# Patient Record
Sex: FEMALE | Race: WHITE | NOT HISPANIC OR LATINO | Employment: UNEMPLOYED | URBAN - METROPOLITAN AREA
[De-identification: names, ages, dates, MRNs, and addresses within clinical notes are randomized per-mention and may not be internally consistent; named-entity substitution may affect disease eponyms.]

---

## 2017-01-03 ENCOUNTER — PATIENT MESSAGE (OUTPATIENT)
Dept: INFECTIOUS DISEASES | Facility: CLINIC | Age: 47
End: 2017-01-03

## 2017-01-03 DIAGNOSIS — Z80.0 FAMILY HISTORY OF COLON CANCER: Primary | ICD-10-CM

## 2017-01-05 ENCOUNTER — TELEPHONE (OUTPATIENT)
Dept: ENDOSCOPY | Facility: HOSPITAL | Age: 47
End: 2017-01-05

## 2017-01-05 DIAGNOSIS — Z80.0 FAMILY HISTORY OF COLON CANCER REQUIRING SCREENING COLONOSCOPY: Primary | ICD-10-CM

## 2017-01-05 RX ORDER — SODIUM, POTASSIUM,MAG SULFATES 17.5-3.13G
SOLUTION, RECONSTITUTED, ORAL ORAL
Qty: 1 BOTTLE | Refills: 0 | Status: ON HOLD | OUTPATIENT
Start: 2017-01-05 | End: 2017-01-26

## 2017-01-25 ENCOUNTER — ANESTHESIA EVENT (OUTPATIENT)
Dept: ENDOSCOPY | Facility: HOSPITAL | Age: 47
End: 2017-01-25
Payer: COMMERCIAL

## 2017-01-26 ENCOUNTER — ANESTHESIA (OUTPATIENT)
Dept: ENDOSCOPY | Facility: HOSPITAL | Age: 47
End: 2017-01-26
Payer: COMMERCIAL

## 2017-01-26 ENCOUNTER — SURGERY (OUTPATIENT)
Age: 47
End: 2017-01-26

## 2017-01-26 ENCOUNTER — HOSPITAL ENCOUNTER (OUTPATIENT)
Facility: HOSPITAL | Age: 47
Discharge: HOME | End: 2017-01-26
Attending: COLON & RECTAL SURGERY | Admitting: COLON & RECTAL SURGERY
Payer: COMMERCIAL

## 2017-01-26 VITALS
SYSTOLIC BLOOD PRESSURE: 115 MMHG | HEIGHT: 65 IN | DIASTOLIC BLOOD PRESSURE: 70 MMHG | OXYGEN SATURATION: 100 % | TEMPERATURE: 98 F | RESPIRATION RATE: 16 BRPM | HEART RATE: 69 BPM | WEIGHT: 118 LBS | BODY MASS INDEX: 19.66 KG/M2

## 2017-01-26 VITALS — RESPIRATION RATE: 73 BRPM

## 2017-01-26 DIAGNOSIS — Z13.9 SCREENING: ICD-10-CM

## 2017-01-26 LAB
B-HCG UR QL: NEGATIVE
CTP QC/QA: YES

## 2017-01-26 PROCEDURE — 37000009 HC ANESTHESIA EA ADD 15 MINS: Performed by: COLON & RECTAL SURGERY

## 2017-01-26 PROCEDURE — 45380 COLONOSCOPY AND BIOPSY: CPT | Mod: 33,,, | Performed by: COLON & RECTAL SURGERY

## 2017-01-26 PROCEDURE — 45380 COLONOSCOPY AND BIOPSY: CPT | Performed by: COLON & RECTAL SURGERY

## 2017-01-26 PROCEDURE — 37000008 HC ANESTHESIA 1ST 15 MINUTES: Performed by: COLON & RECTAL SURGERY

## 2017-01-26 PROCEDURE — D9220A PRA ANESTHESIA: Mod: 33,CRNA,, | Performed by: NURSE ANESTHETIST, CERTIFIED REGISTERED

## 2017-01-26 PROCEDURE — D9220A PRA ANESTHESIA: Mod: 33,ANES,, | Performed by: ANESTHESIOLOGY

## 2017-01-26 PROCEDURE — 81025 URINE PREGNANCY TEST: CPT | Performed by: NURSE PRACTITIONER

## 2017-01-26 PROCEDURE — 27201012 HC FORCEPS, HOT/COLD, DISP: Performed by: COLON & RECTAL SURGERY

## 2017-01-26 PROCEDURE — 25000003 PHARM REV CODE 250: Performed by: NURSE ANESTHETIST, CERTIFIED REGISTERED

## 2017-01-26 PROCEDURE — 25000003 PHARM REV CODE 250: Performed by: NURSE PRACTITIONER

## 2017-01-26 PROCEDURE — 63600175 PHARM REV CODE 636 W HCPCS: Performed by: NURSE ANESTHETIST, CERTIFIED REGISTERED

## 2017-01-26 PROCEDURE — 88305 TISSUE EXAM BY PATHOLOGIST: CPT | Performed by: PATHOLOGY

## 2017-01-26 RX ORDER — LIDOCAINE HCL/PF 100 MG/5ML
SYRINGE (ML) INTRAVENOUS
Status: DISCONTINUED | OUTPATIENT
Start: 2017-01-26 | End: 2017-01-26

## 2017-01-26 RX ORDER — PROPOFOL 10 MG/ML
VIAL (ML) INTRAVENOUS
Status: DISCONTINUED | OUTPATIENT
Start: 2017-01-26 | End: 2017-01-26

## 2017-01-26 RX ORDER — SODIUM CHLORIDE 9 MG/ML
INJECTION, SOLUTION INTRAVENOUS CONTINUOUS
Status: DISCONTINUED | OUTPATIENT
Start: 2017-01-26 | End: 2017-01-26 | Stop reason: HOSPADM

## 2017-01-26 RX ADMIN — LIDOCAINE HYDROCHLORIDE 100 MG: 20 INJECTION, SOLUTION INTRAVENOUS at 10:01

## 2017-01-26 RX ADMIN — PROPOFOL 80 MG: 10 INJECTION, EMULSION INTRAVENOUS at 10:01

## 2017-01-26 RX ADMIN — PROPOFOL 40 MG: 10 INJECTION, EMULSION INTRAVENOUS at 10:01

## 2017-01-26 RX ADMIN — SODIUM CHLORIDE: 0.9 INJECTION, SOLUTION INTRAVENOUS at 09:01

## 2017-01-26 NOTE — DISCHARGE INSTRUCTIONS
Colonoscopy     A camera attached to a flexible tube with a viewing lens is used to take video pictures.     Colonoscopy is used to view the inside of your lower digestive tract (colon and rectum). It can help screen for colon cancer and can help find the source of abdominal pain, bleeding, and changes in bowel habits. The test is usually done in the hospital on an outpatient basis. During the exam, the doctor can remove a small tissue sample ( a biopsy) for testing. Small growths, such as polyps, may also be removed during colonoscopy.  Getting ready   · Be sure to tell your doctor about any medications you take. Also tell your doctor about any health conditions you may have.  · Discuss the risks of the test with your doctor. These include bleeding and bowel puncture.  · Your rectum and colon must be empty for the test. So be sure to follow the diet and bowel prep instructions exactly. If you dont, the test may need to be rescheduled.  · Ask your doctor whether you need to have a friend or family member prepared to drive you home after the test.  During the test   · You are given sedating (relaxing) medication through an IV line. You may be drowsy or completely asleep.  · The procedure takes 30 minutes or longer.  · The doctor performs a digital rectal exam to check for anal and rectal problems. The rectum is lubricated and the scope inserted.  · If you are awake, you may have a feeling similar to needing to have a bowel movement. You may also feel pressure as air is pumped into the colon. Its OK to pass gas during the procedure.  After the test   ·      Colonoscopy provides an inside view of the entire colon.     You may discuss the results with your doctor right away or at a future visit.  · Try to pass all the gas right after the test to help prevent bloating and cramping.  · After the test, you can go back to your normal eating and other activities.  Risks and possible complications include:  · Bleeding · A  puncture or tear in the colon · Risks of anesthesia       © 7732-8500 [employer]. [address] [place] [address], [place] [address]. All rights reserved. This information is not intended as a substitute for professional medical care. Always follow your healthcare professional's instructions.

## 2017-01-26 NOTE — ANESTHESIA PREPROCEDURE EVALUATION
01/26/2017  Louise Cueto is a 46 y.o., female.    [facility] Anesthesia Evaluation         Review of Systems  Anesthesia Hx:  No problems with previous Anesthesia   Social:  Non-Smoker    Cardiovascular:   Exercise tolerance: good Denies Hypertension.  Denies CAD.     Denies Angina.  Functional Capacity Can you climb two flights of stairs? ==> Yes    Pulmonary:   Denies Asthma.  Denies Recent URI.  Denies Sleep Apnea.    Renal/:  Renal/ Normal     Hepatic/GI:   Denies PUD. Denies Hiatal Hernia.  Denies GERD. Denies Liver Disease.  Denies Hepatitis.    Neurological:   Denies CVA. Denies Seizures.    Endocrine:   Denies Diabetes. Denies Hypothyroidism.        Physical Exam  General:  Well nourished    Airway/Jaw/Neck:  Airway Findings: Mouth Opening: Normal Tongue: Normal  General Airway Assessment: Adult  Mallampati: I  TM Distance: Normal, at least 6 cm  Jaw/Neck Findings:  Neck ROM: Normal ROM  Neck Findings:     Eyes/Ears/Nose:  EYES/EARS/NOSE FINDINGS: Normal   Dental:  Dental Findings: In tact   Chest/Lungs:  Chest/Lungs Findings: Clear to auscultation     Heart/Vascular:  Heart Findings: Rate: Normal  Rhythm: Regular Rhythm  Sounds: Normal        Mental Status:  Mental Status Findings:  Alert and Oriented         Anesthesia Plan  Type of Anesthesia, risks & benefits discussed:  Anesthesia Type:  general  Patient's Preference: Proceed with anesthesia understanding that the risks are very small but could be serious or life threatening.  Intra-op Monitoring Plan: standard ASA monitors  Intra-op Monitoring Plan Comments:   Post Op Pain Control Plan:   Post Op Pain Control Plan Comments:   Induction:   IV  Beta Blocker:  Patient is not currently on a Beta-Blocker (No further documentation required).       Informed Consent: Patient understands risks and agrees with Anesthesia plan.  Questions answered.  Anesthesia consent signed with patient.  ASA Score: 1     Day of Surgery Review of History & Physical: I have interviewed and examined the patient. I have reviewed the patient's H&P dated:            Ready For Surgery From Anesthesia Perspective.

## 2017-01-26 NOTE — TRANSFER OF CARE
"Anesthesia Transfer of Care Note    Patient: Louise Cueto    Procedure(s) Performed: Procedure(s) (LRB):  COLONOSCOPY (N/A)    Patient location: PACU    Anesthesia Type: general    Transport from OR: Transported from OR on room air with adequate spontaneous ventilation    Post pain: adequate analgesia    Post assessment: no apparent anesthetic complications    Post vital signs: stable    Level of consciousness: awake    Nausea/Vomiting: no nausea/vomiting          Last vitals:   Visit Vitals    /65    Pulse 65    Temp 36.6 °C (97.9 °F)    Resp 20    Ht 5' 5" (1.651 m)    Wt 53.5 kg (118 lb)    LMP 01/20/2017    SpO2 100%    Breastfeeding No    BMI 19.64 kg/m2     "

## 2017-01-26 NOTE — ANESTHESIA POSTPROCEDURE EVALUATION
"Anesthesia Post Evaluation    Patient: Louise Cueto    Procedure(s) Performed: Procedure(s) (LRB):  COLONOSCOPY (N/A)    Final Anesthesia Type: general  Patient location during evaluation: GI PACU  Patient participation: Yes- Able to Participate  Level of consciousness: awake and alert  Post-procedure vital signs: reviewed and stable  Pain management: adequate  Airway patency: patent  PONV status at discharge: No PONV  Anesthetic complications: no      Cardiovascular status: blood pressure returned to baseline  Respiratory status: unassisted  Hydration status: euvolemic  Follow-up not needed.        Visit Vitals    /70 (BP Location: Left arm, Patient Position: Sitting, BP Method: Automatic)    Pulse 69    Temp 36.5 °C (97.7 °F) (Axillary)    Resp 16    Ht 5' 5" (1.651 m)    Wt 53.5 kg (118 lb)    LMP 01/20/2017    SpO2 100%    Breastfeeding No    BMI 19.64 kg/m2       Pain/Ozzy Score: Pain Assessment Performed: Yes (1/26/2017 11:04 AM)  Presence of Pain: denies (1/26/2017 11:04 AM)  Ozzy Score: 10 (1/26/2017 11:04 AM)      "

## 2017-01-26 NOTE — IP AVS SNAPSHOT
[facility]  [address]  [facility] [address]  Phone: [phone]           Patient Discharge Instructions     Our goal is to set you up for success. This packet includes information on your condition, medications, and your home care. It will help you to care for yourself so you don't get sicker and need to go back to the hospital.     Please ask your nurse if you have any questions.        There are many details to remember when preparing to leave the hospital. Here is what you will need to do:    1. Take your medicine. If you are prescribed medications, review your Medication List in the following pages. You may have new medications to  at the pharmacy and others that you'll need to stop taking. Review the instructions for how and when to take your medications. Talk with your doctor or nurses if you are unsure of what to do.     2. Go to your follow-up appointments. Specific follow-up information is listed in the following pages. Your may be contacted by a transition nurse or clinical provider about future appointments. Be sure we have all of the phone numbers to reach you, if needed. Please contact your provider's office if you are unable to make an appointment.     3. Watch for warning signs. Your doctor or nurse will give you detailed warning signs to watch for and when to call for assistance. These instructions may also include educational information about your condition. If you experience any of warning signs to your health, call your doctor.               [facility]  Unless otherwise directed by your provider, please contact [facility], our nurse care line that is available for 24/7 assistance.     [phone] (toll-free)    Registered nurses in the [facility] provide clinical advisement, health education, appointment booking, and other advisory services.                    ** Verify the list of medication(s) below is accurate and up  to date. Carry this with you in case of emergency. If your medications have changed, please notify your healthcare provider.             Medication List      Notice     You have not been prescribed any medications.               Please bring to all follow up appointments:    1. A copy of your discharge instructions.  2. All medicines you are currently taking in their original bottles.  3. Identification and insurance card.    Please arrive 15 minutes ahead of scheduled appointment time.    Please call 24 hours in advance if you must reschedule your appointment and/or time.            Discharge Instructions         Colonoscopy     A camera attached to a flexible tube with a viewing lens is used to take video pictures.     Colonoscopy is used to view the inside of your lower digestive tract (colon and rectum). It can help screen for colon cancer and can help find the source of abdominal pain, bleeding, and changes in bowel habits. The test is usually done in the hospital on an outpatient basis. During the exam, the doctor can remove a small tissue sample ( a biopsy) for testing. Small growths, such as polyps, may also be removed during colonoscopy.  Getting ready   · Be sure to tell your doctor about any medications you take. Also tell your doctor about any health conditions you may have.  · Discuss the risks of the test with your doctor. These include bleeding and bowel puncture.  · Your rectum and colon must be empty for the test. So be sure to follow the diet and bowel prep instructions exactly. If you dont, the test may need to be rescheduled.  · Ask your doctor whether you need to have a friend or family member prepared to drive you home after the test.  During the test   · You are given sedating (relaxing) medication through an IV line. You may be drowsy or completely asleep.  · The procedure takes 30 minutes or longer.  · The doctor performs a digital rectal exam to check for anal and rectal problems. The  "rectum is lubricated and the scope inserted.  · If you are awake, you may have a feeling similar to needing to have a bowel movement. You may also feel pressure as air is pumped into the colon. Its OK to pass gas during the procedure.  After the test   ·      Colonoscopy provides an inside view of the entire colon.     You may discuss the results with your doctor right away or at a future visit.  · Try to pass all the gas right after the test to help prevent bloating and cramping.  · After the test, you can go back to your normal eating and other activities.  Risks and possible complications include:  · Bleeding · A puncture or tear in the colon · Risks of anesthesia       © 9561-6583 [employer]. [address] [place] [address], [place] [address]. All rights reserved. This information is not intended as a substitute for professional medical care. Always follow your healthcare professional's instructions.            Admission Information     Date & Time Provider Department CSN    1/26/2017  8:55 AM Andrews Sears MD [facility] [id number]      Care Providers     Provider Role Specialty Primary office phone    Andrews Sears MD Attending Provider Colon and Rectal Surgery [phone]    Andrews Sears MD Surgeon  Colon and Rectal Surgery [phone]      Your Vitals Were     BP Pulse Temp Resp Height Weight    111/58 (BP Location: Left arm, Patient Position: Lying, BP Method: Automatic) 73 97.7 °F (36.5 °C) (Axillary) 16 5' 5" (1.651 m) 53.5 kg (118 lb)    Last Period SpO2 BMI          01/20/2017 99% 19.64 kg/m2        Recent Lab Values        5/2/2016                           9:10 AM           A1C 5.3                       Pending Labs     Order Current Status    Specimen to Pathology - Surgery Collected (01/26/17 1041)      Allergies as of 1/26/2017     No Known Allergies      Advance Directives     An advance directive is a document which, in the event you are no longer able " to make decisions for yourself, tells your healthcare team what kind of treatment you do or do not want to receive, or who you would like to make those decisions for you.  If you do not currently have an advance directive, [facility] encourages you to create one.  For more information call:  [phone]), [phone] ([phone]),  or log on to [email or web address].        Language Assistance Services     ATTENTION: Language assistance services are available, free of charge. Please call [phone].      ATENCIÓN: Si habla español, tiene a patterson disposición servicios gratuitos de asistencia lingüística. Llame al [phone].     CHÚ Ý: N?u b?n nói Ti?ng Vi?t, có các d?ch v? h? tr? ngôn ng? mi?n phí dành cho b?n. G?i s? [phone].         [facility] complies with applicable Federal civil rights laws and does not discriminate on the basis of race, color, national origin, age, disability, or sex.

## 2017-01-26 NOTE — ANESTHESIA RELEASE NOTE
Anesthesia Release from PACU Note    Patient: Louise Cueto    Procedure(s) Performed: Procedure(s) (LRB):  COLONOSCOPY (N/A)    Anesthesia type: General    Post pain: Adequate analgesia    Post assessment: no apparent anesthetic complications    Last Vitals:   Vitals:    01/26/17 1104   BP: 115/70   Pulse: 69   Resp: 16   Temp:    SpO2: 100%       Post vital signs: stable    Level of consciousness: awake    Complications: none    Airway Patency: patent    Respiratory: spontaneous    Cardiovascular: stable    Hydration: euvolemic

## 2017-01-26 NOTE — H&P
Endoscopy H&P    Procedure : Colonoscopy      asymptomatic screening exam and family history of colon cancer (mother 70's)      Past Medical History   Diagnosis Date    GERD (gastroesophageal reflux disease)              Review of Systems -ROS:  GENERAL: No fever, chills, fatigability or weight loss.  CHEST: Denies GERMAN, cyanosis, wheezing, cough and sputum production.  CARDIOVASCULAR: Denies chest pain, PND, orthopnea or reduced exercise tolerance.   Musculoskeletal ROS: negative for - gait disturbance or joint pain  Neurological ROS: negative for - confusion or memory loss        Physical Exam:  General: well developed, well nourished, no distress  Head: normocephalic  Neck: supple, symmetrical, trachea midline  Lungs:  clear to auscultation bilaterally and normal respiratory effort  Heart: regular rate and rhythm, S1, S2 normal, no murmur, rub or gallop and regular rate and rhythm  Abdomen: soft, non-tender non-distented; bowel sounds normal; no masses,  no organomegaly  Extremities: no cyanosis or edema, or clubbing       Moderate Sedation (choice): Mallampati Score 1    ASA : II    IMP: asymptomatic screening exam and family history of colon cancer (mother 70's)    Plan: Colonoscopy with Moderate sedation.  I have explained the procedure including indications, alternatives, expected outcomes and potential complications. The patient appears to understand and gives informed consent. The patient is medically ready for surgery.

## 2017-02-02 ENCOUNTER — TELEPHONE (OUTPATIENT)
Dept: ENDOSCOPY | Facility: HOSPITAL | Age: 47
End: 2017-02-02

## 2017-03-02 ENCOUNTER — TELEPHONE (OUTPATIENT)
Dept: INFECTIOUS DISEASES | Facility: CLINIC | Age: 47
End: 2017-03-02

## 2017-03-02 RX ORDER — AMOXICILLIN AND CLAVULANATE POTASSIUM 875; 125 MG/1; MG/1
1 TABLET, FILM COATED ORAL 2 TIMES DAILY
Qty: 20 TABLET | Refills: 0 | Status: SHIPPED | OUTPATIENT
Start: 2017-03-02 | End: 2017-03-12

## 2017-08-11 ENCOUNTER — OFFICE VISIT (OUTPATIENT)
Dept: URGENT CARE | Facility: CLINIC | Age: 47
End: 2017-08-11
Payer: COMMERCIAL

## 2017-08-11 VITALS
RESPIRATION RATE: 18 BRPM | BODY MASS INDEX: 19.66 KG/M2 | DIASTOLIC BLOOD PRESSURE: 67 MMHG | WEIGHT: 118 LBS | OXYGEN SATURATION: 100 % | HEIGHT: 65 IN | HEART RATE: 92 BPM | SYSTOLIC BLOOD PRESSURE: 103 MMHG | TEMPERATURE: 98 F

## 2017-08-11 DIAGNOSIS — S60.562A: Primary | ICD-10-CM

## 2017-08-11 DIAGNOSIS — L08.9: Primary | ICD-10-CM
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
## 2017-08-11 PROCEDURE — 99214 OFFICE O/P EST MOD 30 MIN: CPT | Mod: 25,S$GLB,, | Performed by: FAMILY MEDICINE

## 2017-08-11 PROCEDURE — 96372 THER/PROPH/DIAG INJ SC/IM: CPT | Mod: S$GLB,,, | Performed by: FAMILY MEDICINE

## 2017-08-11 PROCEDURE — 3008F BODY MASS INDEX DOCD: CPT | Mod: S$GLB,,, | Performed by: FAMILY MEDICINE

## 2017-08-11 RX ORDER — SULFAMETHOXAZOLE AND TRIMETHOPRIM 400; 80 MG/1; MG/1
1 TABLET ORAL 2 TIMES DAILY
Qty: 20 TABLET | Refills: 0 | Status: SHIPPED | OUTPATIENT
Start: 2017-08-11 | End: 2017-08-21

## 2017-08-11 RX ORDER — BETAMETHASONE SODIUM PHOSPHATE AND BETAMETHASONE ACETATE 3; 3 MG/ML; MG/ML
6 INJECTION, SUSPENSION INTRA-ARTICULAR; INTRALESIONAL; INTRAMUSCULAR; SOFT TISSUE
Status: COMPLETED | OUTPATIENT
Start: 2017-08-11 | End: 2017-08-11

## 2017-08-11 RX ADMIN — BETAMETHASONE SODIUM PHOSPHATE AND BETAMETHASONE ACETATE 6 MG: 3; 3 INJECTION, SUSPENSION INTRA-ARTICULAR; INTRALESIONAL; INTRAMUSCULAR; SOFT TISSUE at 11:08

## 2017-08-11 NOTE — PATIENT INSTRUCTIONS
Infected Insect Bite or Sting    When an insect stings you, it injects venom. When an insect bites you, it does not. Stings and bites may cause a local reaction. Or they may cause a reaction that affects your whole body. Bites and stings may become infected. Signs of infection include redness, warmth, pain, drainage of pus, and swelling. Infections will need treatment with antibiotics and should get better over the next 10 days.  Home care  The following will help you care for your bite or sting at home:  · If a stinger is still in your skin, it will need to be removed. Don't use tweezers. Gently scrape the stinger from the side with a firm object such as the side of a credit card. This will loosen it and remove it from your skin.  · If itching is a problem, applying ice packs to the sting area will help.  · Wash the area with soap and water at least 3 times a day. Apply a topical antibiotic cream or ointment.  · You can use an over-the counter antihistamine unless your doctor has given you a prescription antihistamine. You may use antihistamines to reduce itching if large areas of the skin are involved. Use lower doses during the daytime and higher doses at bedtime since the drug may make you sleepy. Don't use an antihistamine if you have glaucoma or if you are a man with trouble urinating due to an enlarged prostate. Some antihistamines cause less drowsiness and are a good choice for daytime use.  · If oral antibiotics have been prescribed, be sure to take them as directed until they are all finished.  · You may use over-the-counter pain medicine to control pain, unless another pain medicine was prescribed. Talk with your doctor before using acetaminophen or ibuprofen if you have chronic liver or kidney disease. Also talk with your doctor if you have ever had a stomach ulcer or gastrointestinal bleeding.  Follow-up care  Follow up with your healthcare provider, or as advised if you don't get better over the next  2 days or if your symptoms get worse.  Call 911  Call 911 if any of these occur:  · Swelling of the face, eyelids, mouth, throat, or tongue  · Difficulty swallowing or breathing  When to seek medical advice  Call your healthcare provider right away if any of these occur:  · Spreading areas of redness or swelling  · Fever of 100.4°F (38°C) or higher, or as directed by your healthcare provider  · Increased local pain  · Headache, fever, chills, muscle or joint aching, or vomiting,  · New rash  Date Last Reviewed: 10/1/2016  © 5808-7811 [employer]. [address] [place] [address], [place] [address]. All rights reserved. This information is not intended as a substitute for professional medical care. Always follow your healthcare professional's instructions.

## 2017-08-11 NOTE — PROGRESS NOTES
"Subjective:       Patient ID: Louise Cueto is a 46 y.o. female.    Vitals:  height is 5' 5" (1.651 m) and weight is 53.5 kg (118 lb). Her oral temperature is 98.4 °F (36.9 °C). Her blood pressure is 103/67 and her pulse is 92. Her respiration is 18 and oxygen saturation is 100%.     Chief Complaint: Insect Bite    Patient states she was sting by a wasp on yesterday on her left hand.      Insect Bite   This is a new problem. The current episode started yesterday. The problem occurs constantly. The problem has been gradually worsening. Associated symptoms include joint swelling and numbness. Pertinent negatives include no abdominal pain, chest pain, chills, fever, headaches, nausea, rash, sore throat or vomiting. Nothing aggravates the symptoms. Treatments tried: bendrayl and advil. The treatment provided mild relief.     Review of Systems   Constitution: Negative for chills and fever.   HENT: Negative for headaches and sore throat.    Eyes: Negative for blurred vision.   Cardiovascular: Negative for chest pain.   Respiratory: Negative for shortness of breath.    Skin: Positive for color change, dry skin and itching. Negative for rash.   Musculoskeletal: Positive for joint swelling. Negative for back pain and joint pain.   Gastrointestinal: Negative for abdominal pain, diarrhea, nausea and vomiting.   Neurological: Positive for numbness.   Psychiatric/Behavioral: The patient is not nervous/anxious.        Objective:      Physical Exam   Constitutional: She appears well-developed and well-nourished.   Cardiovascular: Normal rate, regular rhythm and normal heart sounds.    Pulmonary/Chest: Effort normal and breath sounds normal.   Skin: Skin is warm. There is erythema (left hand dorsum with significant swelling to wrist).   Nursing note and vitals reviewed.      Assessment:       1. Insect bite of hand, left, infected, initial encounter        Plan:         Insect bite of hand, left, infected, initial encounter  -     " sulfamethoxazole-trimethoprim 400-80mg (BACTRIM) 400-80 mg per tablet; Take 1 tablet by mouth 2 (two) times daily.  Dispense: 20 tablet; Refill: 0  -     DIPH,PERTUSS(ACEL),TET VAC(PF)(ADULT)(ADACEL)(TDaP); Inject 0.5 mLs into the muscle one time.  -     betamethasone acetate-betamethasone sodium phosphate injection 6 mg; Inject 1 mL (6 mg total) into the muscle one time.      Discussed warning symptoms with patient

## 2017-08-25 ENCOUNTER — TELEPHONE (OUTPATIENT)
Dept: OBSTETRICS AND GYNECOLOGY | Facility: CLINIC | Age: 47
End: 2017-08-25

## 2017-08-25 DIAGNOSIS — Z12.31 VISIT FOR SCREENING MAMMOGRAM: Primary | ICD-10-CM

## 2017-08-25 NOTE — TELEPHONE ENCOUNTER
----- Message from Amara Dumont sent at 8/25/2017 12:49 PM CDT -----  Contact: pt  x 1st Request  _ 2nd Request  _ 3rd Request    Who: pt    Why: is requesting mmg order and is needing to schedule her annual if its her time to do so    What Number to Call Back: [phone]    When to Expect a call back: (Before the end of the day)  -- if call after 3:00 call back will be tomorrow.

## 2017-09-14 ENCOUNTER — HOSPITAL ENCOUNTER (OUTPATIENT)
Dept: RADIOLOGY | Facility: HOSPITAL | Age: 47
Discharge: HOME | End: 2017-09-14
Attending: OBSTETRICS & GYNECOLOGY
Payer: COMMERCIAL

## 2017-09-14 DIAGNOSIS — Z12.31 VISIT FOR SCREENING MAMMOGRAM: ICD-10-CM

## 2017-09-14 PROCEDURE — 77063 BREAST TOMOSYNTHESIS BI: CPT | Mod: 26,,, | Performed by: RADIOLOGY

## 2017-09-14 PROCEDURE — 77067 SCR MAMMO BI INCL CAD: CPT | Mod: 26,,, | Performed by: RADIOLOGY

## 2017-09-14 PROCEDURE — 77067 SCR MAMMO BI INCL CAD: CPT | Mod: TC

## 2017-10-09 ENCOUNTER — HOSPITAL ENCOUNTER (OUTPATIENT)
Dept: RADIOLOGY | Facility: HOSPITAL | Age: 47
Discharge: HOME | End: 2017-10-09
Attending: ORTHOPAEDIC SURGERY
Payer: COMMERCIAL

## 2017-10-09 ENCOUNTER — OFFICE VISIT (OUTPATIENT)
Dept: SPORTS MEDICINE | Facility: CLINIC | Age: 47
End: 2017-10-09
Payer: COMMERCIAL

## 2017-10-09 VITALS
HEIGHT: 65 IN | BODY MASS INDEX: 19.99 KG/M2 | HEART RATE: 59 BPM | WEIGHT: 120 LBS | DIASTOLIC BLOOD PRESSURE: 69 MMHG | SYSTOLIC BLOOD PRESSURE: 108 MMHG

## 2017-10-09 DIAGNOSIS — M54.2 NECK PAIN: ICD-10-CM

## 2017-10-09 DIAGNOSIS — M25.512 LEFT SHOULDER PAIN, UNSPECIFIED CHRONICITY: ICD-10-CM

## 2017-10-09 DIAGNOSIS — M54.2 NECK PAIN: Primary | ICD-10-CM

## 2017-10-09 PROCEDURE — 73030 X-RAY EXAM OF SHOULDER: CPT | Mod: 26,LT,, | Performed by: RADIOLOGY

## 2017-10-09 PROCEDURE — 73030 X-RAY EXAM OF SHOULDER: CPT | Mod: TC,PO,LT

## 2017-10-09 PROCEDURE — 99203 OFFICE O/P NEW LOW 30 MIN: CPT | Mod: S$GLB,,, | Performed by: ORTHOPAEDIC SURGERY

## 2017-10-09 PROCEDURE — 72040 X-RAY EXAM NECK SPINE 2-3 VW: CPT | Mod: 26,,, | Performed by: RADIOLOGY

## 2017-10-09 PROCEDURE — 72040 X-RAY EXAM NECK SPINE 2-3 VW: CPT | Mod: TC,PO

## 2017-10-09 PROCEDURE — 99999 PR PBB SHADOW E&M-EST. PATIENT-LVL IV: CPT | Mod: PBBFAC,,, | Performed by: ORTHOPAEDIC SURGERY

## 2017-10-09 NOTE — PROGRESS NOTES
"CC: left Shoulder pain/neck pain    46 y.o. Female reports active in running and yoga who presents with left sided neck and shoulder pain for 3-4 mos.  Denies any knowledge of injury.  Reports that pain is mild up to 3-4/10.  Intermittent pain with activities.  Improved with yoga.  Pain does not radiate down arm.  Described as dull pain.          PAST MEDICAL HISTORY:   Past Medical History:   Diagnosis Date    GERD (gastroesophageal reflux disease)      PAST SURGICAL HISTORY:   Past Surgical History:   Procedure Laterality Date    BREAST CYST EXCISION Left     20 y/o f     SECTION      CHOLECYSTECTOMY      COLONOSCOPY N/A 2017    Procedure: COLONOSCOPY;  Surgeon: Andrews Sears MD;  Location: [address]);  Service: Endoscopy;  Laterality: N/A;     FAMILY HISTORY:   Family History   Problem Relation Age of Onset    Heart disease Father     Hyperlipidemia Father     Cancer Mother 73     colon ca    Breast cancer Sister 48    Colon cancer Neg Hx      SOCIAL HISTORY:   Social History     Social History    Marital status:      Spouse name: N/A    Number of children: N/A    Years of education: N/A     Occupational History    Not on file.     Social History Main Topics    Smoking status: Never Smoker    Smokeless tobacco: Not on file    Alcohol use Not on file    Drug use: Unknown    Sexual activity: Not on file     Other Topics Concern    Not on file     Social History Narrative    No narrative on file       MEDICATIONS: No current outpatient prescriptions on file.  ALLERGIES: Review of patient's allergies indicates:  No Known Allergies    VITAL SIGNS: /69   Pulse (!) 59   Ht 5' 5" (1.651 m)   Wt 54.4 kg (120 lb)   LMP 2017   BMI 19.97 kg/m²      Review of Systems   Constitution: Negative. Negative for chills, fever and night sweats.   HENT: Negative for congestion and headaches.    Eyes: Negative for blurred vision, left vision loss and right vision " loss.   Cardiovascular: Negative for chest pain and syncope.   Respiratory: Negative for cough and shortness of breath.    Endocrine: Negative for polydipsia, polyphagia and polyuria.   Hematologic/Lymphatic: Negative for bleeding problem. Does not bruise/bleed easily.   Skin: Negative for dry skin, itching and rash.   Musculoskeletal: Negative for falls and muscle weakness.   Gastrointestinal: Negative for abdominal pain and bowel incontinence.   Genitourinary: Negative for bladder incontinence and nocturia.   Neurological: Negative for disturbances in coordination, loss of balance and seizures.   Psychiatric/Behavioral: Negative for depression. The patient does not have insomnia.    Allergic/Immunologic: Negative for hives and persistent infections.       PHYSICAL EXAMINATION:  General:  The patient is alert and oriented x 3.  Mood is pleasant.  Observation of ears, eyes and nose reveal no gross abnormalities.  HEENT: NCAT, sclera nonicteric  Lungs: Respirations are equal and unlabored.  Gait is coordinated. Patient can toe walk and heel walk without difficulty.  Cardiovascular: There are no swelling or varicosities present.   Lymphatic: Negative for adenopathy       left Shoulder / Upper Extremity Exam    OBSERVATION:     Swelling  none  Deformity  none   Discoloration  none   Scapular winging none   Scars   none  Atrophy  none    TENDERNESS / CREPITUS (T/C):          T/C      T/C   Clavicle   -/-  SUPRAspinatus    -/-   AC Jt.    -/-  INFRAspinatus  -/-   SC Jt.    -/-  Deltoid    -/-   G. Tuberosity  -/-  LH BICEP groove  -/-   Acromion:  -/-  Midline Neck   -/-   Scapular Spine -/-  Trapezium   +/-   SMA Scapula  -/-  GH jt. line - post  -/-   Scapulothoracic  -/-         ROM: (* = with pain)  Right shoulder   Left shoulder        AROM (PROM)   AROM (PROM)   FE    170° (175°)     170° (175°)     ER at 0°    60°  (65°)    60°  (65°)   ER at 90° ABD  90°  (90°)    90°  (90°)   IR at 90°  ABD   NA  (40°)     NA   (40°)      IR (spine level)   T10     T10    STRENGTH: (* = with pain) RIGHT SHOULDER  LEFT SHOULDER   SCAPTION at 0   5/5    5/5    SCAPTION at 30   5/5    5/5    IR    5/5    5/5   ER    5/5    5/5   BICEPS   5/5    5/5   Deltoid    5/5    5/5     SIGNS:  Painful side       NEER   neg    OJAYLENS  Neg   EUCEDA   neg    SPEEDS  Neg   DROP ARM   neg   BELLY PRESS Neg   Superior escape none    LIFT-OFF  Neg   X-Body ADD    neg    MOVING VALGUS Neg            EXTREMITY NEURO-VASCULAR EXAM    Sensation grossly intact to light touch all dermatomal regions.    DTR 2+ Biceps, Triceps, BR and Negative Rhondas sign   Grossly intact motor function at Elbow, Wrist and Hand   Distal pulses radial and ulnar 2+, brisk cap refill, symmetric.      NECK:  Painless FROM and spinous processes non-tender. Negative Spurlings sign.      OTHER FINDINGS:  + scapular dyskinesia    XRAYS:  Shoulder trauma series left cervical ap and lateral,  were ordered and reviewed by me. No convincing fracture or dislocation is noted. The osseous structures appear well mineralized and well aligned    1.  2. Shoulder pain, left  Scapular dyskinesia     Plan:       ASSESSMENT:  shoulder pain, scapular dyskinesia  1. Will begin PT for scapular dyskinesia  2. Return to clinic in 1 month

## 2017-10-19 ENCOUNTER — CLINICAL SUPPORT (OUTPATIENT)
Dept: REHABILITATION | Facility: HOSPITAL | Age: 47
End: 2017-10-19
Attending: STUDENT IN AN ORGANIZED HEALTH CARE EDUCATION/TRAINING PROGRAM
Payer: COMMERCIAL

## 2017-10-19 DIAGNOSIS — M25.512 ACUTE PAIN OF LEFT SHOULDER: ICD-10-CM

## 2017-10-19 DIAGNOSIS — M54.2 CERVICAL PAIN: ICD-10-CM

## 2017-10-19 PROCEDURE — 97161 PT EVAL LOW COMPLEX 20 MIN: CPT

## 2017-10-19 PROCEDURE — 97140 MANUAL THERAPY 1/> REGIONS: CPT

## 2017-10-19 NOTE — PROGRESS NOTES
"Pt signed written consent to dry needling Rx.  Pt gave verbal consent for DN.    DN Time: 11:45  Pt rec'd dry needling to cervical spine with 1-2 in needles for range of motion, decrease pain with no adverse effects.    Homeostatic points  1. Deep Radial  2. Greater Auricular  3. Spinal Accessory x 1 1"  4. Saphenous  5. Deep Fibular  6. Tibial  7. Greater Occipital  8. Suprascapular ( infraspinatus)  9. Lateral Antebrachial Cutaneous  10. Sural  11 Lateral Popliteal  12. Superficial Radial  13. Dorsal Scapular  14. Superior Cluneal  15. Posterior Cutaneous L 2  16. Inferior Gluteal  17. Lateral Pectoral  18. Ilitotibal  19. Infraorbital  20. Spinous process T7  21. Posterior cutaneous  T6  22. Posterior cutaneous L 5  23. Supraorbital  24. Common fibular    cspine 4,5,6 paraspinals B x 6    Elisha Benitez, PT, DPT  10/19/2017      "

## 2017-10-19 NOTE — PROGRESS NOTES
Physical Therapy Evaluation    Name: Louise Cueto  Clinic Number: [id number]      Diagnosis:   Encounter Diagnoses   Name Primary?    Cervical pain     Acute pain of left shoulder      Physician: Chip Somers MD  Treatment Orders: PT Eval and Treat    Past Medical History:   Diagnosis Date    GERD (gastroesophageal reflux disease)      No current outpatient prescriptions on file.     No current facility-administered medications for this visit.      Review of patient's allergies indicates:  No Known Allergies  Precautions: N/A    Evaluation Date: 10/19/47901  Visit # authorized: 1/20  Authorization period: 12/31/2017    Subjective     Onset/ARIANA: insidious    Primary concern/ Chief complaints:    Louise is a 46 y.o. female that presents to [facility] secondary to neck pain that radiates to left shoulder down to about deltoid region.  Pt states pain began a few months ago without any specific injury or incident.  She states she runs and does yoga but has been having difficulty with both.  X-ray was taken and revealed no bony abnormality.  Pt uses nothing to control symptoms. Pt has a decreased ability to perform ADLs, running, yoga and sleeping has been difficult.  Patient states MD referred her to a chiropractor as well and that has not made a difference yet; however, the doctor at the chiropractor wants to do trigger point injections.  Patient denies numbness and tingling in L UE. No cultural, environmental, or spiritual barriers identified to treatment or learning.    Pain Scale: Louise rates pain on a scale of 0-10 to be 5 at worst; 4 currently; 2 at best .    Patient Goals: Return to full functional mobility, fitness and daily activities without pain or difficulty    Objective     Posture: Patient demonstrates forward head, decreased cervical lordosis, bilaterally abducted scapulae, with forward head/rounded shoulders posture.    Passive Range of Motion: WNL.     Active Range of  "Motion: WNL.    Cervical ROM:  Moderately limited side bend and rotation w/ pain reproduction to L.    Strength:  4+/5 in all directions; scapular collapse with ER, difficulty maintaining with cues.    Special Tests:  AC Joint Right Left   Cervical Distraction (+) resolution/ stretch felt    Spurling's (-) (-)   Empty Can (-) (-)   Subscaputlaris Lift Off (-) (-)   Hawkin's Kenndy (-) (-)   Neer's Test (-) (-)   Speed's test (-) (-)   1st Rib Spring Test:  (+) reproduction of symptoms  Tay's Test:  (+) for "cold" sensation L UE   Scapular Dyskinesis (+)     Joint Mobility: Moderately restricted cervical vertebral mobility, especially C5-6 with rotation noted.  No radicular symptoms reported.    Palpation: Severe point tenderness over C5-6 transverse processes on L; moderate point tenderness over UT, scalenes and distal levator as well as cervical paraspinals on L.    Sensation: Intact.    Flexibility: Moderately restricted pec minor B; Severely restricted B UT, scalenes, moderately restricted levator.    Functional Limitations Reports   Category: Lifting/Carrying  Tool: FOTO Shoulder/Cervical    PT Evaluation Completed? Yes  Discussed Plan of Care with patient: Yes    TREATMENT:  Louise received therapeutic exercises to develop strength and endurance, flexibility for 0 minutes including:    Louise  received the following manual therapy techniques x 30 min. To include Joint mobilizations and Soft tissue Mobilization were applied to the: cervical region To include:   Dry needling performed by Elisha Benitez, PT, DPT per attached note (not billed)  Gentle STM as tolerated cervical musculature  Gentle rotational mobs especially L side of cervical region     Instructed pt. regarding: Proper technique with all exercises. Pt demonstrated good understanding of the education provided. Louise demonstrated good return demonstration of activities.    Assessment     This is a 46 y.o. female referred to outpatient physical " therapy and presents with a medical diagnosis of cervical pain and scapular dyskinesis.  Patient presents with signs and symptoms consistent with medical diagnosis exacerbated by poor scapular and cervical strength, and postural control.  Patient's chief complaint is pain.  Patient will benefit from skilled physical therapy services, specifically normalized posture and strengthening, normalized joint mobility and flexibility, progressing to functional mobility and fitness activities as tolerated. The following goals were discussed with the patient and patient is in agreement with them as to be addressed in the treatment plan. Patient was given a HEP consisting of exercises listed above. Patient verbally understood the instructions as they were given and demonstrated proper form and technique during therapy. Pt was advised to perform these exercises free of pain, and to stop performing them if pain occurs.     Medical necessity is demonstrated by the following IMPAIRMENTS/PROBLEM LIST:   1)Increase in pain level limiting function   2)Decreased strength   3)Decreased posture   4)Decreased functional mobility   5)Lack of HEP    GOALS: Short Term Goals:  6-8 weeks  1.  Report decreased cervical pain < / =  2/10  to increase tolerance for daily and fitness activities as desired.  2.  Increase cervical AROM to painfree and normal limits in all directions in order to return to daily and fitness activities as desired.   3.  Patient will demonstrate normalized scapular positioning and motion in order to perform daily and fitness activities as desired with minimal pain or difficulty.  4.  Pt to tolerate HEP to improve ROM and independence with ADL's.  5.  Patient will report ability to perform daily and fitness activities as desired without pain or difficulty.    Plan     Pt will be treated by physical therapy 1-2 times a week for Pt Education, HEP, therapeutic exercises, neuromuscular re-education, manual therapy, joint  mobilizations, modalities prn to achieve established goals. Louise may at times be seen by a PTA as part of the Rehab Team.     Cont PT for 6-8 weeks.     Zaina Denton, PT, DPT, SCS        I certify the need for these services furnished under this plan of treatment and while under my care.______________________________ Physician/Referring Practitioner  Date of Signature

## 2017-10-24 ENCOUNTER — CLINICAL SUPPORT (OUTPATIENT)
Dept: REHABILITATION | Facility: HOSPITAL | Age: 47
End: 2017-10-24
Attending: STUDENT IN AN ORGANIZED HEALTH CARE EDUCATION/TRAINING PROGRAM
Payer: COMMERCIAL

## 2017-10-24 DIAGNOSIS — M25.512 ACUTE PAIN OF LEFT SHOULDER: ICD-10-CM

## 2017-10-24 DIAGNOSIS — M54.2 CERVICAL PAIN: ICD-10-CM

## 2017-10-24 PROCEDURE — 97140 MANUAL THERAPY 1/> REGIONS: CPT

## 2017-10-27 ENCOUNTER — CLINICAL SUPPORT (OUTPATIENT)
Dept: REHABILITATION | Facility: HOSPITAL | Age: 47
End: 2017-10-27
Attending: STUDENT IN AN ORGANIZED HEALTH CARE EDUCATION/TRAINING PROGRAM
Payer: COMMERCIAL

## 2017-10-27 DIAGNOSIS — M25.512 ACUTE PAIN OF LEFT SHOULDER: ICD-10-CM

## 2017-10-27 DIAGNOSIS — M54.2 CERVICAL PAIN: ICD-10-CM

## 2017-10-27 PROCEDURE — 97110 THERAPEUTIC EXERCISES: CPT

## 2017-10-27 PROCEDURE — 97140 MANUAL THERAPY 1/> REGIONS: CPT

## 2017-10-31 ENCOUNTER — CLINICAL SUPPORT (OUTPATIENT)
Dept: REHABILITATION | Facility: HOSPITAL | Age: 47
End: 2017-10-31
Attending: STUDENT IN AN ORGANIZED HEALTH CARE EDUCATION/TRAINING PROGRAM
Payer: COMMERCIAL

## 2017-10-31 DIAGNOSIS — M54.2 CERVICAL PAIN: ICD-10-CM

## 2017-10-31 DIAGNOSIS — M25.512 ACUTE PAIN OF LEFT SHOULDER: ICD-10-CM

## 2017-10-31 PROCEDURE — 97140 MANUAL THERAPY 1/> REGIONS: CPT

## 2017-10-31 PROCEDURE — 97110 THERAPEUTIC EXERCISES: CPT

## 2017-10-31 NOTE — PROGRESS NOTES
"Physical Therapy Visit Note    Name: Louise Cueto  Clinic Number: [id number]      Diagnosis:   Encounter Diagnoses   Name Primary?    Cervical pain     Acute pain of left shoulder      Physician: Chip Somers MD  Treatment Orders: PT Eval and Treat    Past Medical History:   Diagnosis Date    GERD (gastroesophageal reflux disease)      No current outpatient prescriptions on file.     No current facility-administered medications for this visit.      Review of patient's allergies indicates:  No Known Allergies  Precautions: N/A    Today's Date:  10/31/2017  Evaluation Date: 10/19/00976  Visit # authorized: 4/20  Authorization period: 12/31/2017  Time In:  1310  Time Out:1405    Subjective     Patient states she feels better with her stretches and immediately after therapy but has not noticed a long-term difference yet.     Patient Goals: Return to full functional mobility, fitness and daily activities without pain or difficulty    Objective     Posture: Patient demonstrates forward head, decreased cervical lordosis, bilaterally abducted scapulae, with forward head/rounded shoulders posture.    Passive Range of Motion: WNL.     Active Range of Motion: WNL.    Cervical ROM:  Moderately limited side bend and rotation w/ pain reproduction to L.    Strength:  4+/5 in all directions; scapular collapse with ER, difficulty maintaining with cues.    Special Tests:  AC Joint Right Left   Cervical Distraction (+) resolution/ stretch felt    Spurling's (-) (-)   Empty Can (-) (-)   Subscaputlaris Lift Off (-) (-)   Hawkin's Kenndy (-) (-)   Neer's Test (-) (-)   Speed's test (-) (-)   1st Rib Spring Test:  (+) reproduction of symptoms  Tay's Test:  (+) for "cold" sensation L UE   Scapular Dyskinesis (+)     Joint Mobility: Moderately restricted cervical vertebral mobility, especially C5-6 with rotation noted.  No radicular symptoms reported.    Palpation: Severe point tenderness over C5-6 transverse processes on L; " moderate point tenderness over UT, scalenes and distal levator as well as cervical paraspinals on L.    Sensation: Intact.    Flexibility: Moderately restricted pec minor B; Severely restricted B UT, scalenes, moderately restricted levator.    Functional Limitations Reports   Category: Lifting/Carrying  Tool: FOTO Shoulder/Cervical    TREATMENT:  Louise received therapeutic exercises to develop strength and endurance, flexibility for 20 minutes including:  MHP x 10' cervical region  Prone Scap Retractions 5sh x 20  Supine Cervical Retraction w/ Rotation 20xB  HEP discussion and Education    Louise  received the following manual therapy techniques x 30 min. To include Joint mobilizations and Soft tissue Mobilization were applied to the: cervical region To include:   Dry needling performed by Live Locke, PT, DPT, OCS per attached note  Gentle STM as tolerated cervical musculature  Gentle rotational mobs especially L side of cervical region     Instructed pt. regarding: Proper technique with all exercises. Pt demonstrated good understanding of the education provided. Louise demonstrated good return demonstration of activities.    Assessment     Today's Assessment:  Patient felt much more loose after today's treatment and had decreased pain afterwards.  Begin progressing postural exercises as tolerated.    This is a 46 y.o. female referred to outpatient physical therapy and presents with a medical diagnosis of cervical pain and scapular dyskinesis.  Patient presents with signs and symptoms consistent with medical diagnosis exacerbated by poor scapular and cervical strength, and postural control.  Patient's chief complaint is pain.  Patient will benefit from skilled physical therapy services, specifically normalized posture and strengthening, normalized joint mobility and flexibility, progressing to functional mobility and fitness activities as tolerated.     Medical necessity is demonstrated by the following  IMPAIRMENTS/PROBLEM LIST:   1)Increase in pain level limiting function   2)Decreased strength   3)Decreased posture   4)Decreased functional mobility   5)Lack of HEP    GOALS: Short Term Goals:  6-8 weeks  1.  Report decreased cervical pain < / =  2/10  to increase tolerance for daily and fitness activities as desired.  2.  Increase cervical AROM to painfree and normal limits in all directions in order to return to daily and fitness activities as desired.   3.  Patient will demonstrate normalized scapular positioning and motion in order to perform daily and fitness activities as desired with minimal pain or difficulty.  4.  Pt to tolerate HEP to improve ROM and independence with ADL's.  5.  Patient will report ability to perform daily and fitness activities as desired without pain or difficulty.    Plan     Pt will be treated by physical therapy 1-2 times a week for Pt Education, HEP, therapeutic exercises, neuromuscular re-education, manual therapy, joint mobilizations, modalities prn to achieve established goals. Louise may at times be seen by a PTA as part of the Rehab Team.     Cont PT for 6-8 weeks.     Zaina Denton, PT, DPT, SCS        I certify the need for these services furnished under this plan of treatment and while under my care.______________________________ Physician/Referring Practitioner  Date of Signature

## 2017-11-02 ENCOUNTER — CLINICAL SUPPORT (OUTPATIENT)
Dept: REHABILITATION | Facility: HOSPITAL | Age: 47
End: 2017-11-02
Attending: STUDENT IN AN ORGANIZED HEALTH CARE EDUCATION/TRAINING PROGRAM
Payer: COMMERCIAL

## 2017-11-02 DIAGNOSIS — M54.2 CERVICAL PAIN: ICD-10-CM

## 2017-11-02 DIAGNOSIS — M25.512 ACUTE PAIN OF LEFT SHOULDER: ICD-10-CM

## 2017-11-02 PROCEDURE — 97110 THERAPEUTIC EXERCISES: CPT

## 2017-11-02 PROCEDURE — 97140 MANUAL THERAPY 1/> REGIONS: CPT

## 2017-11-02 PROCEDURE — 97014 ELECTRIC STIMULATION THERAPY: CPT

## 2017-11-06 ENCOUNTER — CLINICAL SUPPORT (OUTPATIENT)
Dept: REHABILITATION | Facility: HOSPITAL | Age: 47
End: 2017-11-06
Attending: STUDENT IN AN ORGANIZED HEALTH CARE EDUCATION/TRAINING PROGRAM
Payer: COMMERCIAL

## 2017-11-06 DIAGNOSIS — M54.2 CERVICAL PAIN: ICD-10-CM

## 2017-11-06 DIAGNOSIS — M25.512 ACUTE PAIN OF LEFT SHOULDER: ICD-10-CM

## 2017-11-06 PROCEDURE — 97110 THERAPEUTIC EXERCISES: CPT

## 2017-11-06 PROCEDURE — 97140 MANUAL THERAPY 1/> REGIONS: CPT

## 2017-11-06 PROCEDURE — 97012 MECHANICAL TRACTION THERAPY: CPT

## 2017-11-06 NOTE — PROGRESS NOTES
"Physical Therapy Visit Note    Name: Louise Cueto  Clinic Number: [id number]      Diagnosis:   Encounter Diagnoses   Name Primary?    Cervical pain     Acute pain of left shoulder      Physician: Chip Somers MD  Treatment Orders: PT Eval and Treat    Past Medical History:   Diagnosis Date    GERD (gastroesophageal reflux disease)      No current outpatient prescriptions on file.     No current facility-administered medications for this visit.      Review of patient's allergies indicates:  No Known Allergies  Precautions: N/A    Today's Date:  11/6/2017  Evaluation Date: 10/19/47768  Visit # authorized: 6/20  Authorization period: 12/31/2017  Time In:  1515  Time Out:  1600    Subjective     Patient states she went to yoga this morning and it felt good.  She has felt about the same other than that.    Patient Goals: Return to full functional mobility, fitness and daily activities without pain or difficulty    Objective     Posture: Patient demonstrates forward head, decreased cervical lordosis, bilaterally abducted scapulae, with forward head/rounded shoulders posture.    Passive Range of Motion: WNL.     Active Range of Motion: WNL.    Cervical ROM:  Moderately limited side bend and rotation w/ pain reproduction to L.    Strength:  4+/5 in all directions; scapular collapse with ER, difficulty maintaining with cues.    Special Tests:  AC Joint Right Left   Cervical Distraction (+) resolution/ stretch felt    Spurling's (-) (-)   Empty Can (-) (-)   Subscaputlaris Lift Off (-) (-)   Hawkin's Kenndy (-) (-)   Neer's Test (-) (-)   Speed's test (-) (-)   1st Rib Spring Test:  (+) reproduction of symptoms  Tay's Test:  (+) for "cold" sensation L UE   Scapular Dyskinesis (+)     Joint Mobility: Moderately restricted cervical vertebral mobility, especially C5-6 with rotation noted.  No radicular symptoms reported.    Palpation: Severe point tenderness over C5-6 transverse processes on L; moderate point tenderness " over UT, scalenes and distal levator as well as cervical paraspinals on L.    Sensation: Intact.    Flexibility: Moderately restricted pec minor B; Severely restricted B UT, scalenes, moderately restricted levator.    Functional Limitations Reports   Category: Lifting/Carrying  Tool: FOTO Shoulder/Cervical    TREATMENT:  Louise received therapeutic exercises to develop strength and endurance, flexibility for 20 minutes including:  MHP x 10' cervical region  Cervical Retraction 5sh x 20  Supine Cervical Retraction w/ Rotation 20xB  OTB B ER 10x3    Cervical Traction 20#/10#, 30s/20s, 20 deg; 12'.    Louise  received the following manual therapy techniques x 20 min. To include Joint mobilizations and Soft tissue Mobilization were applied to the: cervical region To include:   Gentle STM as tolerated thoracic musculature, trigger point release  Rotational mobs entire thoracic spine as tolerated  Rib mobs especially left thoracic region  Supine Grade V Manip to upper thoracic spine     Supine pec stretch over towel roll with IFC x 10' at end of treatment 2/2 soreness from manual therapy.    Instructed pt. regarding: Proper technique with all exercises. Pt demonstrated good understanding of the education provided. Louise demonstrated good return demonstration of activities.    Assessment     Today's Assessment:  Patient reported feeling great after today's treatment.  Discussed strategies for long-term resolution of symptoms.  Progress exercises as tolerated.    This is a 46 y.o. female referred to outpatient physical therapy and presents with a medical diagnosis of cervical pain and scapular dyskinesis.  Patient presents with signs and symptoms consistent with medical diagnosis exacerbated by poor scapular and cervical strength, and postural control.  Patient's chief complaint is pain.  Patient will benefit from skilled physical therapy services, specifically normalized posture and strengthening, normalized joint mobility  and flexibility, progressing to functional mobility and fitness activities as tolerated.     Medical necessity is demonstrated by the following IMPAIRMENTS/PROBLEM LIST:   1)Increase in pain level limiting function   2)Decreased strength   3)Decreased posture   4)Decreased functional mobility   5)Lack of HEP    GOALS: Short Term Goals:  6-8 weeks  1.  Report decreased cervical pain < / =  2/10  to increase tolerance for daily and fitness activities as desired.  2.  Increase cervical AROM to painfree and normal limits in all directions in order to return to daily and fitness activities as desired.   3.  Patient will demonstrate normalized scapular positioning and motion in order to perform daily and fitness activities as desired with minimal pain or difficulty.  4.  Pt to tolerate HEP to improve ROM and independence with ADL's.  5.  Patient will report ability to perform daily and fitness activities as desired without pain or difficulty.    Plan     Pt will be treated by physical therapy 1-2 times a week for Pt Education, HEP, therapeutic exercises, neuromuscular re-education, manual therapy, joint mobilizations, modalities prn to achieve established goals. Louise may at times be seen by a PTA as part of the Rehab Team.     Cont PT for 6-8 weeks.     Zaina Denton, PT, DPT, SCS        I certify the need for these services furnished under this plan of treatment and while under my care.______________________________ Physician/Referring Practitioner  Date of Signature

## 2017-11-06 NOTE — PROGRESS NOTES
"Physical Therapy Visit Note    Name: Louise Cueto  Clinic Number: [id number]      Diagnosis:   Encounter Diagnoses   Name Primary?    Cervical pain     Acute pain of left shoulder      Physician: Chip Somers MD  Treatment Orders: PT Eval and Treat    Past Medical History:   Diagnosis Date    GERD (gastroesophageal reflux disease)      No current outpatient prescriptions on file.     No current facility-administered medications for this visit.      Review of patient's allergies indicates:  No Known Allergies  Precautions: N/A    Today's Date:  10/24/2017  Evaluation Date: 10/19/32865  Visit # authorized: 2/20  Authorization period: 12/31/2017  Time In:  810  Time Out:  840    Subjective     Patient states she has to leave by 840 for another appointment.  She felt good initially when she left after first visit but no change since then.    Objective     Posture: Patient demonstrates forward head, decreased cervical lordosis, bilaterally abducted scapulae, with forward head/rounded shoulders posture.    Passive Range of Motion: WNL.     Active Range of Motion: WNL.    Cervical ROM:  Moderately limited side bend and rotation w/ pain reproduction to L.    Strength:  4+/5 in all directions; scapular collapse with ER, difficulty maintaining with cues.    Special Tests:  AC Joint Right Left   Cervical Distraction (+) resolution/ stretch felt    Spurling's (-) (-)   Empty Can (-) (-)   Subscaputlaris Lift Off (-) (-)   Hawkin's Kenndy (-) (-)   Neer's Test (-) (-)   Speed's test (-) (-)   1st Rib Spring Test:  (+) reproduction of symptoms  Tay's Test:  (+) for "cold" sensation L UE   Scapular Dyskinesis (+)     Joint Mobility: Moderately restricted cervical vertebral mobility, especially C5-6 with rotation noted.  No radicular symptoms reported.    Palpation: Severe point tenderness over C5-6 transverse processes on L; moderate point tenderness over UT, scalenes and distal levator as well as cervical paraspinals on " L.    Sensation: Intact.    Flexibility: Moderately restricted pec minor B; Severely restricted B UT, scalenes, moderately restricted levator.    Functional Limitations Reports   Category: Lifting/Carrying  Tool: FOTO Shoulder/Cervical    TREATMENT:  Louise received therapeutic exercises to develop strength and endurance, flexibility for 10 minutes including:  MHP x 5'  Cervical Retraction 5sh x 20    Louise  received the following manual therapy techniques x 20 min. To include Joint mobilizations and Soft tissue Mobilization were applied to the: cervical region To include:   Gentle STM as tolerated cervical musculature  Gentle rotational mobs especially L side of cervical region     Instructed pt. regarding: Proper technique with all exercises. Pt demonstrated good understanding of the education provided. Louise demonstrated good return demonstration of activities.    Assessment     Today's Assessment:  Patient felt more loose after today's appointment and was able to move more freely.  Progress postural exercises as tolerated.    This is a 46 y.o. female referred to outpatient physical therapy and presents with a medical diagnosis of cervical pain and scapular dyskinesis.  Patient presents with signs and symptoms consistent with medical diagnosis exacerbated by poor scapular and cervical strength, and postural control.  Patient's chief complaint is pain.  Patient will benefit from skilled physical therapy services, specifically normalized posture and strengthening, normalized joint mobility and flexibility, progressing to functional mobility and fitness activities as tolerated.     Medical necessity is demonstrated by the following IMPAIRMENTS/PROBLEM LIST:   1)Increase in pain level limiting function   2)Decreased strength   3)Decreased posture   4)Decreased functional mobility   5)Lack of HEP    GOALS: Short Term Goals:  6-8 weeks  1.  Report decreased cervical pain < / =  2/10  to increase tolerance for daily and  fitness activities as desired.  2.  Increase cervical AROM to painfree and normal limits in all directions in order to return to daily and fitness activities as desired.   3.  Patient will demonstrate normalized scapular positioning and motion in order to perform daily and fitness activities as desired with minimal pain or difficulty.  4.  Pt to tolerate HEP to improve ROM and independence with ADL's.  5.  Patient will report ability to perform daily and fitness activities as desired without pain or difficulty.    Plan     Pt will be treated by physical therapy 1-2 times a week for Pt Education, HEP, therapeutic exercises, neuromuscular re-education, manual therapy, joint mobilizations, modalities prn to achieve established goals. Louise may at times be seen by a PTA as part of the Rehab Team.     Cont PT for 6-8 weeks.     Zaina Denton, PT, DPT, SCS        I certify the need for these services furnished under this plan of treatment and while under my care.______________________________ Physician/Referring Practitioner  Date of Signature

## 2017-11-08 ENCOUNTER — CLINICAL SUPPORT (OUTPATIENT)
Dept: REHABILITATION | Facility: HOSPITAL | Age: 47
End: 2017-11-08
Attending: STUDENT IN AN ORGANIZED HEALTH CARE EDUCATION/TRAINING PROGRAM
Payer: COMMERCIAL

## 2017-11-08 DIAGNOSIS — M54.2 CERVICAL PAIN: ICD-10-CM

## 2017-11-08 DIAGNOSIS — M25.512 ACUTE PAIN OF LEFT SHOULDER: ICD-10-CM

## 2017-11-08 PROCEDURE — 97012 MECHANICAL TRACTION THERAPY: CPT

## 2017-11-08 PROCEDURE — 97140 MANUAL THERAPY 1/> REGIONS: CPT

## 2017-11-08 NOTE — PROGRESS NOTES
"Physical Therapy Visit Note    Name: Louise Cueto  Clinic Number: [id number]      Diagnosis:   Encounter Diagnoses   Name Primary?    Cervical pain     Acute pain of left shoulder      Physician: Chip Somers MD  Treatment Orders: PT Eval and Treat    Past Medical History:   Diagnosis Date    GERD (gastroesophageal reflux disease)      No current outpatient prescriptions on file.     No current facility-administered medications for this visit.      Review of patient's allergies indicates:  No Known Allergies  Precautions: N/A    Today's Date:  11/8/2017  Evaluation Date: 10/19/12794  Visit # authorized: 7/20  Authorization period: 12/31/2017  Time In:  1510  Time Out:  1600    Subjective     Patient states she felt much more loose after last visit until this morning so she thinks the traction helped.    Patient Goals: Return to full functional mobility, fitness and daily activities without pain or difficulty    Objective     Posture: Patient demonstrates forward head, decreased cervical lordosis, bilaterally abducted scapulae, with forward head/rounded shoulders posture.    Passive Range of Motion: WNL.     Active Range of Motion: WNL.    Cervical ROM:  Moderately limited side bend and rotation w/ pain reproduction to L.    Strength:  4+/5 in all directions; scapular collapse with ER, difficulty maintaining with cues.    Special Tests:  AC Joint Right Left   Cervical Distraction (+) resolution/ stretch felt    Spurling's (-) (-)   Empty Can (-) (-)   Subscaputlaris Lift Off (-) (-)   Hawkin's Kenndy (-) (-)   Neer's Test (-) (-)   Speed's test (-) (-)   1st Rib Spring Test:  (+) reproduction of symptoms  Tay's Test:  (+) for "cold" sensation L UE   Scapular Dyskinesis (+)     Joint Mobility: Moderately restricted cervical vertebral mobility, especially C5-6 with rotation noted.  No radicular symptoms reported.    Palpation: Severe point tenderness over C5-6 transverse processes on L; moderate point " "tenderness over UT, scalenes and distal levator as well as cervical paraspinals on L.    Sensation: Intact.    Flexibility: Moderately restricted pec minor B; Severely restricted B UT, scalenes, moderately restricted levator.    Functional Limitations Reports   Category: Lifting/Carrying  Tool: FOTO Shoulder/Cervical    TREATMENT:  Louise received therapeutic exercises to develop strength and endurance, flexibility for 0 minutes including:  MHP x 10'      Not today:  Supine on 1/2 Foam Roller 5'  Prone Scap Retraction w/ Ext (caused "shooting pain down spine", so stopped) 5sh x 10  GERSON Cat/Came w/ Serratus Press 5sh 10x2  Qped Thoracic Rotations 10x2B  Prone Scap Retractions 5sh x 20  Supine Cervical Retraction w/ Rotation 20xB  HEP discussion and Education    Cervical Traction 20#/10#, 30s/20s, 20 deg; 12'.    Louise  received the following manual therapy techniques x 15 min. To include Joint mobilizations and Soft tissue Mobilization were applied to the: cervical region To include:   Supine Grade V Opening Manip thoracic spine (patient felt much more loose after last visit)  Cupping for myofascial and trigger point release left upper trap, distal levator and midscap region    Not today:  Gentle STM as tolerated thoracic musculature, trigger point release  Rotational mobs entire thoracic spine as tolerated  Rib mobs especially left thoracic region     Supine pec stretch over towel roll with IFC x 10' at end of treatment 2/2 soreness from manual therapy.    Instructed pt. regarding: Proper technique with all exercises. Pt demonstrated good understanding of the education provided. Louise demonstrated good return demonstration of activities.    Assessment     Today's Assessment:  Discussed with patient that if she continues to feel more loose, we will re-attempt progression of postural/scapular exercises next visit since it was not tolerated last attempt.  Patient felt great and reported feeling "normal" for the first " time after today's treatment.  Extensive discussion of posture and stabilization of cervical region.    This is a 46 y.o. female referred to outpatient physical therapy and presents with a medical diagnosis of cervical pain and scapular dyskinesis.  Patient presents with signs and symptoms consistent with medical diagnosis exacerbated by poor scapular and cervical strength, and postural control.  Patient's chief complaint is pain.  Patient will benefit from skilled physical therapy services, specifically normalized posture and strengthening, normalized joint mobility and flexibility, progressing to functional mobility and fitness activities as tolerated.     Medical necessity is demonstrated by the following IMPAIRMENTS/PROBLEM LIST:   1)Increase in pain level limiting function   2)Decreased strength   3)Decreased posture   4)Decreased functional mobility   5)Lack of HEP    GOALS: Short Term Goals:  6-8 weeks  1.  Report decreased cervical pain < / =  2/10  to increase tolerance for daily and fitness activities as desired.  2.  Increase cervical AROM to painfree and normal limits in all directions in order to return to daily and fitness activities as desired.   3.  Patient will demonstrate normalized scapular positioning and motion in order to perform daily and fitness activities as desired with minimal pain or difficulty.  4.  Pt to tolerate HEP to improve ROM and independence with ADL's.  5.  Patient will report ability to perform daily and fitness activities as desired without pain or difficulty.    Plan     Pt will be treated by physical therapy 1-2 times a week for Pt Education, HEP, therapeutic exercises, neuromuscular re-education, manual therapy, joint mobilizations, modalities prn to achieve established goals. Louise may at times be seen by a PTA as part of the Rehab Team.     Cont PT for 6-8 weeks.     Zaina Denton, PT, DPT, SCS        I certify the need for these services furnished under this plan of  treatment and while under my care.______________________________ Physician/Referring Practitioner  Date of Signature

## 2017-11-13 ENCOUNTER — OFFICE VISIT (OUTPATIENT)
Dept: SPORTS MEDICINE | Facility: CLINIC | Age: 47
End: 2017-11-13
Payer: COMMERCIAL

## 2017-11-13 ENCOUNTER — CLINICAL SUPPORT (OUTPATIENT)
Dept: REHABILITATION | Facility: HOSPITAL | Age: 47
End: 2017-11-13
Attending: STUDENT IN AN ORGANIZED HEALTH CARE EDUCATION/TRAINING PROGRAM
Payer: COMMERCIAL

## 2017-11-13 VITALS
DIASTOLIC BLOOD PRESSURE: 72 MMHG | SYSTOLIC BLOOD PRESSURE: 111 MMHG | HEIGHT: 65 IN | BODY MASS INDEX: 19.99 KG/M2 | WEIGHT: 120 LBS | HEART RATE: 61 BPM

## 2017-11-13 DIAGNOSIS — M54.2 CERVICAL PAIN: ICD-10-CM

## 2017-11-13 DIAGNOSIS — M54.2 CERVICAL PAIN: Primary | ICD-10-CM

## 2017-11-13 DIAGNOSIS — M25.512 ACUTE PAIN OF LEFT SHOULDER: ICD-10-CM

## 2017-11-13 PROCEDURE — 99999 PR PBB SHADOW E&M-EST. PATIENT-LVL III: CPT | Mod: PBBFAC,,, | Performed by: ORTHOPAEDIC SURGERY

## 2017-11-13 PROCEDURE — 97012 MECHANICAL TRACTION THERAPY: CPT

## 2017-11-13 PROCEDURE — 99214 OFFICE O/P EST MOD 30 MIN: CPT | Mod: S$GLB,,, | Performed by: ORTHOPAEDIC SURGERY

## 2017-11-13 PROCEDURE — 97140 MANUAL THERAPY 1/> REGIONS: CPT

## 2017-11-13 NOTE — PROGRESS NOTES
"CC: left Shoulder pain/neck pain    46 y.o. Female who presents today for followup of left shoulder and neck pain, last seen in clinic 10/9/17. She reports active in running and yoga who presents with left sided neck and shoulder pain for 3-4 mos.  Denies any knowledge of injury.  Reports that pain is mild up to 3-4/10.  Intermittent pain with activities.  Improved with yoga.  Pain does not radiate down arm.  Described as dull pain.          PAST MEDICAL HISTORY:   Past Medical History:   Diagnosis Date    GERD (gastroesophageal reflux disease)      PAST SURGICAL HISTORY:   Past Surgical History:   Procedure Laterality Date    BREAST CYST EXCISION Left     18 y/o f     SECTION      CHOLECYSTECTOMY      COLONOSCOPY N/A 2017    Procedure: COLONOSCOPY;  Surgeon: Andrews Sears MD;  Location: [address]);  Service: Endoscopy;  Laterality: N/A;     FAMILY HISTORY:   Family History   Problem Relation Age of Onset    Heart disease Father     Hyperlipidemia Father     Cancer Mother 73     colon ca    Breast cancer Sister 48    Colon cancer Neg Hx      SOCIAL HISTORY:   Social History     Social History    Marital status:      Spouse name: N/A    Number of children: N/A    Years of education: N/A     Occupational History    Not on file.     Social History Main Topics    Smoking status: Never Smoker    Smokeless tobacco: Not on file    Alcohol use Not on file    Drug use: Unknown    Sexual activity: Not on file     Other Topics Concern    Not on file     Social History Narrative    No narrative on file       MEDICATIONS: No current outpatient prescriptions on file.  ALLERGIES: Review of patient's allergies indicates:  No Known Allergies    VITAL SIGNS: /72   Pulse 61   Ht 5' 5" (1.651 m)   Wt 54.4 kg (120 lb)   LMP 2017   BMI 19.97 kg/m²      Review of Systems   Constitution: Negative. Negative for chills, fever and night sweats.   HENT: Negative for " congestion and headaches.    Eyes: Negative for blurred vision, left vision loss and right vision loss.   Cardiovascular: Negative for chest pain and syncope.   Respiratory: Negative for cough and shortness of breath.    Endocrine: Negative for polydipsia, polyphagia and polyuria.   Hematologic/Lymphatic: Negative for bleeding problem. Does not bruise/bleed easily.   Skin: Negative for dry skin, itching and rash.   Musculoskeletal: Negative for falls and muscle weakness.   Gastrointestinal: Negative for abdominal pain and bowel incontinence.   Genitourinary: Negative for bladder incontinence and nocturia.   Neurological: Negative for disturbances in coordination, loss of balance and seizures.   Psychiatric/Behavioral: Negative for depression. The patient does not have insomnia.    Allergic/Immunologic: Negative for hives and persistent infections.       PHYSICAL EXAMINATION:  General:  The patient is alert and oriented x 3.  Mood is pleasant.  Observation of ears, eyes and nose reveal no gross abnormalities.  HEENT: NCAT, sclera nonicteric  Lungs: Respirations are equal and unlabored.  Gait is coordinated. Patient can toe walk and heel walk without difficulty.  Cardiovascular: There are no swelling or varicosities present.   Lymphatic: Negative for adenopathy       left Shoulder / Upper Extremity Exam    OBSERVATION:     Swelling  none  Deformity  none   Discoloration  none   Scapular winging none   Scars   none  Atrophy  none    TENDERNESS / CREPITUS (T/C):          T/C      T/C   Clavicle   -/-  SUPRAspinatus    -/-   AC Jt.    -/-  INFRAspinatus  -/-   SC Jt.    -/-  Deltoid    -/-   G. Tuberosity  -/-  LH BICEP groove  -/-   Acromion:  -/-  Midline Neck   -/-   Scapular Spine -/-  Trapezium   +/-   SMA Scapula  -/-  GH jt. line - post  -/-   Scapulothoracic  -/-         ROM: (* = with pain)  Right shoulder   Left shoulder        AROM (PROM)   AROM (PROM)   FE    170° (175°)     170° (175°)     ER at 0°    60°   (65°)    60°  (65°)   ER at 90° ABD  90°  (90°)    90°  (90°)   IR at 90°  ABD   NA  (40°)     NA  (40°)      IR (spine level)   T10     T10    STRENGTH: (* = with pain) RIGHT SHOULDER  LEFT SHOULDER   SCAPTION at 0   5/5    5/5    SCAPTION at 30   5/5    5/5    IR    5/5    5/5   ER    5/5    5/5   BICEPS   5/5    5/5   Deltoid    5/5    5/5     SIGNS:  Painful side       NEER   neg    OJAYLENS  Neg   EUCEDA   neg    SPEEDS  Neg   DROP ARM   neg   BELLY PRESS Neg   Superior escape none    LIFT-OFF  Neg   X-Body ADD    neg    MOVING VALGUS Neg            EXTREMITY NEURO-VASCULAR EXAM    Sensation grossly intact to light touch all dermatomal regions.    DTR 2+ Biceps, Triceps, BR and Negative Rhondas sign   Grossly intact motor function at Elbow, Wrist and Hand   Distal pulses radial and ulnar 2+, brisk cap refill, symmetric.      NECK:  Painless FROM and spinous processes non-tender. Negative Spurlings sign.      OTHER FINDINGS:  + scapular dyskinesia    XRAYS:  Shoulder trauma series left cervical ap and lateral,  were ordered and reviewed by me. No convincing fracture or dislocation is noted. The osseous structures appear well mineralized and well aligned    1.  2. Shoulder pain, left  Scapular dyskinesia     Plan:       ASSESSMENT:  shoulder pain, scapular dyskinesia  1. Will begin PT for scapular dyskinesia  2. Return to clinic in 1 month

## 2017-11-13 NOTE — PROGRESS NOTES
"CC: left Shoulder pain/neck pain    46 y.o. Female reports active in running and yoga who presents with left sided neck and shoulder pain for 3-4 mos.  Denies any knowledge of injury.  Reports that pain is mild up to 2/10.  Intermittent pain with activities.  Improved with yoga.  Pain does not radiate down arm.  Described as dull pain.      50% better with PT    PAST MEDICAL HISTORY:   Past Medical History:   Diagnosis Date    GERD (gastroesophageal reflux disease)      PAST SURGICAL HISTORY:   Past Surgical History:   Procedure Laterality Date    BREAST CYST EXCISION Left     18 y/o f     SECTION      CHOLECYSTECTOMY      COLONOSCOPY N/A 2017    Procedure: COLONOSCOPY;  Surgeon: Andrews Sears MD;  Location: [address]);  Service: Endoscopy;  Laterality: N/A;     FAMILY HISTORY:   Family History   Problem Relation Age of Onset    Heart disease Father     Hyperlipidemia Father     Cancer Mother 73     colon ca    Breast cancer Sister 48    Colon cancer Neg Hx      SOCIAL HISTORY:   Social History     Social History    Marital status:      Spouse name: N/A    Number of children: N/A    Years of education: N/A     Occupational History    Not on file.     Social History Main Topics    Smoking status: Never Smoker    Smokeless tobacco: Not on file    Alcohol use Not on file    Drug use: Unknown    Sexual activity: Not on file     Other Topics Concern    Not on file     Social History Narrative    No narrative on file       MEDICATIONS: No current outpatient prescriptions on file.  ALLERGIES: Review of patient's allergies indicates:  No Known Allergies    VITAL SIGNS: /72   Pulse 61   Ht 5' 5" (1.651 m)   Wt 54.4 kg (120 lb)   LMP 2017   BMI 19.97 kg/m²      Review of Systems   Constitution: Negative. Negative for chills, fever and night sweats.   HENT: Negative for congestion and headaches.    Eyes: Negative for blurred vision, left vision loss and " right vision loss.   Cardiovascular: Negative for chest pain and syncope.   Respiratory: Negative for cough and shortness of breath.    Endocrine: Negative for polydipsia, polyphagia and polyuria.   Hematologic/Lymphatic: Negative for bleeding problem. Does not bruise/bleed easily.   Skin: Negative for dry skin, itching and rash.   Musculoskeletal: Negative for falls and muscle weakness.   Gastrointestinal: Negative for abdominal pain and bowel incontinence.   Genitourinary: Negative for bladder incontinence and nocturia.   Neurological: Negative for disturbances in coordination, loss of balance and seizures.   Psychiatric/Behavioral: Negative for depression. The patient does not have insomnia.    Allergic/Immunologic: Negative for hives and persistent infections.       PHYSICAL EXAMINATION:  General:  The patient is alert and oriented x 3.  Mood is pleasant.  Observation of ears, eyes and nose reveal no gross abnormalities.  HEENT: NCAT, sclera nonicteric  Lungs: Respirations are equal and unlabored.  Gait is coordinated. Patient can toe walk and heel walk without difficulty.  Cardiovascular: There are no swelling or varicosities present.   Lymphatic: Negative for adenopathy       left Shoulder / Upper Extremity Exam    OBSERVATION:     Swelling  none  Deformity  none   Discoloration  none   Scapular winging none   Scars   none  Atrophy  none    TENDERNESS / CREPITUS (T/C):          T/C      T/C   Clavicle   -/-  SUPRAspinatus    -/-   AC Jt.    -/-  INFRAspinatus  -/-   SC Jt.    -/-  Deltoid    -/-   G. Tuberosity  -/-  LH BICEP groove  -/-   Acromion:  -/-  Midline Neck   -/-   Scapular Spine -/-  Trapezium   +/-   SMA Scapula  -/-  GH jt. line - post  -/-   Scapulothoracic  -/-         ROM: (* = with pain)  Right shoulder   Left shoulder        AROM (PROM)   AROM (PROM)   FE    170° (175°)     170° (175°)     ER at 0°    60°  (65°)    60°  (65°)   ER at 90° ABD  90°  (90°)    90°  (90°)   IR at 90°  ABD   NA   (40°)     NA  (40°)      IR (spine level)   T10     T10    STRENGTH: (* = with pain) RIGHT SHOULDER  LEFT SHOULDER   SCAPTION at 0   5/5    5/5    SCAPTION at 30   5/5    5/5    IR    5/5    5/5   ER    5/5    5/5   BICEPS   5/5    5/5   Deltoid    5/5    5/5     SIGNS:  Painful side       NEER   neg    OJAYLENS  Neg   EUCEDA   neg    SPEEDS  Neg   DROP ARM   neg   BELLY PRESS Neg   Superior escape none    LIFT-OFF  Neg   X-Body ADD    neg    MOVING VALGUS Neg            EXTREMITY NEURO-VASCULAR EXAM    Sensation grossly intact to light touch all dermatomal regions.    DTR 2+ Biceps, Triceps, BR and Negative Rhondas sign   Grossly intact motor function at Elbow, Wrist and Hand   Distal pulses radial and ulnar 2+, brisk cap refill, symmetric.      NECK:  Painless FROM and spinous processes non-tender. Negative Spurlings sign.      OTHER FINDINGS:  + scapular dyskinesia    XRAYS:  Shoulder trauma series left cervical ap and lateral,  were ordered and reviewed by me. No convincing fracture or dislocation is noted. The osseous structures appear well mineralized and well aligned    1.  2. Shoulder pain, left  Scapular dyskinesia     Plan:       ASSESSMENT:  shoulder pain, scapular dyskinesia  1. Will begin PT for scapular dyskinesia  2. Return to clinic PRN  3.

## 2017-11-13 NOTE — PROGRESS NOTES
"Physical Therapy Visit Note    Name: Louise Cueto  Clinic Number: [id number]      Diagnosis:   Encounter Diagnoses   Name Primary?    Cervical pain     Acute pain of left shoulder      Physician: Chip Somers MD  Treatment Orders: PT Eval and Treat    Past Medical History:   Diagnosis Date    GERD (gastroesophageal reflux disease)      No current outpatient prescriptions on file.     No current facility-administered medications for this visit.      Review of patient's allergies indicates:  No Known Allergies  Precautions: N/A    Today's Date:  11/13/2017  Evaluation Date: 10/19/45936  Visit # authorized: 8/20  Authorization period: 12/31/2017  Time In:  1015  Time Out:  1100    Subjective     Patient states she felt good for about 3 days after last visit but began feeling stiff and sore again on Saturday.  Today left CT junction is most sore.    Patient Goals: Return to full functional mobility, fitness and daily activities without pain or difficulty    Objective     Posture: Patient demonstrates forward head, decreased cervical lordosis, bilaterally abducted scapulae, with forward head/rounded shoulders posture.    Passive Range of Motion: WNL.     Active Range of Motion: WNL.    Cervical ROM:  Moderately limited side bend and rotation w/ pain reproduction to L.    Strength:  4+/5 in all directions; scapular collapse with ER, difficulty maintaining with cues.    Special Tests:  AC Joint Right Left   Cervical Distraction (+) resolution/ stretch felt    Spurling's (-) (-)   Empty Can (-) (-)   Subscaputlaris Lift Off (-) (-)   Hawkin's Kenndy (-) (-)   Neer's Test (-) (-)   Speed's test (-) (-)   1st Rib Spring Test:  (+) reproduction of symptoms  Tay's Test:  (+) for "cold" sensation L UE   Scapular Dyskinesis (+)     Joint Mobility: Moderately restricted cervical vertebral mobility, especially C5-6 with rotation noted.  No radicular symptoms reported.    Palpation: Severe point tenderness over C5-6 " "transverse processes on L; moderate point tenderness over UT, scalenes and distal levator as well as cervical paraspinals on L.    Sensation: Intact.    Flexibility: Moderately restricted pec minor B; Severely restricted B UT, scalenes, moderately restricted levator.    Functional Limitations Reports   Category: Lifting/Carrying  Tool: FOTO Shoulder/Cervical    TREATMENT:  Louise received therapeutic exercises to develop strength and endurance, flexibility for 0 minutes including:  MHP x 10'    Not today:  Supine on 1/2 Foam Roller 5'  Prone Scap Retraction w/ Ext (caused "shooting pain down spine", so stopped) 5sh x 10  GERSON Cat/Came w/ Serratus Press 5sh 10x2  Qped Thoracic Rotations 10x2B  Prone Scap Retractions 5sh x 20  Supine Cervical Retraction w/ Rotation 20xB  HEP discussion and Education    Cervical Traction 20#/10#, 30s/20s, 20 deg; 12'.    Louise  received the following manual therapy techniques x 15 min. To include Joint mobilizations and Soft tissue Mobilization were applied to the: cervical region To include:   Supine Grade V Opening Manip thoracic spine (patient felt much more loose after last visit)  Cupping for myofascial and trigger point release left upper trap, distal levator and midscap region    Not today:  Gentle STM as tolerated thoracic musculature, trigger point release  Rotational mobs entire thoracic spine as tolerated  Rib mobs especially left thoracic region    Instructed pt. regarding: Proper technique with all exercises. Pt demonstrated good understanding of the education provided. Louise demonstrated good return demonstration of activities.    Assessment     Today's Assessment:  Patient felt better after today's treatment.  Progress as tolerated.    This is a 46 y.o. female referred to outpatient physical therapy and presents with a medical diagnosis of cervical pain and scapular dyskinesis.  Patient presents with signs and symptoms consistent with medical diagnosis exacerbated by poor " scapular and cervical strength, and postural control.  Patient's chief complaint is pain.  Patient will benefit from skilled physical therapy services, specifically normalized posture and strengthening, normalized joint mobility and flexibility, progressing to functional mobility and fitness activities as tolerated.     Medical necessity is demonstrated by the following IMPAIRMENTS/PROBLEM LIST:   1)Increase in pain level limiting function   2)Decreased strength   3)Decreased posture   4)Decreased functional mobility   5)Lack of HEP    GOALS: Short Term Goals:  6-8 weeks  1.  Report decreased cervical pain < / =  2/10  to increase tolerance for daily and fitness activities as desired.  2.  Increase cervical AROM to painfree and normal limits in all directions in order to return to daily and fitness activities as desired.   3.  Patient will demonstrate normalized scapular positioning and motion in order to perform daily and fitness activities as desired with minimal pain or difficulty.  4.  Pt to tolerate HEP to improve ROM and independence with ADL's.  5.  Patient will report ability to perform daily and fitness activities as desired without pain or difficulty.    Plan     Pt will be treated by physical therapy 1-2 times a week for Pt Education, HEP, therapeutic exercises, neuromuscular re-education, manual therapy, joint mobilizations, modalities prn to achieve established goals. Louise may at times be seen by a PTA as part of the Rehab Team.     Cont PT for 6-8 weeks.     Zaina Denton, PT, DPT, SCS        I certify the need for these services furnished under this plan of treatment and while under my care.______________________________ Physician/Referring Practitioner  Date of Signature

## 2017-11-16 ENCOUNTER — CLINICAL SUPPORT (OUTPATIENT)
Dept: REHABILITATION | Facility: HOSPITAL | Age: 47
End: 2017-11-16
Attending: STUDENT IN AN ORGANIZED HEALTH CARE EDUCATION/TRAINING PROGRAM
Payer: COMMERCIAL

## 2017-11-16 DIAGNOSIS — M54.2 CERVICAL PAIN: ICD-10-CM

## 2017-11-16 DIAGNOSIS — M25.512 ACUTE PAIN OF LEFT SHOULDER: ICD-10-CM

## 2017-11-16 PROCEDURE — 97140 MANUAL THERAPY 1/> REGIONS: CPT

## 2017-11-16 PROCEDURE — 97110 THERAPEUTIC EXERCISES: CPT

## 2017-11-16 NOTE — PROGRESS NOTES
"Physical Therapy Visit Note    Name: Louise Cueto  Clinic Number: [id number]      Diagnosis:   Encounter Diagnoses   Name Primary?    Cervical pain     Acute pain of left shoulder      Physician: Chip Somers MD  Treatment Orders: PT Eval and Treat    Past Medical History:   Diagnosis Date    GERD (gastroesophageal reflux disease)      No current outpatient prescriptions on file.     No current facility-administered medications for this visit.      Review of patient's allergies indicates:  No Known Allergies  Precautions: N/A    Today's Date:  11/16/2017  Evaluation Date: 10/19/62600  Visit # authorized: 9/20  Authorization period: 12/31/2017  Time In:  1300  Time Out:  1400    Subjective     Patient states she is still feeling pretty good since last visit.      Patient Goals: Return to full functional mobility, fitness and daily activities without pain or difficulty    Objective     Posture: Patient demonstrates forward head, decreased cervical lordosis, bilaterally abducted scapulae, with forward head/rounded shoulders posture.    Passive Range of Motion: WNL.     Active Range of Motion: WNL.    Cervical ROM:  Moderately limited side bend and rotation w/ pain reproduction to L.    Strength:  4+/5 in all directions; scapular collapse with ER, difficulty maintaining with cues.    Special Tests:  AC Joint Right Left   Cervical Distraction (+) resolution/ stretch felt    Spurling's (-) (-)   Empty Can (-) (-)   Subscaputlaris Lift Off (-) (-)   Hawkin's Kenndy (-) (-)   Neer's Test (-) (-)   Speed's test (-) (-)   1st Rib Spring Test:  (+) reproduction of symptoms  Tay's Test:  (+) for "cold" sensation L UE   Scapular Dyskinesis (+)     Joint Mobility: Moderately restricted cervical vertebral mobility, especially C5-6 with rotation noted.  No radicular symptoms reported.    Palpation: Severe point tenderness over C5-6 transverse processes on L; moderate point tenderness over UT, scalenes and distal levator as " "well as cervical paraspinals on L.    Sensation: Intact.    Flexibility: Moderately restricted pec minor B; Severely restricted B UT, scalenes, moderately restricted levator.    Functional Limitations Reports   Category: Lifting/Carrying  Tool: FOTO Shoulder/Cervical    TREATMENT:  Louise received therapeutic exercises to develop strength and endurance, flexibility for 30 minutes including:  MHP x 10'  Supine Cervical Retraction 5sh x 20  Supine Cervical Retraction w/ Rotation 20xB  Wall Postural Awareness as tolerated  Continued postural education    Not today:  Supine on 1/2 Foam Roller 5'  Prone Scap Retraction w/ Ext (caused "shooting pain down spine", so stopped) 5sh x 10  GERSON Cat/Came w/ Serratus Press 5sh 10x2  Qped Thoracic Rotations 10x2B  Prone Scap Retractions 5sh x 20  HEP discussion and Education    Cervical Traction 20#/10#, 30s/20s, 20 deg; 12'. (not today)    Louise  received the following manual therapy techniques x 25 min. To include Joint mobilizations and Soft tissue Mobilization were applied to the: cervical region To include:   STM and trigger point release surrounding entire cervical and upper trap region  Cervical rotational mobs as tolerated  CT junctional manipulation by RT Chucky, PT, DPT, OCS    Not today:  Gentle STM as tolerated thoracic musculature, trigger point release  Rotational mobs entire thoracic spine as tolerated  Rib mobs especially left thoracic region    Instructed pt. regarding: Proper technique with all exercises. Pt demonstrated good understanding of the education provided. Louise demonstrated good return demonstration of activities.    Assessment     Today's Assessment:  Patient tolerated today's treatment well and felt good after.  Progress scap and cervical strengthening as tolerated.    This is a 46 y.o. female referred to outpatient physical therapy and presents with a medical diagnosis of cervical pain and scapular dyskinesis.  Patient presents with signs and symptoms " consistent with medical diagnosis exacerbated by poor scapular and cervical strength, and postural control.  Patient's chief complaint is pain.  Patient will benefit from skilled physical therapy services, specifically normalized posture and strengthening, normalized joint mobility and flexibility, progressing to functional mobility and fitness activities as tolerated.     Medical necessity is demonstrated by the following IMPAIRMENTS/PROBLEM LIST:   1)Increase in pain level limiting function   2)Decreased strength   3)Decreased posture   4)Decreased functional mobility   5)Lack of HEP    GOALS: Short Term Goals:  6-8 weeks  1.  Report decreased cervical pain < / =  2/10  to increase tolerance for daily and fitness activities as desired.  2.  Increase cervical AROM to painfree and normal limits in all directions in order to return to daily and fitness activities as desired.   3.  Patient will demonstrate normalized scapular positioning and motion in order to perform daily and fitness activities as desired with minimal pain or difficulty.  4.  Pt to tolerate HEP to improve ROM and independence with ADL's.  5.  Patient will report ability to perform daily and fitness activities as desired without pain or difficulty.    Plan     Pt will be treated by physical therapy 1-2 times a week for Pt Education, HEP, therapeutic exercises, neuromuscular re-education, manual therapy, joint mobilizations, modalities prn to achieve established goals. Louise may at times be seen by a PTA as part of the Rehab Team.     Cont PT for 6-8 weeks.     Zaina Denton, PT, DPT, SCS        I certify the need for these services furnished under this plan of treatment and while under my care.______________________________ Physician/Referring Practitioner  Date of Signature

## 2017-11-20 NOTE — PROGRESS NOTES
"Physical Therapy Visit Note    Name: Louise Cueto  Clinic Number: [id number]      Diagnosis:   Encounter Diagnoses   Name Primary?    Cervical pain     Acute pain of left shoulder      Physician: Chip Somers MD  Treatment Orders: PT Eval and Treat    Past Medical History:   Diagnosis Date    GERD (gastroesophageal reflux disease)      No current outpatient prescriptions on file.     No current facility-administered medications for this visit.      Review of patient's allergies indicates:  No Known Allergies  Precautions: N/A    Today's Date:  10/27/2017  Evaluation Date: 10/19/27155  Visit # authorized: 3/20  Authorization period: 12/31/2017  Time In:  900  Time Out:  1000    Subjective     Patient states she is feeling the same.    Objective     Posture: Patient demonstrates forward head, decreased cervical lordosis, bilaterally abducted scapulae, with forward head/rounded shoulders posture.    Passive Range of Motion: WNL.     Active Range of Motion: WNL.    Cervical ROM:  Moderately limited side bend and rotation w/ pain reproduction to L.    Strength:  4+/5 in all directions; scapular collapse with ER, difficulty maintaining with cues.    Special Tests:  AC Joint Right Left   Cervical Distraction (+) resolution/ stretch felt    Spurling's (-) (-)   Empty Can (-) (-)   Subscaputlaris Lift Off (-) (-)   Hawkin's Kenndy (-) (-)   Neer's Test (-) (-)   Speed's test (-) (-)   1st Rib Spring Test:  (+) reproduction of symptoms  Tay's Test:  (+) for "cold" sensation L UE   Scapular Dyskinesis (+)     Joint Mobility: Moderately restricted cervical vertebral mobility, especially C5-6 with rotation noted.  No radicular symptoms reported.    Palpation: Severe point tenderness over C5-6 transverse processes on L; moderate point tenderness over UT, scalenes and distal levator as well as cervical paraspinals on L.    Sensation: Intact.    Flexibility: Moderately restricted pec minor B; Severely restricted B UT, " liliana, moderately restricted levator.    Functional Limitations Reports   Category: Lifting/Carrying  Tool: FOTO Shoulder/Cervical    TREATMENT:  Louise received therapeutic exercises to develop strength and endurance, flexibility for 30 minutes including:  MHP x 5'  Cervical Retraction 5sh x 20  Attempted prone scap retractions but patient reported pain shooting from neck to thoracic spine so unable to perform  Supine Pec minor stretch over towel roll 3'  Wall postural awareness to fatigue x 3    Louise  received the following manual therapy techniques x 30 min. To include Joint mobilizations and Soft tissue Mobilization were applied to the: cervical region To include:   Gentle STM as tolerated cervical musculature  Gentle rotational mobs especially L side of cervical region     Instructed pt. regarding: Proper technique with all exercises. Pt demonstrated good understanding of the education provided. Louise demonstrated good return demonstration of activities.    Assessment     Today's Assessment:  Patient minimally tolerated progression of exercises.  Consider cervical traction trial for increased soft tissue and joint mobilization.    This is a 46 y.o. female referred to outpatient physical therapy and presents with a medical diagnosis of cervical pain and scapular dyskinesis.  Patient presents with signs and symptoms consistent with medical diagnosis exacerbated by poor scapular and cervical strength, and postural control.  Patient's chief complaint is pain.  Patient will benefit from skilled physical therapy services, specifically normalized posture and strengthening, normalized joint mobility and flexibility, progressing to functional mobility and fitness activities as tolerated.     Medical necessity is demonstrated by the following IMPAIRMENTS/PROBLEM LIST:   1)Increase in pain level limiting function   2)Decreased strength   3)Decreased posture   4)Decreased functional mobility   5)Lack of HEP    GOALS:  Short Term Goals:  6-8 weeks  1.  Report decreased cervical pain < / =  2/10  to increase tolerance for daily and fitness activities as desired.  2.  Increase cervical AROM to painfree and normal limits in all directions in order to return to daily and fitness activities as desired.   3.  Patient will demonstrate normalized scapular positioning and motion in order to perform daily and fitness activities as desired with minimal pain or difficulty.  4.  Pt to tolerate HEP to improve ROM and independence with ADL's.  5.  Patient will report ability to perform daily and fitness activities as desired without pain or difficulty.    Plan     Pt will be treated by physical therapy 1-2 times a week for Pt Education, HEP, therapeutic exercises, neuromuscular re-education, manual therapy, joint mobilizations, modalities prn to achieve established goals. Louise may at times be seen by a PTA as part of the Rehab Team.     Cont PT for 6-8 weeks.     Zaina Denton, PT, DPT, SCS        I certify the need for these services furnished under this plan of treatment and while under my care.______________________________ Physician/Referring Practitioner  Date of Signature

## 2017-11-24 ENCOUNTER — CLINICAL SUPPORT (OUTPATIENT)
Dept: REHABILITATION | Facility: HOSPITAL | Age: 47
End: 2017-11-24
Attending: STUDENT IN AN ORGANIZED HEALTH CARE EDUCATION/TRAINING PROGRAM
Payer: COMMERCIAL

## 2017-11-24 DIAGNOSIS — M54.2 CERVICAL PAIN: ICD-10-CM

## 2017-11-24 DIAGNOSIS — M25.512 ACUTE PAIN OF LEFT SHOULDER: ICD-10-CM

## 2017-11-24 PROCEDURE — 97140 MANUAL THERAPY 1/> REGIONS: CPT

## 2017-11-24 PROCEDURE — 97110 THERAPEUTIC EXERCISES: CPT

## 2017-11-27 ENCOUNTER — PATIENT MESSAGE (OUTPATIENT)
Dept: OBSTETRICS AND GYNECOLOGY | Facility: CLINIC | Age: 47
End: 2017-11-27

## 2017-11-27 DIAGNOSIS — N63.0 BREAST LUMP IN FEMALE: Primary | ICD-10-CM

## 2017-11-27 RX ORDER — DICLOXACILLIN SODIUM 250 MG/1
250 CAPSULE ORAL 4 TIMES DAILY
Qty: 40 CAPSULE | Refills: 0 | Status: SHIPPED | OUTPATIENT
Start: 2017-11-27 | End: 2017-12-07

## 2017-11-29 ENCOUNTER — CLINICAL SUPPORT (OUTPATIENT)
Dept: REHABILITATION | Facility: HOSPITAL | Age: 47
End: 2017-11-29
Attending: STUDENT IN AN ORGANIZED HEALTH CARE EDUCATION/TRAINING PROGRAM
Payer: COMMERCIAL

## 2017-11-29 DIAGNOSIS — M25.512 ACUTE PAIN OF LEFT SHOULDER: ICD-10-CM

## 2017-11-29 DIAGNOSIS — M54.2 CERVICAL PAIN: ICD-10-CM

## 2017-11-29 PROCEDURE — 97110 THERAPEUTIC EXERCISES: CPT

## 2017-11-29 PROCEDURE — 97140 MANUAL THERAPY 1/> REGIONS: CPT

## 2017-11-29 NOTE — PROGRESS NOTES
"Physical Therapy Visit Note    Name: Louise Cueto  Clinic Number: [id number]      Diagnosis:   Encounter Diagnoses   Name Primary?    Cervical pain     Acute pain of left shoulder      Physician: Chip Somers MD  Treatment Orders: PT Eval and Treat    Past Medical History:   Diagnosis Date    GERD (gastroesophageal reflux disease)      Current Outpatient Prescriptions   Medication Sig    dicloxacillin (DYNAPEN) 250 MG capsule Take 1 capsule (250 mg total) by mouth 4 (four) times daily.     No current facility-administered medications for this visit.      Review of patient's allergies indicates:  No Known Allergies  Precautions: N/A    Today's Date:  11/29/2017  Evaluation Date: 10/19/71907  Visit # authorized: 11/20  Authorization period: 12/31/2017  Time In:  1410  Time Out:  1505    Subjective     Patient states she had a massage on Monday and was really sore immediately after but had some relief yesterday and today until right before she got here.    Patient Goals: Return to full functional mobility, fitness and daily activities without pain or difficulty    Objective     Posture: Patient demonstrates forward head, decreased cervical lordosis, bilaterally abducted scapulae, with forward head/rounded shoulders posture.    Passive Range of Motion: WNL.     Active Range of Motion: WNL.    Cervical ROM:  Moderately limited side bend and rotation w/ pain reproduction to L.    Strength:  4+/5 in all directions; scapular collapse with ER, difficulty maintaining with cues.    Special Tests:  AC Joint Right Left   Cervical Distraction (+) resolution/ stretch felt    Spurling's (-) (-)   Empty Can (-) (-)   Subscaputlaris Lift Off (-) (-)   Hawkin's Kenndy (-) (-)   Neer's Test (-) (-)   Speed's test (-) (-)   1st Rib Spring Test:  (+) reproduction of symptoms  Tay's Test:  (+) for "cold" sensation L UE   Scapular Dyskinesis (+)     Joint Mobility: Moderately restricted cervical vertebral mobility, especially " "C5-6 with rotation noted.  No radicular symptoms reported.    Palpation: Severe point tenderness over C5-6 transverse processes on L; moderate point tenderness over UT, scalenes and distal levator as well as cervical paraspinals on L.    Sensation: Intact.    Flexibility: Moderately restricted pec minor B; Severely restricted B UT, scalenes, moderately restricted levator.    Functional Limitations Reports   Category: Lifting/Carrying  Tool: FOTO Shoulder/Cervical    TREATMENT:  Louise received therapeutic exercises to develop strength and endurance, flexibility for 25 minutes including:  MR Isometrics Supine Sidebend/Rotation 3sh 10x2 ea.  YTB Supine Isometric Cervical Retraction Rhythmic Stab 10x2  Postural Awareness on Wall No Money 10x3  MHP x 10'    Not today:  Supine on 1/2 Foam Roller 5'  Prone Scap Retraction w/ Ext (caused "shooting pain down spine", so stopped) 5sh x 10  GERSON Cat/Came w/ Serratus Press 5sh 10x2  Qped Thoracic Rotations 10x2B  Prone Scap Retractions 5sh x 20  Supine Cervical Retraction w/ Rotation 20xB  HEP discussion and Education    Cervical Traction 20#/10#, 30s/20s, 20 deg; 12'.    Louise  received the following manual therapy techniques x 20 min. To include Joint mobilizations and Soft tissue Mobilization were applied to the: cervical region To include:   Cervical manipulation and mobilization performed by Live Locke, PT, DPT, OCS      Not today:  Gentle STM as tolerated thoracic musculature, trigger point release  Rotational mobs entire thoracic spine as tolerated  Rib mobs especially left thoracic region     Supine pec stretch over towel roll with IFC x 10' at end of treatment 2/2 soreness from manual therapy.    Instructed pt. regarding: Proper technique with all exercises. Pt demonstrated good understanding of the education provided. Louise demonstrated good return demonstration of activities.    Assessment     Today's Assessment:  Patient tolerated stabilization exercises today and " increased HEP.  Discussed that higher level workout activities are not recommended at this time 2/2 patient stating she wanted to go to [employer] on Friday.    This is a 46 y.o. female referred to outpatient physical therapy and presents with a medical diagnosis of cervical pain and scapular dyskinesis.  Patient presents with signs and symptoms consistent with medical diagnosis exacerbated by poor scapular and cervical strength, and postural control.  Patient's chief complaint is pain.  Patient will benefit from skilled physical therapy services, specifically normalized posture and strengthening, normalized joint mobility and flexibility, progressing to functional mobility and fitness activities as tolerated.     Medical necessity is demonstrated by the following IMPAIRMENTS/PROBLEM LIST:   1)Increase in pain level limiting function   2)Decreased strength   3)Decreased posture   4)Decreased functional mobility   5)Lack of HEP    GOALS: Short Term Goals:  6-8 weeks  1.  Report decreased cervical pain < / =  2/10  to increase tolerance for daily and fitness activities as desired.  2.  Increase cervical AROM to painfree and normal limits in all directions in order to return to daily and fitness activities as desired.   3.  Patient will demonstrate normalized scapular positioning and motion in order to perform daily and fitness activities as desired with minimal pain or difficulty.  4.  Pt to tolerate HEP to improve ROM and independence with ADL's.  5.  Patient will report ability to perform daily and fitness activities as desired without pain or difficulty.    Plan     Pt will be treated by physical therapy 1-2 times a week for Pt Education, HEP, therapeutic exercises, neuromuscular re-education, manual therapy, joint mobilizations, modalities prn to achieve established goals. Louise may at times be seen by a PTA as part of the Rehab Team.     Cont PT for 6-8 weeks.     Zaina Denton, PT, DPT, SCS        I certify the  need for these services furnished under this plan of treatment and while under my care.______________________________ Physician/Referring Practitioner  Date of Signature

## 2017-12-01 ENCOUNTER — CLINICAL SUPPORT (OUTPATIENT)
Dept: REHABILITATION | Facility: HOSPITAL | Age: 47
End: 2017-12-01
Attending: STUDENT IN AN ORGANIZED HEALTH CARE EDUCATION/TRAINING PROGRAM
Payer: COMMERCIAL

## 2017-12-01 DIAGNOSIS — M54.2 CERVICAL PAIN: ICD-10-CM

## 2017-12-01 DIAGNOSIS — M25.512 ACUTE PAIN OF LEFT SHOULDER: ICD-10-CM

## 2017-12-01 PROCEDURE — 97110 THERAPEUTIC EXERCISES: CPT

## 2017-12-01 PROCEDURE — 97012 MECHANICAL TRACTION THERAPY: CPT

## 2017-12-01 NOTE — PROGRESS NOTES
"Physical Therapy Visit Note    Name: Louise Cueto  Clinic Number: [id number]      Diagnosis:   Encounter Diagnoses   Name Primary?    Cervical pain     Acute pain of left shoulder      Physician: Chip Somers MD  Treatment Orders: PT Eval and Treat    Past Medical History:   Diagnosis Date    GERD (gastroesophageal reflux disease)      Current Outpatient Prescriptions   Medication Sig    dicloxacillin (DYNAPEN) 250 MG capsule Take 1 capsule (250 mg total) by mouth 4 (four) times daily.     No current facility-administered medications for this visit.      Review of patient's allergies indicates:  No Known Allergies  Precautions: N/A    Today's Date:  12/01/2017  Evaluation Date: 10/19/10993  Visit # authorized: 12/20  Authorization period: 12/31/2017  Time In:  1400  Time Out:  1505    Subjective     Patient states her neck felt better after last session and feeling better than usual today.     Patient Goals: Return to full functional mobility, fitness and daily activities without pain or difficulty    Objective     Posture: Patient demonstrates forward head, decreased cervical lordosis, bilaterally abducted scapulae, with forward head/rounded shoulders posture.    Passive Range of Motion: WNL.     Active Range of Motion: WNL.    Cervical ROM:  Moderately limited side bend and rotation w/ pain reproduction to L.    Strength:  4+/5 in all directions; scapular collapse with ER, difficulty maintaining with cues.    Special Tests:  AC Joint Right Left   Cervical Distraction (+) resolution/ stretch felt    Spurling's (-) (-)   Empty Can (-) (-)   Subscaputlaris Lift Off (-) (-)   Hawkin's Kenndy (-) (-)   Neer's Test (-) (-)   Speed's test (-) (-)   1st Rib Spring Test:  (+) reproduction of symptoms  Tay's Test:  (+) for "cold" sensation L UE   Scapular Dyskinesis (+)     Joint Mobility: Moderately restricted cervical vertebral mobility, especially C5-6 with rotation noted.  No radicular symptoms " "reported.    Palpation: Severe point tenderness over C5-6 transverse processes on L; moderate point tenderness over UT, scalenes and distal levator as well as cervical paraspinals on L.    Sensation: Intact.    Flexibility: Moderately restricted pec minor B; Severely restricted B UT, scalenes, moderately restricted levator.    Functional Limitations Reports   Category: Lifting/Carrying  Tool: FOTO Shoulder/Cervical    TREATMENT:  Louise received therapeutic exercises to develop strength and endurance, flexibility for 35 minutes including:  MR Isometrics Supine Sidebend/Rotation 3sh 10x2 ea.  YTB Supine Isometric Cervical Retraction Rhythmic Stab 10x2  Prone elbow prop:  Cervical retraction w/ alt UE lift-off 10x2B  Seated B OTB ER (very fatigued, very difficulty to maintain  Postural stab)    Not today:  Supine on 1/2 Foam Roller 5'  Prone Scap Retraction w/ Ext (caused "shooting pain down spine", so stopped) 5sh x 10  GERSON Cat/Came w/ Serratus Press 5sh 10x2  Qped Thoracic Rotations 10x2B  Prone Scap Retractions 5sh x 20  Supine Cervical Retraction w/ Rotation 20xB  HEP discussion and Education    Cervical Traction 20#/10#, 30s/20s, 20 deg; 12'.    Louise  received the following manual therapy techniques x 0 min. To include Joint mobilizations and Soft tissue Mobilization were applied to the: cervical region To include:   Cervical manipulation and mobilization performed by Live Locke, PT, DPT, OCS    Not today:  Gentle STM as tolerated thoracic musculature, trigger point release  Rotational mobs entire thoracic spine as tolerated  Rib mobs especially left thoracic region     Supine pec stretch over towel roll with IFC x 10' at end of treatment 2/2 soreness from manual therapy.    Instructed pt. regarding: Proper technique with all exercises. Pt demonstrated good understanding of the education provided. Louise demonstrated good return demonstration of activities.    Assessment     Today's Assessment:  Discussed home " traction unit for more relief at home as well as to progress exercises in PT sessions due to limited time.    This is a 46 y.o. female referred to outpatient physical therapy and presents with a medical diagnosis of cervical pain and scapular dyskinesis.  Patient presents with signs and symptoms consistent with medical diagnosis exacerbated by poor scapular and cervical strength, and postural control.  Patient's chief complaint is pain.  Patient will benefit from skilled physical therapy services, specifically normalized posture and strengthening, normalized joint mobility and flexibility, progressing to functional mobility and fitness activities as tolerated.     Medical necessity is demonstrated by the following IMPAIRMENTS/PROBLEM LIST:   1)Increase in pain level limiting function   2)Decreased strength   3)Decreased posture   4)Decreased functional mobility   5)Lack of HEP    GOALS: Short Term Goals:  6-8 weeks  1.  Report decreased cervical pain < / =  2/10  to increase tolerance for daily and fitness activities as desired.  2.  Increase cervical AROM to painfree and normal limits in all directions in order to return to daily and fitness activities as desired.   3.  Patient will demonstrate normalized scapular positioning and motion in order to perform daily and fitness activities as desired with minimal pain or difficulty.  4.  Pt to tolerate HEP to improve ROM and independence with ADL's.  5.  Patient will report ability to perform daily and fitness activities as desired without pain or difficulty.    Plan     Pt will be treated by physical therapy 1-2 times a week for Pt Education, HEP, therapeutic exercises, neuromuscular re-education, manual therapy, joint mobilizations, modalities prn to achieve established goals. Louise may at times be seen by a PTA as part of the Rehab Team.     Cont PT for 6-8 weeks.     Zaina Denton, PT, DPT, SCS        I certify the need for these services furnished under this plan  of treatment and while under my care.______________________________ Physician/Referring Practitioner  Date of Signature

## 2017-12-04 ENCOUNTER — CLINICAL SUPPORT (OUTPATIENT)
Dept: REHABILITATION | Facility: HOSPITAL | Age: 47
End: 2017-12-04
Attending: STUDENT IN AN ORGANIZED HEALTH CARE EDUCATION/TRAINING PROGRAM
Payer: COMMERCIAL

## 2017-12-04 DIAGNOSIS — M54.2 CERVICAL PAIN: ICD-10-CM

## 2017-12-04 DIAGNOSIS — M25.512 ACUTE PAIN OF LEFT SHOULDER: ICD-10-CM

## 2017-12-04 PROCEDURE — 97110 THERAPEUTIC EXERCISES: CPT

## 2017-12-04 PROCEDURE — 97140 MANUAL THERAPY 1/> REGIONS: CPT

## 2017-12-04 NOTE — PROGRESS NOTES
"Physical Therapy Visit Note    Name: Louise Cueto  Clinic Number: [id number]      Diagnosis:   Encounter Diagnoses   Name Primary?    Cervical pain     Acute pain of left shoulder      Physician: Chip Somers MD  Treatment Orders: PT Eval and Treat    Past Medical History:   Diagnosis Date    GERD (gastroesophageal reflux disease)      Current Outpatient Prescriptions   Medication Sig    dicloxacillin (DYNAPEN) 250 MG capsule Take 1 capsule (250 mg total) by mouth 4 (four) times daily.     No current facility-administered medications for this visit.      Review of patient's allergies indicates:  No Known Allergies  Precautions: N/A    Today's Date:  12/04/2017  Evaluation Date: 10/19/62899  Visit # authorized: 13/20  Authorization period: 12/31/2017  Time In:  1310  Time Out:  1400    Subjective     Patient states she was doing pretty well until she laid on a "block" to stretch her spine and had a sudden spasm on the left side of her neck and everything has been sore all over again.    Patient Goals: Return to full functional mobility, fitness and daily activities without pain or difficulty    Objective     Posture: Patient demonstrates forward head, decreased cervical lordosis, bilaterally abducted scapulae, with forward head/rounded shoulders posture.    Passive Range of Motion: WNL.     Active Range of Motion: WNL.    Cervical ROM:  Moderately limited side bend and rotation w/ pain reproduction to L.    Strength:  4+/5 in all directions; scapular collapse with ER, difficulty maintaining with cues.    Special Tests:  AC Joint Right Left   Cervical Distraction (+) resolution/ stretch felt    Spurling's (-) (-)   Empty Can (-) (-)   Subscaputlaris Lift Off (-) (-)   Hawkin's Kenndy (-) (-)   Neer's Test (-) (-)   Speed's test (-) (-)   1st Rib Spring Test:  (+) reproduction of symptoms  Tay's Test:  (+) for "cold" sensation L UE   Scapular Dyskinesis (+)     Joint Mobility: Moderately restricted cervical " vertebral mobility, especially C5-6 with rotation noted.  No radicular symptoms reported.    Palpation: Severe point tenderness over C5-6 transverse processes on L; moderate point tenderness over UT, scalenes and distal levator as well as cervical paraspinals on L.    Sensation: Intact.    Flexibility: Moderately restricted pec minor B; Severely restricted B UT, scalenes, moderately restricted levator.    Functional Limitations Reports   Category: Lifting/Carrying  Tool: FOTO Shoulder/Cervical    TREATMENT:  Louise received therapeutic exercises to develop strength and endurance, flexibility for 25 minutes including:  MHP x 10'  Propped on Elbows Postural stab w/ Alt UE Lift-off 10x5  Attempted GERSON Serratus Presses but unable  Supine Serratus Presses 1# Bar 10x3 (very fatigued)    MR Isometrics Supine Sidebend/Rotation 3sh 10x2 ea.  YTB Supine Isometric Cervical Retraction Rhythmic Stab 10x2  Postural Awareness on Wall No Money 10x3    Not today:  Supine on 1/2 Foam Roller 5'  GERSON Cat/Came w/ Serratus Press 5sh 10x2  Qped Thoracic Rotations 10x2B  Prone Scap Retractions 5sh x 20  Supine Cervical Retraction w/ Rotation 20xB  HEP discussion and Education    Cervical Traction 20#/10#, 30s/20s, 20 deg; 12'. (not today)    Louise  received the following manual therapy techniques x 25 min. To include Joint mobilizations and Soft tissue Mobilization were applied to the: cervical region To include:   Cervical mobs as tolerated  1st rib mobs  STM surrounding entire cervical and UT regions    Not today:  Gentle STM as tolerated thoracic musculature, trigger point release  Rotational mobs entire thoracic spine as tolerated  Rib mobs especially left thoracic region     Supine pec stretch over towel roll with IFC x 10' at end of treatment 2/2 soreness from manual therapy.    Instructed pt. regarding: Proper technique with all exercises. Pt demonstrated good understanding of the education provided. Louise demonstrated good return  demonstration of activities.    Assessment     Today's Assessment:  Patient continues to fatigue quickly but does not have increased symptoms with exercises.  Progress postural exercises as tolerated.    This is a 46 y.o. female referred to outpatient physical therapy and presents with a medical diagnosis of cervical pain and scapular dyskinesis.  Patient presents with signs and symptoms consistent with medical diagnosis exacerbated by poor scapular and cervical strength, and postural control.  Patient's chief complaint is pain.  Patient will benefit from skilled physical therapy services, specifically normalized posture and strengthening, normalized joint mobility and flexibility, progressing to functional mobility and fitness activities as tolerated.     Medical necessity is demonstrated by the following IMPAIRMENTS/PROBLEM LIST:   1)Increase in pain level limiting function   2)Decreased strength   3)Decreased posture   4)Decreased functional mobility   5)Lack of HEP    GOALS: Short Term Goals:  6-8 weeks  1.  Report decreased cervical pain < / =  2/10  to increase tolerance for daily and fitness activities as desired.  2.  Increase cervical AROM to painfree and normal limits in all directions in order to return to daily and fitness activities as desired.   3.  Patient will demonstrate normalized scapular positioning and motion in order to perform daily and fitness activities as desired with minimal pain or difficulty.  4.  Pt to tolerate HEP to improve ROM and independence with ADL's.  5.  Patient will report ability to perform daily and fitness activities as desired without pain or difficulty.    Plan     Pt will be treated by physical therapy 1-2 times a week for Pt Education, HEP, therapeutic exercises, neuromuscular re-education, manual therapy, joint mobilizations, modalities prn to achieve established goals. Louise may at times be seen by a PTA as part of the Rehab Team.     Cont PT for 6-8 weeks.     Zaina  M Termin, PT, DPT, SCS        I certify the need for these services furnished under this plan of treatment and while under my care.______________________________ Physician/Referring Practitioner  Date of Signature

## 2018-01-22 RX ORDER — AZITHROMYCIN 500 MG/1
500 TABLET, FILM COATED ORAL DAILY
Qty: 5 TABLET | Refills: 0 | Status: SHIPPED | OUTPATIENT
Start: 2018-01-22 | End: 2018-01-24 | Stop reason: SDUPTHER

## 2018-01-24 RX ORDER — AZITHROMYCIN 500 MG/1
500 TABLET, FILM COATED ORAL DAILY
Qty: 3 TABLET | Refills: 0 | Status: SHIPPED | OUTPATIENT
Start: 2018-01-24 | End: 2018-01-27

## 2018-05-15 ENCOUNTER — PATIENT MESSAGE (OUTPATIENT)
Dept: INFECTIOUS DISEASES | Facility: CLINIC | Age: 48
End: 2018-05-15

## 2018-05-15 ENCOUNTER — TELEPHONE (OUTPATIENT)
Dept: OBSTETRICS AND GYNECOLOGY | Facility: CLINIC | Age: 48
End: 2018-05-15

## 2018-05-15 NOTE — TELEPHONE ENCOUNTER
----- Message from Edie Conte sent at 5/15/2018  1:59 PM CDT -----  Contact: self  Pt needing a call back, regarding an appt, pt can be reached at [phone].

## 2018-08-15 ENCOUNTER — HOSPITAL ENCOUNTER (EMERGENCY)
Facility: OTHER | Age: 48
Discharge: HOME | End: 2018-08-15
Attending: EMERGENCY MEDICINE
Payer: COMMERCIAL

## 2018-08-15 VITALS
DIASTOLIC BLOOD PRESSURE: 69 MMHG | WEIGHT: 120 LBS | OXYGEN SATURATION: 97 % | BODY MASS INDEX: 19.99 KG/M2 | SYSTOLIC BLOOD PRESSURE: 112 MMHG | RESPIRATION RATE: 13 BRPM | HEIGHT: 65 IN | HEART RATE: 70 BPM | TEMPERATURE: 98 F

## 2018-08-15 DIAGNOSIS — R10.9 ABDOMINAL PAIN: Primary | ICD-10-CM

## 2018-08-15 DIAGNOSIS — R18.8 PELVIC FLUID COLLECTION: ICD-10-CM

## 2018-08-15 DIAGNOSIS — K76.89 HEPATIC CYST: ICD-10-CM

## 2018-08-15 LAB
ALBUMIN SERPL BCP-MCNC: 4 G/DL
ALP SERPL-CCNC: 44 U/L
ALT SERPL W/O P-5'-P-CCNC: 14 U/L
ANION GAP SERPL CALC-SCNC: 9 MMOL/L
AST SERPL-CCNC: 17 U/L
B-HCG UR QL: NEGATIVE
BASOPHILS # BLD AUTO: 0.05 K/UL
BASOPHILS NFR BLD: 0.6 %
BILIRUB SERPL-MCNC: 0.5 MG/DL
BILIRUB UR QL STRIP: NEGATIVE
BUN SERPL-MCNC: 11 MG/DL
CALCIUM SERPL-MCNC: 9.3 MG/DL
CHLORIDE SERPL-SCNC: 105 MMOL/L
CLARITY UR: CLEAR
CO2 SERPL-SCNC: 24 MMOL/L
COLOR UR: YELLOW
CREAT SERPL-MCNC: 0.8 MG/DL
CTP QC/QA: YES
DIFFERENTIAL METHOD: ABNORMAL
EOSINOPHIL # BLD AUTO: 0.6 K/UL
EOSINOPHIL NFR BLD: 7 %
ERYTHROCYTE [DISTWIDTH] IN BLOOD BY AUTOMATED COUNT: 13.2 %
EST. GFR  (AFRICAN AMERICAN): >60 ML/MIN/1.73 M^2
EST. GFR  (NON AFRICAN AMERICAN): >60 ML/MIN/1.73 M^2
GLUCOSE SERPL-MCNC: 89 MG/DL
GLUCOSE UR QL STRIP: NEGATIVE
HCT VFR BLD AUTO: 38.7 %
HGB BLD-MCNC: 13.1 G/DL
HGB UR QL STRIP: NEGATIVE
KETONES UR QL STRIP: ABNORMAL
LEUKOCYTE ESTERASE UR QL STRIP: NEGATIVE
LIPASE SERPL-CCNC: 17 U/L
LYMPHOCYTES # BLD AUTO: 2.5 K/UL
LYMPHOCYTES NFR BLD: 30.6 %
MCH RBC QN AUTO: 29.6 PG
MCHC RBC AUTO-ENTMCNC: 33.9 G/DL
MCV RBC AUTO: 87 FL
MONOCYTES # BLD AUTO: 0.5 K/UL
MONOCYTES NFR BLD: 6.1 %
NEUTROPHILS # BLD AUTO: 4.6 K/UL
NEUTROPHILS NFR BLD: 55.3 %
NITRITE UR QL STRIP: NEGATIVE
PH UR STRIP: 6 [PH] (ref 5–8)
PLATELET # BLD AUTO: 345 K/UL
PMV BLD AUTO: 9.4 FL
POTASSIUM SERPL-SCNC: 3.9 MMOL/L
PROT SERPL-MCNC: 7.1 G/DL
PROT UR QL STRIP: NEGATIVE
RBC # BLD AUTO: 4.43 M/UL
SODIUM SERPL-SCNC: 138 MMOL/L
SP GR UR STRIP: >=1.03 (ref 1–1.03)
URN SPEC COLLECT METH UR: ABNORMAL
UROBILINOGEN UR STRIP-ACNC: NEGATIVE EU/DL
WBC # BLD AUTO: 8.24 K/UL

## 2018-08-15 PROCEDURE — 25000003 PHARM REV CODE 250: Performed by: PHYSICIAN ASSISTANT

## 2018-08-15 PROCEDURE — 96361 HYDRATE IV INFUSION ADD-ON: CPT

## 2018-08-15 PROCEDURE — 25500020 PHARM REV CODE 255: Performed by: EMERGENCY MEDICINE

## 2018-08-15 PROCEDURE — 96375 TX/PRO/DX INJ NEW DRUG ADDON: CPT

## 2018-08-15 PROCEDURE — 63600175 PHARM REV CODE 636 W HCPCS: Performed by: PHYSICIAN ASSISTANT

## 2018-08-15 PROCEDURE — 93005 ELECTROCARDIOGRAM TRACING: CPT

## 2018-08-15 PROCEDURE — 96374 THER/PROPH/DIAG INJ IV PUSH: CPT

## 2018-08-15 PROCEDURE — 85025 COMPLETE CBC W/AUTO DIFF WBC: CPT

## 2018-08-15 PROCEDURE — 81025 URINE PREGNANCY TEST: CPT | Performed by: EMERGENCY MEDICINE

## 2018-08-15 PROCEDURE — 80053 COMPREHEN METABOLIC PANEL: CPT

## 2018-08-15 PROCEDURE — 93010 ELECTROCARDIOGRAM REPORT: CPT | Mod: ,,, | Performed by: INTERNAL MEDICINE

## 2018-08-15 PROCEDURE — 81003 URINALYSIS AUTO W/O SCOPE: CPT

## 2018-08-15 PROCEDURE — 83690 ASSAY OF LIPASE: CPT

## 2018-08-15 PROCEDURE — 99285 EMERGENCY DEPT VISIT HI MDM: CPT | Mod: 25

## 2018-08-15 RX ORDER — KETOROLAC TROMETHAMINE 30 MG/ML
30 INJECTION, SOLUTION INTRAMUSCULAR; INTRAVENOUS
Status: COMPLETED | OUTPATIENT
Start: 2018-08-15 | End: 2018-08-15

## 2018-08-15 RX ORDER — SODIUM CHLORIDE 9 MG/ML
1000 INJECTION, SOLUTION INTRAVENOUS
Status: DISCONTINUED | OUTPATIENT
Start: 2018-08-15 | End: 2018-08-15

## 2018-08-15 RX ORDER — ONDANSETRON 4 MG/1
4 TABLET, FILM COATED ORAL EVERY 6 HOURS PRN
Qty: 15 TABLET | Refills: 0 | Status: SHIPPED | OUTPATIENT
Start: 2018-08-15 | End: 2018-11-30

## 2018-08-15 RX ORDER — SODIUM CHLORIDE 9 MG/ML
1000 INJECTION, SOLUTION INTRAVENOUS
Status: COMPLETED | OUTPATIENT
Start: 2018-08-15 | End: 2018-08-15

## 2018-08-15 RX ORDER — KETOROLAC TROMETHAMINE 10 MG/1
10 TABLET, FILM COATED ORAL EVERY 6 HOURS PRN
Qty: 20 TABLET | Refills: 0 | Status: SHIPPED | OUTPATIENT
Start: 2018-08-15 | End: 2018-11-30

## 2018-08-15 RX ORDER — ONDANSETRON 2 MG/ML
4 INJECTION INTRAMUSCULAR; INTRAVENOUS
Status: COMPLETED | OUTPATIENT
Start: 2018-08-15 | End: 2018-08-15

## 2018-08-15 RX ORDER — HYOSCYAMINE SULFATE 0.5 MG/ML
0.5 INJECTION, SOLUTION SUBCUTANEOUS
Status: COMPLETED | OUTPATIENT
Start: 2018-08-15 | End: 2018-08-15

## 2018-08-15 RX ADMIN — SODIUM CHLORIDE 1000 ML: 0.9 INJECTION, SOLUTION INTRAVENOUS at 02:08

## 2018-08-15 RX ADMIN — IOHEXOL 75 ML: 350 INJECTION, SOLUTION INTRAVENOUS at 03:08

## 2018-08-15 RX ADMIN — HYOSCYAMINE SULFATE 0.5 MG: 0.5 INJECTION, SOLUTION SUBCUTANEOUS at 02:08

## 2018-08-15 RX ADMIN — KETOROLAC TROMETHAMINE 30 MG: 30 INJECTION, SOLUTION INTRAMUSCULAR at 02:08

## 2018-08-15 RX ADMIN — ONDANSETRON 4 MG: 2 INJECTION, SOLUTION INTRAMUSCULAR; INTRAVENOUS at 02:08

## 2018-08-15 NOTE — ED NOTES
Pt call light to the nurse station stating she needs to use the restroom. Pt disconnected from the monitor. Pt given socks and ambulating to the restroom.

## 2018-08-15 NOTE — ED NOTES
Rounding on pt. Pt requested ice chips. Check with RN. Pt given a cup of ice chips. Pt headboard elevated for comfort. Call light within reach.

## 2018-08-15 NOTE — ED NOTES
Pt rounding complete. Pt appears to be more comfortable than when she arrived to the unit. Pt denies any  needs at the moment. Pt does not appear to be in acute distress. Respirations are even and unlabored. VSS. Bed is locked and in lowest position with side rails up x2. Call light is within reach. Will continue to monitor. Family remains at the bedside.

## 2018-08-15 NOTE — ED NOTES
Hourly rounding completed on pt. Pt resting comfortably in stretcher. Denies pain att his time. Pt AAOx4 and appropriate at this time. Respirations even and unlabored. No acute distress noted. Awaiting further orders. Pt updated on POC. Bed is locked and in lowest position. Call bell within reach and pt oriented to use of call bell. Pt on continuous pulse ox, and continuous BP cuff. Will continue to monitor. Family member at BS.

## 2018-08-15 NOTE — ED NOTES
"Pt presents with c/o RUQ pain with onset last night, described as sharp. Pt states, "it feels like my when my gall bladder had to be taken out."  + tenderness to palpation in the R and L upper quadrants.  Pt reports an episode of emesis this morning denies any diarrhea. Pt denies any CP or SOB. Pt is AAOx4. RR are even and unlabored. Skin is warm  And dry. Pt is placed on cont cardiac, bp  And pulse ox monitoring. Bed is locked and in lowest position with side rails upx2. Call light is within reach.  is at the bedside.   "

## 2018-08-15 NOTE — ED PROVIDER NOTES
Encounter Date: 8/15/2018       History     Chief Complaint   Patient presents with    Abdominal Pain     upper abd pain radiating towards back. denies any n/v/d or fever/chills body aches. denies any change in urination. denies CP or SOB.     Patient is a 47-year-old female with GERD who presents to the ED with abdominal pain. Patient reports acute onset of upper abdominal pain that began this morning.  She states the pain is worse in her right upper quadrant and radiates into her back.  She states the pain feels similar to when she had cholelithiasis.  Patient had cholecystectomy 3 years ago.  Patient also reports 2-3 episodes of nonbloody, nonbilious emesis today.  She denies diarrhea.  Patient reports taking Advil today that provided her with minimal relief.  Patient states she initially thought the pain was related to her menstrual cycles that she suffers with dysmenorrhea.  She denies fever or chills. She denies chest pain or shortness of breath.      The history is provided by the patient.     Review of patient's allergies indicates:  No Known Allergies  Past Medical History:   Diagnosis Date    GERD (gastroesophageal reflux disease)      Past Surgical History:   Procedure Laterality Date    BREAST CYST EXCISION Left     18 y/o f     SECTION      CHOLECYSTECTOMY       Family History   Problem Relation Age of Onset    Heart disease Father     Hyperlipidemia Father     Cancer Mother 73        colon ca    Breast cancer Sister 48    Colon cancer Neg Hx      Social History     Tobacco Use    Smoking status: Never Smoker   Substance Use Topics    Alcohol use: No     Frequency: Never    Drug use: No     Review of Systems   Constitutional: Negative for chills and fever.   HENT: Negative for congestion and sore throat.    Eyes: Negative for pain.   Respiratory: Negative for shortness of breath.    Cardiovascular: Negative for chest pain.   Gastrointestinal: Positive for abdominal pain, nausea and  vomiting. Negative for diarrhea.   Genitourinary: Negative for dysuria.   Musculoskeletal: Negative for arthralgias.   Skin: Negative for rash.   Neurological: Negative for headaches.       Physical Exam     Initial Vitals [08/15/18 1348]   BP Pulse Resp Temp SpO2   132/70 70 18 98.3 °F (36.8 °C) 100 %      MAP       --         Physical Exam    Constitutional: Vital signs are normal. She is cooperative.   Patient appears uncomfortable.  She is in the fetal position.   HENT:   Head: Normocephalic and atraumatic.   Eyes: EOM are normal. Pupils are equal, round, and reactive to light.   Neck: Normal range of motion. Neck supple.   Cardiovascular: Normal rate, regular rhythm and intact distal pulses.   Pulmonary/Chest: Breath sounds normal. She has no wheezes. She has no rhonchi. She has no rales.   Abdominal: Soft. Bowel sounds are normal.   Diffuse tenderness to palpation.  Worse in the epigastrium with guarding.   Musculoskeletal: Normal range of motion. She exhibits no edema.   Neurological: She is alert and oriented to person, place, and time. GCS eye subscore is 4. GCS verbal subscore is 5. GCS motor subscore is 6.   Skin: Skin is warm and dry. Capillary refill takes less than 2 seconds. No rash noted.   Psychiatric: She has a normal mood and affect. Her behavior is normal.         ED Course   Procedures  Labs Reviewed   URINALYSIS, REFLEX TO URINE CULTURE   CBC W/ AUTO DIFFERENTIAL   COMPREHENSIVE METABOLIC PANEL   LIPASE   POCT URINE PREGNANCY          Imaging Results    None          Medical Decision Making:   Initial Assessment:   Urgent evaluation of a 47 y.o. Female presenting to the emergency department complaining of abdominal pain. Patient is afebrile, nontoxic appearing and hemodynamically stable.  Vital signs are normal.  Diffuse abdominal tenderness on exam.  Worse in the epigastrium.  Differential includes gastritis, perforated ulcer, pancreatitis, retained bile duct stone or obstruction.   Patient is  requesting non opioid pain medication.  Will provide Toradol, lessened, IV fluids and reassess.   ED Management:  Abdominal x-ray reveals no evidence of obstruction.  Hepatomegaly appears to be present.  Lab work reveals no leukocytosis.  No anemia.  Chemistry reveals no electrolyte abnormalities.  Normal LFTs.  Alk-phos is mildly decreased at 44.  Normal renal function.  Lipase is within normal limits. Urinalysis reveals no UTI.   CT abdomen and pelvis reveals fibroid uterus with IUD in place.  There is a small amount of free fluid within the pelvis.  There are also multiple hepatic cysts.  There are no abnormal adnexal masses present.  Patient's pain may have been secondary to the dysmenorrhea or a ruptured ovarian cyst.   After patient received Toradol, she reports significant improvement in her pain. Findings were discussed with patient.  She decision was made that patient can treat her pain at home and follow up with her OB Gyne (and she has an appointment with next week) and her primary care provider for a nonemergent ultrasound of her liver.  Patient will be sent home with Toradol and Zofran.  She has no other complaints this time is stable for discharge. She is given strict return precautions.                      Clinical Impression:     1. Abdominal pain    2. Hepatic cyst    3. Pelvic fluid collection                               Robert Jules PA-C  08/15/18 [id number]

## 2018-08-18 ENCOUNTER — PATIENT MESSAGE (OUTPATIENT)
Dept: OBSTETRICS AND GYNECOLOGY | Facility: CLINIC | Age: 48
End: 2018-08-18

## 2018-08-21 ENCOUNTER — OFFICE VISIT (OUTPATIENT)
Dept: OBSTETRICS AND GYNECOLOGY | Facility: CLINIC | Age: 48
End: 2018-08-21
Attending: OBSTETRICS & GYNECOLOGY
Payer: COMMERCIAL

## 2018-08-21 VITALS
DIASTOLIC BLOOD PRESSURE: 62 MMHG | WEIGHT: 123.44 LBS | HEIGHT: 65 IN | BODY MASS INDEX: 20.57 KG/M2 | SYSTOLIC BLOOD PRESSURE: 104 MMHG

## 2018-08-21 DIAGNOSIS — Z01.419 WELL WOMAN EXAM WITH ROUTINE GYNECOLOGICAL EXAM: Primary | ICD-10-CM

## 2018-08-21 DIAGNOSIS — Z12.4 PAP SMEAR FOR CERVICAL CANCER SCREENING: ICD-10-CM

## 2018-08-21 DIAGNOSIS — Z12.31 SCREENING MAMMOGRAM, ENCOUNTER FOR: ICD-10-CM

## 2018-08-21 PROCEDURE — 88141 CYTOPATH C/V INTERPRET: CPT | Mod: ,,, | Performed by: PATHOLOGY

## 2018-08-21 PROCEDURE — 88175 CYTOPATH C/V AUTO FLUID REDO: CPT | Performed by: PATHOLOGY

## 2018-08-21 PROCEDURE — 99396 PREV VISIT EST AGE 40-64: CPT | Mod: S$GLB,,, | Performed by: OBSTETRICS & GYNECOLOGY

## 2018-08-21 NOTE — PROGRESS NOTES
Subjective:       Patient ID: Louise Cueto is a 47 y.o. female.    Chief Complaint:  Well Woman      History of Present Illness  HPI  This 47 yr old P3 female is here for WWE and was seen in ER last week for abdominal pain and was told had ruptured cyst.  Had some free fluid on CT and fibroid uterus with IUD in place.  She is no longer having pain. We discussed that she needs an internal med.MD.  She has appt with internal med.    GYN & OB History  Patient's last menstrual period was 2018.   Date of Last Pap: 2015    OB History    Para Term  AB Living   3 3 3         SAB TAB Ectopic Multiple Live Births                  # Outcome Date GA Lbr Crow/2nd Weight Sex Delivery Anes PTL Lv   3 Term            2 Term            1 Term                   Review of Systems  Review of Systems   Constitutional: Negative for chills and fever.   Respiratory: Negative for shortness of breath.    Cardiovascular: Negative for chest pain.   Gastrointestinal: Negative for abdominal pain, nausea and vomiting.   Genitourinary: Negative for difficulty urinating, dyspareunia, genital sores, menstrual problem, pelvic pain, vaginal bleeding, vaginal discharge and vaginal pain.   Skin: Negative for wound.   Hematological: Negative for adenopathy.           Objective:   Physical Exam:   Constitutional: She is oriented to person, place, and time. She appears well-developed and well-nourished.    HENT:   Head: Normocephalic.    Eyes: EOM are normal.    Neck: Normal range of motion.    Cardiovascular: Normal rate.     Pulmonary/Chest: Effort normal. She exhibits no mass and no tenderness. Right breast exhibits no inverted nipple, no mass, no skin change and no tenderness. Left breast exhibits no inverted nipple, no mass, no skin change and no tenderness.        Abdominal: Soft. She exhibits no distension. There is no tenderness.     Genitourinary: Vagina normal and uterus normal. There is no rash, tenderness or lesion on  the right labia. There is no rash, tenderness or lesion on the left labia. Uterus is not tender. Cervix is normal. Right adnexum displays no mass, no tenderness and no fullness. Left adnexum displays no mass, no tenderness and no fullness. Cervix exhibits no discharge.           Musculoskeletal: Normal range of motion.       Neurological: She is alert and oriented to person, place, and time.    Skin: Skin is warm and dry.    Psychiatric: She has a normal mood and affect.          Assessment:        1. Well woman exam with routine gynecological exam    2. Pap smear for cervical cancer screening    3. Screening mammogram, encounter for               Plan:      Pap per routine  Mammogram yearly  If pain returns, let us know and might get ultrasound  ressured

## 2018-09-10 ENCOUNTER — PATIENT MESSAGE (OUTPATIENT)
Dept: OBSTETRICS AND GYNECOLOGY | Facility: CLINIC | Age: 48
End: 2018-09-10

## 2018-09-20 ENCOUNTER — HOSPITAL ENCOUNTER (OUTPATIENT)
Dept: RADIOLOGY | Facility: HOSPITAL | Age: 48
Discharge: HOME | End: 2018-09-20
Attending: OBSTETRICS & GYNECOLOGY
Payer: COMMERCIAL

## 2018-09-20 DIAGNOSIS — Z12.31 SCREENING MAMMOGRAM, ENCOUNTER FOR: ICD-10-CM

## 2018-09-20 PROCEDURE — 77067 SCR MAMMO BI INCL CAD: CPT | Mod: TC

## 2018-09-20 PROCEDURE — 77067 SCR MAMMO BI INCL CAD: CPT | Mod: 26,,, | Performed by: RADIOLOGY

## 2018-09-20 PROCEDURE — 77063 BREAST TOMOSYNTHESIS BI: CPT | Mod: 26,,, | Performed by: RADIOLOGY

## 2018-09-24 ENCOUNTER — TELEPHONE (OUTPATIENT)
Dept: RADIOLOGY | Facility: HOSPITAL | Age: 48
End: 2018-09-24

## 2018-09-24 NOTE — TELEPHONE ENCOUNTER
Spoke with patient and explained mammogram findings.Patient expressed understanding of results. Patient is scheduled for a abnormal mammogram follow up appointment at [facility] on 9/26/18

## 2018-09-26 ENCOUNTER — HOSPITAL ENCOUNTER (OUTPATIENT)
Dept: RADIOLOGY | Facility: HOSPITAL | Age: 48
Discharge: HOME | End: 2018-09-26
Attending: OBSTETRICS & GYNECOLOGY
Payer: COMMERCIAL

## 2018-09-26 DIAGNOSIS — R92.8 ABNORMAL MAMMOGRAM: ICD-10-CM

## 2018-09-27 ENCOUNTER — HOSPITAL ENCOUNTER (OUTPATIENT)
Dept: RADIOLOGY | Facility: HOSPITAL | Age: 48
Discharge: HOME | End: 2018-09-27
Attending: OBSTETRICS & GYNECOLOGY
Payer: COMMERCIAL

## 2018-09-27 DIAGNOSIS — R92.8 ABNORMAL MAMMOGRAM: ICD-10-CM

## 2018-09-27 PROCEDURE — 76642 ULTRASOUND BREAST LIMITED: CPT | Mod: 26,LT,, | Performed by: RADIOLOGY

## 2018-09-27 PROCEDURE — 77061 BREAST TOMOSYNTHESIS UNI: CPT | Mod: TC,PO,LT

## 2018-09-27 PROCEDURE — 77065 DX MAMMO INCL CAD UNI: CPT | Mod: 26,LT,, | Performed by: RADIOLOGY

## 2018-09-27 PROCEDURE — 76642 ULTRASOUND BREAST LIMITED: CPT | Mod: TC,PO,LT

## 2018-09-27 PROCEDURE — 77061 BREAST TOMOSYNTHESIS UNI: CPT | Mod: 26,LT,, | Performed by: RADIOLOGY

## 2018-09-27 PROCEDURE — 77065 DX MAMMO INCL CAD UNI: CPT | Mod: TC,PO,LT

## 2018-10-01 ENCOUNTER — PATIENT MESSAGE (OUTPATIENT)
Dept: OBSTETRICS AND GYNECOLOGY | Facility: CLINIC | Age: 48
End: 2018-10-01

## 2018-11-19 ENCOUNTER — PATIENT MESSAGE (OUTPATIENT)
Dept: INFECTIOUS DISEASES | Facility: CLINIC | Age: 48
End: 2018-11-19

## 2018-11-30 ENCOUNTER — OFFICE VISIT (OUTPATIENT)
Dept: INTERNAL MEDICINE | Facility: CLINIC | Age: 48
End: 2018-11-30
Attending: INTERNAL MEDICINE
Payer: COMMERCIAL

## 2018-11-30 VITALS
HEIGHT: 65 IN | WEIGHT: 127 LBS | OXYGEN SATURATION: 99 % | SYSTOLIC BLOOD PRESSURE: 107 MMHG | BODY MASS INDEX: 21.16 KG/M2 | DIASTOLIC BLOOD PRESSURE: 60 MMHG | HEART RATE: 64 BPM

## 2018-11-30 DIAGNOSIS — R93.2 ABNORMAL LIVER DIAGNOSTIC IMAGING: ICD-10-CM

## 2018-11-30 DIAGNOSIS — Z00.00 ANNUAL PHYSICAL EXAM: Primary | ICD-10-CM

## 2018-11-30 PROCEDURE — 99999 PR PBB SHADOW E&M-EST. PATIENT-LVL III: CPT | Mod: PBBFAC,,, | Performed by: INTERNAL MEDICINE

## 2018-11-30 PROCEDURE — 99396 PREV VISIT EST AGE 40-64: CPT | Mod: S$GLB,,, | Performed by: INTERNAL MEDICINE

## 2018-11-30 NOTE — PROGRESS NOTES
"Subjective:   Patient ID: Louise Cueto is a 47 y.o. female  Chief complaint:   Chief Complaint   Patient presents with    [facility]       HPI    Pt here to [facility] and for annual exam   utd on flu vaccine    Cscope: family hx of colon CA and she had cscope with one hyperplastic polyp - rec return in 5 year for repeat    Went to alt doctor and reports had many labs performed   ? Maybe had stool tested - told had parasite and given vits x 3 months - per pt this was thought to be due to that potential exposure from eating foods from garden   - never treated with antiparasitic Rx   Traveled to [facility] and [place] but no other foreign travel   No diarrhea in past      utd on derm appt for skin exam - Dr. Caldwell   utd on flu vaccine     Review of Systems    Objective:  Vitals:    11/30/18 1524   BP: 107/60   Pulse: 64   SpO2: 99%   Weight: 57.6 kg (126 lb 15.8 oz)   Height: 5' 5" (1.651 m)     Body mass index is 21.13 kg/m².    Physical Exam   Constitutional: She is oriented to person, place, and time. She appears well-developed and well-nourished.   HENT:   Head: Normocephalic and atraumatic.   Right Ear: External ear normal.   Left Ear: External ear normal.   Nose: Nose normal.   Mouth/Throat: Oropharynx is clear and moist. No oropharyngeal exudate.   No carotid bruits   Eyes: Conjunctivae and EOM are normal.   Neck: Neck supple. No thyromegaly present.   Cardiovascular: Normal rate, regular rhythm, normal heart sounds and intact distal pulses.   Pulmonary/Chest: Effort normal and breath sounds normal.   Abdominal: Soft. Bowel sounds are normal.   Musculoskeletal: She exhibits no edema or tenderness.   Lymphadenopathy:     She has no cervical adenopathy.   Neurological: She is alert and oriented to person, place, and time.   Skin: Skin is warm and dry.   Psychiatric: Her behavior is normal. Thought content normal.   Vitals reviewed.      Assessment:  1. Annual physical exam    2. Abnormal liver diagnostic imaging "        Plan:  Louise was seen today for establish care.    Diagnoses and all orders for this visit:    Annual physical exam  -     TSH; Future  -     Lipid panel; Future  -     Vitamin D; Future  -     CBC auto differential; Future  Recommend daily sunscreen, cardiovascular exercise min 30 min 5 days per week. Seatbelts routinely.    Abnormal liver diagnostic imaging  -     US Abdomen Limited; Future  Likely cysts on CT - f/u US     Pt to send me copy of labs and stool test from [facility] med MD for review - no fevers or chills or abd pain, change in stools, diarrhea - will review once received   - f/u labs above     Health Maintenance   Topic Date Due    Mammogram  09/27/2019    Pap Smear with HPV Cotest  08/21/2021    Colonoscopy  01/26/2022    Lipid Panel  01/22/2023    TETANUS VACCINE  08/11/2027    Influenza Vaccine  Completed

## 2018-12-12 ENCOUNTER — LAB VISIT (OUTPATIENT)
Dept: LAB | Facility: OTHER | Age: 48
End: 2018-12-12
Attending: INTERNAL MEDICINE
Payer: COMMERCIAL

## 2018-12-12 DIAGNOSIS — Z00.00 ANNUAL PHYSICAL EXAM: ICD-10-CM

## 2018-12-12 LAB
25(OH)D3+25(OH)D2 SERPL-MCNC: 36 NG/ML
BASOPHILS # BLD AUTO: 0.03 K/UL
BASOPHILS NFR BLD: 0.4 %
CHOLEST SERPL-MCNC: 182 MG/DL
CHOLEST/HDLC SERPL: 2.8 {RATIO}
DIFFERENTIAL METHOD: ABNORMAL
EOSINOPHIL # BLD AUTO: 0.5 K/UL
EOSINOPHIL NFR BLD: 5.5 %
ERYTHROCYTE [DISTWIDTH] IN BLOOD BY AUTOMATED COUNT: 13.6 %
HCT VFR BLD AUTO: 39.3 %
HDLC SERPL-MCNC: 66 MG/DL
HDLC SERPL: 36.3 %
HGB BLD-MCNC: 13 G/DL
LDLC SERPL CALC-MCNC: 107.2 MG/DL
LYMPHOCYTES # BLD AUTO: 2 K/UL
LYMPHOCYTES NFR BLD: 23.5 %
MCH RBC QN AUTO: 29.6 PG
MCHC RBC AUTO-ENTMCNC: 33.1 G/DL
MCV RBC AUTO: 90 FL
MONOCYTES # BLD AUTO: 0.4 K/UL
MONOCYTES NFR BLD: 4.9 %
NEUTROPHILS # BLD AUTO: 5.6 K/UL
NEUTROPHILS NFR BLD: 65.5 %
NONHDLC SERPL-MCNC: 116 MG/DL
PLATELET # BLD AUTO: 452 K/UL
PMV BLD AUTO: 9.4 FL
RBC # BLD AUTO: 4.39 M/UL
TRIGL SERPL-MCNC: 44 MG/DL
TSH SERPL DL<=0.005 MIU/L-ACNC: 2.74 UIU/ML
WBC # BLD AUTO: 8.54 K/UL

## 2018-12-12 PROCEDURE — 82306 VITAMIN D 25 HYDROXY: CPT

## 2018-12-12 PROCEDURE — 85025 COMPLETE CBC W/AUTO DIFF WBC: CPT

## 2018-12-12 PROCEDURE — 84443 ASSAY THYROID STIM HORMONE: CPT

## 2018-12-12 PROCEDURE — 36415 COLL VENOUS BLD VENIPUNCTURE: CPT

## 2018-12-12 PROCEDURE — 80061 LIPID PANEL: CPT

## 2018-12-13 ENCOUNTER — TELEPHONE (OUTPATIENT)
Dept: INTERNAL MEDICINE | Facility: CLINIC | Age: 48
End: 2018-12-13

## 2018-12-13 DIAGNOSIS — D69.6 THROMBOCYTOPENIA: Primary | ICD-10-CM

## 2018-12-13 NOTE — TELEPHONE ENCOUNTER
Message sent to pt via my chart with lab results and updates to plan.   Please arrange ferritin and TIBC in 1 week

## 2018-12-14 ENCOUNTER — PATIENT MESSAGE (OUTPATIENT)
Dept: INTERNAL MEDICINE | Facility: CLINIC | Age: 48
End: 2018-12-14

## 2018-12-17 ENCOUNTER — HOSPITAL ENCOUNTER (OUTPATIENT)
Dept: RADIOLOGY | Facility: OTHER | Age: 48
Discharge: HOME | End: 2018-12-17
Attending: INTERNAL MEDICINE
Payer: COMMERCIAL

## 2018-12-17 DIAGNOSIS — R93.2 ABNORMAL LIVER DIAGNOSTIC IMAGING: ICD-10-CM

## 2018-12-17 PROCEDURE — 76705 ECHO EXAM OF ABDOMEN: CPT | Mod: 26,,, | Performed by: RADIOLOGY

## 2018-12-17 PROCEDURE — 76705 ECHO EXAM OF ABDOMEN: CPT | Mod: TC

## 2018-12-20 ENCOUNTER — TELEPHONE (OUTPATIENT)
Dept: INTERNAL MEDICINE | Facility: CLINIC | Age: 48
End: 2018-12-20

## 2018-12-20 DIAGNOSIS — D50.9 IRON DEFICIENCY ANEMIA, UNSPECIFIED IRON DEFICIENCY ANEMIA TYPE: Primary | ICD-10-CM

## 2018-12-20 DIAGNOSIS — R93.2 ABNORMAL LIVER DIAGNOSTIC IMAGING: ICD-10-CM

## 2018-12-20 RX ORDER — DOCUSATE SODIUM 100 MG/1
100 CAPSULE, LIQUID FILLED ORAL 2 TIMES DAILY
Qty: 60 CAPSULE | Refills: 11 | COMMUNITY
Start: 2018-12-20 | End: 2018-12-30

## 2018-12-20 RX ORDER — FERROUS GLUCONATE 324(38)MG
324 TABLET ORAL 2 TIMES DAILY WITH MEALS
COMMUNITY
Start: 2018-12-20 | End: 2019-06-27

## 2018-12-20 NOTE — TELEPHONE ENCOUNTER
Message sent to pt via my chart with lab results and updates to plan.   Please arrange ferritin, tibc, cbc in 3 months     Please schedule appt with hepatology to f/u imaging and for further recs

## 2019-01-03 ENCOUNTER — OFFICE VISIT (OUTPATIENT)
Dept: HEPATOLOGY | Facility: CLINIC | Age: 49
End: 2019-01-03
Payer: COMMERCIAL

## 2019-01-03 ENCOUNTER — LAB VISIT (OUTPATIENT)
Dept: LAB | Facility: HOSPITAL | Age: 49
End: 2019-01-03
Payer: COMMERCIAL

## 2019-01-03 ENCOUNTER — TELEPHONE (OUTPATIENT)
Dept: HEPATOLOGY | Facility: CLINIC | Age: 49
End: 2019-01-03

## 2019-01-03 VITALS
SYSTOLIC BLOOD PRESSURE: 106 MMHG | OXYGEN SATURATION: 100 % | HEART RATE: 72 BPM | HEIGHT: 65 IN | WEIGHT: 125 LBS | TEMPERATURE: 98 F | BODY MASS INDEX: 20.83 KG/M2 | RESPIRATION RATE: 17 BRPM | DIASTOLIC BLOOD PRESSURE: 63 MMHG

## 2019-01-03 DIAGNOSIS — K76.9 LIVER LESION: ICD-10-CM

## 2019-01-03 DIAGNOSIS — K76.9 LIVER LESION: Primary | ICD-10-CM

## 2019-01-03 PROCEDURE — 99203 PR OFFICE/OUTPT VISIT, NEW, LEVL III, 30-44 MIN: ICD-10-PCS | Mod: S$GLB,,, | Performed by: NURSE PRACTITIONER

## 2019-01-03 PROCEDURE — 99999 PR PBB SHADOW E&M-EST. PATIENT-LVL III: CPT | Mod: PBBFAC,,, | Performed by: NURSE PRACTITIONER

## 2019-01-03 PROCEDURE — 36415 COLL VENOUS BLD VENIPUNCTURE: CPT

## 2019-01-03 PROCEDURE — 80074 ACUTE HEPATITIS PANEL: CPT

## 2019-01-03 PROCEDURE — 3008F BODY MASS INDEX DOCD: CPT | Mod: CPTII,S$GLB,, | Performed by: NURSE PRACTITIONER

## 2019-01-03 PROCEDURE — 3008F PR BODY MASS INDEX (BMI) DOCUMENTED: ICD-10-PCS | Mod: CPTII,S$GLB,, | Performed by: NURSE PRACTITIONER

## 2019-01-03 PROCEDURE — 99203 OFFICE O/P NEW LOW 30 MIN: CPT | Mod: S$GLB,,, | Performed by: NURSE PRACTITIONER

## 2019-01-03 PROCEDURE — 99999 PR PBB SHADOW E&M-EST. PATIENT-LVL III: ICD-10-PCS | Mod: PBBFAC,,, | Performed by: NURSE PRACTITIONER

## 2019-01-03 NOTE — TELEPHONE ENCOUNTER
Patient: Louise Cueto       MRN: [id number]      : 1970     Age: 48 y.o.  [address] [facility] [address]    Provider: LILIAN - Мария Trinidad NP seen with Dr. Valdovinos    Urgency of review: non-urgent    Patient Transplant Status: Not a candidate    Reason for presentation: Indeterminate lesion    Clinical Summary: 48 year female, h/o liver lesions (suggestive of hemangioma and cysts)    -- US : 0.8 x 0.8  0.9 cm hyperechoic solid mass in the right hepatic lobe  -- US 2016: 1.1 x 1.2 x 0.8 cm echogenic lesion within the right hepatic lobe, felt to likely represent hemangioma  -- CT 2018: multiple hepatic hypodensities present likely representing hepatic cysts  -- US 2018: 1.1 cm hyperechoic lesion in the right hepatic lobe is unchanged.  Unchanged cyst in the right hepatic lobe    Any further imaging needed?         Imaging to be reviewed: US , US , CT 2018 and US 2018     HCC Treatment History: n/a    ABO:     Platelets:   Lab Results   Component Value Date/Time     (H) 2018 08:40 AM     Creatinine:   Lab Results   Component Value Date/Time    CREATININE 0.8 08/15/2018 02:34 PM     Bilirubin:   Lab Results   Component Value Date/Time    BILITOT 0.5 08/15/2018 02:34 PM     AFP Last 3 each if available: No results found for: AFP, EXTAFP    MELD: Computed MELD-Na score unavailable. Necessary lab results were not found in the last year.  Computed MELD score unavailable. Necessary lab results were not found in the last year.    Plan:     Follow-up Provider:

## 2019-01-03 NOTE — LETTER
January 3, 2019      Jonna Lozano MD  [address]  [facility] [address]           [facility] - Hepatology  [address]  [facility] [address]  Phone: [phone]  Fax: [phone]          Patient: Louise Cueto   MR Number: [id number]   YOB: 1970   Date of Visit: 1/3/2019       Dear Dr. Jonna Lozano:    Thank you for referring Louise Cueto to me for evaluation. Attached you will find relevant portions of my assessment and plan of care.    If you have questions, please do not hesitate to call me. I look forward to following Louise Cueto along with you.    Sincerely,    Мария Trinidad, NP    Enclosure  CC:  No Recipients    If you would like to receive this communication electronically, please contact [email or web address] or [phone] to request more information on [facility] access.    For providers and/or their staff who would like to refer a patient to [facility], please contact us through our one-stop-shop provider referral line, [facility], at [phone].    If you feel you have received this communication in error or would no longer like to receive these types of communications, please e-mail [email or web address]

## 2019-01-03 NOTE — PROGRESS NOTES
"I have personally performed a face to face diagnostic evaluation on this patient. I have reviewed and agree with today's findings and the care plan outlined by Мария Trinidad NP      My findings are as follows:  Patient presents with liver lesion:   US showing 1. mass right lobe,   US showed hemangioma,    CT showed "multiple cysts"   US unchanged hemangioma and cyst.     Asymptomatic patient.   LFTs normal, no evid of liver disease.     Will review in IR conf.        she will return to Мария Trinidad NP for follow-up.      "

## 2019-01-04 ENCOUNTER — TELEPHONE (OUTPATIENT)
Dept: OBSTETRICS AND GYNECOLOGY | Facility: CLINIC | Age: 49
End: 2019-01-04

## 2019-01-04 LAB
HAV IGM SERPL QL IA: NEGATIVE
HBV CORE IGM SERPL QL IA: NEGATIVE
HBV SURFACE AG SERPL QL IA: NEGATIVE
HCV AB SERPL QL IA: NEGATIVE

## 2019-01-04 NOTE — TELEPHONE ENCOUNTER
----- Message from Jayshree Holloway sent at 1/4/2019 10:44 AM CST -----  Contact: self  Pt is calling to discuss some questions. The pt believes she has a UTI and is looking for advice. The pt can be reached at [phone].

## 2019-01-04 NOTE — TELEPHONE ENCOUNTER
Spoke with patient whom complained of a possible UTI. Patient was offered an appointment tomorrow with womens walk in. Patient declined, she stated she will call back on Monday to schedule.Patient has no further questions or complaints.

## 2019-01-06 ENCOUNTER — OFFICE VISIT (OUTPATIENT)
Dept: URGENT CARE | Facility: CLINIC | Age: 49
End: 2019-01-06
Payer: COMMERCIAL

## 2019-01-06 VITALS
TEMPERATURE: 98 F | OXYGEN SATURATION: 100 % | SYSTOLIC BLOOD PRESSURE: 92 MMHG | BODY MASS INDEX: 20.83 KG/M2 | RESPIRATION RATE: 17 BRPM | HEIGHT: 65 IN | HEART RATE: 58 BPM | WEIGHT: 125 LBS | DIASTOLIC BLOOD PRESSURE: 68 MMHG

## 2019-01-06 DIAGNOSIS — R31.9 URINARY TRACT INFECTION WITH HEMATURIA, SITE UNSPECIFIED: ICD-10-CM

## 2019-01-06 DIAGNOSIS — R30.0 DYSURIA: Primary | ICD-10-CM

## 2019-01-06 DIAGNOSIS — N39.0 URINARY TRACT INFECTION WITH HEMATURIA, SITE UNSPECIFIED: ICD-10-CM

## 2019-01-06 LAB
BILIRUB UR QL STRIP: NEGATIVE
GLUCOSE UR QL STRIP: NEGATIVE
KETONES UR QL STRIP: NEGATIVE
LEUKOCYTE ESTERASE UR QL STRIP: POSITIVE
PH, POC UA: 6 (ref 5–8)
POC BLOOD, URINE: POSITIVE
POC NITRATES, URINE: NEGATIVE
PROT UR QL STRIP: POSITIVE
SP GR UR STRIP: 1.01 (ref 1–1.03)
UROBILINOGEN UR STRIP-ACNC: ABNORMAL (ref 0.1–1.1)

## 2019-01-06 PROCEDURE — 81003 POCT URINALYSIS, DIPSTICK, AUTOMATED, W/O SCOPE: ICD-10-PCS | Mod: QW,S$GLB,, | Performed by: NURSE PRACTITIONER

## 2019-01-06 PROCEDURE — 99214 OFFICE O/P EST MOD 30 MIN: CPT | Mod: 25,S$GLB,, | Performed by: NURSE PRACTITIONER

## 2019-01-06 PROCEDURE — 3008F BODY MASS INDEX DOCD: CPT | Mod: CPTII,S$GLB,, | Performed by: NURSE PRACTITIONER

## 2019-01-06 PROCEDURE — 81003 URINALYSIS AUTO W/O SCOPE: CPT | Mod: QW,S$GLB,, | Performed by: NURSE PRACTITIONER

## 2019-01-06 PROCEDURE — 99214 PR OFFICE/OUTPT VISIT, EST, LEVL IV, 30-39 MIN: ICD-10-PCS | Mod: 25,S$GLB,, | Performed by: NURSE PRACTITIONER

## 2019-01-06 PROCEDURE — 3008F PR BODY MASS INDEX (BMI) DOCUMENTED: ICD-10-PCS | Mod: CPTII,S$GLB,, | Performed by: NURSE PRACTITIONER

## 2019-01-06 RX ORDER — NITROFURANTOIN 25; 75 MG/1; MG/1
100 CAPSULE ORAL 2 TIMES DAILY
Qty: 10 CAPSULE | Refills: 0 | Status: SHIPPED | OUTPATIENT
Start: 2019-01-06 | End: 2019-01-11

## 2019-01-06 NOTE — PROGRESS NOTES
"Subjective:       Patient ID: Louise Cueto is a 48 y.o. female.    Vitals:  height is 5' 5" (1.651 m) and weight is 56.7 kg (125 lb). Her oral temperature is 98.2 °F (36.8 °C). Her blood pressure is 92/68 and her pulse is 58 (abnormal). Her respiration is 17 and oxygen saturation is 100%.     Chief Complaint: Urinary Tract Infection    Onset from Friday. No hx of recent uti. Denies fever/chills, denies back pain. Reports lower abdominal tenderness, cramping.       Urinary Tract Infection    This is a new problem. The current episode started acute onset. The problem occurs every urination. The problem has been unchanged. The quality of the pain is described as burning. The pain is at a severity of 5/10. The pain is moderate. There has been no fever. She is sexually active. There is no history of pyelonephritis. Associated symptoms include frequency. Pertinent negatives include no chills, hematuria, nausea, urgency, vomiting or rash. She has tried nothing for the symptoms.       Constitution: Negative for chills and fever.   Neck: Negative for painful lymph nodes.   Gastrointestinal: Negative for abdominal pain, nausea and vomiting.   Genitourinary: Positive for dysuria and frequency. Negative for urgency, urine decreased, hematuria, history of kidney stones, painful menstruation, irregular menstruation, missed menses, heavy menstrual bleeding, ovarian cysts, genital trauma, vaginal pain, vaginal discharge, vaginal bleeding, vaginal odor, painful intercourse, genital sore, painful ejaculation and pelvic pain.   Musculoskeletal: Negative for back pain.   Skin: Negative for rash and lesion.   Hematologic/Lymphatic: Negative for swollen lymph nodes.       Objective:      Physical Exam   Constitutional: She is oriented to person, place, and time. She appears well-developed and well-nourished.   HENT:   Head: Normocephalic and atraumatic.   Right Ear: External ear normal.   Left Ear: External ear normal.   Nose: Nose " normal.   Eyes: Lids are normal.   Neck: Trachea normal, normal range of motion and phonation normal. Neck supple.   Cardiovascular: Normal pulses.   Pulmonary/Chest: Effort normal.   Abdominal: Soft. Normal appearance and bowel sounds are normal. She exhibits no distension. There is tenderness in the suprapubic area. There is no rigidity, no rebound, no guarding, no CVA tenderness, no tenderness at McBurney's point and negative Russ's sign.   Neurological: She is alert and oriented to person, place, and time.   Skin: Skin is warm, dry and intact.   Psychiatric: She has a normal mood and affect. Her speech is normal and behavior is normal. Cognition and memory are normal.   Nursing note and vitals reviewed.      Results for orders placed or performed in visit on 01/06/19   POCT Urinalysis, Dipstick, Automated, W/O Scope   Result Value Ref Range    POC Blood, Urine Positive (A) Negative    POC Bilirubin, Urine Negative Negative    POC Urobilinogen, Urine norm 0.1 - 1.1    POC Ketones, Urine Negative Negative    POC Protein, Urine Positive (A) Negative    POC Nitrates, Urine Negative Negative    POC Glucose, Urine Negative Negative    pH, UA 6.0 5 - 8    POC Specific Gravity, Urine 1.010 1.003 - 1.029    POC Leukocytes, Urine Positive (A) Negative       Assessment:       1. Dysuria    2. Urinary tract infection with hematuria, site unspecified        Plan:         Dysuria  -     POCT Urinalysis, Dipstick, Automated, W/O Scope    Urinary tract infection with hematuria, site unspecified  -     nitrofurantoin, macrocrystal-monohydrate, (MACROBID) 100 MG capsule; Take 1 capsule (100 mg total) by mouth 2 (two) times daily. for 5 days  Dispense: 10 capsule; Refill: 0      Patient Instructions   Follow up with your doctor in a few days.  Return to the urgent care or go to the ER if symptoms get worse.    Take macrobid as directed.  Stay hydrated .  Follow up with ob/gyn.      Bladder Infection, Female (Adult)    Urine is  "normally doesn't have any bacteria in it. But bacteria can get into the urinary tract from the skin around the rectum. Or they can travel in the blood from elsewhere in the body. Once they are in your urinary tract, they can cause infection in the urethra (urethritis), the bladder (cystitis), or the kidneys (pyelonephritis).  The most common place for an infection is in the bladder. This is called a bladder infection. This is one of the most common infections in women. Most bladder infections are easily treated. They are not serious unless the infection spreads to the kidney.  The phrases "bladder infection," "UTI," and "cystitis" are often used to describe the same thing. But they are not always the same. Cystitis is an inflammation of the bladder. The most common cause of cystitis is an infection.  Symptoms  The infection causes inflammation in the urethra and bladder. This causes many of the symptoms. The most common symptoms of a bladder infection are:  · Pain or burning when urinating  · Having to urinate more often than usual  · Urgent need to urinate  · Only a small amount of urine comes out  · Blood in urine  · Abdominal discomfort. This is usually in the lower abdomen above the pubic bone.  · Cloudy urine  · Strong- or bad-smelling urine  · Unable to urinate (urinary retention)  · Unable to hold urine in (urinary incontinence)  · Fever  · Loss of appetite  · Confusion (in older adults)  Causes  Bladder infections are not contagious. You can't get one from someone else, from a toilet seat, or from sharing a bath.  The most common cause of bladder infections is bacteria from the bowels. The bacteria get onto the skin around the opening of the urethra. From there, they can get into the urine and travel up to the bladder, causing inflammation and infection. This usually happens because of:  · Wiping improperly after urinating. Always wipe from front to back.  · Bowel incontinence  · Pregnancy  · Procedures such " as having a catheter inserted  · Older age  · Not emptying your bladder. This can allow bacteria a chance to grow in your urine.  · Dehydration  · Constipation  · Sex  · Use of a diaphragm for birth control   Treatment  Bladder infections are diagnosed by a urine test. They are treated with antibiotics and usually clear up quickly without complications. Treatment helps prevent a more serious kidney infection.  Medicines  Medicines can help in the treatment of a bladder infection:  · Take antibiotics until they are used up, even if you feel better. It is important to finish them to make sure the infection has cleared.  · You can use acetaminophen or ibuprofen for pain, fever, or discomfort, unless another medicine was prescribed. If you have chronic liver or kidney disease, talk with your healthcare provider before using these medicines. Also talk with your provider if you've ever had a stomach ulcer or gastrointestinal bleeding, or are taking blood-thinner medicines.  · If you are given phenazopydridine to reduce burning with urination, it will cause your urine to become a bright orange color. This can stain clothing.  Care and prevention  These self-care steps can help prevent future infections:  · Drink plenty of fluids to prevent dehydration and flush out your bladder. Do this unless you must restrict fluids for other health reasons, or your doctor told you not to.  · Proper cleaning after going to the bathroom is important. Wipe from front to back after using the toilet to prevent the spread of bacteria.  · Urinate more often. Don't try to hold urine in for a long time.  · Wear loose-fitting clothes and cotton underwear. Avoid tight-fitting pants.  · Improve your diet and prevent constipation. Eat more fresh fruit and vegetables, and fiber, and less junk and fatty foods.  · Avoid sex until your symptoms are gone.  · Avoid caffeine, alcohol, and spicy foods. These can irritate your bladder.  · Urinate right after  intercourse to flush out your bladder.  · If you use birth control pills and have frequent bladder infections, discuss it with your doctor.  Follow-up care  Call your healthcare provider if all symptoms are not gone after 3 days of treatment. This is especially important if you have repeat infections.  If a culture was done, you will be told if your treatment needs to be changed. If directed, you can call to find out the results.  If X-rays were done, you will be told if the results will affect your treatment.  Call 911  Call 911 if any of the following occur:  · Trouble breathing  · Hard to wake up or confusion  · Fainting or loss of consciousness  · Rapid heart rate  When to seek medical advice  Call your healthcare provider right away if any of these occur:  · Fever of 100.4ºF (38.0ºC) or higher, or as directed by your healthcare provider  · Symptoms are not better by the third day of treatment  · Back or belly (abdominal) pain that gets worse  · Repeated vomiting, or unable to keep medicine down  · Weakness or dizziness  · Vaginal discharge  · Pain, redness, or swelling in the outer vaginal area (labia)  Date Last Reviewed: 10/1/2016  © 5993-0753 [employer]. [address] [place] [address], [place] [address]. All rights reserved. This information is not intended as a substitute for professional medical care. Always follow your healthcare professional's instructions.

## 2019-01-06 NOTE — PATIENT INSTRUCTIONS
"Follow up with your doctor in a few days.  Return to the urgent care or go to the ER if symptoms get worse.    Take macrobid as directed.  Stay hydrated .  Follow up with ob/gyn.      Bladder Infection, Female (Adult)    Urine is normally doesn't have any bacteria in it. But bacteria can get into the urinary tract from the skin around the rectum. Or they can travel in the blood from elsewhere in the body. Once they are in your urinary tract, they can cause infection in the urethra (urethritis), the bladder (cystitis), or the kidneys (pyelonephritis).  The most common place for an infection is in the bladder. This is called a bladder infection. This is one of the most common infections in women. Most bladder infections are easily treated. They are not serious unless the infection spreads to the kidney.  The phrases "bladder infection," "UTI," and "cystitis" are often used to describe the same thing. But they are not always the same. Cystitis is an inflammation of the bladder. The most common cause of cystitis is an infection.  Symptoms  The infection causes inflammation in the urethra and bladder. This causes many of the symptoms. The most common symptoms of a bladder infection are:  · Pain or burning when urinating  · Having to urinate more often than usual  · Urgent need to urinate  · Only a small amount of urine comes out  · Blood in urine  · Abdominal discomfort. This is usually in the lower abdomen above the pubic bone.  · Cloudy urine  · Strong- or bad-smelling urine  · Unable to urinate (urinary retention)  · Unable to hold urine in (urinary incontinence)  · Fever  · Loss of appetite  · Confusion (in older adults)  Causes  Bladder infections are not contagious. You can't get one from someone else, from a toilet seat, or from sharing a bath.  The most common cause of bladder infections is bacteria from the bowels. The bacteria get onto the skin around the opening of the urethra. From there, they can get into " the urine and travel up to the bladder, causing inflammation and infection. This usually happens because of:  · Wiping improperly after urinating. Always wipe from front to back.  · Bowel incontinence  · Pregnancy  · Procedures such as having a catheter inserted  · Older age  · Not emptying your bladder. This can allow bacteria a chance to grow in your urine.  · Dehydration  · Constipation  · Sex  · Use of a diaphragm for birth control   Treatment  Bladder infections are diagnosed by a urine test. They are treated with antibiotics and usually clear up quickly without complications. Treatment helps prevent a more serious kidney infection.  Medicines  Medicines can help in the treatment of a bladder infection:  · Take antibiotics until they are used up, even if you feel better. It is important to finish them to make sure the infection has cleared.  · You can use acetaminophen or ibuprofen for pain, fever, or discomfort, unless another medicine was prescribed. If you have chronic liver or kidney disease, talk with your healthcare provider before using these medicines. Also talk with your provider if you've ever had a stomach ulcer or gastrointestinal bleeding, or are taking blood-thinner medicines.  · If you are given phenazopydridine to reduce burning with urination, it will cause your urine to become a bright orange color. This can stain clothing.  Care and prevention  These self-care steps can help prevent future infections:  · Drink plenty of fluids to prevent dehydration and flush out your bladder. Do this unless you must restrict fluids for other health reasons, or your doctor told you not to.  · Proper cleaning after going to the bathroom is important. Wipe from front to back after using the toilet to prevent the spread of bacteria.  · Urinate more often. Don't try to hold urine in for a long time.  · Wear loose-fitting clothes and cotton underwear. Avoid tight-fitting pants.  · Improve your diet and prevent  constipation. Eat more fresh fruit and vegetables, and fiber, and less junk and fatty foods.  · Avoid sex until your symptoms are gone.  · Avoid caffeine, alcohol, and spicy foods. These can irritate your bladder.  · Urinate right after intercourse to flush out your bladder.  · If you use birth control pills and have frequent bladder infections, discuss it with your doctor.  Follow-up care  Call your healthcare provider if all symptoms are not gone after 3 days of treatment. This is especially important if you have repeat infections.  If a culture was done, you will be told if your treatment needs to be changed. If directed, you can call to find out the results.  If X-rays were done, you will be told if the results will affect your treatment.  Call 911  Call 911 if any of the following occur:  · Trouble breathing  · Hard to wake up or confusion  · Fainting or loss of consciousness  · Rapid heart rate  When to seek medical advice  Call your healthcare provider right away if any of these occur:  · Fever of 100.4ºF (38.0ºC) or higher, or as directed by your healthcare provider  · Symptoms are not better by the third day of treatment  · Back or belly (abdominal) pain that gets worse  · Repeated vomiting, or unable to keep medicine down  · Weakness or dizziness  · Vaginal discharge  · Pain, redness, or swelling in the outer vaginal area (labia)  Date Last Reviewed: 10/1/2016  © 7284-6231 [employer]. [address] [place] [address], [place] [address]. All rights reserved. This information is not intended as a substitute for professional medical care. Always follow your healthcare professional's instructions.

## 2019-01-08 ENCOUNTER — CONFERENCE (OUTPATIENT)
Dept: TRANSPLANT | Facility: CLINIC | Age: 49
End: 2019-01-08

## 2019-01-09 NOTE — TELEPHONE ENCOUNTER
Patient: Louise Cueto       MRN: [id number]      : 1970     Age: 48 y.o.  [address] [facility] [address]    Provider: LILIAN - Мария Trinidad NP seen with Dr. Valdovinos    Urgency of review: non-urgent    Patient Transplant Status: Not a candidate    Reason for presentation: Indeterminate lesion    Clinical Summary: 48 year female, h/o liver lesions (suggestive of hemangioma and cysts)    -- US : 0.8 x 0.8  0.9 cm hyperechoic solid mass in the right hepatic lobe  -- US 2016: 1.1 x 1.2 x 0.8 cm echogenic lesion within the right hepatic lobe, felt to likely represent hemangioma  -- CT 2018: multiple hepatic hypodensities present likely representing hepatic cysts  -- US 2018: 1.1 cm hyperechoic lesion in the right hepatic lobe is unchanged.  Unchanged cyst in the right hepatic lobe    Any further imaging needed?         Imaging to be reviewed: US , US , CT 2018 and US 2018     HCC Treatment History: n/a    ABO:     Platelets:   Lab Results   Component Value Date/Time     (H) 2018 08:40 AM     Creatinine:   Lab Results   Component Value Date/Time    CREATININE 0.8 08/15/2018 02:34 PM     Bilirubin:   Lab Results   Component Value Date/Time    BILITOT 0.5 08/15/2018 02:34 PM     AFP Last 3 each if available: No results found for: AFP, EXTAFP    MELD: Computed MELD-Na score unavailable. Necessary lab results were not found in the last year.  Computed MELD score unavailable. Necessary lab results were not found in the last year.    Plan: No continued surveillance needed for cysts and hemangiomas.     Follow-up Provider: Мария Trinidad NP

## 2019-01-10 ENCOUNTER — PATIENT MESSAGE (OUTPATIENT)
Dept: HEPATOLOGY | Facility: CLINIC | Age: 49
End: 2019-01-10

## 2019-01-10 NOTE — TELEPHONE ENCOUNTER
Imaging discussed in radiology conference, see conference note    Plan: No continued surveillance needed for cysts and hemangiomas.      Therefore, no surveillance imaging needed and no hepatology f/u needed, message routed to PCP to provide update  Pt notified via [facility] per pt requested method of communucation

## 2019-06-27 ENCOUNTER — OFFICE VISIT (OUTPATIENT)
Dept: UROGYNECOLOGY | Facility: CLINIC | Age: 49
End: 2019-06-27
Payer: COMMERCIAL

## 2019-06-27 VITALS
BODY MASS INDEX: 20.75 KG/M2 | HEIGHT: 65 IN | SYSTOLIC BLOOD PRESSURE: 100 MMHG | WEIGHT: 124.56 LBS | DIASTOLIC BLOOD PRESSURE: 70 MMHG

## 2019-06-27 DIAGNOSIS — R14.0 ABDOMINAL BLOATING: Primary | ICD-10-CM

## 2019-06-27 DIAGNOSIS — R61 NIGHT SWEATS: ICD-10-CM

## 2019-06-27 DIAGNOSIS — N39.46 MIXED STRESS AND URGE URINARY INCONTINENCE: ICD-10-CM

## 2019-06-27 PROCEDURE — 3008F PR BODY MASS INDEX (BMI) DOCUMENTED: ICD-10-PCS | Mod: CPTII,S$GLB,, | Performed by: OBSTETRICS & GYNECOLOGY

## 2019-06-27 PROCEDURE — 51701 INSERT BLADDER CATHETER: CPT | Mod: S$GLB,,, | Performed by: OBSTETRICS & GYNECOLOGY

## 2019-06-27 PROCEDURE — 87086 URINE CULTURE/COLONY COUNT: CPT

## 2019-06-27 PROCEDURE — 99999 PR PBB SHADOW E&M-EST. PATIENT-LVL III: ICD-10-PCS | Mod: PBBFAC,,, | Performed by: OBSTETRICS & GYNECOLOGY

## 2019-06-27 PROCEDURE — 51701 PR INSERTION OF NON-INDWELLING BLADDER CATHETERIZATION FOR RESIDUAL UR: ICD-10-PCS | Mod: S$GLB,,, | Performed by: OBSTETRICS & GYNECOLOGY

## 2019-06-27 PROCEDURE — 99215 OFFICE O/P EST HI 40 MIN: CPT | Mod: 25,S$GLB,, | Performed by: OBSTETRICS & GYNECOLOGY

## 2019-06-27 PROCEDURE — 99999 PR PBB SHADOW E&M-EST. PATIENT-LVL III: CPT | Mod: PBBFAC,,, | Performed by: OBSTETRICS & GYNECOLOGY

## 2019-06-27 PROCEDURE — 3008F BODY MASS INDEX DOCD: CPT | Mod: CPTII,S$GLB,, | Performed by: OBSTETRICS & GYNECOLOGY

## 2019-06-27 PROCEDURE — 99215 PR OFFICE/OUTPT VISIT, EST, LEVL V, 40-54 MIN: ICD-10-PCS | Mod: 25,S$GLB,, | Performed by: OBSTETRICS & GYNECOLOGY

## 2019-06-27 RX ORDER — VALACYCLOVIR HYDROCHLORIDE 1 G/1
TABLET, FILM COATED ORAL DAILY PRN
COMMUNITY
Start: 2019-04-26 | End: 2023-06-15 | Stop reason: SDUPTHER

## 2019-06-27 NOTE — PATIENT INSTRUCTIONS
Bladder Irritants  Certain foods and drinks have been associated with worsening symptoms of urinary frequency, urgency, urge incontinence, or bladder pain. If you suffer from any of these conditions, you may wish to try eliminating one or more of these foods from your diet and see if your symptoms improve. If bladder symptoms are related to dietary factors, strict adherence to a diet thateliminates the food should bring marked relief in 10 days. Once you are feeling better, you can begin to add foods back into your diet, one at a time. If symptoms return, you will be able to identify the irritant. As you add foods back to your diet it is very important that you drink significant amounts of water.    -----------------------------------------------------------------------------------------------  List of Common Bladder Irritants*  Alcoholic beverages  Apples and apple juice  Cantaloupe  Carbonated beverages  Chili and spicy foods  Chocolate  Citrus fruit  Coffee (including decaffeinated)  Cranberries and cranberry juice  Grapes  Guava  Milk Products: milk, cheese, cottage cheese, yogurt, ice cream  Peaches  Pineapple  Plums  Strawberries  Sugar especially artificial sweeteners, saccharin, aspartame, corn sweeteners, honey, fructose, sucrose, lactose  Tea  Tomatoes and tomato juice  Vitamin B complex  Vinegar  *Most people are not sensitive to ALL of these products; your goal is to find the foods that make YOUR symptoms worse.  ---------------------------------------------------------------------------------------------------    Low-acid fruit substitutions include apricots, papaya, pears and watermelon. Coffee drinkers can drink Kava or other lowacid instant drinks. Tea drinkers can substitute non-citrus herbal and sun brewed teas. Calcium carbonate co-buffered with calcium ascorbate can be substituted for Vitamin C. Prelief is a dietary supplement that works as an acid blocker for the bladder.    Where to get more  information:        Overcoming Bladder Disorders by Kesha Callahan and Akil Cohen, 1990        You Dont Have to Live with Cystitis! By Aziza Zabala, 1988  · http://www.urologymanagement.org/oab    --------------------------------------------------------------------------------  1)  Mixed urinary incontinence, urge = stress:    --urine C&S  --Empty bladder every 3 hours.  Empty well: wait a minute, lean forward on toilet.    --Avoid dietary irritants (see sheet).  Keep diary x 3-5 days to determine your irritants.  --start pelvic floor PT.  Call in a few days to make appt:  Laura [facility] ([employer]): (p) [phone].  (f) [phone].  --URGE: consider medication in future.  Takes 2-4 weeks to see if will have effect.  For dry mouth: get sour, sugar free lozenge or gum.    --STRESS:  Pessary vs. Sling.     2)  Extra periurethral tissue:  --discussed  --observation for now    3)  Increased abdominal bloating:  --pelvic US (also has urinary U/F)  --work on keeping bowel movements regular  --may be related to perimenopause    4)  Night sweats, perimenopausal symptoms:  --talk with Dr. Hickey     5)  RTC 3-4 months.

## 2019-06-29 PROBLEM — R61 NIGHT SWEATS: Status: ACTIVE | Noted: 2019-06-29

## 2019-06-29 PROBLEM — N39.46 MIXED STRESS AND URGE URINARY INCONTINENCE: Status: ACTIVE | Noted: 2019-06-29

## 2019-06-29 PROBLEM — R14.0 ABDOMINAL BLOATING: Status: ACTIVE | Noted: 2019-06-29

## 2019-06-29 LAB — BACTERIA UR CULT: NO GROWTH

## 2019-08-12 ENCOUNTER — OFFICE VISIT (OUTPATIENT)
Dept: URGENT CARE | Facility: CLINIC | Age: 49
End: 2019-08-12
Payer: COMMERCIAL

## 2019-08-12 VITALS
HEIGHT: 65 IN | HEART RATE: 65 BPM | SYSTOLIC BLOOD PRESSURE: 102 MMHG | OXYGEN SATURATION: 97 % | DIASTOLIC BLOOD PRESSURE: 71 MMHG | BODY MASS INDEX: 20.66 KG/M2 | TEMPERATURE: 98 F | WEIGHT: 124 LBS

## 2019-08-12 DIAGNOSIS — T78.40XA ALLERGIC REACTION, INITIAL ENCOUNTER: Primary | ICD-10-CM

## 2019-08-12 PROCEDURE — 99214 OFFICE O/P EST MOD 30 MIN: CPT | Mod: S$GLB,,, | Performed by: FAMILY MEDICINE

## 2019-08-12 PROCEDURE — 3008F PR BODY MASS INDEX (BMI) DOCUMENTED: ICD-10-PCS | Mod: CPTII,S$GLB,, | Performed by: FAMILY MEDICINE

## 2019-08-12 PROCEDURE — 3008F BODY MASS INDEX DOCD: CPT | Mod: CPTII,S$GLB,, | Performed by: FAMILY MEDICINE

## 2019-08-12 PROCEDURE — 99214 PR OFFICE/OUTPT VISIT, EST, LEVL IV, 30-39 MIN: ICD-10-PCS | Mod: S$GLB,,, | Performed by: FAMILY MEDICINE

## 2019-08-12 NOTE — PROGRESS NOTES
"Subjective:       Patient ID: Louise Cueto is a 48 y.o. female.    Vitals:  height is 5' 5" (1.651 m) and weight is 56.2 kg (124 lb). Her oral temperature is 98.1 °F (36.7 °C). Her blood pressure is 102/71 and her pulse is 65. Her oxygen saturation is 97%.     Chief Complaint: Insect Bite (left thigh and left clavicle area)    Pt states she was stung by a wasp 3 days ago.  Treated with ice, and using Benadryl.  She states her brother and mother both have had severe reactions to bee/wasp stings and carry EpiPens.  She states the redness and swelling was much worse yesterday encompassing almost her entire left upper thigh, and less swelling/redness noted today.  No fever.  No shortness of breath.  No tightness in her throat.  She personally has never had trouble with insect stings in the past    Insect Bite   This is a new problem. The current episode started in the past 7 days. The problem has been gradually worsening. Associated symptoms include a rash. Pertinent negatives include no arthralgias, chills, coughing, fever, headaches, joint swelling or sore throat. The symptoms are aggravated by walking and standing. She has tried ice and NSAIDs for the symptoms. The treatment provided no relief.       Constitution: Negative for chills and fever.   HENT: Negative for facial swelling and sore throat.    Neck: Negative for painful lymph nodes.   Eyes: Negative for eye itching and eyelid swelling.   Respiratory: Negative for cough.    Musculoskeletal: Positive for pain. Negative for joint pain and joint swelling.   Skin: Positive for color change, rash and erythema. Negative for pale, wound, abrasion, laceration, lesion, skin thickening/induration, bruising, abscess, avulsion and hives.   Allergic/Immunologic: Negative for environmental allergies, immunocompromised state and hives.   Neurological: Negative for headaches.   Hematologic/Lymphatic: Negative for swollen lymph nodes.       Objective:      Physical Exam "   Constitutional: She is oriented to person, place, and time. She appears well-developed and well-nourished. She is cooperative.  Non-toxic appearance. She does not appear ill. No distress.   HENT:   Head: Normocephalic and atraumatic.   Right Ear: Hearing, tympanic membrane and ear canal normal.   Left Ear: Hearing, tympanic membrane and ear canal normal.   Nose: Nose normal. No mucosal edema, rhinorrhea or nasal deformity. No epistaxis. Right sinus exhibits no maxillary sinus tenderness and no frontal sinus tenderness. Left sinus exhibits no maxillary sinus tenderness and no frontal sinus tenderness.   Mouth/Throat: Uvula is midline, oropharynx is clear and moist and mucous membranes are normal. No trismus in the jaw. Normal dentition. No uvula swelling. No posterior oropharyngeal erythema.   Eyes: Conjunctivae and lids are normal. No scleral icterus.   Sclera clear bilat   Neck: Trachea normal, normal range of motion, full passive range of motion without pain and phonation normal. Neck supple.   Cardiovascular: Normal rate, regular rhythm, normal heart sounds, intact distal pulses and normal pulses.   Pulmonary/Chest: Effort normal and breath sounds normal. No stridor. No respiratory distress. She has no wheezes. She has no rales.   Abdominal: Normal appearance.   Musculoskeletal: Normal range of motion. She exhibits no edema or deformity.   Lymphadenopathy:     She has no cervical adenopathy.   Neurological: She is alert and oriented to person, place, and time. She exhibits normal muscle tone. Coordination normal.   Skin: Skin is warm, dry and intact. She is not diaphoretic. There is erythema. No pallor.   Psychiatric: She has a normal mood and affect. Her speech is normal and behavior is normal. Judgment and thought content normal. Cognition and memory are normal.   Nursing note and vitals reviewed.              Assessment:       1. Allergic reaction, initial encounter           - local reaction to wasp  sting  Plan:       We discussed no evidence of infection at this time which was her main concern today.  This appears to be a local inflammatory reaction only.      Allergic reaction, initial encounter    CONTINUE THE BENADRYL 50 MG EVERY 6 HR AS NEEDED.    CONTINUE ICE AND ELEVATION.    RECHECK IF CONTINUED IMPROVEMENT NOT NOTED.    Make sure that you follow up with your primary care doctor in the next 2-5 days if needed .  Return to the Urgent Care if signs or symptoms change and certainly if you have worsening symptoms go to the nearest emergency department for further evaluation.

## 2019-08-12 NOTE — PATIENT INSTRUCTIONS
Insect Sting Allergy, Generalized  You are having an allergic reaction to an insect sting. This may occur after a sting by a wasp, honeybee, yellow jacket, or other insect. This may cause an itchy rash and swelling in the face or other parts of the body. A more severe reaction may cause you to feel dizzy, faint, or have trouble breathing or swallowing. Other warning signs are listed below.  Symptoms can include:  · Rash, hives, redness, welts, or blisters in areas other than the sting site  · Itching, burning, stinging, pain in areas other than the sting site  · Dry, flaky, cracking, scaly skin  · Swelling in areas other than the sting site   · Stomach pain or cramps  More severe symptoms include:  · Swelling of the face or lips or drooling  · Trouble swallowing, feeling like your throat is closing  · Trouble breathing, wheezing  · Dizziness or a sudden decrease in blood pressure  · Hoarse voice or trouble speaking  · Severe nausea, vomiting, or diarrhea  · Feeling faint or lightheaded  · Rapid heart rate  Home care  Medicine  The healthcare provider may prescribe medicines to relieve swelling, itching, and pain. Follow the providers instructions when taking these medicines.  · If you had a severe reaction, the provider may prescribe an injectable epinephrine kit. Epinephrine will stop the progression of an allergic reaction. Before you leave the hospital, be sure that you understand when and how to use this medicine.  · Oral diphenhydramine is an over-the-counter antihistamine available at pharmacies and grocery stores. Unless a prescription antihistamine was given, diphenhydramine may be used to reduce itching if large areas of the skin are involved. It may make you sleepy, so be careful using it in the daytime or when going to school, working, or driving. Note: Dont use diphenhydramine if you have glaucoma or if you are a man with trouble urinating due to an enlarged prostate. There are other antihistamines  that cause less drowsiness and are good choices for daytime use. Ask your pharmacist for suggestions.  · Dont use diphenhydramine cream on your skin. It can cause a further reaction in some people.  · Calamine lotion or oatmeal baths sometimes help with itching.  · You may use acetaminophen or ibuprofen to control pain, unless another pain medicine was prescribed. Note: If you have chronic liver or kidney disease or ever had a stomach ulcer or gastrointestinal bleeding, talk with your provider before using these medicines.    General care  Avoid tight clothing and things that heat up your skin (such as hot showers or baths, or direct sunlight). Heat makes the itching worse.  An ice pack will relieve local areas of intense itching and redness. Apply 5 to 10 minutes. To make an ice pack, put ice cubes in a plastic bag that seals at the top. Wrap the bag in a clean, thin towel or cloth. Dont put ice directly on the skin.  Ticks  If you try to remove a tick, do the following:  · Use a set of fine tweezers and  the tick as close to the skin as is possible.  · Pull upwards, using even, steady pressure. Dont jerk or twist the tick. The ticks bodily fluids may contain infection-causing organisms. So dont squeeze, crush, or puncture the body of the tick. Dont use a smoldering match or cigarette, nail polish, petroleum jelly, liquid soap, or kerosene. They may irritate the tick.  · If any mouthparts of the tick remain in the skin, these can be removed with tweezers. If you cant remove the mouth (of a tick) easily with clean tweezers, leave it alone and let the skin heal.  · After the tick is removed, wash the bite area with rubbing alcohol, iodine, or soap and water.  · Put the tick in a sealed container and completely cover it with alcohol. Never try to kill or crush a tick with your hand or fingers.  Stings  Wasps, yellow jackets, and hornets dont leave a stinger behind. But if a honeybee stings you, a stinger  may stay in your skin. The stinger of a honeybee releases a substance that will attract other bees to you. So try to move away from the nest immediately. Once you are away from the nest, then remove the stinger as quickly as possible by:  · Scraping the stinger out with the edge of a dull knife or plastic card (credit card).  · Don't use a tweezer or your fingers to remove the stinger since that may squeeze more toxin from the stinger.  · Wash the affected area with soap and warm water 2 to 3 times a day. Don't break a blister, if present.  · Next apply an ice pack for 5 to 10 minutes. To make an ice pack, put ice cubes in a plastic bag that seals at the top. Wrap the bag in a clean, thin towel or cloth. Dont put ice directly on the skin.  · Contact your healthcare provider and ask what can be used to help decrease the swelling and itching to the affected area.   · To prevent an infection, don't scratch the affected areas. Always check the sting area for signs of an infection: increased redness, swelling, or pain to the affected area.  Preventing future reactions  Future reactions could be worse than this one. So try to avoid situations where you might be stung:  · Don't walk in grass without shoes. Avoid wearing sandals.  · Don't leave food uncovered when eating outside. Sweet treats, watermelon, and ice cream attract insects.  · Don't drink from uncovered sweetened drinks in cans when outside. Insects are attracted to soda drink cans and sometimes crawl inside of them.  · Don't wear bright colored clothes with flowery prints and patterns when outside.  · Dont wear perfume when outside. Smell attracts insects.  · Wear long pants, long-sleeved shirts, socks, and work gloves when working outside.  · Be aware that honeybees nest in trees. Wasps and yellow jackets nest in the ground, trees or roof eaves. Avoid garbage cans when outside.  Auto-injectable epinephrine  · If you are at high risk for another sting due to  where you work or play, or if your reaction included dizziness, fainting or trouble breathing or swallowing, an auto-injectable epinephrine may be prescribed. If not, ask your healthcare provider for one and always carry it with you. Learn how to use the device. If you begin to feel the symptoms of another reaction in the future, use the auto-injectable epinephrine to inject yourself, and then call 911. Don't wait until symptoms become severe.   · Remember that the auto-injectable epinephrine is a rescue medicine only. You still need someone to take you to the hospital or call 911 after you have received the medicine.  Follow-up care  Follow up with your healthcare provider, or as advised if your symptoms do not continue to improve.  Call 911  Call 911 if any of these occur:  · Trouble breathing or swallowing, wheezing   · Cool, moist, pale skin  · Hoarse voice or trouble speaking  · Confused  · Very drowsy or trouble waking up  · Fainting or loss of consciousness  · Rapid heart rate  · Low blood pressure or feeling dizzy or weak  · Feeling of doom  · Severe nausea, vomiting, or diarrhea  · Seizure  · Swelling in the face, eyelids, lips, mouth, throat or tongue  · Drooling  When to seek medical advice  Call your healthcare provider right away if any of the following occur:  · Spreading areas of itching, redness or swelling  · Headache, fever, chills, muscle or joint aching  · Increased pain or swelling  · Signs of infection of the affected area:  ¨ Spreading redness  ¨ Increase in pain or swelling  ¨ Fluid or colored drainage from the affected site  Date Last Reviewed: 3/1/2017  © 1234-9149 [employer] [address] [place] [address]. All rights reserved. This information is not intended as a substitute for professional medical care. Always follow your healthcare professional's instructions.      CONTINUE THE BENADRYL 50 MG EVERY 6 HR AS NEEDED.    CONTINUE ICE AND ELEVATION.    RECHECK IF  CONTINUED IMPROVEMENT NOT NOTED.    Make sure that you follow up with your primary care doctor in the next 2-5 days if needed .  Return to the Urgent Care if signs or symptoms change and certainly if you have worsening symptoms go to the nearest emergency department for further evaluation.

## 2019-08-15 ENCOUNTER — TELEPHONE (OUTPATIENT)
Dept: URGENT CARE | Facility: CLINIC | Age: 49
End: 2019-08-15

## 2019-08-19 ENCOUNTER — HOSPITAL ENCOUNTER (OUTPATIENT)
Dept: RADIOLOGY | Facility: OTHER | Age: 49
Discharge: HOME | End: 2019-08-19
Attending: OBSTETRICS & GYNECOLOGY
Payer: COMMERCIAL

## 2019-08-19 DIAGNOSIS — R14.0 ABDOMINAL BLOATING: ICD-10-CM

## 2019-08-19 PROCEDURE — 76856 US EXAM PELVIC COMPLETE: CPT | Mod: 26,,, | Performed by: RADIOLOGY

## 2019-08-19 PROCEDURE — 76856 US PELVIS COMP WITH TRANSVAG NON-OB (XPD): ICD-10-PCS | Mod: 26,,, | Performed by: RADIOLOGY

## 2019-08-19 PROCEDURE — 76830 US PELVIS COMP WITH TRANSVAG NON-OB (XPD): ICD-10-PCS | Mod: 26,,, | Performed by: RADIOLOGY

## 2019-08-19 PROCEDURE — 76830 TRANSVAGINAL US NON-OB: CPT | Mod: TC

## 2019-08-19 PROCEDURE — 76830 TRANSVAGINAL US NON-OB: CPT | Mod: 26,,, | Performed by: RADIOLOGY

## 2019-08-26 ENCOUNTER — PATIENT MESSAGE (OUTPATIENT)
Dept: UROGYNECOLOGY | Facility: CLINIC | Age: 49
End: 2019-08-26

## 2019-08-27 ENCOUNTER — PATIENT MESSAGE (OUTPATIENT)
Dept: UROGYNECOLOGY | Facility: CLINIC | Age: 49
End: 2019-08-27

## 2019-08-29 ENCOUNTER — TELEPHONE (OUTPATIENT)
Dept: UROGYNECOLOGY | Facility: CLINIC | Age: 49
End: 2019-08-29

## 2019-08-29 DIAGNOSIS — Z30.433 ENCOUNTER FOR REMOVAL AND REINSERTION OF INTRAUTERINE CONTRACEPTIVE DEVICE (IUD): Primary | ICD-10-CM

## 2019-08-29 NOTE — TELEPHONE ENCOUNTER
Spoke with patient regarding scheduling surgery. She is not available 9/9-- will consult with Dr. Murphy regarding dates.  RITESH Chambers-BC

## 2019-09-04 ENCOUNTER — TELEPHONE (OUTPATIENT)
Dept: UROGYNECOLOGY | Facility: CLINIC | Age: 49
End: 2019-09-04

## 2019-09-04 NOTE — TELEPHONE ENCOUNTER
September 3, 2019     The  [facility] - DEVICE AUTHORIZATIONS     - RI LEVONORGESTREL-REL INTRAUTERINE CONTRACEPT SYS, (KYLEENA) 19.5 MG    16043 (CPT®) - RI INSERT INTRAUTERINE DEVICE order placed by Maggy Gallegos for Louise Cueto is now AUTHORIZED by [facility] for 1 visit(s). Please schedule the patient before the authorization expiration date, 08/28/2020, and confirm the authorized referral (ID# [id number]) is linked to the visit.     For questions, send an In Basket message to the Pre Service Intake pool or call the [facility] Pre-Service department at [phone]. Pre-Service department hours are M-F 8am to 5pm.        Information for Referral # [id number]:        Diagnoses:    Z30.433 (ICD-10-CM) - Encounter for removal and reinsertion of intrauterine contraceptive device (IUD)             Procedures:    DEVICE AUTHORIZATIONS [GBF369]    RI LEVONORGESTREL-REL INTRAUTERINE CONTRACEPT SYS, (KYLEENA) 19.5 MG []    RI INSERT INTRAUTERINE DEVICE [49664 (CPT®)]     Device:    Kyleena  Kyleena

## 2019-09-06 ENCOUNTER — PATIENT MESSAGE (OUTPATIENT)
Dept: UROGYNECOLOGY | Facility: CLINIC | Age: 49
End: 2019-09-06

## 2019-09-10 DIAGNOSIS — D25.9 UTERINE LEIOMYOMA, UNSPECIFIED LOCATION: ICD-10-CM

## 2019-09-10 DIAGNOSIS — Z30.433 ENCOUNTER FOR REMOVAL AND REINSERTION OF INTRAUTERINE CONTRACEPTIVE DEVICE (IUD): Primary | ICD-10-CM

## 2019-09-10 DIAGNOSIS — Z01.818 PREOP TESTING: ICD-10-CM

## 2019-09-12 ENCOUNTER — OFFICE VISIT (OUTPATIENT)
Dept: UROGYNECOLOGY | Facility: CLINIC | Age: 49
End: 2019-09-12
Payer: COMMERCIAL

## 2019-09-12 VITALS
SYSTOLIC BLOOD PRESSURE: 110 MMHG | WEIGHT: 127 LBS | HEIGHT: 65 IN | DIASTOLIC BLOOD PRESSURE: 60 MMHG | BODY MASS INDEX: 21.16 KG/M2

## 2019-09-12 DIAGNOSIS — D25.9 UTERINE LEIOMYOMA, UNSPECIFIED LOCATION: ICD-10-CM

## 2019-09-12 DIAGNOSIS — T83.32XA INTRAUTERINE DEVICE (IUD) MIGRATION, INITIAL ENCOUNTER: ICD-10-CM

## 2019-09-12 DIAGNOSIS — N92.1 METRORRHAGIA: ICD-10-CM

## 2019-09-12 DIAGNOSIS — Z01.818 PREOPERATIVE EXAM FOR GYNECOLOGIC SURGERY: Primary | ICD-10-CM

## 2019-09-12 PROCEDURE — 99999 PR PBB SHADOW E&M-EST. PATIENT-LVL III: CPT | Mod: PBBFAC,,, | Performed by: NURSE PRACTITIONER

## 2019-09-12 PROCEDURE — 99499 UNLISTED E&M SERVICE: CPT | Mod: S$GLB,,, | Performed by: NURSE PRACTITIONER

## 2019-09-12 PROCEDURE — 99999 PR PBB SHADOW E&M-EST. PATIENT-LVL III: ICD-10-PCS | Mod: PBBFAC,,, | Performed by: NURSE PRACTITIONER

## 2019-09-12 PROCEDURE — 99499 NO LOS: ICD-10-PCS | Mod: S$GLB,,, | Performed by: NURSE PRACTITIONER

## 2019-09-12 NOTE — PROGRESS NOTES
Urogyn follow up  09/12/2019  .  [facility]   [address] [id number]    Louise Cueto  [id number]  1970      Louise Cueto is a 48 y.o.  here for a urogyn preop visit.     Last HPI from 06/27/2019     1)  UI:  (+) OLIVER = (+) UUI  X 1years.  No previous Tx. (+) pads:2/day, usually minimum wetness, 1 pad/night dry. Daytime frequency: Q 4 hours.  Nocturia: No..   (--) dysuria,  (--) hematuria,  (--) frequent UTIs.  (--) complete bladder emptying. PV urgency with moderate amount.      2)  POP:  Absent.  Is worried she may developed.   (--) vaginal bleeding. (--) vaginal discharge. (+) sexually active.  (--) dyspareunia. (--)  Vaginal dryness.  (--) vaginal estrogen use.      3)  BM:  (--) constipation/straining.  (--) chronic diarrhea. (--) hematochezia.  (--) fecal incontinence.  (--) fecal smearing/urgency.  (+) complete evacuation.    --having some abdominal bloating: stopped dairy, feels better; thinks could be related to menses/diet; seems a little better.      Pelvic ultrasound  08/19/2019  Uterus: Size: 10 cm x 5.8 cm x 9.8 cm   Masses: There are numerous fibroids present.  The 3 largest are as follows:  -intramural left fundus 4.6 cm x 3.9 cm x 3.2  -intramural right posterior body 2.8 cm x 2.9 cm x 2.2  -subserosal right posterior body near the lower uterine segment 3.8 cm x 3.5 cm x 2.7  Endometrium: The endometrium is abnormal.  First, the IUD is located within the cervix.  There is a hyperechoic structure with a fluid fluid level within the fundus of the endometrium measuring 1.9 cm x 3.4 cm x 1 cm.  The hypoechoic structure at the edge of the endometrium/uterus measuring 1.1 cm in maximum diameter labile by the sonographer is likely an intramural uterine fibroid with some mass effect on the endometrium.  Right ovary: Size: 4.5 cm x 4.2 cm x 2.2 cm  Appearance: There is a 3 cm x 2.3 cm x 3.9 cm simple cyst on the right ovary.  Vascular flow: Normal.  Left  "ovary: Size: 2.9 cm x 3.2 cm x 2 cm  Appearance: A dominant follicle measuring 1.7 cm is identified on the left ovary.  Vascular Flow: Normal.  Free Fluid: None.  Impression:  1. The intrauterine device is within the endometrium.  2. There are findings suggestive of an endometrial papilloma measuring 3.4 cm versus a submucosal fibroid.  Neoplasm is considered less likely.  3. A 3.9 cm simple cyst on the right ovary does not meet criteria at this time for follow-up.      Past Medical History:   Diagnosis Date    GERD (gastroesophageal reflux disease)     Liver lesion 1/3/2019       Past Surgical History:   Procedure Laterality Date    BREAST CYST EXCISION Left     20 y/o f     SECTION      CHOLECYSTECTOMY      CHOLECYSTECTOMY-LAPAROSCOPIC single port N/A 10/10/2014    Performed by [facility] at [facility] OR 2ND FLR    COLONOSCOPY N/A 2017    Performed by Andrews Sears MD at [facility] ENDO (4TH FLR)       Current Outpatient Medications   Medication Sig    valACYclovir (VALTREX) 1000 MG tablet      No current facility-administered medications for this visit.        Well woman:  Pap test: 2018, normal.  History of abnormal paps: Yes - Abnormal once in 20s, no procedure.  History of STIs:  No  Mammogram: Date of last: .  Result: Normal  Colonoscopy: Date of last: .  Result:  Normal.  Repeat due:  .    DEXA:  Date of last: None.    ROS:  As per HPI.      Exam  /60 (BP Location: Right arm, Patient Position: Sitting, BP Method: Medium (Manual))   Ht 5' 5" (1.651 m)   Wt 57.6 kg (126 lb 15.8 oz)   LMP 2019   BMI 21.13 kg/m²   General: alert and oriented, no acute distress  Respiratory: normal respiratory effort  Abd: soft, non-tender, non-distended    Pelvic--deferred    Impression  1. Preoperative exam for gynecologic surgery     2. Metrorrhagia     3. Intrauterine device (IUD) migration, initial encounter     4. Uterine leiomyoma, unspecified location       We " reviewed the above issues and discussed options for short-term versus long-term management of her problems.   Plan:     1. Patient consented with Dr. Murphy for hysteroscopy D&C, removal of IUD, possible shaving of fibroids, possible endometrial ablation, possible laparoscopic salpingectomy, and cystourethroscopy.   R/B/A reviewed. Specific risks reviewed include:  infection, bleeding, need for blood transfusion, damage to surrounding structures, anesthesia risks, death, heart attack, stroke, pain, dyspareunia, urinary retention, voiding dysfunction, urinary incontinence, exacerbation of urinary urge incontinence, pregnancy, IUD migration, and need for further surgeries.      2. T&S, urine HCG on DOS  3. Preoperative appointment with PCP or cardiology: No  4. VTE Prophylaxis:  heparin 5000 u SQ TID (1st dose 2hrs preop) + SCDs  5. Patient instructed on Magnesium citrate and chlorahexadine/dial soap prep to perform day before & AM of surgery.   6. Proceed to OR for above-mentioned procedure.  7. She will let us know if she wants to move forward with ablation and salpingectomy,     30 minutes were spent in face to face time with this patient  90 % of this time was spent in counseling and/or coordination of care      Maggy Gallegos, CHIO-BC [facility]  Division of Female Pelvic Medicine and Reconstructive Surgery  Department of Obstetrics & Gynecology

## 2019-09-12 NOTE — H&P (VIEW-ONLY)
Urogyn follow up  09/12/2019  .  [facility]   [address] [id number]    Louise Cueto  [id number]  1970      Louise Cueto is a 48 y.o.  here for a urogyn preop visit.     Last HPI from 06/27/2019     1)  UI:  (+) OLIVER = (+) UUI  X 1years.  No previous Tx. (+) pads:2/day, usually minimum wetness, 1 pad/night dry. Daytime frequency: Q 4 hours.  Nocturia: No..   (--) dysuria,  (--) hematuria,  (--) frequent UTIs.  (--) complete bladder emptying. PV urgency with moderate amount.      2)  POP:  Absent.  Is worried she may developed.   (--) vaginal bleeding. (--) vaginal discharge. (+) sexually active.  (--) dyspareunia. (--)  Vaginal dryness.  (--) vaginal estrogen use.      3)  BM:  (--) constipation/straining.  (--) chronic diarrhea. (--) hematochezia.  (--) fecal incontinence.  (--) fecal smearing/urgency.  (+) complete evacuation.    --having some abdominal bloating: stopped dairy, feels better; thinks could be related to menses/diet; seems a little better.      Pelvic ultrasound  08/19/2019  Uterus: Size: 10 cm x 5.8 cm x 9.8 cm   Masses: There are numerous fibroids present.  The 3 largest are as follows:  -intramural left fundus 4.6 cm x 3.9 cm x 3.2  -intramural right posterior body 2.8 cm x 2.9 cm x 2.2  -subserosal right posterior body near the lower uterine segment 3.8 cm x 3.5 cm x 2.7  Endometrium: The endometrium is abnormal.  First, the IUD is located within the cervix.  There is a hyperechoic structure with a fluid fluid level within the fundus of the endometrium measuring 1.9 cm x 3.4 cm x 1 cm.  The hypoechoic structure at the edge of the endometrium/uterus measuring 1.1 cm in maximum diameter labile by the sonographer is likely an intramural uterine fibroid with some mass effect on the endometrium.  Right ovary: Size: 4.5 cm x 4.2 cm x 2.2 cm  Appearance: There is a 3 cm x 2.3 cm x 3.9 cm simple cyst on the right ovary.  Vascular flow: Normal.  Left  "ovary: Size: 2.9 cm x 3.2 cm x 2 cm  Appearance: A dominant follicle measuring 1.7 cm is identified on the left ovary.  Vascular Flow: Normal.  Free Fluid: None.  Impression:  1. The intrauterine device is within the endometrium.  2. There are findings suggestive of an endometrial papilloma measuring 3.4 cm versus a submucosal fibroid.  Neoplasm is considered less likely.  3. A 3.9 cm simple cyst on the right ovary does not meet criteria at this time for follow-up.      Past Medical History:   Diagnosis Date    GERD (gastroesophageal reflux disease)     Liver lesion 1/3/2019       Past Surgical History:   Procedure Laterality Date    BREAST CYST EXCISION Left     20 y/o f     SECTION      CHOLECYSTECTOMY      CHOLECYSTECTOMY-LAPAROSCOPIC single port N/A 10/10/2014    Performed by Gino Jones MD at [facility] OR 2ND FLR    COLONOSCOPY N/A 2017    Performed by Andrews Sears MD at [facility] ENDO (4TH FLR)       Current Outpatient Medications   Medication Sig    valACYclovir (VALTREX) 1000 MG tablet      No current facility-administered medications for this visit.        Well woman:  Pap test: 2018, normal.  History of abnormal paps: Yes - Abnormal once in 20s, no procedure.  History of STIs:  No  Mammogram: Date of last: .  Result: Normal  Colonoscopy: Date of last: .  Result:  Normal.  Repeat due:  .    DEXA:  Date of last: None.    ROS:  As per HPI.      Exam  /60 (BP Location: Right arm, Patient Position: Sitting, BP Method: Medium (Manual))   Ht 5' 5" (1.651 m)   Wt 57.6 kg (126 lb 15.8 oz)   LMP 2019   BMI 21.13 kg/m²   General: alert and oriented, no acute distress  Respiratory: normal respiratory effort  Abd: soft, non-tender, non-distended    Pelvic--deferred    Impression  1. Preoperative exam for gynecologic surgery     2. Metrorrhagia     3. Intrauterine device (IUD) migration, initial encounter     4. Uterine leiomyoma, unspecified location       We " reviewed the above issues and discussed options for short-term versus long-term management of her problems.   Plan:     1. Patient consented with Dr. Murphy for hysteroscopy D&C, removal of IUD, possible shaving of fibroids, possible endometrial ablation, possible laparoscopic salpingectomy, and cystourethroscopy.   R/B/A reviewed. Specific risks reviewed include:  infection, bleeding, need for blood transfusion, damage to surrounding structures, anesthesia risks, death, heart attack, stroke, pain, dyspareunia, urinary retention, voiding dysfunction, urinary incontinence, exacerbation of urinary urge incontinence, pregnancy, IUD migration, and need for further surgeries.      2. T&S, urine HCG on DOS  3. Preoperative appointment with PCP or cardiology: No  4. VTE Prophylaxis:  heparin 5000 u SQ TID (1st dose 2hrs preop) + SCDs  5. Patient instructed on Magnesium citrate and chlorahexadine/dial soap prep to perform day before & AM of surgery.   6. Proceed to OR for above-mentioned procedure.  7. She will let us know if she wants to move forward with ablation and salpingectomy,     30 minutes were spent in face to face time with this patient  90 % of this time was spent in counseling and/or coordination of care      Maggy Gallegos, CHIO-BC [facility]  Division of Female Pelvic Medicine and Reconstructive Surgery  Department of Obstetrics & Gynecology

## 2019-09-13 ENCOUNTER — HOSPITAL ENCOUNTER (OUTPATIENT)
Dept: PREADMISSION TESTING | Facility: OTHER | Age: 49
Discharge: HOME | End: 2019-09-13
Attending: OBSTETRICS & GYNECOLOGY
Payer: COMMERCIAL

## 2019-09-13 ENCOUNTER — ANESTHESIA EVENT (OUTPATIENT)
Dept: SURGERY | Facility: OTHER | Age: 49
End: 2019-09-13
Payer: COMMERCIAL

## 2019-09-13 VITALS
OXYGEN SATURATION: 99 % | HEART RATE: 59 BPM | WEIGHT: 127 LBS | TEMPERATURE: 98 F | SYSTOLIC BLOOD PRESSURE: 96 MMHG | DIASTOLIC BLOOD PRESSURE: 64 MMHG | HEIGHT: 65 IN | BODY MASS INDEX: 21.16 KG/M2

## 2019-09-13 DIAGNOSIS — Z01.818 PREOP TESTING: ICD-10-CM

## 2019-09-13 LAB
ABO + RH BLD: NORMAL
BLD GP AB SCN CELLS X3 SERPL QL: NORMAL

## 2019-09-13 PROCEDURE — 36415 COLL VENOUS BLD VENIPUNCTURE: CPT

## 2019-09-13 PROCEDURE — 63600175 PHARM REV CODE 636 W HCPCS

## 2019-09-13 PROCEDURE — 86850 RBC ANTIBODY SCREEN: CPT

## 2019-09-13 RX ORDER — PREGABALIN 75 MG/1
75 CAPSULE ORAL
Status: CANCELLED | OUTPATIENT
Start: 2019-09-13 | End: 2019-09-13

## 2019-09-13 RX ORDER — SODIUM CHLORIDE, SODIUM LACTATE, POTASSIUM CHLORIDE, CALCIUM CHLORIDE 600; 310; 30; 20 MG/100ML; MG/100ML; MG/100ML; MG/100ML
INJECTION, SOLUTION INTRAVENOUS CONTINUOUS
Status: CANCELLED | OUTPATIENT
Start: 2019-09-13

## 2019-09-13 RX ORDER — IBUPROFEN 100 MG/5ML
1000 SUSPENSION, ORAL (FINAL DOSE FORM) ORAL NIGHTLY
COMMUNITY

## 2019-09-13 RX ORDER — VITAMIN B COMPLEX
1 CAPSULE ORAL NIGHTLY
COMMUNITY

## 2019-09-13 RX ORDER — IBUPROFEN 200 MG
200 TABLET ORAL EVERY 6 HOURS PRN
Status: ON HOLD | COMMUNITY
End: 2019-09-17 | Stop reason: HOSPADM

## 2019-09-13 RX ORDER — MULTIVIT WITH MINERALS/HERBS
1 TABLET ORAL DAILY
COMMUNITY
End: 2021-12-15 | Stop reason: SDUPTHER

## 2019-09-13 RX ORDER — FAMOTIDINE 20 MG/1
20 TABLET, FILM COATED ORAL
Status: CANCELLED | OUTPATIENT
Start: 2019-09-13 | End: 2019-09-13

## 2019-09-13 RX ORDER — ACETAMINOPHEN 500 MG
1000 TABLET ORAL
Status: CANCELLED | OUTPATIENT
Start: 2019-09-13 | End: 2019-09-13

## 2019-09-13 RX ORDER — CHOLECALCIFEROL (VITAMIN D3) 25 MCG
1000 TABLET ORAL NIGHTLY
COMMUNITY

## 2019-09-13 NOTE — ANESTHESIA PREPROCEDURE EVALUATION
09/13/2019  Louise Cueto is a 48 y.o., female.    Anesthesia Evaluation    I have reviewed the Patient Summary Reports.    I have reviewed the Nursing Notes.   I have reviewed the Medications.     Review of Systems  Anesthesia Hx:  No problems with previous Anesthesia  Denies Family Hx of Anesthesia complications.   Denies Personal Hx of Anesthesia complications.   Social:  Non-Smoker    Hematology/Oncology:     Oncology Normal    -- Anemia:   Cardiovascular:  Cardiovascular Normal     Pulmonary:  Pulmonary Normal    Renal/:  Renal/ Normal     Hepatic/GI:  Hepatic/GI Normal    Musculoskeletal:  Musculoskeletal Normal    Neurological:  Neurology Normal    Endocrine:  Endocrine Normal        Physical Exam  General:  Well nourished    Airway/Jaw/Neck:  Airway Findings: Mouth Opening: Normal Tongue: Normal  General Airway Assessment: Adult  Mallampati: I         Dental:  Dental Findings: In tact             Anesthesia Plan  Type of Anesthesia, risks & benefits discussed:  Anesthesia Type:  general  Patient's Preference:   Intra-op Monitoring Plan: standard ASA monitors  Intra-op Monitoring Plan Comments:   Post Op Pain Control Plan: multimodal analgesia  Post Op Pain Control Plan Comments:   Induction:   IV  Beta Blocker:         Informed Consent: Patient understands risks and agrees with Anesthesia plan.  Questions answered. Anesthesia consent signed with patient.  ASA Score: 1     Day of Surgery Review of History & Physical:    H&P update referred to the surgeon.         Ready For Surgery From Anesthesia Perspective.

## 2019-09-13 NOTE — DISCHARGE INSTRUCTIONS
PRE-ADMIT TESTING -  [phone]    [address]  [place] [facility]          Your surgery has been scheduled at [facility]. We are pleased to have the opportunity to serve you. For Further Information please call [phone].    On the day of surgery please report to the Information Desk on the 1st floor.    · CONTACT YOUR PHYSICIAN'S OFFICE THE DAY PRIOR TO YOUR SURGERY TO OBTAIN YOUR ARRIVAL TIME.     · The evening before surgery do not eat anything after 9 p.m. ( this includes hard candy, chewing gum and mints).  You may only have GATORADE, POWERADE AND WATER  from 9 p.m. until you leave your home.   DO NOT DRINK ANY LIQUIDS ON THE WAY TO THE HOSPITAL.      SPECIAL MEDICATION INSTRUCTIONS: TAKE medications checked off by the Anesthesiologist on your Medication List.    Angiogram Patients: Take medications as instructed by your physician, including aspirin.     Surgery Patients:    If you take ASPIRIN - Your PHYSICIAN/SURGEON will need to inform you IF/OR when you need to stop taking aspirin prior to your surgery.     Do Not take any medications containing IBUPROFEN.  Do Not Wear any make-up or dark nail polish   (especially eye make-up) to surgery. If you come to surgery with makeup on you will be required to remove the makeup or nail polish.    Do not shave your surgical area at least 5 days prior to your surgery. The surgical prep will be performed at the hospital according to Infection Control regulations.    Leave all valuables at home.   Do Not wear any jewelry or watches, including any metal in body piercings. Jewelry must be removed prior to coming to the hospital.  There is a possibility that rings that are unable to be removed may be cut off if they are on the surgical extremity.    Contact Lens must be removed before surgery. Either do not wear the contact lens or bring a case and solution for storage.  Please bring a container for eyeglasses or dentures as required.  Bring  any paperwork your physician has provided, such as consent forms,  history and physicals, doctor's orders, etc.   Bring comfortable clothes that are loose fitting to wear upon discharge. Take into consideration the type of surgery being performed.  Maintain your diet as advised per your physician the day prior to surgery.      Adequate rest the night before surgery is advised.   Park in the Parking lot behind the hospital or in the [place] [facility] across the street from the parking lot. Parking is complimentary.  If you will be discharged the same day as your procedure, please arrange for a responsible adult to drive you home or to accompany you if traveling by taxi.   YOU WILL NOT BE PERMITTED TO DRIVE OR TO LEAVE THE HOSPITAL ALONE AFTER SURGERY.   It is strongly recommended that you arrange for someone to remain with you for the first 24 hrs following your surgery.    The Surgeon will speak to your family/visitor after your surgery regarding the outcome of your surgery and post op care.  The Surgeon may speak to you after your surgery, but there is a possibility you may not remember the details.  Please check with your family members regarding the conversation with the Surgeon.    We strongly recommend whoever is bringing you home be present for discharge instructions.  This will ensure a thorough understanding for your post op home care.      Thank you for your cooperation.  The Staff of [facility].                Bathing Instructions with Hibiclens     Shower the evening before and morning of your procedure with Hibiclens:   Wash your face with water and your regular face wash/soap   Apply Hibiclens directly on your skin or on a wet washcloth and wash gently. When showering: Move away from the shower stream when applying Hibiclens to avoid rinsing off too soon.   Rinse thoroughly with warm water   Do not dilute Hibiclens         Dry off as usual, do not use any deodorant,  powder, body lotions, perfume, after shave or cologne.

## 2019-09-17 ENCOUNTER — ANESTHESIA (OUTPATIENT)
Dept: SURGERY | Facility: OTHER | Age: 49
End: 2019-09-17
Payer: COMMERCIAL

## 2019-09-17 ENCOUNTER — HOSPITAL ENCOUNTER (OUTPATIENT)
Facility: OTHER | Age: 49
Discharge: HOME | End: 2019-09-17
Attending: OBSTETRICS & GYNECOLOGY | Admitting: OBSTETRICS & GYNECOLOGY
Payer: COMMERCIAL

## 2019-09-17 VITALS
HEIGHT: 65 IN | OXYGEN SATURATION: 100 % | SYSTOLIC BLOOD PRESSURE: 123 MMHG | BODY MASS INDEX: 21.16 KG/M2 | DIASTOLIC BLOOD PRESSURE: 60 MMHG | TEMPERATURE: 97 F | RESPIRATION RATE: 16 BRPM | WEIGHT: 127 LBS | HEART RATE: 55 BPM

## 2019-09-17 DIAGNOSIS — N39.46 MIXED STRESS AND URGE URINARY INCONTINENCE: ICD-10-CM

## 2019-09-17 DIAGNOSIS — R14.0 ABDOMINAL BLOATING: ICD-10-CM

## 2019-09-17 DIAGNOSIS — Z98.890 STATUS POST HYSTEROSCOPY: ICD-10-CM

## 2019-09-17 DIAGNOSIS — Z01.818 PREOP TESTING: ICD-10-CM

## 2019-09-17 DIAGNOSIS — N93.9 ABNORMAL UTERINE BLEEDING: ICD-10-CM

## 2019-09-17 DIAGNOSIS — R93.89 ABNORMAL PELVIC ULTRASOUND: Primary | ICD-10-CM

## 2019-09-17 LAB
B-HCG UR QL: NEGATIVE
CTP QC/QA: YES

## 2019-09-17 PROCEDURE — 63600175 PHARM REV CODE 636 W HCPCS: Performed by: SPECIALIST

## 2019-09-17 PROCEDURE — 58301 REMOVE INTRAUTERINE DEVICE: CPT | Mod: 51,,, | Performed by: OBSTETRICS & GYNECOLOGY

## 2019-09-17 PROCEDURE — 58300 INSERT INTRAUTERINE DEVICE: CPT | Mod: 51,,, | Performed by: OBSTETRICS & GYNECOLOGY

## 2019-09-17 PROCEDURE — 63600175 PHARM REV CODE 636 W HCPCS: Performed by: NURSE ANESTHETIST, CERTIFIED REGISTERED

## 2019-09-17 PROCEDURE — 63600175 PHARM REV CODE 636 W HCPCS: Performed by: ANESTHESIOLOGY

## 2019-09-17 PROCEDURE — 88305 TISSUE SPECIMEN TO PATHOLOGY - SURGERY: ICD-10-PCS | Mod: 26,,, | Performed by: PATHOLOGY

## 2019-09-17 PROCEDURE — 58561 PR HYSTEROSCOPY,RMV MYOMA: ICD-10-PCS | Mod: ,,, | Performed by: OBSTETRICS & GYNECOLOGY

## 2019-09-17 PROCEDURE — 63600175 PHARM REV CODE 636 W HCPCS: Performed by: OBSTETRICS & GYNECOLOGY

## 2019-09-17 PROCEDURE — 71000033 HC RECOVERY, INTIAL HOUR: Performed by: OBSTETRICS & GYNECOLOGY

## 2019-09-17 PROCEDURE — 71000016 HC POSTOP RECOV ADDL HR: Performed by: OBSTETRICS & GYNECOLOGY

## 2019-09-17 PROCEDURE — 37000008 HC ANESTHESIA 1ST 15 MINUTES: Performed by: OBSTETRICS & GYNECOLOGY

## 2019-09-17 PROCEDURE — 25000003 PHARM REV CODE 250: Performed by: ANESTHESIOLOGY

## 2019-09-17 PROCEDURE — 27201423 OPTIME MED/SURG SUP & DEVICES STERILE SUPPLY: Performed by: OBSTETRICS & GYNECOLOGY

## 2019-09-17 PROCEDURE — 71000039 HC RECOVERY, EACH ADD'L HOUR: Performed by: OBSTETRICS & GYNECOLOGY

## 2019-09-17 PROCEDURE — 25000003 PHARM REV CODE 250: Performed by: NURSE ANESTHETIST, CERTIFIED REGISTERED

## 2019-09-17 PROCEDURE — C1782 MORCELLATOR: HCPCS | Performed by: OBSTETRICS & GYNECOLOGY

## 2019-09-17 PROCEDURE — 63600175 PHARM REV CODE 636 W HCPCS

## 2019-09-17 PROCEDURE — 37000009 HC ANESTHESIA EA ADD 15 MINS: Performed by: OBSTETRICS & GYNECOLOGY

## 2019-09-17 PROCEDURE — 58301 PR REMOVE, INTRAUTERINE DEVICE: ICD-10-PCS | Mod: 51,,, | Performed by: OBSTETRICS & GYNECOLOGY

## 2019-09-17 PROCEDURE — 58300 PR INSERT INTRAUTERINE DEVICE: ICD-10-PCS | Mod: 51,,, | Performed by: OBSTETRICS & GYNECOLOGY

## 2019-09-17 PROCEDURE — 81025 URINE PREGNANCY TEST: CPT | Performed by: ANESTHESIOLOGY

## 2019-09-17 PROCEDURE — 58561 HYSTEROSCOPY REMOVE MYOMA: CPT | Mod: ,,, | Performed by: OBSTETRICS & GYNECOLOGY

## 2019-09-17 PROCEDURE — 71000015 HC POSTOP RECOV 1ST HR: Performed by: OBSTETRICS & GYNECOLOGY

## 2019-09-17 PROCEDURE — 88305 TISSUE EXAM BY PATHOLOGIST: CPT | Mod: 26,,, | Performed by: PATHOLOGY

## 2019-09-17 PROCEDURE — 88305 TISSUE EXAM BY PATHOLOGIST: CPT | Performed by: PATHOLOGY

## 2019-09-17 PROCEDURE — 36000706: Performed by: OBSTETRICS & GYNECOLOGY

## 2019-09-17 PROCEDURE — 25000003 PHARM REV CODE 250: Performed by: SPECIALIST

## 2019-09-17 PROCEDURE — 36000707: Performed by: OBSTETRICS & GYNECOLOGY

## 2019-09-17 RX ORDER — FENTANYL CITRATE 50 UG/ML
25 INJECTION, SOLUTION INTRAMUSCULAR; INTRAVENOUS EVERY 5 MIN PRN
Status: DISCONTINUED | OUTPATIENT
Start: 2019-09-17 | End: 2019-09-17 | Stop reason: HOSPADM

## 2019-09-17 RX ORDER — SODIUM CHLORIDE 0.9 % (FLUSH) 0.9 %
3 SYRINGE (ML) INJECTION
Status: DISCONTINUED | OUTPATIENT
Start: 2019-09-17 | End: 2019-09-17 | Stop reason: HOSPADM

## 2019-09-17 RX ORDER — PREGABALIN 75 MG/1
75 CAPSULE ORAL
Status: COMPLETED | OUTPATIENT
Start: 2019-09-17 | End: 2019-09-17

## 2019-09-17 RX ORDER — CEFAZOLIN SODIUM 1 G/3ML
2 INJECTION, POWDER, FOR SOLUTION INTRAMUSCULAR; INTRAVENOUS
Status: DISCONTINUED | OUTPATIENT
Start: 2019-09-17 | End: 2021-12-15

## 2019-09-17 RX ORDER — HYDROMORPHONE HYDROCHLORIDE 2 MG/ML
0.4 INJECTION, SOLUTION INTRAMUSCULAR; INTRAVENOUS; SUBCUTANEOUS EVERY 5 MIN PRN
Status: DISCONTINUED | OUTPATIENT
Start: 2019-09-17 | End: 2019-09-17 | Stop reason: HOSPADM

## 2019-09-17 RX ORDER — SODIUM CHLORIDE, SODIUM LACTATE, POTASSIUM CHLORIDE, CALCIUM CHLORIDE 600; 310; 30; 20 MG/100ML; MG/100ML; MG/100ML; MG/100ML
INJECTION, SOLUTION INTRAVENOUS CONTINUOUS
Status: DISCONTINUED | OUTPATIENT
Start: 2019-09-17 | End: 2019-09-17 | Stop reason: HOSPADM

## 2019-09-17 RX ORDER — ONDANSETRON 2 MG/ML
4 INJECTION INTRAMUSCULAR; INTRAVENOUS DAILY PRN
Status: DISCONTINUED | OUTPATIENT
Start: 2019-09-17 | End: 2019-09-17 | Stop reason: HOSPADM

## 2019-09-17 RX ORDER — MEPERIDINE HYDROCHLORIDE 25 MG/ML
12.5 INJECTION INTRAMUSCULAR; INTRAVENOUS; SUBCUTANEOUS ONCE AS NEEDED
Status: DISCONTINUED | OUTPATIENT
Start: 2019-09-17 | End: 2019-09-17 | Stop reason: HOSPADM

## 2019-09-17 RX ORDER — PROPOFOL 10 MG/ML
VIAL (ML) INTRAVENOUS
Status: DISCONTINUED | OUTPATIENT
Start: 2019-09-17 | End: 2019-09-17

## 2019-09-17 RX ORDER — IBUPROFEN 600 MG/1
600 TABLET ORAL 3 TIMES DAILY
Qty: 60 TABLET | Refills: 1 | Status: SHIPPED | OUTPATIENT
Start: 2019-09-17 | End: 2022-11-09 | Stop reason: ALTCHOICE

## 2019-09-17 RX ORDER — ACETAMINOPHEN 500 MG
1000 TABLET ORAL
Status: COMPLETED | OUTPATIENT
Start: 2019-09-17 | End: 2019-09-17

## 2019-09-17 RX ORDER — ROCURONIUM BROMIDE 10 MG/ML
INJECTION, SOLUTION INTRAVENOUS
Status: DISCONTINUED | OUTPATIENT
Start: 2019-09-17 | End: 2019-09-17

## 2019-09-17 RX ORDER — IBUPROFEN 600 MG/1
600 TABLET ORAL ONCE
Status: COMPLETED | OUTPATIENT
Start: 2019-09-17 | End: 2019-09-17

## 2019-09-17 RX ORDER — GLYCOPYRROLATE 0.2 MG/ML
INJECTION INTRAMUSCULAR; INTRAVENOUS
Status: DISCONTINUED | OUTPATIENT
Start: 2019-09-17 | End: 2019-09-17

## 2019-09-17 RX ORDER — MIDAZOLAM HYDROCHLORIDE 1 MG/ML
INJECTION INTRAMUSCULAR; INTRAVENOUS
Status: DISCONTINUED | OUTPATIENT
Start: 2019-09-17 | End: 2019-09-17

## 2019-09-17 RX ORDER — KETOROLAC TROMETHAMINE 30 MG/ML
INJECTION, SOLUTION INTRAMUSCULAR; INTRAVENOUS
Status: DISCONTINUED | OUTPATIENT
Start: 2019-09-17 | End: 2019-09-17

## 2019-09-17 RX ORDER — FENTANYL CITRATE 50 UG/ML
INJECTION, SOLUTION INTRAMUSCULAR; INTRAVENOUS
Status: DISCONTINUED | OUTPATIENT
Start: 2019-09-17 | End: 2019-09-17

## 2019-09-17 RX ORDER — OXYCODONE HYDROCHLORIDE 5 MG/1
5 TABLET ORAL
Status: DISCONTINUED | OUTPATIENT
Start: 2019-09-17 | End: 2019-09-17 | Stop reason: HOSPADM

## 2019-09-17 RX ORDER — FAMOTIDINE 20 MG/1
20 TABLET, FILM COATED ORAL
Status: COMPLETED | OUTPATIENT
Start: 2019-09-17 | End: 2019-09-17

## 2019-09-17 RX ORDER — ONDANSETRON 2 MG/ML
INJECTION INTRAMUSCULAR; INTRAVENOUS
Status: DISCONTINUED | OUTPATIENT
Start: 2019-09-17 | End: 2019-09-17

## 2019-09-17 RX ORDER — DEXAMETHASONE SODIUM PHOSPHATE 4 MG/ML
INJECTION, SOLUTION INTRA-ARTICULAR; INTRALESIONAL; INTRAMUSCULAR; INTRAVENOUS; SOFT TISSUE
Status: DISCONTINUED | OUTPATIENT
Start: 2019-09-17 | End: 2019-09-17

## 2019-09-17 RX ORDER — LIDOCAINE HCL/PF 100 MG/5ML
SYRINGE (ML) INTRAVENOUS
Status: DISCONTINUED | OUTPATIENT
Start: 2019-09-17 | End: 2019-09-17

## 2019-09-17 RX ORDER — NEOSTIGMINE METHYLSULFATE 1 MG/ML
INJECTION, SOLUTION INTRAVENOUS
Status: DISCONTINUED | OUTPATIENT
Start: 2019-09-17 | End: 2019-09-17

## 2019-09-17 RX ADMIN — LIDOCAINE HYDROCHLORIDE 75 MG: 20 INJECTION, SOLUTION INTRAVENOUS at 01:09

## 2019-09-17 RX ADMIN — ROCURONIUM BROMIDE 30 MG: 10 INJECTION, SOLUTION INTRAVENOUS at 01:09

## 2019-09-17 RX ADMIN — GLYCOPYRROLATE 0.2 MG: 0.2 INJECTION, SOLUTION INTRAMUSCULAR; INTRAVENOUS at 02:09

## 2019-09-17 RX ADMIN — PROPOFOL 120 MG: 10 INJECTION, EMULSION INTRAVENOUS at 01:09

## 2019-09-17 RX ADMIN — MIDAZOLAM HYDROCHLORIDE 2 MG: 1 INJECTION, SOLUTION INTRAMUSCULAR; INTRAVENOUS at 01:09

## 2019-09-17 RX ADMIN — PREGABALIN 75 MG: 75 CAPSULE ORAL at 11:09

## 2019-09-17 RX ADMIN — FAMOTIDINE 20 MG: 20 TABLET, FILM COATED ORAL at 11:09

## 2019-09-17 RX ADMIN — SODIUM CHLORIDE, SODIUM LACTATE, POTASSIUM CHLORIDE, AND CALCIUM CHLORIDE: 600; 310; 30; 20 INJECTION, SOLUTION INTRAVENOUS at 02:09

## 2019-09-17 RX ADMIN — CEFAZOLIN 2 G: 330 INJECTION, POWDER, FOR SOLUTION INTRAMUSCULAR; INTRAVENOUS at 01:09

## 2019-09-17 RX ADMIN — FENTANYL CITRATE 100 MCG: 50 INJECTION, SOLUTION INTRAMUSCULAR; INTRAVENOUS at 01:09

## 2019-09-17 RX ADMIN — CARBOXYMETHYLCELLULOSE SODIUM 2 DROP: 2.5 SOLUTION/ DROPS OPHTHALMIC at 01:09

## 2019-09-17 RX ADMIN — ACETAMINOPHEN 1000 MG: 500 TABLET, FILM COATED ORAL at 11:09

## 2019-09-17 RX ADMIN — FENTANYL CITRATE 25 MCG: 50 INJECTION, SOLUTION INTRAMUSCULAR; INTRAVENOUS at 02:09

## 2019-09-17 RX ADMIN — KETOROLAC TROMETHAMINE 30 MG: 30 INJECTION, SOLUTION INTRAMUSCULAR; INTRAVENOUS at 02:09

## 2019-09-17 RX ADMIN — FENTANYL CITRATE 25 MCG: 50 INJECTION, SOLUTION INTRAMUSCULAR; INTRAVENOUS at 03:09

## 2019-09-17 RX ADMIN — DEXAMETHASONE SODIUM PHOSPHATE 8 MG: 4 INJECTION, SOLUTION INTRAMUSCULAR; INTRAVENOUS at 02:09

## 2019-09-17 RX ADMIN — IBUPROFEN 600 MG: 600 TABLET ORAL at 05:09

## 2019-09-17 RX ADMIN — GLYCOPYRROLATE 0.8 MG: 0.2 INJECTION, SOLUTION INTRAMUSCULAR; INTRAVENOUS at 02:09

## 2019-09-17 RX ADMIN — SODIUM CHLORIDE, SODIUM LACTATE, POTASSIUM CHLORIDE, AND CALCIUM CHLORIDE: 600; 310; 30; 20 INJECTION, SOLUTION INTRAVENOUS at 12:09

## 2019-09-17 RX ADMIN — ONDANSETRON 4 MG: 2 INJECTION INTRAMUSCULAR; INTRAVENOUS at 02:09

## 2019-09-17 RX ADMIN — NEOSTIGMINE METHYLSULFATE 5 MG: 1 INJECTION INTRAVENOUS at 02:09

## 2019-09-17 NOTE — OP NOTE
OPERATIVE REPORT    PREOPERATIVE DIAGNOSIS  1. Encounter for removal and reinsertion of intrauterine contraceptive device (IUD)  2. Uterine leiomyoma, unspecified location     POSTOPERATIVE DIAGNOSIS  1. Encounter for removal and reinsertion of intrauterine contraceptive device (IUD)  2. Uterine leiomyoma, unspecified location   3. S/P Removal and re-insertion of IUD  4. S/P Hysteroscopy with hysteroscopic myomectomy    PROCEDURE:  1. Hysteroscopy with hysteroscopic myomectomy  2. IUD Removal and Re-Insertion    SURGEON: Debbie Murphy MD    ASSISTANT: Colette Ramirez MD (RES)    ANESTHESIA: General    COMPLICATIONS: None    EBL: Minimal < 10 cc    IV FLUIDS: 1300  cc    URINE OUTPUT: 300 cc drained via in-and-out catheterization prior to procedure    Hysteroscopy fluid deficit: 850 cc    FINDINGS:    1. Normal appearing external genitalia  2. ~ 9 week size fibroid uterus per pelvic exam  3. Normal appearing intrauterine cavity apart from ~ 1 cm fibroid noted on posterior wall of uterus. Bilateral tubal ostia visualized  4. Successful debulking of uterine fibroid with TruClear Dense Tissue Mini Incisor  5. IUD strings noted at cervical os. Device removed without complication. Mirena device then placed inside the uterus per standard procedure  6. Hemostasis at the conclusion of the case.     SPECIMENS:  1. Endometrial curettage    PROCEDURE:   Patient was taken to the operating room where general anesthesia was administered and found to be adequate.  She was placed in the dorsal lithotomy position using Tay stirrups, then prepped and draped in the usual sterile fashion. Bladder was drained via in-and-out catheterization prior to procedure.  A surgical timeout was performed with patient's name, date of birth, procedure to be performed, and allergies verbalized. All OR staff in agreement. No preoperative antibiotics were administered as none were indicated.    Attention was then turned to the vagina where a weighted  sterile speculum was placed in the posterior aspect, and a right angle retractor was placed in the anterior aspect. At this point the IUD strings were visualized and grasped with the ringed forceps. The copper IUD was then successfully removed in tack without complication. The anterior lip of the cervix was grasped with a single tooth tenaculum. The cervix was serially dilated to 18 Tamazight. The 5mm hysteroscope was advanced through the cervical os and the uterus was distended with normal saline.  The endometrium was inspected and found grossly normal in appearance apart from a posterior fibroid, measuring ~ 1 cm.  The tubal ostia were identified without difficulty, and appeared normal. At this point the TruClear Dense Tissue Mini Incisor was requested and inserted into the uterine cavity. The incisor was then used to de-bulk the posterior fibroid as well as to sample the quadrantsz of the uterus. All specimens were sent for pathology.The hysteroscope was withdrawn without difficulty.     At this point, the new Mirena IUD was requested and the device was then successfully deployed per insertion guidelines. The strings were easily visualized exiting the cervix and the strings were appropriately trimmed. The tenaculum was removed and hemostasis was noted at the puncture sites in the cervix.     Sponge and instrument counts were correct x 2. The patient tolerated the procedure well and was awakened without difficulty. She was taken to the recovery room in stable condition.  =======================    I was have reviewed the above operative note and agree.  I was present and scrubbed for the entire case.   Debbie Murphy MD

## 2019-09-17 NOTE — PLAN OF CARE
Louise Cueto has met all discharge criteria from Phase II. Vital Signs are stable, ambulating  without difficulty.Pain is now under control and tolerable for the pt. Pain score 2 at this time.  Discharge instructions given, patient verbalized understanding. Discharged from facility via wheelchair in stable condition.

## 2019-09-17 NOTE — DISCHARGE INSTRUCTIONS
Anesthesia: After Your Surgery  Youve just had surgery. During surgery, you received medication called anesthesia to keep you comfortable and pain-free. After surgery, you may experience some pain or nausea. This is common. Here are some tips for feeling better and recovering after surgery.    Going home  Your doctor or nurse will show you how to take care of yourself when you go home. He or she will also answer your questions. Have an adult family member or friend drive you home. For the first 24 hours after your surgery:    · Do not drive or use heavy equipment.  · Do not make important decisions or sign legal documents.  · Avoid alcohol.  · Have someone stay with you, if needed. He or she can watch for problems and help keep you safe.  · Take your time getting up from a seated or lying position. You may experience dizziness for 24 hours.    Be sure to keep all follow-up appointments with your doctor. And rest after your procedure for as long as your doctor tells you to.    Coping with pain  If you have pain after surgery, pain medication will help you feel better. Take it as directed, before pain becomes severe. Also, ask your doctor or pharmacist about other ways to control pain, such as with heat, ice, and relaxation. And follow any other instructions your surgeon or nurse gives you.    URINARY RETENTION  Should you experience a decrease in your urine output or are unable to urinate following surgery, this can be due to the medications given during surgery.  We recommend you going to the nearest Emergency Department.    Tips for taking pain medication  To get the best relief possible, remember these points:    · Pain medications can upset your stomach. Taking them with a little food may help.  · Most pain relievers taken by mouth need at least 20 to 30 minutes to take effect.  · Taking medication on a schedule can help you remember to take it. Try to time your medication so that you can take it before  beginning an activity, such as dressing, walking, or sitting down for dinner.  · Constipation is a common side effect of pain medications. Contact your doctor before taking any medications like laxatives or stool softeners to help relieve constipation. Also ask about any dietary restrictions, because drinking lots of fluids and eating foods like fruits and vegetables that are high in fiber can also help. Remember, dont take laxatives unless your surgeon has prescribed them.  · Mixing alcohol and pain medication can cause dizziness and slow your breathing. It can even be fatal. Dont drink alcohol while taking pain medication.  · Pain medication can slow your reflexes. Dont drive or operate machinery while taking pain medication.    If your health care provider tells you to take acetaminophen to help relieve your pain, ask him or her how much you are supposed to take each day. (Acetaminophen is the generic name for Tylenol and other brand-name pain relievers.) Acetaminophen or other pain relievers may interact with your prescription medicines or other over-the-counter (OTC) drugs. Some prescription medications contain acetaminophen along with other active ingredients. Using both prescription and OTC acetaminophen for pain can cause you to overdose. The FDA recommends that you read the labels on your OTC medications carefully. This will help you to clearly understand the list of active ingredients, dosing instructions, and any warnings. It may also help you avoid taking too much acetaminophen. If you have questions or don't understand the information, ask your pharmacist or health care provider to explain it to you before you take the OTC medication.    Managing nausea  Some people have an upset stomach after surgery. This is often due to anesthesia, pain, pain medications, or the stress of surgery. The following tips will help you manage nausea and get good nutrition as you recover. If you were on a special diet  before surgery, ask your doctor if you should follow it during recovery. These tips may help:    · Dont push yourself to eat. Your body will tell you when to eat and how much.  · Start off with clear liquids and soup. They are easier to digest.  · Progress to semi-solid foods (mashed potatoes, applesauce, and gelatin) as you feel ready.  · Slowly move to solid foods. Dont eat fatty, rich, or spicy foods at first.  · Dont force yourself to have three large meals a day. Instead, eat smaller amounts more often.  · Take pain medications with a small amount of solid food, such as crackers or toast to avoid nausea.      Call your surgeon if    · You feel too sleepy, dizzy, or groggy (medication may be too strong).  · You have side effects like nausea, vomiting, or skin changes (rash, itching, or hives).     © 8586-1885 [employer]. [address] [place] [address], [place] [address]. All rights reserved. This information is not intended as a substitute for professional medical care. Always follow your healthcare professional's instructions.    PLEASE FOLLOW ANY OTHER INSTRUCTIONS PROVIDED TO YOU BY DR. MONTOYA!

## 2019-09-17 NOTE — ANESTHESIA POSTPROCEDURE EVALUATION
Anesthesia Post Evaluation    Patient: Louise Cueto    Procedure(s) Performed: Procedure(s) (LRB):  HYSTEROSCOPY-removal of IUD; possible shaving of uterine fibroids if needed to insert new IUD (N/A)  IUD insertion-- mirena-- would prefer kyleena if available (N/A)  CYSTOSCOPY (N/A)    Final Anesthesia Type: general  Patient location during evaluation: PACU  Patient participation: Yes- Able to Participate  Level of consciousness: awake and alert and oriented  Post-procedure vital signs: reviewed and stable  Pain management: adequate  Airway patency: patent  PONV status at discharge: No PONV  Anesthetic complications: no      Cardiovascular status: blood pressure returned to baseline and hemodynamically stable  Respiratory status: unassisted, spontaneous ventilation and room air  Hydration status: euvolemic  Follow-up not needed.          Vitals Value Taken Time   BP 98/61 9/17/2019  3:55 PM   Temp 36.1 °C (97 °F) 9/17/2019  3:55 PM   Pulse 51 9/17/2019  3:55 PM   Resp 16 9/17/2019  3:55 PM   SpO2 100 % 9/17/2019  3:55 PM         Event Time     Out of Recovery 15:50:00          Pain/Ozzy Score: Pain Rating Prior to Med Admin: 5 (9/17/2019  3:29 PM)  Pain Rating Post Med Admin: 2 (9/17/2019  3:37 PM)  Ozzy Score: 10 (9/17/2019  3:55 PM)

## 2019-09-17 NOTE — INTERVAL H&P NOTE
The patient has been examined and the H&P has been reviewed:    I concur with the findings and no changes have occurred since H&P was written.    Anesthesia/Surgery risks, benefits and alternative options discussed and understood by patient/family.    Discussed with patient.  She would like minimal done to accomplish goals:  Will proceed with hysteroscopy, D&C, possible truclear reduction/removal of fibroids to allow better placement of IUD, replacement of IUD.  Understands that she may need future procedures/treatment for her AUB and urinary incontinence.           Active Hospital Problems    Diagnosis  POA    Abnormal pelvic ultrasound [R93.89]  Yes      Resolved Hospital Problems   No resolved problems to display.

## 2019-09-17 NOTE — DISCHARGE SUMMARY
[facility]  Brief Op Note/Discharge Note  Short Stay    Admit Date: 9/17/2019    Discharge Date: 09/17/2019    Attending Physician: Debbie Murphy MD     Surgery Date: 9/17/2019     Surgeon(s) and Role:     * Debbie Murphy MD - Primary    Assisting Surgeon: Colette Ramirez MD Resident PGY-3    Pre-op Diagnosis:  Encounter for removal and reinsertion of intrauterine contraceptive device (IUD) [Z30.433]  Uterine leiomyoma, unspecified location [D25.9]    Post-op Diagnosis:  Post-Op Diagnosis Codes:     * Encounter for removal and reinsertion of intrauterine contraceptive device (IUD) [Z30.433]     * Uterine leiomyoma, unspecified location [D25.9]     * S/P Removal and re-insertion IUD     * S/P Hysteroscopy with hysteroscopic myomectomy    Procedure(s) (LRB):  HYSTEROSCOPY-removal of IUD; possible shaving of uterine fibroids if needed to insert new IUD (N/A)  IUD insertion-- mirena-- would prefer kyleena if available (N/A)  CYSTOSCOPY (N/A)    Anesthesia: General    Findings/Key Components:   1. Normal appearing external genitalia  2. ~ 9 week size fibroid uterus per pelvic exam  3. Normal appearing intrauterine cavity apart from ~ 1 cm fibroid noted on posterior wall of uterus. Bilateral tubal ostia visualized  4. Successful debulking of uterine fibroid with TruClear Dense Tissue Mini Incisor  5. IUD strings noted at cervical os. Device removed without complication. Mirena device then placed inside the uterus per standard procedure  6. Hemostasis at the conclusion of the case.     Estimated Blood Loss: Minimal < 10 cc         Specimens:   Specimen (12h ago, onward)    Start     Ordered    09/17/19 1419  Specimen to Pathology - Surgery  Once     Comments:  1. ENDOMETRIAL CURETTAGE     Start Status     09/17/19 1419 Collected (09/17/19 1419) Order ID: [id number]       09/17/19 1419          Discharge Provider: Colette Ramirez    Diagnoses:  Active Hospital Problems    Diagnosis  POA    Abnormal pelvic  ultrasound [R93.89]  Yes    Status post hysteroscopy with hysteroscopic myomectomy & IUD Removal and Insertion [Z98.890]  Not Applicable      Resolved Hospital Problems   No resolved problems to display.       Discharged Condition: good    Hospital Course:   Patient was admitted for outpatient procedure as above, and tolerated the procedure well with no complications. Please see operative report for further details. Following the procedure, the patient was awakened from anesthesia and transferred to the recovery area in stable condition. She was discharged to home once ambulating, voiding, tolerating PO intake, and pain was well-controlled. Patient was given routine post-op instructions and prescriptions for pain medication to take as needed. Patient will be contacted by Dr. Murphy's clinic with the results of the pathology and appropriate follow-up will be scheduled.     Final Diagnoses: Same as principal problem.    Disposition: Home or Self Care    Follow up/Patient Instructions:    Medications:  Reconciled Home Medications:      Medication List      CHANGE how you take these medications    ibuprofen 600 MG tablet  Commonly known as:  ADVIL,MOTRIN  Take 1 tablet (600 mg total) by mouth 3 (three) times daily.  What changed:    · medication strength  · how much to take  · when to take this  · reasons to take this        CONTINUE taking these medications    * b complex vitamins capsule  Take 1 capsule by mouth once daily.     * b complex vitamins tablet  Take 1 tablet by mouth once daily.     collagen (bovine) 100 % Powd  Apply topically.     valACYclovir 1000 MG tablet  Commonly known as:  VALTREX  daily as needed.     VITAMIN C 1000 MG tablet  Generic drug:  ascorbic acid (vitamin C)  Take 1,000 mg by mouth once daily.     vitamin D 1000 units Tab  Commonly known as:  VITAMIN D3  Take 1,000 Units by mouth once daily.         * This list has 2 medication(s) that are the same as other medications prescribed for  you. Read the directions carefully, and ask your doctor or other care provider to review them with you.              Discharge Procedure Orders   Other restrictions (specify):   Order Comments: Pelvic Rest - Nothing in the Vagina for 4 weeks.     Notify your health care provider if you experience any of the following:   Order Comments: Vaginal Bleeding greater than a pad per hour.     Notify your health care provider if you experience any of the following:  increased confusion or weakness     Notify your health care provider if you experience any of the following:  persistent dizziness, light-headedness, or visual disturbances     Notify your health care provider if you experience any of the following:  worsening rash     Notify your health care provider if you experience any of the following:  severe persistent headache     Notify your health care provider if you experience any of the following:  difficulty breathing or increased cough     Notify your health care provider if you experience any of the following:  redness, tenderness, or signs of infection (pain, swelling, redness, odor or green/yellow discharge around incision site)     Notify your health care provider if you experience any of the following:  severe uncontrolled pain     Notify your health care provider if you experience any of the following:  persistent nausea and vomiting or diarrhea     Notify your health care provider if you experience any of the following:  temperature >100.4     Activity as tolerated     Follow-up Information     Schedule an appointment as soon as possible for a visit with Debbie Murphy MD.    Specialties:  Gynecology, Urology  Why:  [facility] will contact Ms. Cueto with the results of the pathology and appropriate follow-up will be scheduled if neccesary.   Contact information:  [address] [facility] [address]  [phone]                 Colette Ramirez M.D.  PGY-3 OB/GYN  [facility]

## 2019-09-17 NOTE — TRANSFER OF CARE
"Anesthesia Transfer of Care Note    Patient: Louise Cueto    Procedure(s) Performed: Procedure(s) (LRB):  HYSTEROSCOPY-removal of IUD; possible shaving of uterine fibroids if needed to insert new IUD (N/A)  IUD insertion-- mirena-- would prefer kyleena if available (N/A)  CYSTOSCOPY (N/A)    Patient location: PACU    Anesthesia Type: general    Transport from OR: Transported from OR on 2-3 L/min O2 by NC with adequate spontaneous ventilation. Continuous SpO2 monitoring in transport    Post pain: adequate analgesia    Post assessment: no apparent anesthetic complications and tolerated procedure well    Post vital signs: stable    Level of consciousness: alert, awake and oriented    Nausea/Vomiting: no nausea/vomiting    Complications: none    Transfer of care protocol was followed      Last vitals:   Visit Vitals  /63   Pulse 60   Temp 36.7 °C (98.1 °F) (Oral)   Resp 16   Ht 5' 5" (1.651 m)   Wt 57.6 kg (126 lb 15.8 oz)   LMP 09/05/2019   SpO2 99%   Breastfeeding? No   BMI 21.13 kg/m²     "

## 2019-09-19 ENCOUNTER — PATIENT MESSAGE (OUTPATIENT)
Dept: UROGYNECOLOGY | Facility: CLINIC | Age: 49
End: 2019-09-19

## 2019-10-18 ENCOUNTER — PATIENT MESSAGE (OUTPATIENT)
Dept: UROGYNECOLOGY | Facility: CLINIC | Age: 49
End: 2019-10-18

## 2019-10-18 DIAGNOSIS — Z12.31 ENCOUNTER FOR SCREENING MAMMOGRAM FOR BREAST CANCER: Primary | ICD-10-CM

## 2019-10-24 ENCOUNTER — IMMUNIZATION (OUTPATIENT)
Dept: PHARMACY | Facility: CLINIC | Age: 49
End: 2019-10-24
Payer: COMMERCIAL

## 2019-10-24 ENCOUNTER — HOSPITAL ENCOUNTER (OUTPATIENT)
Dept: RADIOLOGY | Facility: HOSPITAL | Age: 49
Discharge: HOME | End: 2019-10-24
Attending: NURSE PRACTITIONER
Payer: COMMERCIAL

## 2019-10-24 DIAGNOSIS — Z12.31 ENCOUNTER FOR SCREENING MAMMOGRAM FOR BREAST CANCER: ICD-10-CM

## 2019-10-24 PROCEDURE — 77063 MAMMO DIGITAL SCREENING BILAT WITH TOMOSYNTHESIS_CAD: ICD-10-PCS | Mod: 26,,, | Performed by: RADIOLOGY

## 2019-10-24 PROCEDURE — 77063 BREAST TOMOSYNTHESIS BI: CPT | Mod: 26,,, | Performed by: RADIOLOGY

## 2019-10-24 PROCEDURE — 77063 BREAST TOMOSYNTHESIS BI: CPT | Mod: TC

## 2019-10-24 PROCEDURE — 77067 SCR MAMMO BI INCL CAD: CPT | Mod: 26,,, | Performed by: RADIOLOGY

## 2019-10-24 PROCEDURE — 77067 MAMMO DIGITAL SCREENING BILAT WITH TOMOSYNTHESIS_CAD: ICD-10-PCS | Mod: 26,,, | Performed by: RADIOLOGY

## 2019-10-29 ENCOUNTER — TELEPHONE (OUTPATIENT)
Dept: UROGYNECOLOGY | Facility: CLINIC | Age: 49
End: 2019-10-29

## 2019-10-29 NOTE — TELEPHONE ENCOUNTER
Left voice message for pt to give the office a call back at [phone] regarding 10/31/19 appt. Reschedule appt to morning at 11 am due to Dr Murphy not being in clinic in the afternoon.

## 2019-10-30 ENCOUNTER — TELEPHONE (OUTPATIENT)
Dept: UROGYNECOLOGY | Facility: CLINIC | Age: 49
End: 2019-10-30

## 2019-10-30 NOTE — TELEPHONE ENCOUNTER
Returned pt call no answer, Left voice message for pt to give the office a call back at [phone] to confirm 11 am appt on tomorrow due to Dr Murphy not being in clinic in the pm.

## 2019-10-30 NOTE — TELEPHONE ENCOUNTER
----- Message from Inez French sent at 10/30/2019  9:21 AM CDT -----  Contact: PRANEETH BERKOWITZ   Type:  Patient Returning Call    Who Called: PRANEETH BERKOWITZ     Who Left Message for Patient: Susy Truong MA    Does the patient know what this is regarding?: Rescheduling her post op      Best Call Back Number:    Additional Information:

## 2019-10-31 ENCOUNTER — OFFICE VISIT (OUTPATIENT)
Dept: UROGYNECOLOGY | Facility: CLINIC | Age: 49
End: 2019-10-31
Payer: COMMERCIAL

## 2019-10-31 VITALS
HEIGHT: 65 IN | BODY MASS INDEX: 21.16 KG/M2 | WEIGHT: 127 LBS | DIASTOLIC BLOOD PRESSURE: 68 MMHG | SYSTOLIC BLOOD PRESSURE: 110 MMHG

## 2019-10-31 DIAGNOSIS — Z91.89 INCREASED RISK OF BREAST CANCER: ICD-10-CM

## 2019-10-31 DIAGNOSIS — Z98.890 POST-OPERATIVE STATE: Primary | ICD-10-CM

## 2019-10-31 PROCEDURE — 99999 PR PBB SHADOW E&M-EST. PATIENT-LVL III: ICD-10-PCS | Mod: PBBFAC,,, | Performed by: NURSE PRACTITIONER

## 2019-10-31 PROCEDURE — 99999 PR PBB SHADOW E&M-EST. PATIENT-LVL III: CPT | Mod: PBBFAC,,, | Performed by: NURSE PRACTITIONER

## 2019-10-31 PROCEDURE — 99024 POSTOP FOLLOW-UP VISIT: CPT | Mod: S$GLB,,, | Performed by: NURSE PRACTITIONER

## 2019-10-31 PROCEDURE — 99024 PR POST-OP FOLLOW-UP VISIT: ICD-10-PCS | Mod: S$GLB,,, | Performed by: NURSE PRACTITIONER

## 2019-10-31 NOTE — PATIENT INSTRUCTIONS
1. Post op doing well.   2. Discussed elevated TC score--declines breast surgery consult  3. She will follow up with us in 1 year.

## 2019-10-31 NOTE — PROGRESS NOTES
Urogyn follow up  10/31/2019    [facility] UROGYN [facility]   [address] [address]    Louise Cueto  [id number]  1970      Louise Cueto is a 48 y.o.  here for a urogyn post op visit.     2019  PREOPERATIVE DIAGNOSIS  1. Encounter for removal and reinsertion of intrauterine contraceptive device (IUD)  2. Uterine leiomyoma, unspecified location      POSTOPERATIVE DIAGNOSIS  1. Encounter for removal and reinsertion of intrauterine contraceptive device (IUD)  2. Uterine leiomyoma, unspecified location   3. S/P Removal and re-insertion of IUD  4. S/P Hysteroscopy with hysteroscopic myomectomy     PROCEDURE:  1. Hysteroscopy with hysteroscopic myomectomy  2. IUD Removal and Re-Insertion     FINDINGS:    1. Normal appearing external genitalia  2. ~ 9 week size fibroid uterus per pelvic exam  3. Normal appearing intrauterine cavity apart from ~ 1 cm fibroid noted on posterior wall of uterus. Bilateral tubal ostia visualized  4. Successful debulking of uterine fibroid with TruClear Dense Tissue Mini Incisor  5. IUD strings noted at cervical os. Device removed without complication. Mirena device then placed inside the uterus per standard procedure  6. Hemostasis at the conclusion of the case    ENDOMETRIUM: PROLIFERATIVE PHASE ENDOMETRIUM, NO EVIDENCE OF ENDOMETRIAL  HYPERPLASIA OR MALIGNANCY; SMALL FRAGMENT OF MYOMETRIUM    Past Medical History:   Diagnosis Date    GERD (gastroesophageal reflux disease)     Liver lesion 1/3/2019       Past Surgical History:   Procedure Laterality Date    BREAST CYST EXCISION Left     18 y/o f     SECTION      CHOLECYSTECTOMY      COLONOSCOPY N/A 2017    Procedure: COLONOSCOPY;  Surgeon: Andrews Sears MD;  Location: [address]);  Service: Endoscopy;  Laterality: N/A;    CYSTOSCOPY N/A 2019    Procedure: CYSTOSCOPY;  Surgeon: Debbie Murphy MD;  Location: [facility];  Service: OB/GYN;  Laterality: N/A;     "HYSTEROSCOPY N/A 9/17/2019    Procedure: HYSTEROSCOPY-removal of IUD; possible shaving of uterine fibroids if needed to insert new IUD;  Surgeon: Debbie Murphy MD;  Location: [facility];  Service: OB/GYN;  Laterality: N/A;    INTRAUTERINE DEVICE INSERTION N/A 9/17/2019    Procedure: IUD insertion-- mirena-- would prefer kyleena if available;  Surgeon: Debbie Murphy MD;  Location: [facility] OR;  Service: OB/GYN;  Laterality: N/A;     History since last visit: Denies pain, bleeding or discharge.   Bladder issues: Denies urinary urgency, frequency, or discharge  Bowel issues: Denies constipation or straining.    Well Woman:  Pap:08/2018 normal  Mammo:10/2019 normal-- elevated TC score  Colonoscopy:01/2017 polyp  Dexa:n/a      Medications:    Current Outpatient Medications:     ascorbic acid, vitamin C, (VITAMIN C) 1000 MG tablet, Take 1,000 mg by mouth once daily., Disp: , Rfl:     b complex vitamins capsule, Take 1 capsule by mouth once daily., Disp: , Rfl:     b complex vitamins tablet, Take 1 tablet by mouth once daily., Disp: , Rfl:     collagen, bovine, 100 % Powd, Apply topically., Disp: , Rfl:     ibuprofen (ADVIL,MOTRIN) 600 MG tablet, Take 1 tablet (600 mg total) by mouth 3 (three) times daily., Disp: 60 tablet, Rfl: 1    valACYclovir (VALTREX) 1000 MG tablet, daily as needed. , Disp: , Rfl:     vitamin D (VITAMIN D3) 1000 units Tab, Take 1,000 Units by mouth once daily., Disp: , Rfl:   No current facility-administered medications for this visit.     Facility-Administered Medications Ordered in Other Visits:     ceFAZolin injection 2 g, 2 g, Intravenous, On Call Procedure, Debbie Murphy MD, 2 g at 09/17/19 1321    ROS:  As per HPI.      Exam  /68 (BP Location: Right arm, Patient Position: Sitting)   Ht 5' 5" (1.651 m)   Wt 57.6 kg (127 lb)   BMI 21.13 kg/m²   General: alert and oriented, no acute distress  Respiratory: normal respiratory effort  Abd: soft, non-tender, " non-distended    Pelvic  Ext. Genitalia: normal external genitalia. Normal bartholin's and skeens glands  Vagina: -- atrophy. Normal vaginal mucosa without lesions. No discharge noted.   Non-tender bladder base without palpable mass.  Cervix: no lesions; IUD strings visible  Uterus:  uterus is normal size, shape, consistency and nontender   Urethra: no masses or tenderness  Urethral meatus: no lesions, caruncle or prolapse.    Dr. Murphy present during exam    Impression  1. Post-operative state     2. Increased risk of breast cancer       We reviewed the above issues and discussed options for short-term versus long-term management of her problems.   Plan:   1. Post op doing well.   2. Discussed elevated TC score--declines breast surgery consult  3. She will follow up with us in 1 year.  25 minutes were spent in face to face time with this patient  90 % of this time was spent in counseling and/or coordination of care     Maggy Gallegos NP  [facility]  Division of Female Pelvic Medicine and Reconstructive Surgery  Department of Obstetrics & Gynecology

## 2019-12-05 ENCOUNTER — OFFICE VISIT (OUTPATIENT)
Dept: URGENT CARE | Facility: CLINIC | Age: 49
End: 2019-12-05
Payer: COMMERCIAL

## 2019-12-05 VITALS
WEIGHT: 125 LBS | TEMPERATURE: 98 F | OXYGEN SATURATION: 98 % | HEART RATE: 70 BPM | BODY MASS INDEX: 20.83 KG/M2 | HEIGHT: 65 IN | RESPIRATION RATE: 13 BRPM | SYSTOLIC BLOOD PRESSURE: 108 MMHG | DIASTOLIC BLOOD PRESSURE: 72 MMHG

## 2019-12-05 DIAGNOSIS — J06.9 VIRAL URI: Primary | ICD-10-CM

## 2019-12-05 DIAGNOSIS — R59.1 LYMPHADENOPATHY: ICD-10-CM

## 2019-12-05 DIAGNOSIS — J02.9 SORE THROAT: ICD-10-CM

## 2019-12-05 DIAGNOSIS — R09.82 POST-NASAL DRIP: ICD-10-CM

## 2019-12-05 LAB
CTP QC/QA: YES
CTP QC/QA: YES
FLUAV AG NPH QL: NEGATIVE
FLUBV AG NPH QL: NEGATIVE
S PYO RRNA THROAT QL PROBE: NEGATIVE

## 2019-12-05 PROCEDURE — 87880 POCT RAPID STREP A: ICD-10-PCS | Mod: QW,S$GLB,, | Performed by: NURSE PRACTITIONER

## 2019-12-05 PROCEDURE — 87880 STREP A ASSAY W/OPTIC: CPT | Mod: QW,S$GLB,, | Performed by: NURSE PRACTITIONER

## 2019-12-05 PROCEDURE — 99214 OFFICE O/P EST MOD 30 MIN: CPT | Mod: S$GLB,,, | Performed by: NURSE PRACTITIONER

## 2019-12-05 PROCEDURE — 87804 INFLUENZA ASSAY W/OPTIC: CPT | Mod: 59,QW,S$GLB, | Performed by: NURSE PRACTITIONER

## 2019-12-05 PROCEDURE — 99214 PR OFFICE/OUTPT VISIT, EST, LEVL IV, 30-39 MIN: ICD-10-PCS | Mod: S$GLB,,, | Performed by: NURSE PRACTITIONER

## 2019-12-05 PROCEDURE — 87804 POCT INFLUENZA A/B: ICD-10-PCS | Mod: 59,QW,S$GLB, | Performed by: NURSE PRACTITIONER

## 2019-12-05 RX ORDER — FLUTICASONE PROPIONATE 50 MCG
2 SPRAY, SUSPENSION (ML) NASAL DAILY
Qty: 15.8 ML | Refills: 0 | Status: SHIPPED | OUTPATIENT
Start: 2019-12-05 | End: 2022-11-10

## 2019-12-05 NOTE — PROGRESS NOTES
"Subjective:       Patient ID: Louise Cueto is a 48 y.o. female.    Vitals:  height is 5' 5" (1.651 m) and weight is 56.7 kg (125 lb). Her oral temperature is 98.3 °F (36.8 °C). Her blood pressure is 108/72 and her pulse is 70. Her respiration is 13 and oxygen saturation is 98%.     Chief Complaint: Sore Throat (2 days)    Pt c/o sore throat, productive cough, sinus congestion and post nasal drip x 5 days. Taking coldies, no relief. Ambulatory. MJB    Sore Throat    This is a new problem. The current episode started in the past 7 days. The problem has been gradually worsening. Neither side of throat is experiencing more pain than the other. There has been no fever. Associated symptoms include congestion, neck pain, swollen glands and trouble swallowing. Pertinent negatives include no diarrhea, shortness of breath or vomiting. Treatments tried: coldies. The treatment provided no relief.       Constitution: Positive for appetite change and fatigue. Negative for chills and fever.   HENT: Positive for congestion, postnasal drip, sinus pain, sinus pressure, sore throat and trouble swallowing.    Neck: Positive for neck pain and painful lymph nodes.   Cardiovascular: Negative for chest pain and leg swelling.   Eyes: Negative for double vision and blurred vision.   Respiratory: Negative for shortness of breath.    Gastrointestinal: Negative for nausea, vomiting and diarrhea.   Genitourinary: Negative for dysuria, frequency, urgency and history of kidney stones.   Musculoskeletal: Positive for muscle ache. Negative for joint pain, joint swelling and muscle cramps.   Skin: Negative for color change, pale, rash and bruising.   Allergic/Immunologic: Negative for seasonal allergies.   Neurological: Negative for dizziness, history of vertigo, light-headedness and passing out.   Hematologic/Lymphatic: Positive for swollen lymph nodes.   Psychiatric/Behavioral: Negative for nervous/anxious, sleep disturbance and depression. The " patient is not nervous/anxious.        Objective:      Physical Exam   Constitutional: She is oriented to person, place, and time. Vital signs are normal. She appears well-developed and well-nourished. She is cooperative.  Non-toxic appearance. She does not have a sickly appearance. She appears ill. No distress.   HENT:   Head: Normocephalic and atraumatic.   Right Ear: Hearing, external ear and ear canal normal. A middle ear effusion is present.   Left Ear: Hearing, external ear and ear canal normal. A middle ear effusion is present.   Nose: Mucosal edema and rhinorrhea present. No nasal deformity. No epistaxis. Right sinus exhibits no maxillary sinus tenderness and no frontal sinus tenderness. Left sinus exhibits no maxillary sinus tenderness and no frontal sinus tenderness.   Mouth/Throat: Uvula is midline and mucous membranes are normal. No trismus in the jaw. Normal dentition. No uvula swelling. Posterior oropharyngeal erythema and cobblestoning present. No oropharyngeal exudate or posterior oropharyngeal edema.   Eyes: Pupils are equal, round, and reactive to light. Conjunctivae, EOM and lids are normal. No scleral icterus.   Neck: Trachea normal, normal range of motion, full passive range of motion without pain and phonation normal. Neck supple. No spinous process tenderness and no muscular tenderness present. No neck rigidity. No edema, no erythema and normal range of motion present.   Cardiovascular: Normal rate, regular rhythm, normal heart sounds and normal pulses.   Pulmonary/Chest: Effort normal and breath sounds normal. No respiratory distress. She has no decreased breath sounds. She has no rhonchi.   Abdominal: Normal appearance.   Musculoskeletal: Normal range of motion. She exhibits no edema or deformity.   Lymphadenopathy:     She has cervical adenopathy.        Right cervical: Superficial cervical and posterior cervical adenopathy present.        Left cervical: Superficial cervical and posterior  cervical adenopathy present.   Neurological: She is alert and oriented to person, place, and time. She exhibits normal muscle tone. Coordination normal.   Skin: Skin is warm, dry, intact, not diaphoretic and not pale. Capillary refill takes less than 2 seconds.   Psychiatric: She has a normal mood and affect. Her speech is normal and behavior is normal. Judgment and thought content normal. Cognition and memory are normal.   Nursing note and vitals reviewed.    Results for orders placed or performed in visit on 12/05/19   POCT rapid strep A   Result Value Ref Range    Rapid Strep A Screen Negative Negative     Acceptable Yes    POCT Influenza A/B   Result Value Ref Range    Rapid Influenza A Ag Negative Negative    Rapid Influenza B Ag Negative Negative     Acceptable Yes          Assessment:       1. Viral URI    2. Sore throat    3. Post-nasal drip    4. Lymphadenopathy        Plan:       Had a detailed discussion with the patient on using Flonase and Mucinex with plenty of water.  Patient voiced understanding.  Viral URI  -     fluticasone propionate (FLONASE) 50 mcg/actuation nasal spray; 2 sprays (100 mcg total) by Each Nostril route once daily.  Dispense: 15.8 mL; Refill: 0    Sore throat  -     POCT rapid strep A  -     POCT Influenza A/B  -     diphenhydrAMINE-aluminum-magnesium hydroxide-simethicone-lidocaine HCl 2%; Swish and spit 15 mLs every 4 (four) hours as needed (sore throat).  Dispense: 390 mL; Refill: 0    Post-nasal drip  -     fluticasone propionate (FLONASE) 50 mcg/actuation nasal spray; 2 sprays (100 mcg total) by Each Nostril route once daily.  Dispense: 15.8 mL; Refill: 0    Lymphadenopathy      Patient Instructions       If your condition worsens or fails to improve we recommend that you receive another evaluation at the emergency room immediately or contact your primary medical clinic to discuss your concerns.   You must understand that you have received an  Urgent Care treatment only and that you may be released before all of your medical problems are known or treated. You, the patient, will arrange for follow up care as instructed.   Please drink plenty of fluids.  Please get plenty of rest.  Please return here or go to the Emergency Department for any concerns or worsening of condition.  If you were prescribed antibiotics, please take them to completion.  If you were given wait & see antibiotics, please wait 5-7 days before taking them, and only take them if your symptoms have worsened or not improved.  If you do begin taking the antibiotics, please take them to completion.  If you were prescribed a narcotic medication, do not drive or operate heavy equipment or machinery while taking these medications.  If you were given a steroid shot in the clinic and have also been given a prescription for a steroid such as Prednisone or a Medrol Dose Pack, please begin taking them tomorrow.  If you do not have Hypertension or any history of palpitations, it is ok to take over the counter Sudafed or Mucinex D or Allegra-D or Claritin-D or Zyrtec-D.  If you do take one of the above, it is ok to combine that with plain over the counter Mucinex or Allegra or Claritin or Zyrtec.  If for example you are taking Zyrtec -D, you can combine that with Mucinex, but not Mucinex-D.  If you are taking Mucinex-D, you can combine that with plain Allegra or Claritin or Zyrtec.   If you do have Hypertension or palpitations, it is safe to take Coricidin HBP for relief of sinus symptoms.  If not allergic, please take over the counter Tylenol (Acetaminophen) and/or Motrin (Ibuprofen) as directed for control of pain and/or fever.  Please follow up with your primary care doctor or specialist as needed.    If you  smoke, please stop smoking.    Viral Upper Respiratory Illness (Adult)  You have a viral upper respiratory illness (URI), which is another term for the common cold. This illness is contagious  during the first few days. It is spread through the air by coughing and sneezing. It may also be spread by direct contact (touching the sick person and then touching your own eyes, nose, or mouth). Frequent handwashing will decrease risk of spread. Most viral illnesses go away within 7 to 10 days with rest and simple home remedies. Sometimes the illness may last for several weeks. Antibiotics will not kill a virus, and they are generally not prescribed for this condition.    Home care  · If symptoms are severe, rest at home for the first 2 to 3 days. When you resume activity, don't let yourself get too tired.  · Avoid being exposed to cigarette smoke (yours or others).  · You may use acetaminophen or ibuprofen to control pain and fever, unless another medicine was prescribed. (Note: If you have chronic liver or kidney disease, have ever had a stomach ulcer or gastrointestinal bleeding, or are taking blood-thinning medicines, talk with your healthcare provider before using these medicines.) Aspirin should never be given to anyone under 18 years of age who is ill with a viral infection or fever. It may cause severe liver or brain damage.  · Your appetite may be poor, so a light diet is fine. Avoid dehydration by drinking 6 to 8 glasses of fluids per day (water, soft drinks, juices, tea, or soup). Extra fluids will help loosen secretions in the nose and lungs.  · Over-the-counter cold medicines will not shorten the length of time youre sick, but they may be helpful for the following symptoms: cough, sore throat, and nasal and sinus congestion. (Note: Do not use decongestants if you have high blood pressure.)  Follow-up care  Follow up with your healthcare provider, or as advised.  When to seek medical advice  Call your healthcare provider right away if any of these occur:  · Cough with lots of colored sputum (mucus)  · Severe headache; face, neck, or ear pain  · Difficulty swallowing due to throat pain  · Fever of  100.4°F (38°C)  Call 911, or get immediate medical care  Call emergency services right away if any of these occur:  · Chest pain, shortness of breath, wheezing, or difficulty breathing  · Coughing up blood  · Inability to swallow due to throat pain  Date Last Reviewed: 9/13/2015  © 2115-1521 [employer] [address] [place] [address], [place] [address]. All rights reserved. This information is not intended as a substitute for professional medical care. Always follow your healthcare professional's instructions.        Self-Care for Sore Throats    Sore throats happen for many reasons, such as colds, allergies, and infections caused by viruses or bacteria. In any case, your throat becomes red and sore. Your goal for self-care is to reduce your discomfort while giving your throat a chance to heal.  Moisten and soothe your throat  Tips include the following:  · Try a sip of water first thing after waking up.  · Keep your throat moist by drinking 6 or more glasses of clear liquids every day.  · Run a cool-air humidifier in your room overnight.  · Avoid cigarette smoke.   · Suck on throat lozenges, cough drops, hard candy, ice chips, or frozen fruit-juice bars. Use the sugar-free versions if your diet or medical condition requires them.  Gargle to ease irritation  Gargling every hour or 2 can ease irritation. Try gargling with 1 of these solutions:  · 1/4 teaspoon of salt in 1/2 cup of warm water  · An over-the-counter anesthetic gargle  Use medicine for more relief  Over-the-counter medicine can reduce sore throat symptoms. Ask your pharmacist if you have questions about which medicine to use:  · Ease pain with anesthetic sprays. Aspirin or an aspirin substitute also helps. Remember, never give aspirin to anyone 18 or younger, or if you are already taking blood thinners.   · For sore throats caused by allergies, try antihistamines to block the allergic reaction.  · Remember: unless a sore throat is caused by a  bacterial infection, antibiotics wont help you.  Prevent future sore throats  Prevention tips include the following:  · Stop smoking or reduce contact with secondhand smoke. Smoke irritates the tender throat lining.  · Limit contact with pets and with allergy-causing substances, such as pollen and mold.  · When youre around someone with a sore throat or cold, wash your hands often to keep viruses or bacteria from spreading.  · Dont strain your vocal cords.  Call your healthcare provider  Contact your healthcare provider if you have:  · A temperature over 101°F (38.3°C)  · White spots on the throat  · Great difficulty swallowing  · Trouble breathing  · A skin rash  · Recent exposure to someone else with strep bacteria  · Severe hoarseness and swollen glands in the neck or jaw   Date Last Reviewed: 8/1/2016 © 2000-2017 [employer]. [address] [place] [address]. All rights reserved. This information is not intended as a substitute for professional medical care. Always follow your healthcare professional's instructions.        Lymphadenopathy  Lymphadenopathy is swelling of the lymph nodes. Lymph nodes are small, bean-shaped glands around the body.  What are lymph nodes?  Lymph nodes are part of your immune system. The glands are found in your neck, armpits, groin, chest, and abdomen. They act as filters for lymph fluid as it flows through your body. Lymph fluid contains white blood cells and other things that fight infection.  Why lymph nodes swell  Lymphadenopathy is very common. The glands often enlarge during a viral or bacterial infection. It can happen during a cold, the flu, or strep throat. The nodes may swell in just one area of the body, such as the neck (localized). Or nodes may swell all over the body (generalized). The neck (cervical) lymph nodes are the most common site of lymphadenopathy.  What causes lymphadenopathy?  Dead cells and fluid build up in the lymph nodes as they  help fight infection or disease. This causes them to swell in size. Enlarged lymph nodes are often near the source of infection. This can help to find the cause of an infection. For example, swollen lymph nodes around the jaw may be because of an infection in the teeth or mouth. But lymphadenopathy may also be generalized. This is common in some viral illnesses such as mononucleosis or chickenpox (varicella).  Lymphadenopathy can also be caused by:  · Infection of a lymph node or small group of nodes (lymphadenitis)  · Cancer  · Reactions to medicines such as antibiotics and some seizure medicines  · Other health conditions, such as lupus  Symptoms of lymphadenopathy  Lymphadenopathy can cause symptoms such as:  · Lumps under the jaw, on the sides or back of the neck, in the armpits, in the groin, or in the chest or belly (abdomen)  · Pain or tenderness in any of these areas  · Redness or warmth in any of these areas  You may also have symptoms from an infection causing the swollen glands. These symptoms may include fever, sore throat, body aches, or cough.  Diagnosing lymphadenopathy  Your health care provider will ask about your health history and symptoms. He or she will give you a physical exam and check the areas where lymph nodes are enlarged. Your health care provider will check the size and location of the nodes, and ask how long they have been swollen and if they are painful. Diagnostic tests and referral to specialists may be recommended. They may include:  · Blood tests. These are done to check for signs of infection and other problems.  · Urine test. This is also done to check for infection and other problems.  · Chest X-ray. This test can show enlarged lymph nodes or other problems.  · Lymph node biopsy. If lymph nodes are swollen for 3 to 4 weeks, they may be checked with a biopsy. Small samples of lymph node tissue are taken and checked in a lab for signs of cancer. You may be referred to a specialist  in blood disorders and cancer (hematologist and oncologist).  Treatment for lymphadenopathy  The treatment of enlarged lymph nodes depends on the cause. Enlarged lymph nodes are often harmless and go away without any treatment. Treatment is most often done on the cause of the enlarged nodes and may include:  · Antibiotic medicine to treat a bacterial infection  · Incision and drainage (I & D) of a lymph node for lymphadenitis  · Other medicines or procedures to treat the cause of the enlarged nodes  You may need follow-up exam in 3 to 4 weeks to recheck enlarged nodes.     When to call your health care provider  Call your health care provider if you have lymph nodes that are still swollen after 3 to 4 weeks.   Date Last Reviewed: 6/19/2015  © 3321-3361 [employer] [address] [place] [address], [place] [address]. All rights reserved. This information is not intended as a substitute for professional medical care. Always follow your healthcare professional's instructions.

## 2019-12-05 NOTE — PATIENT INSTRUCTIONS
If your condition worsens or fails to improve we recommend that you receive another evaluation at the emergency room immediately or contact your primary medical clinic to discuss your concerns.   You must understand that you have received an Urgent Care treatment only and that you may be released before all of your medical problems are known or treated. You, the patient, will arrange for follow up care as instructed.   Please drink plenty of fluids.  Please get plenty of rest.  Please return here or go to the Emergency Department for any concerns or worsening of condition.  If you were prescribed antibiotics, please take them to completion.  If you were given wait & see antibiotics, please wait 5-7 days before taking them, and only take them if your symptoms have worsened or not improved.  If you do begin taking the antibiotics, please take them to completion.  If you were prescribed a narcotic medication, do not drive or operate heavy equipment or machinery while taking these medications.  If you were given a steroid shot in the clinic and have also been given a prescription for a steroid such as Prednisone or a Medrol Dose Pack, please begin taking them tomorrow.  If you do not have Hypertension or any history of palpitations, it is ok to take over the counter Sudafed or Mucinex D or Allegra-D or Claritin-D or Zyrtec-D.  If you do take one of the above, it is ok to combine that with plain over the counter Mucinex or Allegra or Claritin or Zyrtec.  If for example you are taking Zyrtec -D, you can combine that with Mucinex, but not Mucinex-D.  If you are taking Mucinex-D, you can combine that with plain Allegra or Claritin or Zyrtec.   If you do have Hypertension or palpitations, it is safe to take Coricidin HBP for relief of sinus symptoms.  If not allergic, please take over the counter Tylenol (Acetaminophen) and/or Motrin (Ibuprofen) as directed for control of pain and/or fever.  Please follow up with your  primary care doctor or specialist as needed.    If you  smoke, please stop smoking.    Viral Upper Respiratory Illness (Adult)  You have a viral upper respiratory illness (URI), which is another term for the common cold. This illness is contagious during the first few days. It is spread through the air by coughing and sneezing. It may also be spread by direct contact (touching the sick person and then touching your own eyes, nose, or mouth). Frequent handwashing will decrease risk of spread. Most viral illnesses go away within 7 to 10 days with rest and simple home remedies. Sometimes the illness may last for several weeks. Antibiotics will not kill a virus, and they are generally not prescribed for this condition.    Home care  · If symptoms are severe, rest at home for the first 2 to 3 days. When you resume activity, don't let yourself get too tired.  · Avoid being exposed to cigarette smoke (yours or others).  · You may use acetaminophen or ibuprofen to control pain and fever, unless another medicine was prescribed. (Note: If you have chronic liver or kidney disease, have ever had a stomach ulcer or gastrointestinal bleeding, or are taking blood-thinning medicines, talk with your healthcare provider before using these medicines.) Aspirin should never be given to anyone under 18 years of age who is ill with a viral infection or fever. It may cause severe liver or brain damage.  · Your appetite may be poor, so a light diet is fine. Avoid dehydration by drinking 6 to 8 glasses of fluids per day (water, soft drinks, juices, tea, or soup). Extra fluids will help loosen secretions in the nose and lungs.  · Over-the-counter cold medicines will not shorten the length of time youre sick, but they may be helpful for the following symptoms: cough, sore throat, and nasal and sinus congestion. (Note: Do not use decongestants if you have high blood pressure.)  Follow-up care  Follow up with your healthcare provider, or as  advised.  When to seek medical advice  Call your healthcare provider right away if any of these occur:  · Cough with lots of colored sputum (mucus)  · Severe headache; face, neck, or ear pain  · Difficulty swallowing due to throat pain  · Fever of 100.4°F (38°C)  Call 911, or get immediate medical care  Call emergency services right away if any of these occur:  · Chest pain, shortness of breath, wheezing, or difficulty breathing  · Coughing up blood  · Inability to swallow due to throat pain  Date Last Reviewed: 9/13/2015 © 2000-2017 [employer]. [address] [place] [id number]. All rights reserved. This information is not intended as a substitute for professional medical care. Always follow your healthcare professional's instructions.        Self-Care for Sore Throats    Sore throats happen for many reasons, such as colds, allergies, and infections caused by viruses or bacteria. In any case, your throat becomes red and sore. Your goal for self-care is to reduce your discomfort while giving your throat a chance to heal.  Moisten and soothe your throat  Tips include the following:  · Try a sip of water first thing after waking up.  · Keep your throat moist by drinking 6 or more glasses of clear liquids every day.  · Run a cool-air humidifier in your room overnight.  · Avoid cigarette smoke.   · Suck on throat lozenges, cough drops, hard candy, ice chips, or frozen fruit-juice bars. Use the sugar-free versions if your diet or medical condition requires them.  Gargle to ease irritation  Gargling every hour or 2 can ease irritation. Try gargling with 1 of these solutions:  · 1/4 teaspoon of salt in 1/2 cup of warm water  · An over-the-counter anesthetic gargle  Use medicine for more relief  Over-the-counter medicine can reduce sore throat symptoms. Ask your pharmacist if you have questions about which medicine to use:  · Ease pain with anesthetic sprays. Aspirin or an aspirin substitute also  helps. Remember, never give aspirin to anyone 18 or younger, or if you are already taking blood thinners.   · For sore throats caused by allergies, try antihistamines to block the allergic reaction.  · Remember: unless a sore throat is caused by a bacterial infection, antibiotics wont help you.  Prevent future sore throats  Prevention tips include the following:  · Stop smoking or reduce contact with secondhand smoke. Smoke irritates the tender throat lining.  · Limit contact with pets and with allergy-causing substances, such as pollen and mold.  · When youre around someone with a sore throat or cold, wash your hands often to keep viruses or bacteria from spreading.  · Dont strain your vocal cords.  Call your healthcare provider  Contact your healthcare provider if you have:  · A temperature over 101°F (38.3°C)  · White spots on the throat  · Great difficulty swallowing  · Trouble breathing  · A skin rash  · Recent exposure to someone else with strep bacteria  · Severe hoarseness and swollen glands in the neck or jaw   Date Last Reviewed: 8/1/2016  © 8826-1303 [employer]. [address] [place] [address]. All rights reserved. This information is not intended as a substitute for professional medical care. Always follow your healthcare professional's instructions.        Lymphadenopathy  Lymphadenopathy is swelling of the lymph nodes. Lymph nodes are small, bean-shaped glands around the body.  What are lymph nodes?  Lymph nodes are part of your immune system. The glands are found in your neck, armpits, groin, chest, and abdomen. They act as filters for lymph fluid as it flows through your body. Lymph fluid contains white blood cells and other things that fight infection.  Why lymph nodes swell  Lymphadenopathy is very common. The glands often enlarge during a viral or bacterial infection. It can happen during a cold, the flu, or strep throat. The nodes may swell in just one area of the  body, such as the neck (localized). Or nodes may swell all over the body (generalized). The neck (cervical) lymph nodes are the most common site of lymphadenopathy.  What causes lymphadenopathy?  Dead cells and fluid build up in the lymph nodes as they help fight infection or disease. This causes them to swell in size. Enlarged lymph nodes are often near the source of infection. This can help to find the cause of an infection. For example, swollen lymph nodes around the jaw may be because of an infection in the teeth or mouth. But lymphadenopathy may also be generalized. This is common in some viral illnesses such as mononucleosis or chickenpox (varicella).  Lymphadenopathy can also be caused by:  · Infection of a lymph node or small group of nodes (lymphadenitis)  · Cancer  · Reactions to medicines such as antibiotics and some seizure medicines  · Other health conditions, such as lupus  Symptoms of lymphadenopathy  Lymphadenopathy can cause symptoms such as:  · Lumps under the jaw, on the sides or back of the neck, in the armpits, in the groin, or in the chest or belly (abdomen)  · Pain or tenderness in any of these areas  · Redness or warmth in any of these areas  You may also have symptoms from an infection causing the swollen glands. These symptoms may include fever, sore throat, body aches, or cough.  Diagnosing lymphadenopathy  Your health care provider will ask about your health history and symptoms. He or she will give you a physical exam and check the areas where lymph nodes are enlarged. Your health care provider will check the size and location of the nodes, and ask how long they have been swollen and if they are painful. Diagnostic tests and referral to specialists may be recommended. They may include:  · Blood tests. These are done to check for signs of infection and other problems.  · Urine test. This is also done to check for infection and other problems.  · Chest X-ray. This test can show enlarged  lymph nodes or other problems.  · Lymph node biopsy. If lymph nodes are swollen for 3 to 4 weeks, they may be checked with a biopsy. Small samples of lymph node tissue are taken and checked in a lab for signs of cancer. You may be referred to a specialist in blood disorders and cancer (hematologist and oncologist).  Treatment for lymphadenopathy  The treatment of enlarged lymph nodes depends on the cause. Enlarged lymph nodes are often harmless and go away without any treatment. Treatment is most often done on the cause of the enlarged nodes and may include:  · Antibiotic medicine to treat a bacterial infection  · Incision and drainage (I & D) of a lymph node for lymphadenitis  · Other medicines or procedures to treat the cause of the enlarged nodes  You may need follow-up exam in 3 to 4 weeks to recheck enlarged nodes.     When to call your health care provider  Call your health care provider if you have lymph nodes that are still swollen after 3 to 4 weeks.   Date Last Reviewed: 6/19/2015  © 6481-9094 [employer]. [address] [place] [address], [place] [address]. All rights reserved. This information is not intended as a substitute for professional medical care. Always follow your healthcare professional's instructions.

## 2019-12-28 ENCOUNTER — OFFICE VISIT (OUTPATIENT)
Dept: INTERNAL MEDICINE | Facility: CLINIC | Age: 49
End: 2019-12-28
Payer: COMMERCIAL

## 2019-12-28 VITALS
SYSTOLIC BLOOD PRESSURE: 102 MMHG | HEIGHT: 65 IN | TEMPERATURE: 98 F | WEIGHT: 127 LBS | OXYGEN SATURATION: 99 % | DIASTOLIC BLOOD PRESSURE: 64 MMHG | HEART RATE: 61 BPM | BODY MASS INDEX: 21.16 KG/M2

## 2019-12-28 DIAGNOSIS — J20.8 ACUTE BACTERIAL BRONCHITIS: Primary | ICD-10-CM

## 2019-12-28 DIAGNOSIS — H10.9 CONJUNCTIVITIS, UNSPECIFIED CONJUNCTIVITIS TYPE, UNSPECIFIED LATERALITY: ICD-10-CM

## 2019-12-28 DIAGNOSIS — B96.89 ACUTE BACTERIAL BRONCHITIS: Primary | ICD-10-CM

## 2019-12-28 PROBLEM — R14.0 ABDOMINAL BLOATING: Status: RESOLVED | Noted: 2019-06-29 | Resolved: 2019-12-28

## 2019-12-28 PROCEDURE — 99999 PR PBB SHADOW E&M-EST. PATIENT-LVL III: ICD-10-PCS | Mod: PBBFAC,,, | Performed by: FAMILY MEDICINE

## 2019-12-28 PROCEDURE — 99999 PR PBB SHADOW E&M-EST. PATIENT-LVL III: CPT | Mod: PBBFAC,,, | Performed by: FAMILY MEDICINE

## 2019-12-28 PROCEDURE — 99213 OFFICE O/P EST LOW 20 MIN: CPT | Mod: S$GLB,,, | Performed by: FAMILY MEDICINE

## 2019-12-28 PROCEDURE — 3008F BODY MASS INDEX DOCD: CPT | Mod: CPTII,S$GLB,, | Performed by: FAMILY MEDICINE

## 2019-12-28 PROCEDURE — 3008F PR BODY MASS INDEX (BMI) DOCUMENTED: ICD-10-PCS | Mod: CPTII,S$GLB,, | Performed by: FAMILY MEDICINE

## 2019-12-28 PROCEDURE — 99213 PR OFFICE/OUTPT VISIT, EST, LEVL III, 20-29 MIN: ICD-10-PCS | Mod: S$GLB,,, | Performed by: FAMILY MEDICINE

## 2019-12-28 RX ORDER — AZITHROMYCIN 250 MG/1
TABLET, FILM COATED ORAL
Qty: 6 TABLET | Refills: 0 | Status: SHIPPED | OUTPATIENT
Start: 2019-12-28 | End: 2021-12-15

## 2019-12-28 RX ORDER — PROMETHAZINE HYDROCHLORIDE AND DEXTROMETHORPHAN HYDROBROMIDE 6.25; 15 MG/5ML; MG/5ML
5 SYRUP ORAL EVERY 4 HOURS PRN
Qty: 240 ML | Refills: 0 | Status: SHIPPED | OUTPATIENT
Start: 2019-12-28 | End: 2020-01-07

## 2019-12-28 RX ORDER — FLUCONAZOLE 150 MG/1
150 TABLET ORAL DAILY
Qty: 1 TABLET | Refills: 2 | Status: SHIPPED | OUTPATIENT
Start: 2019-12-28 | End: 2019-12-29

## 2019-12-28 RX ORDER — ERYTHROMYCIN 5 MG/G
OINTMENT OPHTHALMIC EVERY 6 HOURS
Qty: 1 G | Refills: 2 | Status: SHIPPED | OUTPATIENT
Start: 2019-12-28 | End: 2021-12-15

## 2019-12-28 NOTE — PROGRESS NOTES
Subjective:   Patient ID: Louise Cueto is a 49 y.o. female.    Chief Complaint: URI      Conjunctivitis   This is a new problem. The current episode started in the past 7 days. The problem occurs constantly. The problem has been gradually worsening. Associated symptoms include congestion, coughing, fatigue, a sore throat, swollen glands and vertigo. Pertinent negatives include no abdominal pain, anorexia, arthralgias, change in bowel habit, chest pain, chills, diaphoresis, fever, headaches, joint swelling, myalgias, nausea, neck pain, numbness, rash, urinary symptoms, visual change, vomiting or weakness. The symptoms are aggravated by standing and stress. She has tried nothing for the symptoms. The treatment provided no relief.   Cough   This is a new problem. Associated symptoms include a sore throat. Pertinent negatives include no chest pain, chills, fever, headaches, myalgias, postnasal drip, rash, rhinorrhea or shortness of breath. The symptoms are aggravated by dust, exercise and fumes. She has tried nothing for the symptoms. The treatment provided no relief.       Patient queried and denies any further complaints.    LOCATION  DURATION  SEVERITY  QUALITY  TIMING  CAUSE  ASSOCIATED SYMPTOMS  MODIFIERS.    ALLERGIES AND MEDICATIONS: updated and reviewed.  Review of patient's allergies indicates:   Allergen Reactions    Percocet [oxycodone-acetaminophen] Nausea And Vomiting    Vicodin [hydrocodone-acetaminophen] Nausea And Vomiting     Pt not sure if she took percocet or Vicodin that caused severe nausea & vomiting       Current Outpatient Medications:     ascorbic acid, vitamin C, (VITAMIN C) 1000 MG tablet, Take 1,000 mg by mouth once daily., Disp: , Rfl:     b complex vitamins capsule, Take 1 capsule by mouth once daily., Disp: , Rfl:     b complex vitamins tablet, Take 1 tablet by mouth once daily., Disp: , Rfl:     collagen, bovine, 100 % Powd, Apply topically., Disp: , Rfl:      diphenhydrAMINE-aluminum-magnesium hydroxide-simethicone-lidocaine HCl 2%, Swish and spit 15 mLs every 4 (four) hours as needed (sore throat)., Disp: 390 mL, Rfl: 0    fluticasone propionate (FLONASE) 50 mcg/actuation nasal spray, 2 sprays (100 mcg total) by Each Nostril route once daily., Disp: 15.8 mL, Rfl: 0    ibuprofen (ADVIL,MOTRIN) 600 MG tablet, Take 1 tablet (600 mg total) by mouth 3 (three) times daily., Disp: 60 tablet, Rfl: 1    valACYclovir (VALTREX) 1000 MG tablet, daily as needed. , Disp: , Rfl:     vitamin D (VITAMIN D3) 1000 units Tab, Take 1,000 Units by mouth once daily., Disp: , Rfl:     azithromycin (Z-TREASURE) 250 MG tablet, Take 2 tablets by mouth on day 1; Take 1 tablet by mouth on days 2-5, Disp: 6 tablet, Rfl: 0    erythromycin (ROMYCIN) ophthalmic ointment, Place into the left eye every 6 (six) hours., Disp: 1 g, Rfl: 2    fluconazole (DIFLUCAN) 150 MG Tab, Take 1 tablet (150 mg total) by mouth once daily. As needed for yeast vaginitis for 1 day, Disp: 1 tablet, Rfl: 2    promethazine-dextromethorphan (PROMETHAZINE-DM) 6.25-15 mg/5 mL Syrp, Take 5 mLs by mouth every 4 (four) hours as needed. Do not take and drive. Do not take while working., Disp: 240 mL, Rfl: 0  No current facility-administered medications for this visit.     Facility-Administered Medications Ordered in Other Visits:     ceFAZolin injection 2 g, 2 g, Intravenous, On Call Procedure, Debbie Murphy MD, 2 g at 09/17/19 1321    Review of Systems   Constitutional: Positive for fatigue. Negative for chills, diaphoresis and fever.   HENT: Positive for congestion and sore throat. Negative for postnasal drip, rhinorrhea and sinus pressure.    Respiratory: Positive for cough. Negative for shortness of breath.    Cardiovascular: Negative for chest pain, palpitations and leg swelling.   Gastrointestinal: Negative for abdominal pain, anorexia, change in bowel habit, diarrhea, nausea and vomiting.   Genitourinary: Negative for  "dysuria.   Musculoskeletal: Negative for arthralgias, joint swelling, myalgias and neck pain.   Skin: Negative for rash.   Neurological: Positive for vertigo. Negative for dizziness, weakness, light-headedness, numbness and headaches.       Objective:     Vitals:    12/28/19 1024   BP: 102/64   Pulse: 61   Temp: 97.9 °F (36.6 °C)   SpO2: 99%   Weight: 57.6 kg (126 lb 15.8 oz)   Height: 5' 5" (1.651 m)   PainSc: 0-No pain     Body mass index is 21.13 kg/m².    Physical Exam   Constitutional: She is oriented to person, place, and time. She appears well-developed and well-nourished. She is cooperative. She does not have a sickly appearance. No distress.   HENT:   Head: Normocephalic and atraumatic.   Right Ear: Hearing, tympanic membrane, external ear and ear canal normal. No tenderness.   Left Ear: Hearing, tympanic membrane, external ear and ear canal normal. No tenderness.   Nose: Nose normal.   Mouth/Throat: Oropharynx is clear and moist. Normal dentition. No oropharyngeal exudate, posterior oropharyngeal edema or posterior oropharyngeal erythema.   Eyes: Conjunctivae and lids are normal. Right eye exhibits no discharge. Left eye exhibits no discharge. Right conjunctiva is not injected. Left conjunctiva is not injected. No scleral icterus. Right eye exhibits normal extraocular motion. Left eye exhibits normal extraocular motion.   Neck: Normal range of motion. Neck supple. No JVD present. Carotid bruit is not present. No tracheal deviation and no edema present. No thyromegaly present.   Cardiovascular: Normal rate, regular rhythm, normal heart sounds and normal pulses. Exam reveals no friction rub.   No murmur heard.  Pulmonary/Chest: Effort normal and breath sounds normal. No accessory muscle usage. No respiratory distress. She has no wheezes. She has no rhonchi. She has no rales.   Musculoskeletal: She exhibits no edema.   Lymphadenopathy:        Head (right side): No submandibular adenopathy present.        Head " (left side): No submandibular adenopathy present.     She has no cervical adenopathy.   Neurological: She is alert and oriented to person, place, and time.   Skin: Skin is warm and dry. She is not diaphoretic.   Psychiatric: Her speech is normal and behavior is normal. Thought content normal. Her mood appears not anxious. Her affect is not angry, not labile and not inappropriate. She does not exhibit a depressed mood.   Nursing note and vitals reviewed.      Assessment and Plan:   Louise was seen today for uri.    Diagnoses and all orders for this visit:    Acute bacterial bronchitis    Conjunctivitis, unspecified conjunctivitis type, unspecified laterality    Other orders  -     promethazine-dextromethorphan (PROMETHAZINE-DM) 6.25-15 mg/5 mL Syrp; Take 5 mLs by mouth every 4 (four) hours as needed. Do not take and drive. Do not take while working.  -     azithromycin (Z-TREASURE) 250 MG tablet; Take 2 tablets by mouth on day 1; Take 1 tablet by mouth on days 2-5  -     fluconazole (DIFLUCAN) 150 MG Tab; Take 1 tablet (150 mg total) by mouth once daily. As needed for yeast vaginitis for 1 day  -     erythromycin (ROMYCIN) ophthalmic ointment; Place into the left eye every 6 (six) hours.      Hydrate, rest, OTC Mucinex Expectorant as directed, Nasal saline as needed.  OTC Zyrtec as directed.    Follow up in about 2 weeks (around 1/11/2020), or if symptoms worsen or fail to improve.    THIS NOTE WILL BE SHARED WITH THE PATIENT.

## 2020-01-17 ENCOUNTER — TELEPHONE (OUTPATIENT)
Dept: UROGYNECOLOGY | Facility: CLINIC | Age: 50
End: 2020-01-17

## 2020-01-17 NOTE — TELEPHONE ENCOUNTER
----- Message from Sarah Noel sent at 1/17/2020 12:41 PM CST -----  Contact: PRANEETH BERKOWITZ [[id number]]  Name of Who is Calling: PRANEETH BERKOWITZ [[id number]]    What is the request in detail: PRANEETH BERKOWITZ [[id number]] is requesting a call back in regards to IUD removal ..Please contact to further discuss and advise      Can the clinic reply by [facility]: yes     [facility]  [phone] (home)

## 2020-01-21 ENCOUNTER — TELEPHONE (OUTPATIENT)
Dept: UROGYNECOLOGY | Facility: CLINIC | Age: 50
End: 2020-01-21

## 2020-01-21 NOTE — TELEPHONE ENCOUNTER
----- Message from Van Solomon sent at 1/21/2020 12:39 PM CST -----  Contact: PRANEETH BERKOWITZ [[id number]]  Name of Who is Calling: PRANEETH BERKOWITZ [[id number]]      What is the request in detail: Would like to speak with staff in regards to IUD removal. Please advise      Can the clinic reply by [facility]: yes      What Number [facility] Back [facility] [phone]

## 2020-01-21 NOTE — TELEPHONE ENCOUNTER
"Spoke to pt, she states she would like her IUD removed she states " my  had a vasectomy and I'm really not a fan of the IUD" appointment scheduled for removal on 2/6/2020 at the [facility] location with Maggy. Pt is aware and verbalizes understanding.  "

## 2020-02-13 ENCOUNTER — OFFICE VISIT (OUTPATIENT)
Dept: UROGYNECOLOGY | Facility: CLINIC | Age: 50
End: 2020-02-13
Payer: COMMERCIAL

## 2020-02-13 VITALS
HEIGHT: 65 IN | BODY MASS INDEX: 21.31 KG/M2 | WEIGHT: 127.88 LBS | SYSTOLIC BLOOD PRESSURE: 100 MMHG | DIASTOLIC BLOOD PRESSURE: 60 MMHG

## 2020-02-13 DIAGNOSIS — N93.9 VAGINAL SPOTTING: Primary | ICD-10-CM

## 2020-02-13 PROCEDURE — 99999 PR PBB SHADOW E&M-EST. PATIENT-LVL III: CPT | Mod: PBBFAC,,, | Performed by: NURSE PRACTITIONER

## 2020-02-13 PROCEDURE — 99999 PR PBB SHADOW E&M-EST. PATIENT-LVL III: ICD-10-PCS | Mod: PBBFAC,,, | Performed by: NURSE PRACTITIONER

## 2020-02-13 PROCEDURE — 99499 UNLISTED E&M SERVICE: CPT | Mod: S$GLB,,, | Performed by: NURSE PRACTITIONER

## 2020-02-13 PROCEDURE — 99499 NO LOS: ICD-10-PCS | Mod: S$GLB,,, | Performed by: NURSE PRACTITIONER

## 2020-02-25 NOTE — PROGRESS NOTES
Discussed with patient IUD removal.   had a vasectomy and no longer needs contraceptive option.  She is having some irregular spotting. Reviewed with patient how IUD is controlling irregular bleeding.  Agrees to keep iud at this time.  Maggy Gallegos, RITESH-BC

## 2020-03-17 NOTE — PROGRESS NOTES
"Physical Therapy Visit Note    Name: Louise Cueto  Clinic Number: [id number]      Diagnosis:   Encounter Diagnoses   Name Primary?    Cervical pain     Acute pain of left shoulder      Physician: Chip Somers MD  Treatment Orders: PT Eval and Treat    Past Medical History:   Diagnosis Date    GERD (gastroesophageal reflux disease)      No current outpatient prescriptions on file.     No current facility-administered medications for this visit.      Review of patient's allergies indicates:  No Known Allergies  Precautions: N/A    Today's Date:  11/24/2017  Evaluation Date: 10/19/24889  Visit # authorized: 10/20  Authorization period: 12/31/2017  Time In:  1100  Time Out:  1200    Subjective     Patient states she is still feeling pretty well.      Patient Goals: Return to full functional mobility, fitness and daily activities without pain or difficulty    Objective     Posture: Patient demonstrates forward head, decreased cervical lordosis, bilaterally abducted scapulae, with forward head/rounded shoulders posture.    Passive Range of Motion: WNL.     Active Range of Motion: WNL.    Cervical ROM:  Moderately limited side bend and rotation w/ pain reproduction to L.    Strength:  4+/5 in all directions; scapular collapse with ER, difficulty maintaining with cues.    Special Tests:  AC Joint Right Left   Cervical Distraction (+) resolution/ stretch felt    Spurling's (-) (-)   Empty Can (-) (-)   Subscaputlaris Lift Off (-) (-)   Hawkin's Kenndy (-) (-)   Neer's Test (-) (-)   Speed's test (-) (-)   1st Rib Spring Test:  (+) reproduction of symptoms  Tay's Test:  (+) for "cold" sensation L UE   Scapular Dyskinesis (+)     Joint Mobility: Moderately restricted cervical vertebral mobility, especially C5-6 with rotation noted.  No radicular symptoms reported.    Palpation: Severe point tenderness over C5-6 transverse processes on L; moderate point tenderness over UT, scalenes and distal levator as well as " "cervical paraspinals on L.    Sensation: Intact.    Flexibility: Moderately restricted pec minor B; Severely restricted B UT, scalenes, moderately restricted levator.    Functional Limitations Reports   Category: Lifting/Carrying  Tool: FOTO Shoulder/Cervical    TREATMENT:  Louise received therapeutic exercises to develop strength and endurance, flexibility for 20 minutes including:  MHP x 10'  Supine Cervical Isometrics in all directions 10x3 (fatigued)  B ER OTB w/ cervical stab cues    Not today:  Supine on 1/2 Foam Roller 5'  Prone Scap Retraction w/ Ext (caused "shooting pain down spine", so stopped) 5sh x 10  [facility] Cat/Came w/ Serratus Press 5sh 10x2  Qped Thoracic Rotations 10x2B  Prone Scap Retractions 5sh x 20  HEP discussion and Education    Cervical Traction 20#/10#, 30s/20s, 20 deg; 12'. (not today)    Louise  received the following manual therapy techniques x 25 min. To include Joint mobilizations and Soft tissue Mobilization were applied to the: cervical region To include:   STM and trigger point release surrounding entire cervical and upper trap region  Cervical rotational mobs as tolerated  1st rib mobs    Not today:  Gentle STM as tolerated thoracic musculature, trigger point release  Rotational mobs entire thoracic spine as tolerated  Rib mobs especially left thoracic region    Instructed pt. regarding: Proper technique with all exercises. Pt demonstrated good understanding of the education provided. Louise demonstrated good return demonstration of activities.    Assessment     Today's Assessment:  Patient continues to fatigue quickly and requires cues for proper motor control of all exercises.    This is a 47 y.o. female referred to outpatient physical therapy and presents with a medical diagnosis of cervical pain and scapular dyskinesis.  Patient presents with signs and symptoms consistent with medical diagnosis exacerbated by poor scapular and cervical strength, and postural control.  Patient's chief " hyperglycemia complaint is pain.  Patient will benefit from skilled physical therapy services, specifically normalized posture and strengthening, normalized joint mobility and flexibility, progressing to functional mobility and fitness activities as tolerated.     Medical necessity is demonstrated by the following IMPAIRMENTS/PROBLEM LIST:   1)Increase in pain level limiting function   2)Decreased strength   3)Decreased posture   4)Decreased functional mobility   5)Lack of HEP    GOALS: Short Term Goals:  6-8 weeks  1.  Report decreased cervical pain < / =  2/10  to increase tolerance for daily and fitness activities as desired.  2.  Increase cervical AROM to painfree and normal limits in all directions in order to return to daily and fitness activities as desired.   3.  Patient will demonstrate normalized scapular positioning and motion in order to perform daily and fitness activities as desired with minimal pain or difficulty.  4.  Pt to tolerate HEP to improve ROM and independence with ADL's.  5.  Patient will report ability to perform daily and fitness activities as desired without pain or difficulty.    Plan     Pt will be treated by physical therapy 1-2 times a week for Pt Education, HEP, therapeutic exercises, neuromuscular re-education, manual therapy, joint mobilizations, modalities prn to achieve established goals. Louise may at times be seen by a PTA as part of the Rehab Team.     Cont PT for 6-8 weeks.     Zaina Denton, PT, DPT, SCS    I certify the need for these services furnished under this plan of treatment and while under my care.______________________________ Physician/Referring Practitioner  Date of Signature

## 2020-04-23 ENCOUNTER — LAB VISIT (OUTPATIENT)
Dept: LAB | Facility: HOSPITAL | Age: 50
End: 2020-04-23
Attending: INTERNAL MEDICINE
Payer: COMMERCIAL

## 2020-04-23 DIAGNOSIS — Z00.00 PREVENTATIVE HEALTH CARE: Primary | ICD-10-CM

## 2020-04-23 DIAGNOSIS — Z00.00 PREVENTATIVE HEALTH CARE: ICD-10-CM

## 2020-04-23 LAB — SARS-COV-2 IGG SERPL QL IA: NEGATIVE

## 2020-04-23 PROCEDURE — 36415 COLL VENOUS BLD VENIPUNCTURE: CPT

## 2020-04-23 PROCEDURE — 86769 SARS-COV-2 COVID-19 ANTIBODY: CPT

## 2020-05-28 ENCOUNTER — PATIENT MESSAGE (OUTPATIENT)
Dept: UROGYNECOLOGY | Facility: CLINIC | Age: 50
End: 2020-05-28

## 2020-06-22 ENCOUNTER — PATIENT MESSAGE (OUTPATIENT)
Dept: UROGYNECOLOGY | Facility: CLINIC | Age: 50
End: 2020-06-22

## 2020-06-24 ENCOUNTER — OFFICE VISIT (OUTPATIENT)
Dept: OBSTETRICS AND GYNECOLOGY | Facility: CLINIC | Age: 50
End: 2020-06-24
Attending: OBSTETRICS & GYNECOLOGY
Payer: COMMERCIAL

## 2020-06-24 VITALS — HEIGHT: 65 IN | BODY MASS INDEX: 20.42 KG/M2 | WEIGHT: 122.56 LBS

## 2020-06-24 DIAGNOSIS — N61.1 BREAST ABSCESS: Primary | ICD-10-CM

## 2020-06-24 PROCEDURE — 99213 PR OFFICE/OUTPT VISIT, EST, LEVL III, 20-29 MIN: ICD-10-PCS | Mod: S$GLB,,, | Performed by: OBSTETRICS & GYNECOLOGY

## 2020-06-24 PROCEDURE — 3008F PR BODY MASS INDEX (BMI) DOCUMENTED: ICD-10-PCS | Mod: CPTII,S$GLB,, | Performed by: OBSTETRICS & GYNECOLOGY

## 2020-06-24 PROCEDURE — 3008F BODY MASS INDEX DOCD: CPT | Mod: CPTII,S$GLB,, | Performed by: OBSTETRICS & GYNECOLOGY

## 2020-06-24 PROCEDURE — 99213 OFFICE O/P EST LOW 20 MIN: CPT | Mod: S$GLB,,, | Performed by: OBSTETRICS & GYNECOLOGY

## 2020-06-24 RX ORDER — DICLOXACILLIN SODIUM 250 MG/1
250 CAPSULE ORAL 4 TIMES DAILY
Qty: 40 CAPSULE | Refills: 0 | Status: SHIPPED | OUTPATIENT
Start: 2020-06-24 | End: 2020-07-04

## 2020-06-24 NOTE — PROGRESS NOTES
Subjective:       Patient ID: Louise Cueto is a 49 y.o. female.    Chief Complaint:  No chief complaint on file.      History of Present Illness  HPI  This 49 yr old P3 female is here for possible breast infection. 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 She has had this before about 2 yrs ago in the same (left ) breast..  She was successfully treated with dicloxicillin.  She states that this again happened right at the beginning of the Covid issue about 3 months ago and she did not seek treatment but it went away on its own.  Today states has been there several days and is painful and hot to the touch.  She has had no fever/Chills and no nipple discharge and no pain in the left axilla.  She has see breast clinic for T-C score of 21% in the past.  She had diagnostic left mammogram in 2018 followed by normal mammogram in .  Her last pap was normal in   She is going to [place] for the summer Saturday.  We disucussed that this probably a clogged milk duct or malformed that is blocked.  Will send Dr Elizondo a note about this as to next step and if she needs some kind of imaging for this  She understands and agrees.  She states she is not worried this is cancer just very painful. She is taking ibuprofen  We spent over 15 min and over 50% in counseling.  GYN & OB History  No LMP recorded.   Date of Last Pap: 2018    OB History    Para Term  AB Living   3 3 3         SAB TAB Ectopic Multiple Live Births                  # Outcome Date GA Lbr Crow/2nd Weight Sex Delivery Anes PTL Lv   3 Term            2 Term            1 Term                Review of Systems  Review of Systems   Constitutional: Negative for chills and fever.   Respiratory: Negative for shortness of breath.    Cardiovascular: Negative for chest pain.   Gastrointestinal: Negative for abdominal pain, nausea and vomiting.   Genitourinary: Negative for difficulty urinating, dyspareunia, genital sores, menstrual problem, pelvic pain, vaginal bleeding,  vaginal discharge and vaginal pain.   Skin: Negative for wound.   Hematological: Negative for adenopathy.           Objective:   Physical Exam       Assessment:        1. Breast abscess               Plan:      dicloxicillin and will send note to Dr Elizondo  Pt knows I am moving and will need follow up with Dr Cohen /Mikhail if recurs.

## 2020-06-26 ENCOUNTER — HOSPITAL ENCOUNTER (EMERGENCY)
Facility: HOSPITAL | Age: 50
Discharge: HOME | End: 2020-06-26
Attending: EMERGENCY MEDICINE
Payer: COMMERCIAL

## 2020-06-26 DIAGNOSIS — Z20.822 EXPOSURE TO COVID-19 VIRUS: Primary | ICD-10-CM

## 2020-06-26 LAB — SARS-COV-2 RNA RESP QL NAA+PROBE: NOT DETECTED

## 2020-06-26 PROCEDURE — 99499 UNLISTED E&M SERVICE: CPT | Mod: ,,, | Performed by: EMERGENCY MEDICINE

## 2020-06-26 PROCEDURE — 99282 EMERGENCY DEPT VISIT SF MDM: CPT

## 2020-06-26 PROCEDURE — U0003 INFECTIOUS AGENT DETECTION BY NUCLEIC ACID (DNA OR RNA); SEVERE ACUTE RESPIRATORY SYNDROME CORONAVIRUS 2 (SARS-COV-2) (CORONAVIRUS DISEASE [COVID-19]), AMPLIFIED PROBE TECHNIQUE, MAKING USE OF HIGH THROUGHPUT TECHNOLOGIES AS DESCRIBED BY CMS-2020-01-R: HCPCS

## 2020-06-26 PROCEDURE — 99499 NO LOS: ICD-10-PCS | Mod: ,,, | Performed by: EMERGENCY MEDICINE

## 2020-07-06 NOTE — ED PROVIDER NOTES
Encounter Date: 2020       History     Chief Complaint   Patient presents with    COVID19 SWAB     Pt sent to ED for rapid Covid test.  They declined any additional evaluation or treatment in ED.  I did not provide a Face to Face evaluation of this pt.          Review of patient's allergies indicates:   Allergen Reactions    Percocet [oxycodone-acetaminophen] Nausea And Vomiting    Vicodin [hydrocodone-acetaminophen] Nausea And Vomiting     Pt not sure if she took percocet or Vicodin that caused severe nausea & vomiting     Past Medical History:   Diagnosis Date    Abdominal bloating 2019    GERD (gastroesophageal reflux disease)     Liver lesion 1/3/2019     Past Surgical History:   Procedure Laterality Date    BREAST CYST EXCISION Left     18 y/o f     SECTION      CHOLECYSTECTOMY      COLONOSCOPY N/A 2017    Procedure: COLONOSCOPY;  Surgeon: Andrews Sears MD;  Location: [address];  Service: Endoscopy;  Laterality: N/A;    CYSTOSCOPY N/A 2019    Procedure: CYSTOSCOPY;  Surgeon: Debbie Murphy MD;  Location: [facility];  Service: OB/GYN;  Laterality: N/A;    HYSTEROSCOPY N/A 2019    Procedure: HYSTEROSCOPY-removal of IUD; possible shaving of uterine fibroids if needed to insert new IUD;  Surgeon: Debbie Murphy MD;  Location: [facility];  Service: OB/GYN;  Laterality: N/A;    INTRAUTERINE DEVICE INSERTION N/A 2019    Procedure: IUD insertion-- mirena-- would prefer kyleena if available;  Surgeon: Debbie Murphy MD;  Location: [facility];  Service: OB/GYN;  Laterality: N/A;     Family History   Problem Relation Age of Onset    Heart disease Father     Hyperlipidemia Father     Parkinsonism Father     Cancer Mother 73        colon ca    Hypertension Mother     Breast cancer Sister 48        breast    Other Sister     Other Daughter         TBI with left hemiparesis    No Known Problems Son     Cirrhosis Maternal Grandfather     Alcohol abuse  Maternal Grandfather     Alzheimer's disease Paternal Grandmother     Parkinsonism Paternal Grandfather     No Known Problems Son     Colon cancer Neg Hx      Social History     Tobacco Use    Smoking status: Never Smoker    Smokeless tobacco: Never Used   Substance Use Topics    Alcohol use: Yes     Alcohol/week: 1.0 standard drinks     Types: 1 Glasses of wine per week     Frequency: Never     Comment: rare    Drug use: No     Review of Systems    Physical Exam     Initial Vitals   BP Pulse Resp Temp SpO2   -- -- -- -- --      MAP       --         Physical Exam    ED Course   Procedures  Labs Reviewed   SARS-COV-2 (COVID-19) QUALITATIVE PCR    Narrative:     Is the patient symptomatic?->No         Results for orders placed or performed during the hospital encounter of 06/26/20   COVID-19 Routine Screening   Result Value Ref Range    SARS-CoV2 (COVID-19) Qualitative PCR Not Detected Not Detected        Imaging Results    None                                          Clinical Impression:       ICD-10-CM ICD-9-CM   1. Exposure to Covid-19 Virus  Z20.828              ED Disposition Condition    Discharge         ED Prescriptions     None        Follow-up Information    None                                    Ryan Diaz MD  07/06/20 0719

## 2020-07-20 ENCOUNTER — TELEPHONE (OUTPATIENT)
Dept: OBSTETRICS AND GYNECOLOGY | Facility: CLINIC | Age: 50
End: 2020-07-20

## 2020-07-20 NOTE — TELEPHONE ENCOUNTER
----- Message from Bushra Garcia sent at 7/16/2020 10:03 AM CDT -----  Can you check on this?  ----- Message -----  From: Мария Elizondo MD  Sent: 6/25/2020   5:01 PM CDT  To: Nat Hickey MD    I heard you were leaving for [place] :(  we will miss you!    I'm happy to see her whenever.  Only thing I would ask is if she smokes.  If she smokes, quitting will usually do the job of keeping this away.  If not, I can see her. She may have a chronic abscess that just needs to be excised.    I can have my office call her to touch base.    Enjoy [place]:)  we lived there for 3 years in [place] and loved it.    Мария  ----- Message -----  From: Nat Hickey MD  Sent: 6/24/2020  11:31 AM CDT  To: Мария Elizondo MD    Federico Hsieh,  This is a friend of mine who has had two to three left breast infections.  Once about 2 yrs ago and recently 3 months ago and now again.  I treated her from home the first time just called in diclxocillin and 3 months ago she said went away on its own.  Is in the same place each time.  She had targeted left mammogram in 2018 after her screening and was benign.  T-C score of 21%.  Last mammogram in 09/19 was negative.  She is going to [facility] for month or so .  Should she be seen by you when she gets home or if recurs again.  Thanks for your input.  My last day is tomorrow , I have enjoyed working with you!!  Yousif

## 2020-08-18 ENCOUNTER — LAB VISIT (OUTPATIENT)
Dept: URGENT CARE | Facility: CLINIC | Age: 50
End: 2020-08-18
Payer: COMMERCIAL

## 2020-08-18 DIAGNOSIS — Z11.9 ENCOUNTER FOR SCREENING EXAMINATION FOR INFECTIOUS DISEASE: Primary | ICD-10-CM

## 2020-08-18 DIAGNOSIS — Z11.9 ENCOUNTER FOR SCREENING EXAMINATION FOR INFECTIOUS DISEASE: ICD-10-CM

## 2020-08-18 LAB
CTP QC/QA: YES
SARS-COV-2 RDRP RESP QL NAA+PROBE: NEGATIVE

## 2020-08-18 PROCEDURE — U0002: ICD-10-PCS | Mod: S$GLB,,, | Performed by: INTERNAL MEDICINE

## 2020-08-18 PROCEDURE — U0002 COVID-19 LAB TEST NON-CDC: HCPCS | Mod: S$GLB,,, | Performed by: INTERNAL MEDICINE

## 2020-09-17 ENCOUNTER — LAB VISIT (OUTPATIENT)
Dept: LAB | Facility: OTHER | Age: 50
End: 2020-09-17
Attending: SURGERY
Payer: COMMERCIAL

## 2020-09-17 ENCOUNTER — OFFICE VISIT (OUTPATIENT)
Dept: SURGERY | Facility: CLINIC | Age: 50
End: 2020-09-17
Attending: SURGERY
Payer: COMMERCIAL

## 2020-09-17 ENCOUNTER — PATIENT OUTREACH (OUTPATIENT)
Dept: ADMINISTRATIVE | Facility: OTHER | Age: 50
End: 2020-09-17

## 2020-09-17 VITALS
WEIGHT: 122.56 LBS | HEART RATE: 88 BPM | DIASTOLIC BLOOD PRESSURE: 66 MMHG | BODY MASS INDEX: 20.42 KG/M2 | HEIGHT: 65 IN | SYSTOLIC BLOOD PRESSURE: 101 MMHG

## 2020-09-17 DIAGNOSIS — Z91.89 AT HIGH RISK FOR BREAST CANCER: ICD-10-CM

## 2020-09-17 DIAGNOSIS — N63.0 BREAST MASS: Primary | ICD-10-CM

## 2020-09-17 PROCEDURE — 99203 PR OFFICE/OUTPT VISIT, NEW, LEVL III, 30-44 MIN: ICD-10-PCS | Mod: S$GLB,,, | Performed by: SURGERY

## 2020-09-17 PROCEDURE — 3008F BODY MASS INDEX DOCD: CPT | Mod: CPTII,S$GLB,, | Performed by: SURGERY

## 2020-09-17 PROCEDURE — 99203 OFFICE O/P NEW LOW 30 MIN: CPT | Mod: S$GLB,,, | Performed by: SURGERY

## 2020-09-17 PROCEDURE — 3008F PR BODY MASS INDEX (BMI) DOCUMENTED: ICD-10-PCS | Mod: CPTII,S$GLB,, | Performed by: SURGERY

## 2020-09-17 PROCEDURE — 36415 COLL VENOUS BLD VENIPUNCTURE: CPT

## 2020-09-17 NOTE — PROGRESS NOTES
BREAST HIGH RISK CLINIC  [facility]  Breast Surgery      Date of Visit:  2020    Referring provider:  Aaarefcosta Self  No address on file    PCP:  Jonna Lozano MD    HIGH RISK    Louise Cueto is a 49 y.o. premenopausal female referred for evaluation of an increased risk of breast cancer based on her Tyrer-Cuzick score.  She is here today to discuss options of high risk screening and risk reduction.    Other breast cancer risk factors include sister with breast CA, family hx of ovarian CA, family hx of colon CA, and extremely dense breast tissue.     She had a left breast biopsy when she was in college that was benign; denies any atypia or hyperplasia.  She denies any nipple discharge or palpating any breast masses bilaterally.  She does complain of an occasional yellow crust that forms on her nipples.  Patient also states she has a history of recurrent left breast abscesses. Two years ago she saw Dr. Hickey for a left breast abscess that resolved with antibiotics. In the spring of 2020 she had 2 more left breast abscesses that also resolved with antibiotic treatment.  She does not smoke and has no history of diabetes.     On 10/24/19 she had a bilateral screening mammogram that was read as BIRADS 1, negative.    Personal history of cancer: no  Prior chest wall radiation at ages 8-30: no  Genetic testing: none; sister was not tested.  Ashkenazi inheritance: no  OB/Gyn history:    Number of pregnancies & age at first gestation:  (27)   Age of menarche & menopause: 14; N/A   Last menstrual period: 2 weeks ago   Body mass index is 20.4 kg/m².   Contraceptive Use/ HRT: Denies HRT; IUD currently in place.     Tyrer-Cuzick Score:  24.9% (re-calculated in clinic today).     Family History: Mother had colon cancer at age 73. Sister had breast cancer at 48 and is now 52; did not have genetic testing. Paternal first cousin had ovarian cancer and  of this. Paternal first cousin also had lung  cancer.    Past Medical History:   Diagnosis Date    Abdominal bloating 6/29/2019    GERD (gastroesophageal reflux disease)     Liver lesion 1/3/2019     Social History     Socioeconomic History    Marital status:      Spouse name: Dennis    Number of children: 3    Years of education: Not on file    Highest education level: Not on file   Occupational History    Not on file   Social Needs    Financial resource strain: Not on file    Food insecurity     Worry: Not on file     Inability: Not on file    Transportation needs     Medical: Not on file     Non-medical: Not on file   Tobacco Use    Smoking status: Never Smoker    Smokeless tobacco: Never Used   Substance and Sexual Activity    Alcohol use: Yes     Alcohol/week: 1.0 standard drinks     Types: 1 Glasses of wine per week     Frequency: Never     Comment: rare    Drug use: No    Sexual activity: Yes     Partners: Male   Lifestyle    Physical activity     Days per week: Not on file     Minutes per session: Not on file    Stress: Not on file   Relationships    Social connections     Talks on phone: Not on file     Gets together: Not on file     Attends Jew service: Not on file     Active member of club or organization: Not on file     Attends meetings of clubs or organizations: Not on file     Relationship status: Not on file   Other Topics Concern    Not on file   Social History Narrative    Here for [place]     Moved to [facility] 1998    Exercising      Review of Systems: Denies any fever or chills.  Denies any chest pain or shortness of breath.       Objective:     Vitals:    09/17/20 1107   BP: 101/66   Pulse: 88      Physical Exam:  HEENT: Normocephalic, atraumatic.    Gen: alert and oriented; no acute distress.  Breast: 0.8 cm mobile lump at 12 o'clock right breast, N+1 cm. 1.5 cm mobile, non-tender mass at 12:30--1 o'clock left breast, adjacent to the NAC. No nipple discharge, nipple inversion, or skin changes  bilaterally.  Lymph: No adjacent axillary lymphadenopathy bilaterally.     Assessment:     This is a 49 y.o. female with an increased risk of breast cancer based on Tyrer Cuzick score of 24.9%.      Plan:   The patient's estimated lifetime risk of Breast Cancer (to age 85) based on the Tyrer-Cuzick 7 risk assessment model is 24.9 %.  According to the American Cancer Society, patients with a lifetime breast cancer risk of 20% or higher might benefit from supplemental screening tests.    We discussed that she would benefit from annual MRI's in addition to yearly mammograms, alternating imaging every 6 months until age 75.  The pros and cons of MRI screening were presented.  I also recommended two physical exams per year, one can be with her OB/GYN.  Risk reduction options with chemoprevention such as Tamoxifen or Raloxifene were discussed.  These have been shown to lower the risk of breast cancer incidence, however there is no survival benefit in patients who don't have breast cancer.  I explained the most common side effects and risks including hot flashes, thromboembolism, stroke, endometrial cancer, weight changes, and pain.   We also discussed modifiable measures that affect breast cancer risk including diet, exercise, avoiding obesity, and limiting alcohol intake. I recommended at least 30 minutes of cardiovascular exercise 4-5 times per week.  Exercise can lower the relative risk of breast cancer by ~18-20%.  We also discussed that obesity is linked to a higher risk of breast cancer; therefore, exercise is very important.  I recommended proper nutrition with fresh fruits and vegetables and lean meats and avoidance of processed foods.  Non-modifiable risk factors were also discussed such as age at menarche, age at menopause, family history, and age at first pregnancy.  We did discuss hormone replacement therapy as well.  In patients at increased risk, I usually do not recommend HRT for long periods of time.       I recommended genetic testing for her due to her significant family history. This was performed today through [employer].   She will need a bilateral diagnostic mammogram soon to further evaluate the masses noted on physical exam today. She will need an MRI of the breast in 6 months, and she can follow up with me in one year.    There was nothing concerning or suspicious noted on exam today to explain her recurrent breast abscesses. However, I advised her to follow up with us in clinic when the next one occurs.    Sharon Plasencia PA-C    Patient seen by me.  Agree with above.  Мария Elizondo MD

## 2020-09-17 NOTE — PROGRESS NOTES
Health Maintenance Due   Topic Date Due    HIV Screening  12/07/1985    Shingles Vaccine (1 of 2) 12/07/2018    Influenza Vaccine (1) 08/01/2020    Mammogram  10/24/2020     Updates were requested from care everywhere.  Chart was reviewed for overdue Proactive [facility] Encounters ([facility]) topics (CRS, Breast Cancer Screening, Eye exam)  Health Maintenance has been updated.  LINKS immunization registry triggered.  Immunizations were reconciled.

## 2020-09-25 ENCOUNTER — HOSPITAL ENCOUNTER (OUTPATIENT)
Dept: RADIOLOGY | Facility: HOSPITAL | Age: 50
Discharge: HOME | End: 2020-09-25
Attending: PHYSICIAN ASSISTANT
Payer: COMMERCIAL

## 2020-09-25 DIAGNOSIS — N63.0 BREAST MASS: ICD-10-CM

## 2020-09-25 PROCEDURE — 76642 ULTRASOUND BREAST LIMITED: CPT | Mod: 26,50,, | Performed by: RADIOLOGY

## 2020-09-25 PROCEDURE — 77062 BREAST TOMOSYNTHESIS BI: CPT | Mod: 26,,, | Performed by: RADIOLOGY

## 2020-09-25 PROCEDURE — 77066 DX MAMMO INCL CAD BI: CPT | Mod: 26,,, | Performed by: RADIOLOGY

## 2020-09-25 PROCEDURE — 76642 ULTRASOUND BREAST LIMITED: CPT | Mod: TC,50

## 2020-09-25 PROCEDURE — 77066 MAMMO DIGITAL DIAGNOSTIC BILAT WITH TOMO: ICD-10-PCS | Mod: 26,,, | Performed by: RADIOLOGY

## 2020-09-25 PROCEDURE — 77062 MAMMO DIGITAL DIAGNOSTIC BILAT WITH TOMO: ICD-10-PCS | Mod: 26,,, | Performed by: RADIOLOGY

## 2020-09-25 PROCEDURE — 77062 BREAST TOMOSYNTHESIS BI: CPT | Mod: TC

## 2020-09-25 PROCEDURE — 76642 PR US BREAST UNILAT LIMITED: ICD-10-PCS | Mod: 26,50,, | Performed by: RADIOLOGY

## 2020-10-07 LAB — INTEGRATED BRAC ANALYSIS: NORMAL

## 2020-10-08 ENCOUNTER — PATIENT MESSAGE (OUTPATIENT)
Dept: SURGERY | Facility: CLINIC | Age: 50
End: 2020-10-08

## 2020-10-26 ENCOUNTER — PATIENT MESSAGE (OUTPATIENT)
Dept: PHARMACY | Facility: CLINIC | Age: 50
End: 2020-10-26

## 2020-10-27 ENCOUNTER — IMMUNIZATION (OUTPATIENT)
Dept: PHARMACY | Facility: CLINIC | Age: 50
End: 2020-10-27

## 2020-10-27 ENCOUNTER — IMMUNIZATION (OUTPATIENT)
Dept: PHARMACY | Facility: CLINIC | Age: 50
End: 2020-10-27
Payer: COMMERCIAL

## 2021-01-13 DIAGNOSIS — Z78.9 NO KNOWN PROBLEMS: Primary | ICD-10-CM

## 2021-01-25 ENCOUNTER — IMMUNIZATION (OUTPATIENT)
Dept: PHARMACY | Facility: CLINIC | Age: 51
End: 2021-01-25
Payer: COMMERCIAL

## 2021-03-01 ENCOUNTER — IMMUNIZATION (OUTPATIENT)
Dept: INTERNAL MEDICINE | Facility: CLINIC | Age: 51
End: 2021-03-01
Payer: COMMERCIAL

## 2021-03-01 DIAGNOSIS — Z23 NEED FOR VACCINATION: Primary | ICD-10-CM

## 2021-03-01 PROCEDURE — 91300 COVID-19, MRNA, LNP-S, PF, 30 MCG/0.3 ML DOSE VACCINE: CPT | Mod: PBBFAC | Performed by: INTERNAL MEDICINE

## 2021-03-22 ENCOUNTER — IMMUNIZATION (OUTPATIENT)
Dept: INTERNAL MEDICINE | Facility: CLINIC | Age: 51
End: 2021-03-22
Payer: COMMERCIAL

## 2021-03-22 DIAGNOSIS — Z23 NEED FOR VACCINATION: Primary | ICD-10-CM

## 2021-03-22 PROCEDURE — 0002A COVID-19, MRNA, LNP-S, PF, 30 MCG/0.3 ML DOSE VACCINE: CPT | Mod: PBBFAC | Performed by: INTERNAL MEDICINE

## 2021-03-22 PROCEDURE — 91300 COVID-19, MRNA, LNP-S, PF, 30 MCG/0.3 ML DOSE VACCINE: CPT | Mod: PBBFAC | Performed by: INTERNAL MEDICINE

## 2021-04-29 ENCOUNTER — HOSPITAL ENCOUNTER (OUTPATIENT)
Dept: RADIOLOGY | Facility: HOSPITAL | Age: 51
Discharge: HOME | End: 2021-04-29
Attending: PHYSICIAN ASSISTANT
Payer: COMMERCIAL

## 2021-04-29 DIAGNOSIS — Z91.89 AT HIGH RISK FOR BREAST CANCER: ICD-10-CM

## 2021-04-29 PROCEDURE — A9577 INJ MULTIHANCE: HCPCS | Performed by: PHYSICIAN ASSISTANT

## 2021-04-29 PROCEDURE — 77049 MRI BREAST W/WO CONTRAST, W/CAD, BILATERAL: ICD-10-PCS | Mod: 26,,, | Performed by: RADIOLOGY

## 2021-04-29 PROCEDURE — 77049 MRI BREAST C-+ W/CAD BI: CPT | Mod: TC

## 2021-04-29 PROCEDURE — 77049 MRI BREAST C-+ W/CAD BI: CPT | Mod: 26,,, | Performed by: RADIOLOGY

## 2021-04-29 PROCEDURE — 25500020 PHARM REV CODE 255: Performed by: PHYSICIAN ASSISTANT

## 2021-04-29 RX ADMIN — GADOBENATE DIMEGLUMINE 12 ML: 529 INJECTION, SOLUTION INTRAVENOUS at 04:04

## 2021-06-02 ENCOUNTER — LAB VISIT (OUTPATIENT)
Dept: LAB | Facility: HOSPITAL | Age: 51
End: 2021-06-02
Attending: OBSTETRICS & GYNECOLOGY
Payer: COMMERCIAL

## 2021-06-02 DIAGNOSIS — L65.9 BALDNESS: ICD-10-CM

## 2021-06-02 DIAGNOSIS — R53.83 FATIGUE: Primary | ICD-10-CM

## 2021-06-02 LAB
BASOPHILS # BLD AUTO: 0.05 K/UL (ref 0–0.2)
BASOPHILS NFR BLD: 0.7 % (ref 0–1.9)
DIFFERENTIAL METHOD: NORMAL
EOSINOPHIL # BLD AUTO: 0.3 K/UL (ref 0–0.5)
EOSINOPHIL NFR BLD: 4.2 % (ref 0–8)
ERYTHROCYTE [DISTWIDTH] IN BLOOD BY AUTOMATED COUNT: 13.4 % (ref 11.5–14.5)
FERRITIN SERPL-MCNC: 57 NG/ML (ref 20–300)
HCT VFR BLD AUTO: 39.3 % (ref 37–48.5)
HGB BLD-MCNC: 13.1 G/DL (ref 12–16)
IMM GRANULOCYTES # BLD AUTO: 0.03 K/UL (ref 0–0.04)
IMM GRANULOCYTES NFR BLD AUTO: 0.4 % (ref 0–0.5)
LYMPHOCYTES # BLD AUTO: 1.8 K/UL (ref 1–4.8)
LYMPHOCYTES NFR BLD: 24.8 % (ref 18–48)
MCH RBC QN AUTO: 29.8 PG (ref 27–31)
MCHC RBC AUTO-ENTMCNC: 33.3 G/DL (ref 32–36)
MCV RBC AUTO: 89 FL (ref 82–98)
MONOCYTES # BLD AUTO: 0.4 K/UL (ref 0.3–1)
MONOCYTES NFR BLD: 6 % (ref 4–15)
NEUTROPHILS # BLD AUTO: 4.6 K/UL (ref 1.8–7.7)
NEUTROPHILS NFR BLD: 63.9 % (ref 38–73)
NRBC BLD-RTO: 0 /100 WBC
PLATELET # BLD AUTO: 383 K/UL (ref 150–450)
PMV BLD AUTO: 9.4 FL (ref 9.2–12.9)
RBC # BLD AUTO: 4.4 M/UL (ref 4–5.4)
TESTOST SERPL-MCNC: 30 NG/DL (ref 5–73)
WBC # BLD AUTO: 7.13 K/UL (ref 3.9–12.7)

## 2021-06-02 PROCEDURE — 36415 COLL VENOUS BLD VENIPUNCTURE: CPT | Performed by: OBSTETRICS & GYNECOLOGY

## 2021-06-02 PROCEDURE — 84403 ASSAY OF TOTAL TESTOSTERONE: CPT | Performed by: OBSTETRICS & GYNECOLOGY

## 2021-06-02 PROCEDURE — 85025 COMPLETE CBC W/AUTO DIFF WBC: CPT | Performed by: OBSTETRICS & GYNECOLOGY

## 2021-06-02 PROCEDURE — 82728 ASSAY OF FERRITIN: CPT | Performed by: OBSTETRICS & GYNECOLOGY

## 2021-06-22 ENCOUNTER — TELEPHONE (OUTPATIENT)
Dept: SURGERY | Facility: CLINIC | Age: 51
End: 2021-06-22

## 2021-07-13 DIAGNOSIS — D25.9 LEIOMYOMA OF BODY OF UTERUS: Primary | ICD-10-CM

## 2021-07-16 ENCOUNTER — HOSPITAL ENCOUNTER (OUTPATIENT)
Dept: RADIOLOGY | Facility: OTHER | Age: 51
Discharge: HOME | End: 2021-07-16
Attending: OBSTETRICS & GYNECOLOGY
Payer: COMMERCIAL

## 2021-07-16 DIAGNOSIS — D25.9 LEIOMYOMA OF BODY OF UTERUS: ICD-10-CM

## 2021-07-16 PROCEDURE — 76830 TRANSVAGINAL US NON-OB: CPT | Mod: 26,,, | Performed by: RADIOLOGY

## 2021-07-16 PROCEDURE — 76856 US EXAM PELVIC COMPLETE: CPT | Mod: 26,,, | Performed by: RADIOLOGY

## 2021-07-16 PROCEDURE — 76856 US EXAM PELVIC COMPLETE: CPT | Mod: TC

## 2021-07-16 PROCEDURE — 76830 US PELVIS COMP WITH TRANSVAG NON-OB (XPD): ICD-10-PCS | Mod: 26,,, | Performed by: RADIOLOGY

## 2021-07-16 PROCEDURE — 76856 US PELVIS COMP WITH TRANSVAG NON-OB (XPD): ICD-10-PCS | Mod: 26,,, | Performed by: RADIOLOGY

## 2021-08-10 ENCOUNTER — PATIENT MESSAGE (OUTPATIENT)
Dept: SURGERY | Facility: CLINIC | Age: 51
End: 2021-08-10

## 2021-08-18 ENCOUNTER — OFFICE VISIT (OUTPATIENT)
Dept: SURGERY | Facility: CLINIC | Age: 51
End: 2021-08-18
Payer: COMMERCIAL

## 2021-08-18 ENCOUNTER — TELEPHONE (OUTPATIENT)
Dept: SURGERY | Facility: CLINIC | Age: 51
End: 2021-08-18

## 2021-08-18 ENCOUNTER — TELEPHONE (OUTPATIENT)
Dept: RADIOLOGY | Facility: HOSPITAL | Age: 51
End: 2021-08-18

## 2021-08-18 VITALS
DIASTOLIC BLOOD PRESSURE: 58 MMHG | WEIGHT: 122 LBS | HEART RATE: 71 BPM | HEIGHT: 65 IN | BODY MASS INDEX: 20.33 KG/M2 | SYSTOLIC BLOOD PRESSURE: 107 MMHG

## 2021-08-18 DIAGNOSIS — N63.0 BREAST MASS: Primary | ICD-10-CM

## 2021-08-18 DIAGNOSIS — Z91.89 AT HIGH RISK FOR BREAST CANCER: ICD-10-CM

## 2021-08-18 PROCEDURE — 99999 PR PBB SHADOW E&M-EST. PATIENT-LVL IV: ICD-10-PCS | Mod: PBBFAC,,, | Performed by: PHYSICIAN ASSISTANT

## 2021-08-18 PROCEDURE — 3008F PR BODY MASS INDEX (BMI) DOCUMENTED: ICD-10-PCS | Mod: CPTII,S$GLB,, | Performed by: PHYSICIAN ASSISTANT

## 2021-08-18 PROCEDURE — 1159F PR MEDICATION LIST DOCUMENTED IN MEDICAL RECORD: ICD-10-PCS | Mod: CPTII,S$GLB,, | Performed by: PHYSICIAN ASSISTANT

## 2021-08-18 PROCEDURE — 99213 PR OFFICE/OUTPT VISIT, EST, LEVL III, 20-29 MIN: ICD-10-PCS | Mod: S$GLB,,, | Performed by: PHYSICIAN ASSISTANT

## 2021-08-18 PROCEDURE — 3074F SYST BP LT 130 MM HG: CPT | Mod: CPTII,S$GLB,, | Performed by: PHYSICIAN ASSISTANT

## 2021-08-18 PROCEDURE — 1159F MED LIST DOCD IN RCRD: CPT | Mod: CPTII,S$GLB,, | Performed by: PHYSICIAN ASSISTANT

## 2021-08-18 PROCEDURE — 3078F DIAST BP <80 MM HG: CPT | Mod: CPTII,S$GLB,, | Performed by: PHYSICIAN ASSISTANT

## 2021-08-18 PROCEDURE — 3008F BODY MASS INDEX DOCD: CPT | Mod: CPTII,S$GLB,, | Performed by: PHYSICIAN ASSISTANT

## 2021-08-18 PROCEDURE — 1126F PR PAIN SEVERITY QUANTIFIED, NO PAIN PRESENT: ICD-10-PCS | Mod: CPTII,S$GLB,, | Performed by: PHYSICIAN ASSISTANT

## 2021-08-18 PROCEDURE — 99213 OFFICE O/P EST LOW 20 MIN: CPT | Mod: S$GLB,,, | Performed by: PHYSICIAN ASSISTANT

## 2021-08-18 PROCEDURE — 1126F AMNT PAIN NOTED NONE PRSNT: CPT | Mod: CPTII,S$GLB,, | Performed by: PHYSICIAN ASSISTANT

## 2021-08-18 PROCEDURE — 99999 PR PBB SHADOW E&M-EST. PATIENT-LVL IV: CPT | Mod: PBBFAC,,, | Performed by: PHYSICIAN ASSISTANT

## 2021-08-18 PROCEDURE — 3078F PR MOST RECENT DIASTOLIC BLOOD PRESSURE < 80 MM HG: ICD-10-PCS | Mod: CPTII,S$GLB,, | Performed by: PHYSICIAN ASSISTANT

## 2021-08-18 PROCEDURE — 1160F RVW MEDS BY RX/DR IN RCRD: CPT | Mod: CPTII,S$GLB,, | Performed by: PHYSICIAN ASSISTANT

## 2021-08-18 PROCEDURE — 1160F PR REVIEW ALL MEDS BY PRESCRIBER/CLIN PHARMACIST DOCUMENTED: ICD-10-PCS | Mod: CPTII,S$GLB,, | Performed by: PHYSICIAN ASSISTANT

## 2021-08-18 PROCEDURE — 3074F PR MOST RECENT SYSTOLIC BLOOD PRESSURE < 130 MM HG: ICD-10-PCS | Mod: CPTII,S$GLB,, | Performed by: PHYSICIAN ASSISTANT

## 2021-08-20 ENCOUNTER — HOSPITAL ENCOUNTER (OUTPATIENT)
Dept: RADIOLOGY | Facility: HOSPITAL | Age: 51
Discharge: HOME | End: 2021-08-20
Attending: PHYSICIAN ASSISTANT
Payer: COMMERCIAL

## 2021-08-20 DIAGNOSIS — Z91.89 AT HIGH RISK FOR BREAST CANCER: ICD-10-CM

## 2021-08-20 DIAGNOSIS — N63.0 BREAST MASS: ICD-10-CM

## 2021-08-20 PROCEDURE — 76642 ULTRASOUND BREAST LIMITED: CPT | Mod: TC,LT

## 2021-08-20 PROCEDURE — 77065 DX MAMMO INCL CAD UNI: CPT | Mod: 26,LT,, | Performed by: RADIOLOGY

## 2021-08-20 PROCEDURE — 77061 BREAST TOMOSYNTHESIS UNI: CPT | Mod: 26,LT,, | Performed by: RADIOLOGY

## 2021-08-20 PROCEDURE — 76642 ULTRASOUND BREAST LIMITED: CPT | Mod: 26,LT,, | Performed by: RADIOLOGY

## 2021-08-20 PROCEDURE — 77065 MAMMO DIGITAL DIAGNOSTIC LEFT WITH TOMO: ICD-10-PCS | Mod: 26,LT,, | Performed by: RADIOLOGY

## 2021-08-20 PROCEDURE — 77061 BREAST TOMOSYNTHESIS UNI: CPT | Mod: TC,LT

## 2021-08-20 PROCEDURE — 77061 MAMMO DIGITAL DIAGNOSTIC LEFT WITH TOMO: ICD-10-PCS | Mod: 26,LT,, | Performed by: RADIOLOGY

## 2021-08-20 PROCEDURE — 76642 US BREAST LEFT LIMITED: ICD-10-PCS | Mod: 26,LT,, | Performed by: RADIOLOGY

## 2021-09-10 ENCOUNTER — IMMUNIZATION (OUTPATIENT)
Dept: INTERNAL MEDICINE | Facility: CLINIC | Age: 51
End: 2021-09-10
Payer: COMMERCIAL

## 2021-09-10 DIAGNOSIS — Z23 NEED FOR VACCINATION: Primary | ICD-10-CM

## 2021-09-10 PROCEDURE — 91300 COVID-19, MRNA, LNP-S, PF, 30 MCG/0.3 ML DOSE VACCINE: ICD-10-PCS | Mod: ,,, | Performed by: INTERNAL MEDICINE

## 2021-09-10 PROCEDURE — 0003A COVID-19, MRNA, LNP-S, PF, 30 MCG/0.3 ML DOSE VACCINE: ICD-10-PCS | Mod: CV19,,, | Performed by: INTERNAL MEDICINE

## 2021-09-10 PROCEDURE — 0003A COVID-19, MRNA, LNP-S, PF, 30 MCG/0.3 ML DOSE VACCINE: CPT | Mod: CV19,,, | Performed by: INTERNAL MEDICINE

## 2021-09-10 PROCEDURE — 91300 COVID-19, MRNA, LNP-S, PF, 30 MCG/0.3 ML DOSE VACCINE: CPT | Mod: ,,, | Performed by: INTERNAL MEDICINE

## 2021-10-05 ENCOUNTER — PATIENT MESSAGE (OUTPATIENT)
Dept: ADMINISTRATIVE | Facility: HOSPITAL | Age: 51
End: 2021-10-05

## 2021-10-21 ENCOUNTER — HOSPITAL ENCOUNTER (OUTPATIENT)
Dept: RADIOLOGY | Facility: HOSPITAL | Age: 51
Discharge: HOME | End: 2021-10-21
Attending: OBSTETRICS & GYNECOLOGY
Payer: COMMERCIAL

## 2021-10-21 DIAGNOSIS — Z12.31 VISIT FOR SCREENING MAMMOGRAM: ICD-10-CM

## 2021-10-21 PROCEDURE — 77067 MAMMO DIGITAL SCREENING BILAT WITH TOMO: ICD-10-PCS | Mod: 26,,, | Performed by: RADIOLOGY

## 2021-10-21 PROCEDURE — 77067 SCR MAMMO BI INCL CAD: CPT | Mod: TC

## 2021-10-21 PROCEDURE — 77063 MAMMO DIGITAL SCREENING BILAT WITH TOMO: ICD-10-PCS | Mod: 26,,, | Performed by: RADIOLOGY

## 2021-10-21 PROCEDURE — 77063 BREAST TOMOSYNTHESIS BI: CPT | Mod: 26,,, | Performed by: RADIOLOGY

## 2021-10-21 PROCEDURE — 77067 SCR MAMMO BI INCL CAD: CPT | Mod: 26,,, | Performed by: RADIOLOGY

## 2021-11-24 ENCOUNTER — IMMUNIZATION (OUTPATIENT)
Dept: PHARMACY | Facility: CLINIC | Age: 51
End: 2021-11-24
Payer: COMMERCIAL

## 2021-12-15 ENCOUNTER — TELEPHONE (OUTPATIENT)
Dept: URGENT CARE | Facility: CLINIC | Age: 51
End: 2021-12-15
Payer: COMMERCIAL

## 2021-12-15 ENCOUNTER — OFFICE VISIT (OUTPATIENT)
Dept: INTERNAL MEDICINE | Facility: CLINIC | Age: 51
End: 2021-12-15
Payer: COMMERCIAL

## 2021-12-15 VITALS
OXYGEN SATURATION: 100 % | BODY MASS INDEX: 20.49 KG/M2 | HEIGHT: 65 IN | HEART RATE: 74 BPM | WEIGHT: 123 LBS | SYSTOLIC BLOOD PRESSURE: 100 MMHG | DIASTOLIC BLOOD PRESSURE: 60 MMHG

## 2021-12-15 DIAGNOSIS — K63.5 HYPERPLASTIC COLONIC POLYP, UNSPECIFIED PART OF COLON: ICD-10-CM

## 2021-12-15 DIAGNOSIS — Z00.00 ANNUAL PHYSICAL EXAM: Primary | ICD-10-CM

## 2021-12-15 DIAGNOSIS — Z91.89 INCREASED RISK OF BREAST CANCER: ICD-10-CM

## 2021-12-15 DIAGNOSIS — N95.1 MENOPAUSAL SYMPTOMS: ICD-10-CM

## 2021-12-15 DIAGNOSIS — Z20.822 ENCOUNTER FOR LABORATORY TESTING FOR COVID-19 VIRUS: ICD-10-CM

## 2021-12-15 DIAGNOSIS — Z80.3 FAMILY HISTORY OF BREAST CANCER: ICD-10-CM

## 2021-12-15 PROBLEM — R61 NIGHT SWEATS: Status: RESOLVED | Noted: 2019-06-29 | Resolved: 2021-12-15

## 2021-12-15 PROBLEM — M25.512 LEFT SHOULDER PAIN: Status: RESOLVED | Noted: 2017-10-19 | Resolved: 2021-12-15

## 2021-12-15 PROBLEM — M54.2 CERVICAL PAIN: Status: RESOLVED | Noted: 2017-10-19 | Resolved: 2021-12-15

## 2021-12-15 PROCEDURE — 99999 PR PBB SHADOW E&M-EST. PATIENT-LVL III: CPT | Mod: PBBFAC,,, | Performed by: INTERNAL MEDICINE

## 2021-12-15 PROCEDURE — 99396 PR PREVENTIVE VISIT,EST,40-64: ICD-10-PCS | Mod: S$GLB,,, | Performed by: INTERNAL MEDICINE

## 2021-12-15 PROCEDURE — 99396 PREV VISIT EST AGE 40-64: CPT | Mod: S$GLB,,, | Performed by: INTERNAL MEDICINE

## 2021-12-15 PROCEDURE — 99999 PR PBB SHADOW E&M-EST. PATIENT-LVL III: ICD-10-PCS | Mod: PBBFAC,,, | Performed by: INTERNAL MEDICINE

## 2021-12-15 RX ORDER — PROGESTERONE 200 MG/1
CAPSULE ORAL
COMMUNITY
Start: 2021-10-19

## 2021-12-16 ENCOUNTER — LAB VISIT (OUTPATIENT)
Dept: EMERGENCY MEDICINE | Facility: HOSPITAL | Age: 51
End: 2021-12-16
Payer: COMMERCIAL

## 2021-12-16 DIAGNOSIS — U07.1 COVID-19: Primary | ICD-10-CM

## 2021-12-16 DIAGNOSIS — U07.1 COVID-19: ICD-10-CM

## 2021-12-16 LAB
CTP QC/QA: YES
SARS-COV-2 RDRP RESP QL NAA+PROBE: NEGATIVE

## 2021-12-16 PROCEDURE — U0002 COVID-19 LAB TEST NON-CDC: HCPCS

## 2021-12-17 ENCOUNTER — PATIENT OUTREACH (OUTPATIENT)
Dept: ADMINISTRATIVE | Facility: HOSPITAL | Age: 51
End: 2021-12-17
Payer: COMMERCIAL

## 2021-12-21 ENCOUNTER — PATIENT OUTREACH (OUTPATIENT)
Dept: ADMINISTRATIVE | Facility: HOSPITAL | Age: 51
End: 2021-12-21
Payer: COMMERCIAL

## 2021-12-22 ENCOUNTER — LAB VISIT (OUTPATIENT)
Dept: EMERGENCY MEDICINE | Facility: HOSPITAL | Age: 51
End: 2021-12-22
Attending: INTERNAL MEDICINE
Payer: COMMERCIAL

## 2021-12-22 DIAGNOSIS — Z20.822 COVID-19 RULED OUT: Primary | ICD-10-CM

## 2021-12-22 DIAGNOSIS — Z20.822 COVID-19 RULED OUT: ICD-10-CM

## 2021-12-22 LAB
CTP QC/QA: YES
SARS-COV-2 RDRP RESP QL NAA+PROBE: POSITIVE

## 2021-12-22 PROCEDURE — U0002 COVID-19 LAB TEST NON-CDC: HCPCS

## 2022-02-01 DIAGNOSIS — R10.32 ABDOMINAL PAIN, LEFT LOWER QUADRANT: Primary | ICD-10-CM

## 2022-02-01 DIAGNOSIS — Z80.3 FAMILY HISTORY OF BREAST CANCER: ICD-10-CM

## 2022-02-01 DIAGNOSIS — R10.10 UPPER ABDOMINAL PAIN, UNSPECIFIED: Primary | ICD-10-CM

## 2022-02-01 DIAGNOSIS — N93.8 OTHER SPECIFIED ABNORMAL UTERINE AND VAGINAL BLEEDING: ICD-10-CM

## 2022-02-01 DIAGNOSIS — R92.30 BREAST DENSITY: Primary | ICD-10-CM

## 2022-02-08 ENCOUNTER — HOSPITAL ENCOUNTER (OUTPATIENT)
Dept: RADIOLOGY | Facility: OTHER | Age: 52
Discharge: HOME | End: 2022-02-08
Attending: OBSTETRICS & GYNECOLOGY
Payer: COMMERCIAL

## 2022-02-08 DIAGNOSIS — N93.8 OTHER SPECIFIED ABNORMAL UTERINE AND VAGINAL BLEEDING: ICD-10-CM

## 2022-02-08 DIAGNOSIS — R10.32 ABDOMINAL PAIN, LEFT LOWER QUADRANT: ICD-10-CM

## 2022-02-08 PROCEDURE — 76830 US PELVIS COMP WITH TRANSVAG NON-OB (XPD): ICD-10-PCS | Mod: 26,,, | Performed by: RADIOLOGY

## 2022-02-08 PROCEDURE — 76856 US PELVIS COMP WITH TRANSVAG NON-OB (XPD): ICD-10-PCS | Mod: 26,,, | Performed by: RADIOLOGY

## 2022-02-08 PROCEDURE — 76830 TRANSVAGINAL US NON-OB: CPT | Mod: 26,,, | Performed by: RADIOLOGY

## 2022-02-08 PROCEDURE — 76856 US EXAM PELVIC COMPLETE: CPT | Mod: 26,,, | Performed by: RADIOLOGY

## 2022-02-08 PROCEDURE — 76830 TRANSVAGINAL US NON-OB: CPT | Mod: TC

## 2022-02-22 ENCOUNTER — PATIENT MESSAGE (OUTPATIENT)
Dept: RESEARCH | Facility: HOSPITAL | Age: 52
End: 2022-02-22
Payer: COMMERCIAL

## 2022-05-16 ENCOUNTER — PATIENT MESSAGE (OUTPATIENT)
Dept: INTERNAL MEDICINE | Facility: CLINIC | Age: 52
End: 2022-05-16
Payer: COMMERCIAL

## 2022-05-16 DIAGNOSIS — Z80.0 FAMILY HISTORY OF COLON CANCER: ICD-10-CM

## 2022-05-16 DIAGNOSIS — Z12.11 COLON CANCER SCREENING: Primary | ICD-10-CM

## 2022-05-18 DIAGNOSIS — Z12.11 SPECIAL SCREENING FOR MALIGNANT NEOPLASMS, COLON: Primary | ICD-10-CM

## 2022-05-18 RX ORDER — SODIUM, POTASSIUM,MAG SULFATES 17.5-3.13G
1 SOLUTION, RECONSTITUTED, ORAL ORAL DAILY
Qty: 1 KIT | Refills: 0 | Status: SHIPPED | OUTPATIENT
Start: 2022-05-18 | End: 2022-05-20

## 2022-07-25 ENCOUNTER — ANESTHESIA (OUTPATIENT)
Dept: ENDOSCOPY | Facility: HOSPITAL | Age: 52
End: 2022-07-25
Payer: COMMERCIAL

## 2022-07-25 ENCOUNTER — ANESTHESIA EVENT (OUTPATIENT)
Dept: ENDOSCOPY | Facility: HOSPITAL | Age: 52
End: 2022-07-25
Payer: COMMERCIAL

## 2022-07-25 ENCOUNTER — HOSPITAL ENCOUNTER (OUTPATIENT)
Facility: HOSPITAL | Age: 52
Discharge: HOME | End: 2022-07-25
Attending: COLON & RECTAL SURGERY | Admitting: COLON & RECTAL SURGERY
Payer: COMMERCIAL

## 2022-07-25 VITALS
DIASTOLIC BLOOD PRESSURE: 65 MMHG | HEART RATE: 71 BPM | SYSTOLIC BLOOD PRESSURE: 112 MMHG | BODY MASS INDEX: 19.99 KG/M2 | TEMPERATURE: 98 F | HEIGHT: 65 IN | RESPIRATION RATE: 16 BRPM | WEIGHT: 120 LBS | OXYGEN SATURATION: 100 %

## 2022-07-25 DIAGNOSIS — Z80.0 FAMILY HISTORY OF COLON CANCER: ICD-10-CM

## 2022-07-25 LAB
B-HCG UR QL: NEGATIVE
CTP QC/QA: YES

## 2022-07-25 PROCEDURE — 81025 URINE PREGNANCY TEST: CPT | Performed by: COLON & RECTAL SURGERY

## 2022-07-25 PROCEDURE — 27201012 HC FORCEPS, HOT/COLD, DISP: Performed by: COLON & RECTAL SURGERY

## 2022-07-25 PROCEDURE — 63600175 PHARM REV CODE 636 W HCPCS: Performed by: NURSE ANESTHETIST, CERTIFIED REGISTERED

## 2022-07-25 PROCEDURE — 88305 TISSUE EXAM BY PATHOLOGIST: ICD-10-PCS | Mod: 26,,, | Performed by: PATHOLOGY

## 2022-07-25 PROCEDURE — 37000009 HC ANESTHESIA EA ADD 15 MINS: Performed by: COLON & RECTAL SURGERY

## 2022-07-25 PROCEDURE — 37000008 HC ANESTHESIA 1ST 15 MINUTES: Performed by: COLON & RECTAL SURGERY

## 2022-07-25 PROCEDURE — 25000003 PHARM REV CODE 250: Performed by: COLON & RECTAL SURGERY

## 2022-07-25 PROCEDURE — 45380 COLONOSCOPY AND BIOPSY: CPT | Mod: 33,,, | Performed by: COLON & RECTAL SURGERY

## 2022-07-25 PROCEDURE — 88305 TISSUE EXAM BY PATHOLOGIST: CPT | Mod: 26,,, | Performed by: PATHOLOGY

## 2022-07-25 PROCEDURE — 25000003 PHARM REV CODE 250: Performed by: NURSE ANESTHETIST, CERTIFIED REGISTERED

## 2022-07-25 PROCEDURE — E9220 PRA ENDO ANESTHESIA: ICD-10-PCS | Mod: 33,,, | Performed by: NURSE ANESTHETIST, CERTIFIED REGISTERED

## 2022-07-25 PROCEDURE — 88305 TISSUE EXAM BY PATHOLOGIST: CPT | Performed by: PATHOLOGY

## 2022-07-25 PROCEDURE — E9220 PRA ENDO ANESTHESIA: HCPCS | Mod: 33,,, | Performed by: NURSE ANESTHETIST, CERTIFIED REGISTERED

## 2022-07-25 PROCEDURE — 45380 COLONOSCOPY AND BIOPSY: CPT | Mod: PT | Performed by: COLON & RECTAL SURGERY

## 2022-07-25 PROCEDURE — 45380 PR COLONOSCOPY,BIOPSY: ICD-10-PCS | Mod: 33,,, | Performed by: COLON & RECTAL SURGERY

## 2022-07-25 RX ORDER — LIDOCAINE HYDROCHLORIDE 20 MG/ML
INJECTION INTRAVENOUS
Status: DISCONTINUED | OUTPATIENT
Start: 2022-07-25 | End: 2022-07-25

## 2022-07-25 RX ORDER — PROPOFOL 10 MG/ML
VIAL (ML) INTRAVENOUS
Status: DISCONTINUED | OUTPATIENT
Start: 2022-07-25 | End: 2022-07-25

## 2022-07-25 RX ORDER — SODIUM CHLORIDE 9 MG/ML
INJECTION, SOLUTION INTRAVENOUS CONTINUOUS
Status: DISCONTINUED | OUTPATIENT
Start: 2022-07-25 | End: 2022-07-25 | Stop reason: HOSPADM

## 2022-07-25 RX ORDER — PROPOFOL 10 MG/ML
VIAL (ML) INTRAVENOUS CONTINUOUS PRN
Status: DISCONTINUED | OUTPATIENT
Start: 2022-07-25 | End: 2022-07-25

## 2022-07-25 RX ADMIN — SODIUM CHLORIDE: 0.9 INJECTION, SOLUTION INTRAVENOUS at 07:07

## 2022-07-25 RX ADMIN — Medication 200 MCG/KG/MIN: at 08:07

## 2022-07-25 RX ADMIN — PROPOFOL 50 MG: 10 INJECTION, EMULSION INTRAVENOUS at 08:07

## 2022-07-25 RX ADMIN — LIDOCAINE HYDROCHLORIDE 100 MG: 20 INJECTION, SOLUTION INTRAVENOUS at 08:07

## 2022-07-25 NOTE — ANESTHESIA POSTPROCEDURE EVALUATION
Anesthesia Post Evaluation    Patient: Louise Cueto    Procedure(s) Performed: Procedure(s) (LRB):  COLONOSCOPY (N/A)    Final Anesthesia Type: general      Patient location during evaluation: GI PACU  Patient participation: Yes- Able to Participate  Level of consciousness: awake and alert, awake and oriented  Post-procedure vital signs: reviewed and stable  Pain management: adequate  Airway patency: patent    PONV status at discharge: No PONV  Anesthetic complications: no      Cardiovascular status: blood pressure returned to baseline, stable and hemodynamically stable  Respiratory status: unassisted, spontaneous ventilation and room air  Hydration status: euvolemic  Follow-up not needed.          Vitals Value Taken Time   /65 07/25/22 0950   Temp 36.6 °C (97.9 °F) 07/25/22 0920   Pulse 71 07/25/22 0950   Resp 16 07/25/22 0950   SpO2 100 % 07/25/22 0950         Event Time   Out of Recovery 10:11:56         Pain/Ozzy Score: Ozzy Score: 10 (7/25/2022  9:50 AM)

## 2022-07-25 NOTE — TRANSFER OF CARE
"Anesthesia Transfer of Care Note    Patient: Louise Cueto    Procedure(s) Performed: Procedure(s) (LRB):  COLONOSCOPY (N/A)    Patient location: GI    Anesthesia Type: general    Transport from OR: Transported from OR on room air with adequate spontaneous ventilation    Post pain: adequate analgesia    Post assessment: no apparent anesthetic complications and tolerated procedure well    Post vital signs: stable    Level of consciousness: sedated and responds to stimulation    Nausea/Vomiting: no nausea/vomiting    Complications: none    Transfer of care protocol was followed      Last vitals:   Visit Vitals  BP (!) 97/55   Pulse 77   Temp 36.6 °C (97.9 °F)   Resp 15   Ht 5' 5" (1.651 m)   Wt 54.4 kg (120 lb)   SpO2 98%   Breastfeeding No   BMI 19.97 kg/m²     "

## 2022-07-25 NOTE — ANESTHESIA PREPROCEDURE EVALUATION
2022  Louise Cueto is a 51 y.o., female.  Past Medical History:   Diagnosis Date    Abdominal bloating 2019    GERD (gastroesophageal reflux disease)     Liver lesion 1/3/2019     Past Surgical History:   Procedure Laterality Date    BREAST CYST EXCISION Left     20 y/o f     SECTION      CHOLECYSTECTOMY      COLONOSCOPY N/A 2017    Procedure: COLONOSCOPY;  Surgeon: Andrews Sears MD;  Location: [address]);  Service: Endoscopy;  Laterality: N/A;    CYSTOSCOPY N/A 2019    Procedure: CYSTOSCOPY;  Surgeon: Debbie Murphy MD;  Location: [facility];  Service: OB/GYN;  Laterality: N/A;    HYSTEROSCOPY N/A 2019    Procedure: HYSTEROSCOPY-removal of IUD; possible shaving of uterine fibroids if needed to insert new IUD;  Surgeon: Debbie Murphy MD;  Location: [facility];  Service: OB/GYN;  Laterality: N/A;    INTRAUTERINE DEVICE INSERTION N/A 2019    Procedure: IUD insertion-- mirena-- would prefer kyleena if available;  Surgeon: Debbie Murphy MD;  Location: [facility];  Service: OB/GYN;  Laterality: N/A;           Pre-op Assessment    I have reviewed the Patient Summary Reports.       I have reviewed the Medications.     Review of Systems  Anesthesia Hx:  Denies Family Hx of Anesthesia complications.   Denies Personal Hx of Anesthesia complications.   Hematology/Oncology:  Hematology Normal   Oncology Normal     EENT/Dental:EENT/Dental Normal   Cardiovascular:  Cardiovascular Normal     Pulmonary:  Pulmonary Normal    Renal/:  Renal/ Normal     Hepatic/GI:  Hepatic/GI Normal    Musculoskeletal:  Musculoskeletal Normal    Neurological:  Neurology Normal    Endocrine:  Endocrine Normal    Dermatological:  Skin Normal    Psych:  Psychiatric Normal           Physical Exam  General: Well nourished, Cooperative, Alert and Oriented    Airway:  Mallampati: II  / I  Mouth Opening: Normal  Tongue: Normal  Neck ROM: Normal ROM    Dental:  Intact        Anesthesia Plan  Type of Anesthesia, risks & benefits discussed:    Anesthesia Type: Gen Supraglottic Airway  Intra-op Monitoring Plan: Standard ASA Monitors  Post Op Pain Control Plan: multimodal analgesia and IV/PO Opioids PRN  Induction:  IV  Informed Consent: Informed consent signed with the Patient and all parties understand the risks and agree with anesthesia plan.  All questions answered. Patient consented to blood products? No  ASA Score: 1  Day of Surgery Review of History & Physical: H&P Update referred to the surgeon/provider.    Ready For Surgery From Anesthesia Perspective.     .

## 2022-07-25 NOTE — PROVATION PATIENT INSTRUCTIONS
Discharge Summary/Instructions after an Endoscopic Procedure  Patient Name: Louise Cueto  Patient MRN: [id number]  Patient YOB: 1970 Monday, July 25, 2022  Andrews Sears MD  Dear patient,  As a result of recent federal legislation (The Federal Cures Act), you may   receive lab or pathology results from your procedure in your [facility]   account before your physician is able to contact you. Your physician or   their representative will relay the results to you with their   recommendations at their soonest availability.  Thank you,  RESTRICTIONS:  During your procedure today, you received medications for sedation.  These   medications may affect your judgment, balance and coordination.  Therefore,   for 24 hours, you have the following restrictions:   - DO NOT drive a car, operate machinery, make legal/financial decisions,   sign important papers or drink alcohol.    ACTIVITY:  Today: no heavy lifting, straining or running due to procedural   sedation/anesthesia.  The following day: return to full activity including work.  DIET:  Eat and drink normally unless instructed otherwise.     TREATMENT FOR COMMON SIDE EFFECTS:  - Mild abdominal pain, nausea, belching, bloating or excessive gas:  rest,   eat lightly and use a heating pad.  - Sore Throat: treat with throat lozenges and/or gargle with warm salt   water.  - Because air was used during the procedure, expelling large amounts of air   from your rectum or belching is normal.  - If a bowel prep was taken, you may not have a bowel movement for 1-3 days.    This is normal.  SYMPTOMS TO WATCH FOR AND REPORT TO YOUR PHYSICIAN:  1. Abdominal pain or bloating, other than gas cramps.  2. Chest pain.  3. Back pain.  4. Signs of infection such as: chills or fever occurring within 24 hours   after the procedure.  5. Rectal bleeding, which would show as bright red, maroon, or black stools.   (A tablespoon of blood from the rectum is not serious, especially if    hemorrhoids are present.)  6. Vomiting.  7. Weakness or dizziness.  GO DIRECTLY TO THE NEAREST EMERGENCY ROOM IF YOU HAVE ANY OF THE FOLLOWING:      Difficulty breathing              Chills and/or fever over 101 F   Persistent vomiting and/or vomiting blood   Severe abdominal pain   Severe chest pain   Black, tarry stools   Bleeding- more than one tablespoon   Any other symptom or condition that you feel may need urgent attention  Your doctor recommends these additional instructions:  If any biopsies were taken, your doctors clinic will contact you in 1 to 2   weeks with any results.  - Patient has a contact number available for emergencies.  The signs and   symptoms of potential delayed complications were discussed with the   patient.  Return to normal activities tomorrow.  Written discharge   instructions were provided to the patient.   - Discharge patient to home (ambulatory).   - Resume regular diet indefinitely.   - Repeat colonoscopy in 5 years for surveillance.   - Continue present medications.  For questions, problems or results please call your physician - Andrews Sears MD at Work:  [phone].  [facility], EMERGENCY ROOM PHONE NUMBER: [phone]  IF A COMPLICATION OR EMERGENCY SITUATION ARISES AND YOU ARE UNABLE TO REACH   YOUR PHYSICIAN - GO DIRECTLY TO THE EMERGENCY ROOM.  Andrews Sears MD  7/25/2022 9:17:14 AM  This report has been verified and signed electronically.  Dear patient,  As a result of recent federal legislation (The Federal Cures Act), you may   receive lab or pathology results from your procedure in your [facility]   account before your physician is able to contact you. Your physician or   their representative will relay the results to you with their   recommendations at their soonest availability.  Thank you,  PROVATION

## 2022-07-25 NOTE — H&P
COLONOSCOPY HISTORY AND PHYSICAL EXAM    Procedure : Colonoscopy      INDICATIONS: family history of colon cancer (mother, age >60) and personal history of colon polyps    Family Hx of CRC: Mother    Last Colonoscopy:  2017  Findings: One 4 mm polyp in the cecum, removed with a jumbo cold forceps. Resected and retrieved. (Path: Hyperplastic).       Past Medical History:   Diagnosis Date    Abdominal bloating 6/29/2019    GERD (gastroesophageal reflux disease)     Liver lesion 1/3/2019     Sedation Problems: NO  Family History   Problem Relation Age of Onset    Heart disease Father         MI around 50.    Hyperlipidemia Father     Parkinsonism Father     Cancer Mother 73        colon ca    Hypertension Mother     Breast cancer Sister 48        breast    Other Sister     No Known Problems Brother     Other Daughter         TBI with left hemiparesis    No Known Problems Son     Cirrhosis Maternal Grandfather     Alcohol abuse Maternal Grandfather     Alzheimer's disease Paternal Grandmother     Parkinsonism Paternal Grandfather     No Known Problems Son     Colon cancer Neg Hx      Fam Hx of Sedation Problems: NO  Social History     Socioeconomic History    Marital status:      Spouse name: Dennis    Number of children: 3   Tobacco Use    Smoking status: Never Smoker    Smokeless tobacco: Never Used   Substance and Sexual Activity    Alcohol use: Yes     Alcohol/week: 1.0 standard drink     Types: 1 Glasses of wine per week     Comment: amt varies     Drug use: No    Sexual activity: Yes     Partners: Male   Social History Narrative    From  [place]     Moved to [facility] 1998    Exercising - runs,  wts, yoga, walks.        Kids 22, 20, 18.          Retired  and .      - .           Review of Systems - Negative except   Respiratory ROS: no dyspnea  Cardiovascular ROS: no exertional chest pain  Gastrointestinal ROS: NO abdominal discomfort,  NO  "rectal bleeding  Musculoskeletal ROS: no muscular pain  Neurological ROS: no recent stroke    Physical Exam:  /64 (BP Location: Left arm, Patient Position: Lying)   Pulse 68   Temp 98.1 °F (36.7 °C) (Temporal)   Resp 18   Ht 5' 5" (1.651 m)   Wt 54.4 kg (120 lb)   SpO2 99%   Breastfeeding No   BMI 19.97 kg/m²   General: no distress  Head: normocephalic  Mallampati Score   Neck: supple, symmetrical, trachea midline  Lungs:  clear to auscultation bilaterally and normal respiratory effort  Heart: regular rate and rhythm and no murmur  Abdomen: soft, non-tender non-distented; bowel sounds normal; no masses,  no organomegaly  Extremities: no cyanosis or edema, or clubbing    ASA:  I    PLAN  COLONOSCOPY.    SedationPlan :MAC    The details of the procedure, the possible need for biopsy or polypectomy and the potential risks including bleeding, perforation, missed polyps were discussed in detail.      "

## 2022-07-29 LAB
FINAL PATHOLOGIC DIAGNOSIS: NORMAL
Lab: NORMAL

## 2022-08-29 ENCOUNTER — OFFICE VISIT (OUTPATIENT)
Dept: URGENT CARE | Facility: CLINIC | Age: 52
End: 2022-08-29
Payer: COMMERCIAL

## 2022-08-29 VITALS
WEIGHT: 120 LBS | HEIGHT: 65 IN | SYSTOLIC BLOOD PRESSURE: 112 MMHG | BODY MASS INDEX: 19.99 KG/M2 | TEMPERATURE: 98 F | RESPIRATION RATE: 18 BRPM | HEART RATE: 78 BPM | DIASTOLIC BLOOD PRESSURE: 78 MMHG | OXYGEN SATURATION: 98 %

## 2022-08-29 DIAGNOSIS — T14.90XA TRAUMA: Primary | ICD-10-CM

## 2022-08-29 DIAGNOSIS — S63.502A LEFT WRIST SPRAIN, INITIAL ENCOUNTER: ICD-10-CM

## 2022-08-29 DIAGNOSIS — S49.91XA INJURY OF RIGHT SHOULDER, INITIAL ENCOUNTER: ICD-10-CM

## 2022-08-29 PROCEDURE — 1159F PR MEDICATION LIST DOCUMENTED IN MEDICAL RECORD: ICD-10-PCS | Mod: CPTII,S$GLB,,

## 2022-08-29 PROCEDURE — 99214 OFFICE O/P EST MOD 30 MIN: CPT | Mod: 25,S$GLB,,

## 2022-08-29 PROCEDURE — 96372 THER/PROPH/DIAG INJ SC/IM: CPT | Mod: S$GLB,,,

## 2022-08-29 PROCEDURE — 3074F PR MOST RECENT SYSTOLIC BLOOD PRESSURE < 130 MM HG: ICD-10-PCS | Mod: CPTII,S$GLB,,

## 2022-08-29 PROCEDURE — 73110 X-RAY EXAM OF WRIST: CPT | Mod: LT,S$GLB,, | Performed by: RADIOLOGY

## 2022-08-29 PROCEDURE — 3078F PR MOST RECENT DIASTOLIC BLOOD PRESSURE < 80 MM HG: ICD-10-PCS | Mod: CPTII,S$GLB,,

## 2022-08-29 PROCEDURE — 73030 XR SHOULDER TRAUMA 3 VIEW RIGHT: ICD-10-PCS | Mod: RT,S$GLB,, | Performed by: RADIOLOGY

## 2022-08-29 PROCEDURE — 3074F SYST BP LT 130 MM HG: CPT | Mod: CPTII,S$GLB,,

## 2022-08-29 PROCEDURE — 3078F DIAST BP <80 MM HG: CPT | Mod: CPTII,S$GLB,,

## 2022-08-29 PROCEDURE — 3008F PR BODY MASS INDEX (BMI) DOCUMENTED: ICD-10-PCS | Mod: CPTII,S$GLB,,

## 2022-08-29 PROCEDURE — 96372 PR INJECTION,THERAP/PROPH/DIAG2ST, IM OR SUBCUT: ICD-10-PCS | Mod: S$GLB,,,

## 2022-08-29 PROCEDURE — 99214 PR OFFICE/OUTPT VISIT, EST, LEVL IV, 30-39 MIN: ICD-10-PCS | Mod: 25,S$GLB,,

## 2022-08-29 PROCEDURE — 73110 XR WRIST COMPLETE 3 VIEWS LEFT: ICD-10-PCS | Mod: LT,S$GLB,, | Performed by: RADIOLOGY

## 2022-08-29 PROCEDURE — 1159F MED LIST DOCD IN RCRD: CPT | Mod: CPTII,S$GLB,,

## 2022-08-29 PROCEDURE — 1160F PR REVIEW ALL MEDS BY PRESCRIBER/CLIN PHARMACIST DOCUMENTED: ICD-10-PCS | Mod: CPTII,S$GLB,,

## 2022-08-29 PROCEDURE — 73030 X-RAY EXAM OF SHOULDER: CPT | Mod: RT,S$GLB,, | Performed by: RADIOLOGY

## 2022-08-29 PROCEDURE — 3008F BODY MASS INDEX DOCD: CPT | Mod: CPTII,S$GLB,,

## 2022-08-29 PROCEDURE — 1160F RVW MEDS BY RX/DR IN RCRD: CPT | Mod: CPTII,S$GLB,,

## 2022-08-29 RX ORDER — KETOROLAC TROMETHAMINE 30 MG/ML
30 INJECTION, SOLUTION INTRAMUSCULAR; INTRAVENOUS
Status: COMPLETED | OUTPATIENT
Start: 2022-08-29 | End: 2022-08-29

## 2022-08-29 RX ADMIN — KETOROLAC TROMETHAMINE 30 MG: 30 INJECTION, SOLUTION INTRAMUSCULAR; INTRAVENOUS at 03:08

## 2022-08-29 NOTE — PROGRESS NOTES
"Subjective:       Patient ID: Louise Cueto is a 51 y.o. female.    Vitals:  height is 5' 5" (1.651 m) and weight is 54.4 kg (120 lb). Her temperature is 98.4 °F (36.9 °C). Her blood pressure is 112/78 and her pulse is 78. Her respiration is 18 and oxygen saturation is 98%.     Chief Complaint: Wrist Injury and Shoulder Injury    Patient states she went hiking on Friday, she then stated that she tripped and hurt right should and left wrist.     Provider note starts below:  Patient presents with left wrist and right shoulder pain after a fall on Friday (3 days ago). She states she was hiking when she slipped on a rock and caught herself with her left wrist but also fell onto her right shoulder. She didn't have too much pain at the time and or over the weekend because she was taking tylenol. She has not taken any tylenol since last night and is now having more pain today so would like to get it checked out. She denies numbness or tingling. She states the wrist is about a 2/10 and the shoulder is slightly worse (6/10). She has had increased bruising.     Trauma  The incident occurred 2 days ago. The injury mechanism was a fall. Her tetanus status is unknown.     Musculoskeletal:  Positive for pain, trauma, joint pain and joint swelling. Negative for abnormal ROM of joint.   Skin:  Positive for abrasion (right shoulder) and bruising.     Objective:      Physical Exam   Constitutional: No distress. normal  HENT:   Head: Normocephalic and atraumatic.   Nose: Nose normal.   Eyes: Conjunctivae are normal. Extraocular movement intact   Cardiovascular: Normal rate and normal pulses.   Pulses:       Carotid pulses are 2+ on the right side and 2+ on the left side.  Musculoskeletal:      Right shoulder: She exhibits bony tenderness (distal clavicle and acromiom process and posterior humeral head). She exhibits normal range of motion, normal pulse and normal strength.      Left shoulder: Normal.      Right elbow: Normal.     " Left elbow: Normal.      Right wrist: Normal.      Left wrist: She exhibits tenderness and bony tenderness. She exhibits normal range of motion and no deformity.      Right upper arm: Normal.      Left upper arm: Normal.      Right forearm: Normal.      Left forearm: She exhibits tenderness and bony tenderness (distal forearm at articulation with carpal bones). She exhibits no swelling and no deformity.        Arms:       Right hand: Normal.      Left hand: Normal.      Comments: Bony tenderness on on lateral side of ulnar head and carpal bones; full ROM; 5/5 strength in bilateral upper extremities in all joints- full sensation in all dermatomes- NVI   Neurological: She is alert.   Skin: Skin is warm and dry. Capillary refill takes less than 2 seconds. abrasion (right shoulder)   Nursing note and vitals reviewed.    XR WRIST COMPLETE 3 VIEWS LEFT    Result Date: 8/29/2022  EXAMINATION: XR WRIST COMPLETE 3 VIEWS LEFT CLINICAL HISTORY: Injury, unspecified, initial encounter TECHNIQUE: PA, lateral, and oblique views of the left wrist were performed. COMPARISON: None. FINDINGS: Skeletal structures are intact.  No fracture or dislocation is identified.  Joint spaces are satisfactory.  No focal soft tissue swelling or foreign body is detected.     Negative for fracture Electronically signed by: Andrews Reaves MD Date:    08/29/2022 Time:    16:14    XR SHOULDER TRAUMA 3 VIEW RIGHT    Result Date: 8/29/2022  EXAMINATION: XR SHOULDER TRAUMA 3 VIEW RIGHT CLINICAL HISTORY: Injury, unspecified, initial encounter TECHNIQUE: Three or four views of the right shoulder were performed. COMPARISON: Left shoulder 10/09/2017 FINDINGS: Skeletal structures at right shoulder are intact.  No fracture or dislocation is identified.  Joint spaces are satisfactory.  Soft tissue calcification adjacent to the humeral head is consistent with calcific tendinitis.     No fracture or dislocation identified Electronically signed by: Andrews  MD Jean Paul Date:    08/29/2022 Time:    16:12     Assessment:       1. Trauma    2. Injury of right shoulder, initial encounter    3. Left wrist sprain, initial encounter            Plan:     Imaging reviewed. Negative for fx. Offered ace wrap or brace but patient deferred at this time. States she will follow up with PCP if her sx continue after she returns from her trip.     Trauma  -     XR SHOULDER TRAUMA 3 VIEW RIGHT; Future; Expected date: 08/29/2022  -     XR WRIST COMPLETE 3 VIEWS LEFT; Future; Expected date: 08/29/2022  -     ketorolac injection 30 mg    Injury of right shoulder, initial encounter    Left wrist sprain, initial encounter       Patient Instructions   PLEASE READ ALL DISCHARGE INSTRUCTIONS    - Today you have received a Toradol injection. DO NOT TAKE ANY NSAIDs (Ibuprofen, Motrin, Advil, Naproxen, etc.) for 24 hours after receiving the injection.     Rest - Rest the injured area, wear splint for the next week or two as needed for comfort    Ice - Apply ice  to affected area for the first 24-48 hours (DO NOT APPLY ICE DIRECTLY TO THE SKIN.  DO NOT LEAVE ON AFFECTED BODY PART FOR MORE THAN 15 MINUTES AT AT TIME TO AVOID INJURY TO SOFT TISSUE)     Compression - Wear ACE wrap or splint provided for compression and comfort to help reduce pain and swelling    Elevate - Elevated affected area higher than your heart to reduce swelling    -  If you were prescribed an anti-inflammatory, please take as directed as needed for pain and inflammation. If you were not prescribed an anti-inflammatory, you can take Tylenol or ibuprofen over the counter as directed for pain unless you have an allergy to them or medical condition such as stomach ulcers, kidney or liver disease or blood thinners etc for which you should not be taking these type of medications.     Follow up with your PCP in 1 week or as needed if no improvement.      Repeat X ray in 1 week if you still have pain.    If your condition worsens or  fails to improve we recommend that you receive another evaluation at the ER immediately or contact your PCP to discuss your concerns or return here.     You must understand that you've received an urgent care treatment only and that you may be released before all your medical problems are known or treated.     You the patient will arrange for followup care as instructed.

## 2022-08-29 NOTE — PATIENT INSTRUCTIONS
PLEASE READ ALL DISCHARGE INSTRUCTIONS    - Today you have received a Toradol injection. DO NOT TAKE ANY NSAIDs (Ibuprofen, Motrin, Advil, Naproxen, etc.) for 24 hours after receiving the injection.     Rest - Rest the injured area, wear splint for the next week or two as needed for comfort    Ice - Apply ice  to affected area for the first 24-48 hours (DO NOT APPLY ICE DIRECTLY TO THE SKIN.  DO NOT LEAVE ON AFFECTED BODY PART FOR MORE THAN 15 MINUTES AT AT TIME TO AVOID INJURY TO SOFT TISSUE)     Compression - Wear ACE wrap or splint provided for compression and comfort to help reduce pain and swelling    Elevate - Elevated affected area higher than your heart to reduce swelling    -  If you were prescribed an anti-inflammatory, please take as directed as needed for pain and inflammation. If you were not prescribed an anti-inflammatory, you can take Tylenol or ibuprofen over the counter as directed for pain unless you have an allergy to them or medical condition such as stomach ulcers, kidney or liver disease or blood thinners etc for which you should not be taking these type of medications.     Follow up with your PCP in 1 week or as needed if no improvement.      Repeat X ray in 1 week if you still have pain.    If your condition worsens or fails to improve we recommend that you receive another evaluation at the ER immediately or contact your PCP to discuss your concerns or return here.     You must understand that you've received an urgent care treatment only and that you may be released before all your medical problems are known or treated.     You the patient will arrange for followup care as instructed.

## 2022-09-30 ENCOUNTER — PATIENT MESSAGE (OUTPATIENT)
Dept: INTERNAL MEDICINE | Facility: CLINIC | Age: 52
End: 2022-09-30
Payer: COMMERCIAL

## 2022-09-30 DIAGNOSIS — M25.529 ELBOW PAIN, UNSPECIFIED LATERALITY: ICD-10-CM

## 2022-09-30 DIAGNOSIS — M25.519 SHOULDER PAIN, UNSPECIFIED CHRONICITY, UNSPECIFIED LATERALITY: Primary | ICD-10-CM

## 2022-09-30 NOTE — TELEPHONE ENCOUNTER
Pt requesting referral to ortho for shoulder/elbow pain and opthalmology for strabismus. Pended for your review.

## 2022-10-03 ENCOUNTER — OFFICE VISIT (OUTPATIENT)
Dept: SPORTS MEDICINE | Facility: CLINIC | Age: 52
End: 2022-10-03
Payer: COMMERCIAL

## 2022-10-03 ENCOUNTER — HOSPITAL ENCOUNTER (OUTPATIENT)
Dept: RADIOLOGY | Facility: HOSPITAL | Age: 52
Discharge: HOME | End: 2022-10-03
Attending: ORTHOPAEDIC SURGERY
Payer: COMMERCIAL

## 2022-10-03 VITALS
WEIGHT: 120 LBS | HEART RATE: 76 BPM | HEIGHT: 65 IN | SYSTOLIC BLOOD PRESSURE: 107 MMHG | BODY MASS INDEX: 19.99 KG/M2 | DIASTOLIC BLOOD PRESSURE: 70 MMHG

## 2022-10-03 DIAGNOSIS — M25.519 SHOULDER PAIN, UNSPECIFIED CHRONICITY, UNSPECIFIED LATERALITY: ICD-10-CM

## 2022-10-03 DIAGNOSIS — S46.011A TRAUMATIC TEAR OF RIGHT ROTATOR CUFF, UNSPECIFIED TEAR EXTENT, INITIAL ENCOUNTER: Primary | ICD-10-CM

## 2022-10-03 DIAGNOSIS — M25.521 RIGHT ELBOW PAIN: ICD-10-CM

## 2022-10-03 PROCEDURE — 3078F PR MOST RECENT DIASTOLIC BLOOD PRESSURE < 80 MM HG: ICD-10-PCS | Mod: CPTII,S$GLB,, | Performed by: ORTHOPAEDIC SURGERY

## 2022-10-03 PROCEDURE — 73080 XR ELBOW COMPLETE 3 VIEW RIGHT: ICD-10-PCS | Mod: 26,RT,, | Performed by: RADIOLOGY

## 2022-10-03 PROCEDURE — 1159F MED LIST DOCD IN RCRD: CPT | Mod: CPTII,S$GLB,, | Performed by: ORTHOPAEDIC SURGERY

## 2022-10-03 PROCEDURE — 3008F BODY MASS INDEX DOCD: CPT | Mod: CPTII,S$GLB,, | Performed by: ORTHOPAEDIC SURGERY

## 2022-10-03 PROCEDURE — 3074F PR MOST RECENT SYSTOLIC BLOOD PRESSURE < 130 MM HG: ICD-10-PCS | Mod: CPTII,S$GLB,, | Performed by: ORTHOPAEDIC SURGERY

## 2022-10-03 PROCEDURE — 99204 PR OFFICE/OUTPT VISIT, NEW, LEVL IV, 45-59 MIN: ICD-10-PCS | Mod: S$GLB,,, | Performed by: ORTHOPAEDIC SURGERY

## 2022-10-03 PROCEDURE — 1159F PR MEDICATION LIST DOCUMENTED IN MEDICAL RECORD: ICD-10-PCS | Mod: CPTII,S$GLB,, | Performed by: ORTHOPAEDIC SURGERY

## 2022-10-03 PROCEDURE — 73080 X-RAY EXAM OF ELBOW: CPT | Mod: 26,RT,, | Performed by: RADIOLOGY

## 2022-10-03 PROCEDURE — 99999 PR PBB SHADOW E&M-EST. PATIENT-LVL III: CPT | Mod: PBBFAC,,, | Performed by: ORTHOPAEDIC SURGERY

## 2022-10-03 PROCEDURE — 99204 OFFICE O/P NEW MOD 45 MIN: CPT | Mod: S$GLB,,, | Performed by: ORTHOPAEDIC SURGERY

## 2022-10-03 PROCEDURE — 3078F DIAST BP <80 MM HG: CPT | Mod: CPTII,S$GLB,, | Performed by: ORTHOPAEDIC SURGERY

## 2022-10-03 PROCEDURE — 3008F PR BODY MASS INDEX (BMI) DOCUMENTED: ICD-10-PCS | Mod: CPTII,S$GLB,, | Performed by: ORTHOPAEDIC SURGERY

## 2022-10-03 PROCEDURE — 73080 X-RAY EXAM OF ELBOW: CPT | Mod: TC,RT

## 2022-10-03 PROCEDURE — 3074F SYST BP LT 130 MM HG: CPT | Mod: CPTII,S$GLB,, | Performed by: ORTHOPAEDIC SURGERY

## 2022-10-03 PROCEDURE — 99999 PR PBB SHADOW E&M-EST. PATIENT-LVL III: ICD-10-PCS | Mod: PBBFAC,,, | Performed by: ORTHOPAEDIC SURGERY

## 2022-10-03 NOTE — PROGRESS NOTES
CC: right Shoulder pain. Artist.    51 y.o. Female reports 2 month history of right shoulder pain and 4 month history of right elbow pain.    Right shoulder pain began after a fall while hiking 2 months ago. She has pain and weakness of her right shoulder.  Pain is worse with overhead activity. It bothers her at night.  The pain is severe and not responding to any conservative care.        Right elbow pain for 4 months located at lateral epicondyle. Atraumtaic. Worse with lifting.  Pain has not been relieved with NSAIDs or rest.    Pain is 4 out of 10.    SANE: 70    PAST MEDICAL HISTORY:   Past Medical History:   Diagnosis Date    Abdominal bloating 2019    GERD (gastroesophageal reflux disease)     Liver lesion 1/3/2019     PAST SURGICAL HISTORY:   Past Surgical History:   Procedure Laterality Date    BREAST CYST EXCISION Left     18 y/o f     SECTION      CHOLECYSTECTOMY      COLONOSCOPY N/A 2017    Procedure: COLONOSCOPY;  Surgeon: Andrews Sears MD;  Location: [address]);  Service: Endoscopy;  Laterality: N/A;    COLONOSCOPY N/A 2022    Procedure: COLONOSCOPY;  Surgeon: Andrews Sears MD;  Location: [address]);  Service: Endoscopy;  Laterality: N/A;  Fully Vaccinated.EC    CYSTOSCOPY N/A 2019    Procedure: CYSTOSCOPY;  Surgeon: Debbie Murphy MD;  Location: [facility];  Service: OB/GYN;  Laterality: N/A;    HYSTEROSCOPY N/A 2019    Procedure: HYSTEROSCOPY-removal of IUD; possible shaving of uterine fibroids if needed to insert new IUD;  Surgeon: Debbie Murphy MD;  Location: [facility];  Service: OB/GYN;  Laterality: N/A;    INTRAUTERINE DEVICE INSERTION N/A 2019    Procedure: IUD insertion-- mirena-- would prefer kyleena if available;  Surgeon: Debbie Murphy MD;  Location: [facility];  Service: OB/GYN;  Laterality: N/A;     FAMILY HISTORY:   Family History   Problem Relation Age of Onset    Heart disease Father         MI around 50.     Hyperlipidemia Father     Parkinsonism Father     Cancer Mother 73        colon ca    Hypertension Mother     Breast cancer Sister 48        breast    Other Sister     No Known Problems Brother     Other Daughter         TBI with left hemiparesis    No Known Problems Son     Cirrhosis Maternal Grandfather     Alcohol abuse Maternal Grandfather     Alzheimer's disease Paternal Grandmother     Parkinsonism Paternal Grandfather     No Known Problems Son     Colon cancer Neg Hx      SOCIAL HISTORY:   Social History     Socioeconomic History    Marital status:      Spouse name: Dennis    Number of children: 3   Tobacco Use    Smoking status: Never    Smokeless tobacco: Never   Substance and Sexual Activity    Alcohol use: Yes     Alcohol/week: 1.0 standard drink     Types: 1 Glasses of wine per week     Comment: amt varies     Drug use: No    Sexual activity: Yes     Partners: Male   Social History Narrative    From  [place]     Moved to [facility] 1998    Exercising - runs,  wts, yoga, walks.        Kids 20, 20, 18.          Retired  and .      - .           MEDICATIONS:   Current Outpatient Medications:     ascorbic acid, vitamin C, (VITAMIN C) 1000 MG tablet, Take 1,000 mg by mouth once daily., Disp: , Rfl:     b complex vitamins capsule, Take 1 capsule by mouth once daily., Disp: , Rfl:     collagen, bovine, 100 % Powd, Apply topically., Disp: , Rfl:     ibuprofen (ADVIL,MOTRIN) 600 MG tablet, Take 1 tablet (600 mg total) by mouth 3 (three) times daily., Disp: 60 tablet, Rfl: 1    INV TESTOSTERONE/ANASTROZOL OR PLACEBO PELLETS, Inject 1 Pellet into the skin. FOR INVESTIGATIONAL USE ONLY, Disp: , Rfl:     progesterone (PROMETRIUM) 200 MG capsule, TAKE ONE CAPSULE BY MOUTH DAYS 1-12 EVERY NIGHT AT BEDTIME, Disp: , Rfl:     valACYclovir (VALTREX) 1000 MG tablet, daily as needed. , Disp: , Rfl:     vitamin D (VITAMIN D3) 1000 units Tab, Take 1,000 Units by mouth once  "daily., Disp: , Rfl:     fluticasone propionate (FLONASE) 50 mcg/actuation nasal spray, 2 sprays (100 mcg total) by Each Nostril route once daily., Disp: 15.8 mL, Rfl: 0  ALLERGIES:   Review of patient's allergies indicates:   Allergen Reactions    Percocet [oxycodone-acetaminophen] Nausea And Vomiting    Vicodin [hydrocodone-acetaminophen] Nausea And Vomiting     Pt not sure if she took percocet or Vicodin that caused severe nausea & vomiting       VITAL SIGNS: /70   Pulse 76   Ht 5' 5" (1.651 m)   Wt 54.4 kg (120 lb)   BMI 19.97 kg/m²      Review of Systems   Constitution: Negative. Negative for chills, fever and night sweats.   HENT: Negative for congestion and headaches.    Eyes: Negative for blurred vision, left vision loss and right vision loss.   Cardiovascular: Negative for chest pain and syncope.   Respiratory: Negative for cough and shortness of breath.    Endocrine: Negative for polydipsia, polyphagia and polyuria.   Hematologic/Lymphatic: Negative for bleeding problem. Does not bruise/bleed easily.   Skin: Negative for dry skin, itching and rash.   Musculoskeletal: Negative for falls and muscle weakness.   Gastrointestinal: Negative for abdominal pain and bowel incontinence.   Genitourinary: Negative for bladder incontinence and nocturia.   Neurological: Negative for disturbances in coordination, loss of balance and seizures.   Psychiatric/Behavioral: Negative for depression. The patient does not have insomnia.    Allergic/Immunologic: Negative for hives and persistent infections.       PHYSICAL EXAMINATION:  General:  The patient is alert and oriented x 3.  Mood is pleasant.  Observation of ears, eyes and nose reveal no gross abnormalities.  HEENT: NCAT, sclera nonicteric  Lungs: Respirations are equal and unlabored.  Gait is coordinated. Patient can toe walk and heel walk without difficulty.  Cardiovascular: There are no swelling or varicosities present.   Lymphatic: Negative for adenopathy "       right SHOULDER / UPPER EXTREMITY EXAM    OBSERVATION:     Swelling  none  Deformity  none   Discoloration  none   Scapular winging none   Scars   none  Atrophy  none    TENDERNESS / CREPITUS (T/C):          T/C      T/C   Clavicle   -/-  SUPRAspinatus    -/-   AC Jt.    -/-  INFRAspinatus  -/-   SC Jt.    -/-  Deltoid    +/-   G. Tuberosity  +/-  LH BICEP groove  +/-   Acromion:  -/-  Midline Neck   -/-   Scapular Spine -/-  Trapezium   -/-   SMA Scapula  -/-  GH jt. line - post  -/-   Scapulothoracic  -/-         ROM: (* = with pain)  Right shoulder   Left shoulder        AROM (PROM)   AROM (PROM)   FE    170° (175°)     170° (175°)     ER at 0°    60°  (65°)    60°  (65°)   ER at 90° ABD  90°  (90°)    90°  (90°)   IR at 90°  ABD   NA  (40°)     NA  (40°)      IR (spine level)   L4     T10    STRENGTH: (* = with pain) RIGHT SHOULDER  LEFT SHOULDER   SCAPTION at 0   4+/5    5/5    SCAPTION at 30   5-/5    5/5    IR    5/5    5/5   ER    5/5    5/5   BICEPS   5/5    5/5   Deltoid    5/5    5/5     SIGNS:  Painful side       NEER   pos    OJAYLENS  Neg   EUCEDA   neg    SPEEDS  Neg   DROP ARM   neg   BELLY PRESS pos   Superior escape none    LIFT-OFF  Neg   X-Body ADD    neg    MOVING VALGUS Neg      STABILITY TESTING    RIGHT SHOULDER   LEFT SHOULDER       Translation    Anterior  up face     up face    Posterior  up face    up face    Sulcus   < 10mm    < 10 mm    Signs    Apprehension   neg      Neg    Relocation   no change     no change    Jerk test  neg     Neg      EXTREMITY NEURO-VASCULAR EXAM    Sensation grossly intact to light touch all dermatomal regions.    DTR 2+ Biceps, Triceps, BR and Negative Rhondas sign   Grossly intact motor function at Elbow, Wrist and Hand   Distal pulses radial and ulnar 2+, brisk cap refill, symmetric.      NECK:  Painless FROM and spinous processes non-tender. Negative Spurlings sign.      OTHER FINDINGS:  Positive bear hug      Right ELBOW EXTREMITY  EXAM:    OBSERVATION / INSPECTION    Swelling  none    Deformity  none  Discoloration  none     Scars   none    Atrophy  none    TENDERNESS / CREPITUS (T / C):           T / C        Medial epicondyle   - / -    Med. (Ulnar) collateral ligament  - / -    Flexor pronator Musculature   - / -   Biceps tendon    - / -   Head of radius    - / -    Lateral epicondyle   + / -    Extensor Musculature   - / -   Brachioradialis   - / -   Triceps tendon   - / -   Triceps muscle   - / -   Olecranon    - / -   Olecranon bursa   - / -   Cubital fossa    - / -   Anterior jointline   - / -   Radial tunnel    - / -             ROM: ('*' = with pain)    Right Elbow  AROM (PROM)     Extension   0 deg  (5 deg)   Flexion   145 deg (145 deg)         Pronation  90 deg  (90 deg)      Supination   80 deg  (80 deg)                 Left Elbow  AROM (PROM)     Extension   0 deg  (5 deg)   Flexion   145 deg (145 deg)         Pronation  90 deg  (90 deg)      Supination   80 deg  (80 deg)        STRENGTH: ('*' = with pain)    Elbow Flexion:   5/5  Elbow Extension:  5/5  Wrist Flexion:   5/5  Wrist Extension:  5/5  :    5/5  Intrinsics:   5/5  EPL (Ext. pollicis longus): 5/5  Pinch Mechanism:  5/5    ELBOW EXAMINATION:  See above noted areas of tenderness.   Test for Ligamentous Instability - UCL normal  Test for Ligamentous Instability - LUCL normal  PLRI       neg  Tinel's (Percussion) Test - Cubital  neg  Tennis Elbow Test    +  Golfer's Elbow Test    neg  Radial Capitellar Grind Test   neg  Valgus/Extension Overload Test  neg  Resisted Long Finger Extension Pain +  Moving Valgus     neg  Forearm pain with resisted supination neg            XRAYS:  Shoulder trauma series right,  were reviewed and interpreted by me. No convincing fracture or dislocation is noted. The osseous structures appear well mineralized and well aligned  Elbow series right, No convincing fracture or dislocation. + calcific tendonitis      ASSESSMENT:    Right shoulder  pain, probable rotator cuff tear,  + calcific tendonitis  Right elbow pain, lateral epicondylitis      PLAN:  I do think that this is likely a rotator cuff tear.    I have recommended we check an MRI of the shoulder to evaluate this.    Depending on the results of the MRI, we may consider a cortisone   injection and physical therapy and/or arthroscopic intervention and treatment   depending on what we find.  I will see her back upon its completion or PHREV and we will consider the above.    For lateral epicondylitis,   Jerman Twist bar  Stretches instructed by me  NSAIDs

## 2022-10-08 ENCOUNTER — HOSPITAL ENCOUNTER (OUTPATIENT)
Dept: RADIOLOGY | Facility: HOSPITAL | Age: 52
Discharge: HOME | End: 2022-10-08
Attending: STUDENT IN AN ORGANIZED HEALTH CARE EDUCATION/TRAINING PROGRAM
Payer: COMMERCIAL

## 2022-10-08 DIAGNOSIS — S46.011A TRAUMATIC TEAR OF RIGHT ROTATOR CUFF, UNSPECIFIED TEAR EXTENT, INITIAL ENCOUNTER: ICD-10-CM

## 2022-10-08 PROCEDURE — 73221 MRI JOINT UPR EXTREM W/O DYE: CPT | Mod: TC,RT

## 2022-10-08 PROCEDURE — 73221 MRI SHOULDER WITHOUT CONTRAST RIGHT: ICD-10-PCS | Mod: 26,RT,, | Performed by: RADIOLOGY

## 2022-10-08 PROCEDURE — 73221 MRI JOINT UPR EXTREM W/O DYE: CPT | Mod: 26,RT,, | Performed by: RADIOLOGY

## 2022-10-10 ENCOUNTER — TELEPHONE (OUTPATIENT)
Dept: SPORTS MEDICINE | Facility: CLINIC | Age: 52
End: 2022-10-10
Payer: COMMERCIAL

## 2022-10-10 NOTE — TELEPHONE ENCOUNTER
I called patient twice and left a voicemail requesting a call back to discuss her MRI results and treatment plan:    When the patient does call back, we will send a message to Dr. Thomas Alonso's staff to schedule her for an ultrasound guided needle barbotage procedure to her right subscapularis tendon.        MRI Right Shoulder:  Impression:     1. Subscapularis calcific tendinitis.  2. Mild AC joint arthrosis

## 2022-10-12 ENCOUNTER — TELEPHONE (OUTPATIENT)
Dept: OPHTHALMOLOGY | Facility: CLINIC | Age: 52
End: 2022-10-12
Payer: COMMERCIAL

## 2022-10-12 ENCOUNTER — PATIENT MESSAGE (OUTPATIENT)
Dept: SPORTS MEDICINE | Facility: CLINIC | Age: 52
End: 2022-10-12
Payer: COMMERCIAL

## 2022-10-12 NOTE — TELEPHONE ENCOUNTER
Spoke to pt informed her I didn't receive fax yet.    -jessica ----- Message from Suzanne Medeiros sent at 10/12/2022  1:26 PM CDT -----  Patient is calling stating orders were faxed over Monday.   Please contact patient at [phone]

## 2022-10-13 ENCOUNTER — TELEPHONE (OUTPATIENT)
Dept: SPORTS MEDICINE | Facility: CLINIC | Age: 52
End: 2022-10-13
Payer: COMMERCIAL

## 2022-11-07 ENCOUNTER — PATIENT MESSAGE (OUTPATIENT)
Dept: SPORTS MEDICINE | Facility: CLINIC | Age: 52
End: 2022-11-07
Payer: COMMERCIAL

## 2022-11-08 ENCOUNTER — HOSPITAL ENCOUNTER (OUTPATIENT)
Dept: RADIOLOGY | Facility: OTHER | Age: 52
Discharge: HOME | End: 2022-11-08
Payer: COMMERCIAL

## 2022-11-08 DIAGNOSIS — Z12.31 BREAST CANCER SCREENING BY MAMMOGRAM: ICD-10-CM

## 2022-11-08 PROCEDURE — 77063 BREAST TOMOSYNTHESIS BI: CPT | Mod: 26,,, | Performed by: RADIOLOGY

## 2022-11-08 PROCEDURE — 77063 BREAST TOMOSYNTHESIS BI: CPT | Mod: TC

## 2022-11-08 PROCEDURE — 77067 SCR MAMMO BI INCL CAD: CPT | Mod: 26,,, | Performed by: RADIOLOGY

## 2022-11-08 PROCEDURE — 77063 MAMMO DIGITAL SCREENING BILAT WITH TOMO: ICD-10-PCS | Mod: 26,,, | Performed by: RADIOLOGY

## 2022-11-08 PROCEDURE — 77067 MAMMO DIGITAL SCREENING BILAT WITH TOMO: ICD-10-PCS | Mod: 26,,, | Performed by: RADIOLOGY

## 2022-11-09 ENCOUNTER — OFFICE VISIT (OUTPATIENT)
Dept: SPORTS MEDICINE | Facility: CLINIC | Age: 52
End: 2022-11-09
Payer: COMMERCIAL

## 2022-11-09 VITALS
BODY MASS INDEX: 19.99 KG/M2 | WEIGHT: 120 LBS | SYSTOLIC BLOOD PRESSURE: 102 MMHG | DIASTOLIC BLOOD PRESSURE: 81 MMHG | HEIGHT: 65 IN

## 2022-11-09 DIAGNOSIS — M75.31 CALCIFIC TENDONITIS OF RIGHT SHOULDER: ICD-10-CM

## 2022-11-09 DIAGNOSIS — M25.519 SHOULDER PAIN, UNSPECIFIED CHRONICITY, UNSPECIFIED LATERALITY: ICD-10-CM

## 2022-11-09 DIAGNOSIS — M25.529 ELBOW PAIN, UNSPECIFIED LATERALITY: ICD-10-CM

## 2022-11-09 DIAGNOSIS — M25.511 RIGHT SHOULDER PAIN, UNSPECIFIED CHRONICITY: Primary | ICD-10-CM

## 2022-11-09 PROCEDURE — 3079F DIAST BP 80-89 MM HG: CPT | Mod: CPTII,S$GLB,, | Performed by: ORTHOPAEDIC SURGERY

## 2022-11-09 PROCEDURE — 3079F PR MOST RECENT DIASTOLIC BLOOD PRESSURE 80-89 MM HG: ICD-10-PCS | Mod: CPTII,S$GLB,, | Performed by: ORTHOPAEDIC SURGERY

## 2022-11-09 PROCEDURE — 99214 OFFICE O/P EST MOD 30 MIN: CPT | Mod: 25,S$GLB,, | Performed by: ORTHOPAEDIC SURGERY

## 2022-11-09 PROCEDURE — 99999 PR PBB SHADOW E&M-EST. PATIENT-LVL III: CPT | Mod: PBBFAC,,, | Performed by: ORTHOPAEDIC SURGERY

## 2022-11-09 PROCEDURE — 1159F MED LIST DOCD IN RCRD: CPT | Mod: CPTII,S$GLB,, | Performed by: ORTHOPAEDIC SURGERY

## 2022-11-09 PROCEDURE — 1159F PR MEDICATION LIST DOCUMENTED IN MEDICAL RECORD: ICD-10-PCS | Mod: CPTII,S$GLB,, | Performed by: ORTHOPAEDIC SURGERY

## 2022-11-09 PROCEDURE — 99214 PR OFFICE/OUTPT VISIT, EST, LEVL IV, 30-39 MIN: ICD-10-PCS | Mod: 25,S$GLB,, | Performed by: ORTHOPAEDIC SURGERY

## 2022-11-09 PROCEDURE — 3008F PR BODY MASS INDEX (BMI) DOCUMENTED: ICD-10-PCS | Mod: CPTII,S$GLB,, | Performed by: ORTHOPAEDIC SURGERY

## 2022-11-09 PROCEDURE — 3074F PR MOST RECENT SYSTOLIC BLOOD PRESSURE < 130 MM HG: ICD-10-PCS | Mod: CPTII,S$GLB,, | Performed by: ORTHOPAEDIC SURGERY

## 2022-11-09 PROCEDURE — 3008F BODY MASS INDEX DOCD: CPT | Mod: CPTII,S$GLB,, | Performed by: ORTHOPAEDIC SURGERY

## 2022-11-09 PROCEDURE — 99999 PR PBB SHADOW E&M-EST. PATIENT-LVL III: ICD-10-PCS | Mod: PBBFAC,,, | Performed by: ORTHOPAEDIC SURGERY

## 2022-11-09 PROCEDURE — 76882 PR  US,EXTREMITY,NONVASCULAR,REAL-TIME IMAGE,LIMITED: ICD-10-PCS | Mod: S$GLB,,, | Performed by: ORTHOPAEDIC SURGERY

## 2022-11-09 PROCEDURE — 1160F RVW MEDS BY RX/DR IN RCRD: CPT | Mod: CPTII,S$GLB,, | Performed by: ORTHOPAEDIC SURGERY

## 2022-11-09 PROCEDURE — 1160F PR REVIEW ALL MEDS BY PRESCRIBER/CLIN PHARMACIST DOCUMENTED: ICD-10-PCS | Mod: CPTII,S$GLB,, | Performed by: ORTHOPAEDIC SURGERY

## 2022-11-09 PROCEDURE — 3074F SYST BP LT 130 MM HG: CPT | Mod: CPTII,S$GLB,, | Performed by: ORTHOPAEDIC SURGERY

## 2022-11-09 PROCEDURE — 76882 US LMTD JT/FCL EVL NVASC XTR: CPT | Mod: S$GLB,,, | Performed by: ORTHOPAEDIC SURGERY

## 2022-11-09 RX ORDER — MELOXICAM 15 MG/1
15 TABLET ORAL DAILY
Qty: 30 TABLET | Refills: 0 | Status: SHIPPED | OUTPATIENT
Start: 2022-11-09 | End: 2023-10-23 | Stop reason: CLARIF

## 2022-11-09 NOTE — PROGRESS NOTES
CC: right shoulder pain    51 y.o. Female presents today for evaluation of her right shoulder pain. She admits to right shoulder pain beginning in 2022 that she attributes to a fall on her shoulder while hiking. She gestures to the deltoid and trapezius muscles when asked where she hurts. She admits to this problem waking her from her sleep.  How long: 2022  What makes it better:  Chiropractic treatments  What makes it worse: Painting, laying on her right side  Does it radiate: Denies  Attempted treatments: Admits to applying ice and seeking chiropractic treatments which she states have been helpful for her  Pain score: 4/10  Any mechanical symptoms: Denies  Feelings of instability: Denies   Affecting ADLs: Admits to this problem affecting her ability to perform ADLs    PAST MEDICAL HISTORY:   Past Medical History:   Diagnosis Date    Abdominal bloating 2019    GERD (gastroesophageal reflux disease)     Liver lesion 1/3/2019       PAST SURGICAL HISTORY:   Past Surgical History:   Procedure Laterality Date    BREAST CYST EXCISION Left     18 y/o f     SECTION      CHOLECYSTECTOMY      COLONOSCOPY N/A 2017    Procedure: COLONOSCOPY;  Surgeon: Andrews Sears MD;  Location: [address]);  Service: Endoscopy;  Laterality: N/A;    COLONOSCOPY N/A 2022    Procedure: COLONOSCOPY;  Surgeon: Andrews Sears MD;  Location: [address]);  Service: Endoscopy;  Laterality: N/A;  Fully Vaccinated.EC    CYSTOSCOPY N/A 2019    Procedure: CYSTOSCOPY;  Surgeon: Debbie Murphy MD;  Location[facility];  Service: OB/GYN;  Laterality: N/A;    HYSTEROSCOPY N/A 2019    Procedure: HYSTEROSCOPY-removal of IUD; possible shaving of uterine fibroids if needed to insert new IUD;  Surgeon: Debbie Murphy MD;  Location: [facility];  Service: OB/GYN;  Laterality: N/A;    INTRAUTERINE DEVICE INSERTION N/A 2019    Procedure: IUD insertion-- mirena-- would prefer kyleena if  available;  Surgeon: Debbie Murphy MD;  Location: [facility];  Service: OB/GYN;  Laterality: N/A;       FAMILY HISTORY:   Family History   Problem Relation Age of Onset    Heart disease Father         MI around 50.    Hyperlipidemia Father     Parkinsonism Father     Cancer Mother 73        colon ca    Hypertension Mother     Breast cancer Sister 48        breast    Other Sister     No Known Problems Brother     Other Daughter         TBI with left hemiparesis    No Known Problems Son     Cirrhosis Maternal Grandfather     Alcohol abuse Maternal Grandfather     Alzheimer's disease Paternal Grandmother     Parkinsonism Paternal Grandfather     No Known Problems Son     Colon cancer Neg Hx        SOCIAL HISTORY:   Social History     Socioeconomic History    Marital status:      Spouse name: Dennis    Number of children: 3   Tobacco Use    Smoking status: Never    Smokeless tobacco: Never   Substance and Sexual Activity    Alcohol use: Yes     Alcohol/week: 1.0 standard drink     Types: 1 Glasses of wine per week     Comment: amt varies     Drug use: No    Sexual activity: Yes     Partners: Male   Social History Narrative    From  [place]     Moved to [place] [id number]    Exercising - runs,  wts, yoga, walks.        Kids 22, 20, 18.          Retired  and .      - .           MEDICATIONS:     Current Outpatient Medications:     ascorbic acid, vitamin C, (VITAMIN C) 1000 MG tablet, Take 1,000 mg by mouth once daily., Disp: , Rfl:     b complex vitamins capsule, Take 1 capsule by mouth once daily., Disp: , Rfl:     collagen, bovine, 100 % Powd, Apply topically., Disp: , Rfl:     fluticasone propionate (FLONASE) 50 mcg/actuation nasal spray, 2 sprays (100 mcg total) by Each Nostril route once daily., Disp: 15.8 mL, Rfl: 0    INV TESTOSTERONE/ANASTROZOL OR PLACEBO PELLETS, Inject 1 Pellet into the skin. FOR INVESTIGATIONAL USE ONLY, Disp: , Rfl:     meloxicam (MOBIC) 15 MG  "tablet, Take 1 tablet (15 mg total) by mouth once daily., Disp: 30 tablet, Rfl: 0    progesterone (PROMETRIUM) 200 MG capsule, TAKE ONE CAPSULE BY MOUTH DAYS 1-12 EVERY NIGHT AT BEDTIME, Disp: , Rfl:     valACYclovir (VALTREX) 1000 MG tablet, daily as needed. , Disp: , Rfl:     vitamin D (VITAMIN D3) 1000 units Tab, Take 1,000 Units by mouth once daily., Disp: , Rfl:     ALLERGIES:   Review of patient's allergies indicates:   Allergen Reactions    Percocet [oxycodone-acetaminophen] Nausea And Vomiting    Vicodin [hydrocodone-acetaminophen] Nausea And Vomiting     Pt not sure if she took percocet or Vicodin that caused severe nausea & vomiting        PHYSICAL EXAMINATION:  /81   Ht 5' 5" (1.651 m)   Wt 54.4 kg (120 lb)   BMI 19.97 kg/m²   Vitals signs and nursing note have been reviewed.  General: In no acute distress, well developed, well nourished, no diaphoresis  Eyes: EOM full and smooth, no eye redness or discharge  HENT: normocephalic and atraumatic, neck supple, trachea midline, no nasal discharge, no external ear redness or discharge  Cardiovascular: 2+ and symmetric radial bilaterally, no LE edema  Lungs: respirations non-labored, no conversational dyspnea   Abd: non-distended, no rigidity  MSK: no amputation or deformity, no swelling of extremities  Neuro: AAOx3, CN2-12 grossly intact  Skin: No rashes, warm and dry  Psychiatric: cooperative, pleasant, mood and affect appropriate for age    SHOULDER: RIGHT  The affected shoulder is compared to the contralateral shoulder.    Observation:    CERVICAL SPINE  Normal head carriage. Normal thoracic kyphosis.  Full AROM in flexion, extension, sidebending, and rotation.    SHOULDER  No ecchymosis, edema, or erythema throughout the shoulder girdle.  No sternal, clavicular, or acromial deformities bilaterally.  No atrophy of the pectorals, deltoids, supraspinatus, infraspinatus, or biceps bilaterally.  No asymmetry of shoulders bilaterally.    ROM:  Active " flexion to 180° on left and 170° on right.  Pain on the right  Active abduction to 180° on left and 170° on right.  Pain on the right  Active internal rotation to T7 on left and T10 on right.    Active external rotation to T4 on left and T1 on right.    No scapular dyskinesia or winging.    Tenderness:  No tenderness at the SC or AC joint  No tenderness over the clavicle   No tenderness over biceps tendon in the bicipital groove  + tenderness over subacromial space  + tenderness over the posterior humeral head    Strength Testing:  Deltoid - 5/5 on left and 5/5 on right  Biceps - 5/5 on left and 5/5 on right  Triceps - 5/5 on left and 5/5 on right  Wrist extension - 5/5 on left and 5/5 on right  Wrist flexion - 5/5 on left and 5/5 on right   - 5/5 on left and 5/5 on right  Finger extension - 5/5 on left and 5/5 on right  Finger abduction - 5/5 on left and 5/5 on right    Special Tests:  Empty can test - negative  Full can test - negative  Bear hug test - negative  Belly press test - negative  Resisted internal rotation - positive for pain  Resisted external rotation - negative    Neer's test - negative  Hawkin's-Lasha test - positive    OVins test - negative    Speed's test - negative  Yergason's test - negative    Sulcus sign - none  AP load and shift laxity - none  Anterior apprehension test - negative    Neurovascular Exam:  2+ radial pulses BL  Sensation intact to light touch in the distal median, radial, and ulnar nerve distributions bilaterally.  Spurlings test - negative  Lhermittes test - negative  Capillary refill intact <2 seconds in all digits bilaterally      IMAGIN. X-ray obtained 2022 due to right shoulder pain   2. X-ray images were reviewed personally by me and then directly with patient.  3. FINDINGS: X-ray images obtained demonstrate no fracture or dislocation, joint spaces maintained, soft tissue calcification adjacent to the humeral head.  4. IMPRESSION: As above.     1. MRI  obtained 10/08/2022 due to right shoulder pain   2. MRI images were reviewed personally by me and then directly with patient.  3. FINDINGS: MRI images obtained demonstrate subscapularis calcific tendinitis, AC joint arthrosis.  4. IMPRESSION: As above.     DIAGNOSTIC ULTRASOUND FOCUSED:  Performed on 11/09/2022  1. Diagnostic Extremity - MSK-Sports Ultrasound was recommended due to chronic right shoulder pain calcification seen on x-ray.  2. Diagnostic Extremity - MSK-Sports Ultrasound Performed: [facility]onso Extremity Study: right shoulder.  TECHNIQUE: Real time ultrasound examination of the right shoulder was performed with SonAlex and Ani Edge 2, 9-L MHz linear probe(s).   FINDINGS: The images are of adequate diagnostic quality with identification of all echogenic structures made except for the vascular structures unless otherwise noted. There is no sonographic evidence of periosteal abnormalities, soft tissue edema, or nerve irregularities.   The biceps tendon is intact and is located in the bicipital groove.  No fluid surrounding the biceps tendon.  The subscapularis tendon was visualized in long and short axis. There is a large calcification measuring 1 cm x 0.5 cm x 2.6 cm at the medial aspect of the tendon consistent with calcific tendinitis.  Otherwise, the tendon is intact and without other signs of tendinopathy/tear.  The supraspinatus and infraspinatus tendons are visualized in long and short axis.  There is no thinning of either tendon and no interstitial tearing is appreciated.  There are small cortical irregularities at the infraspinatus attachment consistent with tendinopathy.  There is no glenohumeral joint abnormalities or joint effusion.  No abnormalities at the suprascapular notch.  The rest of the sonographic examination was unremarkable.  Ultrasound images were directly reviewed with the patient and then a treatment plan was discussed.  IMPRESSION:  Large calcification in the subscapularis tendon  confirming calcific tendinitis.  The positioning of the calcification is favorable for either a barbotage or TenJet procedure.      ASSESSMENT:      ICD-10-CM ICD-9-CM   1. Right shoulder pain, unspecified chronicity  M25.511 719.41   2. Calcific tendonitis of right shoulder  M75.31 726.11         PLAN:  1-2.  Right shoulder pain/calcific tendinitis - new to provider    - Louise admits to right shoulder pain beginning in August of 2022 that she attributes to a fall while hiking. She is a  and appreciates pain with this activity and with laying on her shoulder.     - XRs obtained 08/29/2022 and MRI obtained 10/08/2022 and images were personally reviewed with the patient. See above for further detail.    - We discussed better assessing the position and measurements of the calcification using diagnostic ultrasound.  See above for further detail.    - We discussed options for treatment of calcific tendinitis including barbotage and TenJet.  We also discussed a trial physical therapy to see if strengthening and stabilizing around that area provides her with the pain relief she is looking for as she is currently hesitant on going through with the procedure at this time prior to trying other things.  I think this is reasonable and advised her to follow up with me if further discussion is desired or to help navigate either procedure.    - Physical therapy referral has been placed.    - Mobic 15 mg daily for 2 weeks followed by as needed.  I am hopeful that this helps to get her started with PT.      Future planning includes - barbotage or TenJet for the calcification pending response to physical therapy    All questions were answered to the best of my ability and all concerns were addressed at this time.    Follow up after physical therapy.      This note is dictated using the M*Modal Fluency Direct word recognition program. There are word recognition mistakes that are occasionally missed on review.      Total time  spent face-to face with patient counseling or coordinating care including prognosis, differential diagnosis, risks and benefits of treatment, instructions, compliance risk reductions as well as non-face-to-face time personally spent reviewing medial record, medical documentation, and coordination of care.     EST MINUTES X   [id number] 10-19    [id number] 20-29    [id number] 30-39 X   [id number] 40-54    NEW     07650 15-29    [id number] 30-44    [id number] 45-59    86203 60-74    PHONE      5-10    [id number] 11-20    [id number] 21-30

## 2022-11-10 NOTE — PROGRESS NOTES
DIAGNOSTIC ULTRASOUND FOCUSED:  Performed on 11/09/2022  1. Diagnostic Extremity - [facility]-Sports Ultrasound was recommended due to chronic right shoulder pain calcification seen on x-ray.  2. Diagnostic Extremity - [facility]-Sports Ultrasound Performed: Thomas Alonso Extremity Study: right shoulder.    TECHNIQUE: Real time ultrasound examination of the right shoulder was performed with SonoSite Edge 2, 9-L MHz linear probe(s).     FINDINGS: The images are of adequate diagnostic quality with identification of all echogenic structures made except for the vascular structures unless otherwise noted. There is no sonographic evidence of periosteal abnormalities, soft tissue edema, or nerve irregularities.     The biceps tendon is intact and is located in the bicipital groove.  No fluid surrounding the biceps tendon.    The subscapularis tendon was visualized in long and short axis. There is a large calcification measuring 1 cm x 0.5 cm x 2.6 cm at the medial aspect of the tendon consistent with calcific tendinitis.  Otherwise, the tendon is intact and without other signs of tendinopathy/tear.    The supraspinatus and infraspinatus tendons are visualized in long and short axis.  There is no thinning of either tendon and no interstitial tearing is appreciated.  There are small cortical irregularities at the infraspinatus attachment consistent with tendinopathy.    There is no glenohumeral joint abnormalities or joint effusion.  No abnormalities at the suprascapular notch.    The rest of the sonographic examination was unremarkable.    Ultrasound images were directly reviewed with the patient and then a treatment plan was discussed.    IMPRESSION:  Large calcification in the subscapularis tendon confirming calcific tendinitis.  The positioning of the calcification is favorable for either a barbotage or TenJet procedure.

## 2022-11-11 ENCOUNTER — CLINICAL SUPPORT (OUTPATIENT)
Dept: REHABILITATION | Facility: OTHER | Age: 52
End: 2022-11-11
Payer: COMMERCIAL

## 2022-11-11 DIAGNOSIS — M75.31 CALCIFIC TENDONITIS OF RIGHT SHOULDER: ICD-10-CM

## 2022-11-11 DIAGNOSIS — M25.511 RIGHT SHOULDER PAIN, UNSPECIFIED CHRONICITY: ICD-10-CM

## 2022-11-11 PROCEDURE — 97161 PT EVAL LOW COMPLEX 20 MIN: CPT | Mod: PN

## 2022-11-11 NOTE — PROGRESS NOTES
[facility]  Physical Therapy Initial Evaluation    Name: Louise Cueto  Clinic Number: [id number]    Therapy Diagnosis:   Encounter Diagnoses   Name Primary?    Right shoulder pain, unspecified chronicity     Calcific tendonitis of right shoulder      Physician: Thomas Alonso DO    Physician Orders: Physical Therapy Evaluate and Treat  Medical Diagnosis from Referral: right shoulder pain  Evaluation Date: 2022  Authorization Period Expiration: 22  Plan of Care Expiration: 2022 to 23  Visit # / Visits authorized:  (pending additional authorization following initial evaluation)     Time In: 310p  Time Out: 400p  Total Billable Time: 50 minutes    Precautions: standard    Subjective     History of current condition - Louise reports: fell while hiking 2022 onto right shoulder.    Antiinflammatories seem to help    Dry needling helps             Medical History:   Past Medical History:   Diagnosis Date    Abdominal bloating 2019    GERD (gastroesophageal reflux disease)     Liver lesion 1/3/2019       Surgical History:   Louise Cueto  has a past surgical history that includes  section; Cholecystectomy; Colonoscopy (N/A, 2017); Breast cyst excision (Left); Hysteroscopy (N/A, 2019); Intrauterine device insertion (N/A, 2019); Cystoscopy (N/A, 2019); and Colonoscopy (N/A, 2022).    Medications:   Louise has a current medication list which includes the following prescription(s): ascorbic acid (vitamin c), b complex vitamins, collagen (bovine), INV TESTOSTERONE/ANASTROZOL OR PLACEBO PELLETS, meloxicam, progesterone, valacyclovir, and vitamin d.    Allergies:   Review of patient's allergies indicates:   Allergen Reactions    Percocet [oxycodone-acetaminophen] Nausea And Vomiting    Vicodin [hydrocodone-acetaminophen] Nausea And Vomiting     Pt not sure if she took percocet or Vicodin that caused severe nausea & vomiting         Imaging: MRI revealed calcification at subscapularis     Prior Therapy: Chiropractic helped right elbow and shoulder pain  Social History: 52 yo female lives with family  Occupation: painting  Prior Level of Function: Ind with recreational activity (yoga) and work tasks - painting  Current Level of Function: limited with     Pain:  Current 1/10, worst 4/10, best 1/10   Location: right shoulder   Description: Aching  Aggravating Factors: overhead movements, lying on that side  Easing Factors: anti-inflammatories    Pts goals: Pt would like to return to PLOF with no increase in right shoulder pain.    Objective     WNL=within normal limits  WFL=within functional limits  NT=not tested  *=pain    Posture: forward head, rounded shoulders, right shoulder protraction  Palpation: tenderness to palpation at right   Sensation: intact bilaterally  Deep tendon reflexes: NT        Cervical ROM:  Cervical Right   Flexion WNL   Extension WNL   Right rotation WNL   Left rotation WNL     Cervical Cluster:   Left  Right    Spurling's NT NT   Distraction NT NT   ULTT A (median, anterior interosseous, C5-7) (--) (--)   ULTT B  (median, axillary, musculocutaneous) (--) (--)   Ipsilateral Rotation <60 degrees (--) (--)       Active Range of Motion:   Shoulder Right Left   Flexion 130 ** with pain 160   Abduction 90 ** with pain 170   ER  90 90   IR 75 ** with pain 85       Upper Extremity Strength:  Right UE   Left UE     Shoulder ABD: C5 4/5 Shoulder ABD: C5 5/5   Shoulder ER: C5 4/5 Shoulder ER: C5 5/5   Shoulder IR: C6 4/5 Shoulder IR: C6 5/5    Wrist Ext: C6 4/5 Wrist Ext: C6 5/5   Elbow Ext: C7 4+/5 Elbow Ext: C7 5/5   Wrist Flex: C7 4/5 Wrist Flex: C7 5/5   Thumb Ext: C8 NT Thumb Ext: C8 NT   Opposition: C8-T1 5/5 Opposition: C8-T1 5/5   Finger ABD:  T1 5/5 Finger ABD:  T1 5/5       Special Tests:   Right Left   AC Joint Compression Test NT NT   Painful arc (+) (--)   Drop Arm test (--) (--)   Subscaputlaris Lift Off (+) (--)    Clunk test (--) (--)   O'lety's test NT NT   Hawkin's Kenndy (+) (--)   Speed's test (--) (--)   Anterior Apprehension test NT NT   Relocation test NT NT   Posterior Apprehension test NT NT   Sulcus Sign NT NT           CMS Impairment/Limitation/Restriction for FOTO Survey    Therapist reviewed FOTO scores for Louise Cueto on 11/11/2022.   FOTO documents entered into [employer] - see Media section.    Limitation Score: --%  Predicted Goal: --%    Category: Mobility     TREATMENT         Home Exercises and Patient Education Provided:    Education provided:   - Findings; prognosis and plan of care (POC)  - Home exercise program (HEP)  - Modality options  - Therapist contact information    Written Home Exercises Provided: Yes  Exercises were reviewed and Louise was able to demonstrate them prior to the end of the session.  Louise demonstrated good understanding of the education provided.       Assessment     Louise is a 51 y.o. female referred to outpatient Physical Therapy with a medical diagnosis of right shoulder pain. Pt presents to PT with pain, decreased right shoulder ROM, decreased strength and flexibility, poor posture, and functional deficits with right upper extremity use. These deficits are negatively impacting this patient's ability to complete their work duties and activities of daily living.     Pt prognosis is Good.   Pt will benefit from skilled outpatient Physical Therapy to address the deficits stated above and in the chart below, provide pt/family education, and to maximize pt's level of independence.     Plan of care discussed with patient: Yes  Pt's spiritual, cultural and educational needs considered and pt agreeable to plan of care and goals as stated below:     Anticipated Barriers for therapy: None      Medical Necessity is demonstrated by the following  History  Co-morbidities and personal factors that may impact the plan of care Co-morbidities:   NA    Personal Factors:   no deficits     low    Examination  Body Structures and Functions, activity limitations and participation restrictions that may impact the plan of care Body Regions:   upper extremities    Body Systems:    ROM  strength  gross coordinated movement  motor control    Participation Restrictions:   Walking    Activity limitations:   Learning and applying knowledge  no deficits    General Tasks and Commands  No Deficits    Communication  No Deficits    Mobility  lifting and carrying objects    Self care  washing oneself (bathing, drying, washing hands)  caring for body parts (brushing teeth, shaving, grooming)    Domestic Life  No Deficits    Interactions/Relationships  No Deficits    Life Areas  No Deficits    Community and Social Life  No Deficits         low   Clinical Presentation stable and uncomplicated low   Decision Making/ Complexity Score: low     GOALS:  Short Term Goals (4 Weeks):   1. Patient will be compliant with home exercise program to assist therapy in restoring pain free motion of the shoulder. (Progressing, not met)  2. Patient will improve impaired shoulder manual muscle tests 1/2 grade bilaterally to improve strength for functional tasks.(Progressing, not met)  3. Patient will improve right shoulder flexion >/= 20 degrees to improve functional mobility of upper extremities.(Progressing, not met)  4. Patient will improve right shoulder abduction >/= 20 degrees to improve functional mobility of upper extremities. (Progressing, not met)     Long Term Goals (8 Weeks):   1. Patient will improve FOTO score by 10% to demonstrate improvements in carrying, moving, and handling objects (Progressing, not met)  2. Patient will improve impaired shoulder manual muscle tests 1 grade bilaterally to improve strength for household duties. (Progressing, not met)  3. Patient will improve right shoulder flexion to >/= 160 degrees to improve functional mobility of upper extremities. (Progressing, not met)  4. Patient will improve right  shoulder abduction to >/= 160 degrees to improve functional mobility of upper extremities. (Progressing, not met)  5. 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 (Progressing, not met)  6. Patient will return to all recreational activity without pain or limitation.  (Progressing, not met)       Plan     Plan of care Certification: 11/11/2022 to 1/11/23    Outpatient Physical Therapy 2 times weekly for 8 weeks to include the following interventions: Therapeutic Exercises, Manual Therapeutic Technique, Neuromuscular Re Education, Therapeutic Activities. Modalities, Kinesiotape prn, and Functional Dry Needling as needed.    Jassi Capellan, PT,  DPT, OCS

## 2022-11-15 ENCOUNTER — IMMUNIZATION (OUTPATIENT)
Dept: PHARMACY | Facility: CLINIC | Age: 52
End: 2022-11-15
Payer: COMMERCIAL

## 2022-11-17 ENCOUNTER — CLINICAL SUPPORT (OUTPATIENT)
Dept: REHABILITATION | Facility: OTHER | Age: 52
End: 2022-11-17
Payer: COMMERCIAL

## 2022-11-17 DIAGNOSIS — M25.511 CHRONIC RIGHT SHOULDER PAIN: ICD-10-CM

## 2022-11-17 DIAGNOSIS — G89.29 CHRONIC RIGHT SHOULDER PAIN: ICD-10-CM

## 2022-11-17 PROCEDURE — 97530 THERAPEUTIC ACTIVITIES: CPT | Mod: PN

## 2022-11-17 PROCEDURE — 97140 MANUAL THERAPY 1/> REGIONS: CPT | Mod: PN

## 2022-11-17 PROCEDURE — 97110 THERAPEUTIC EXERCISES: CPT | Mod: PN

## 2022-11-17 NOTE — PROGRESS NOTES
[facility]   Physical Therapy Treatment Note     Name: Louise Cueto  Clinic Number: [id number]    Therapy Diagnosis:   Encounter Diagnosis   Name Primary?    Chronic right shoulder pain      Physician: Thomas Alonso DO    Visit Date: 11/17/2022    Physician Orders: PT Evaluate and Treat  Medical Diagnosis: R Shoulder Pain  Evaluation Date: 11/11/22  Authorization Period Expiration: 12/31/22  Plan of Care Certification Period: 11/11/22- 1/11/23  Progress Note Due:   12/11/22  Visit # / Visits authorized: 2/ 12   FOTO: 1/ 3       Precautions: Standard    Time In: 0800am  Time Out: 0900am  Total Billable Time: 60 minutes      SUBJECTIVE     Pt reports: that she is doing well today. Pt states that the anti inflammatory medication has been extremely helpful in decreasing her pain. Pt states that her evaluating therapist emailed her exercises and she has been completing them daily.   She was compliant with home exercise program.  Response to previous treatment: Decreased shoulder pain  Functional change: continued difficulty with overhead motions and pain    Pain: 2/10  Location: left shoulder      OBJECTIVE     Objective Measures updated at progress report unless specified.       TREATMENT     Total Treatment time (time-based codes) separate from Evaluation: 60 minutes     Louise received the treatments listed below:      Patient received therapeutic exercises for 30 minutes for improved strength and AROM including:  +Supine ABC's vs 2#  2 sets in pain free range  +Serratus Wall Slides with no resistance (pillow case on forearms) 30x   +Seated scapular squeeze with 1/2 foam roller 30x  (Initiate prone Y, T, I as tolerated next visit)      Patient received manual therapeutic technique for 15 minutes for improved soft tissue and joint mobility including:  Gr I/II joint oscillations R shoulder for pain relief  R scapular mobilization into upward rotation for improved overhead  motion      Patient received neuromuscular reeducation for 0 minutes for improved proprioception and balance including:      Patient received therapeutic activities for 15 minutes for improved tolerance to functional activities including:  +UBE 2min forward/2 min backward following R shoulder mobilization  +Ball on wall circles at 90 degrees shoulder flexion for improved endurance 30x CW/ 30x CCW       PATIENT EDUCATION AND HOME EXERCISES     Home Exercises Provided and Patient Education Provided     Education provided:   PT educated pt on importance of compliance with their HEP this visit.     Written Home Exercises Provided: Patient instructed to cont prior HEP. Exercises were reviewed and Louise was able to demonstrate them prior to the end of the session.  Louise demonstrated good  understanding of the education provided. See EMR under Patient Instructions for exercises provided during therapy sessions    ASSESSMENT   Pt tolerated tx session well today and completed all therapeutic exercises with minimal/no increase in right shoulder pain. 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 Pt is traveling to [place] for the holidays and plans to continue performing her HEP while she is away.    Louise Is progressing well towards her goals.   Pt prognosis is Good.     Pt will continue to benefit from skilled outpatient physical therapy to address the deficits listed in the problem list box on initial evaluation, provide pt/family education and to maximize pt's level of independence in the home and community environment.     Pt's spiritual, cultural and educational needs considered and pt agreeable to plan of care and goals.     Anticipated barriers to physical therapy: None    Goals:   Short Term Goals (4 Weeks):   1. Patient will be compliant with home exercise program to assist therapy in restoring pain free motion of the shoulder.  (Progressing, not met)  2. Patient will improve impaired shoulder manual muscle tests 1/2 grade bilaterally to improve strength for functional tasks.(Progressing, not met)  3. Patient will improve right shoulder flexion >/= 20 degrees to improve functional mobility of upper extremities.(Progressing, not met)  4. Patient will improve right shoulder abduction >/= 20 degrees to improve functional mobility of upper extremities. (Progressing, not met)     Long Term Goals (8 Weeks):   1. Patient will improve FOTO score by 10% to demonstrate improvements in carrying, moving, and handling objects (Progressing, not met)  2. Patient will improve impaired shoulder manual muscle tests 1 grade bilaterally to improve strength for household duties. (Progressing, not met)  3. Patient will improve right shoulder flexion to >/= 160 degrees to improve functional mobility of upper extremities. (Progressing, not met)  4. Patient will improve right shoulder abduction to >/= 160 degrees to improve functional mobility of upper extremities. (Progressing, not met)  5. 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 (Progressing, not met)  6. Patient will return to all recreational activity without pain or limitation.  (Progressing, not met)     PLAN   Plan of care Certification: 11/11/2022 to 1/11/23     Outpatient Physical Therapy 2 times weekly for 8 weeks to include the following interventions: Therapeutic Exercises, Manual Therapeutic Technique, Neuromuscular Re Education, Therapeutic Activities. Modalities, Kinesiotape prn, and Functional Dry Needling as needed      Christie Wong, PT

## 2022-12-05 ENCOUNTER — CLINICAL SUPPORT (OUTPATIENT)
Dept: REHABILITATION | Facility: OTHER | Age: 52
End: 2022-12-05
Payer: COMMERCIAL

## 2022-12-05 DIAGNOSIS — G89.29 CHRONIC RIGHT SHOULDER PAIN: Primary | ICD-10-CM

## 2022-12-05 DIAGNOSIS — M25.511 CHRONIC RIGHT SHOULDER PAIN: Primary | ICD-10-CM

## 2022-12-05 PROCEDURE — 97140 MANUAL THERAPY 1/> REGIONS: CPT | Mod: PN

## 2022-12-05 PROCEDURE — 97110 THERAPEUTIC EXERCISES: CPT | Mod: PN

## 2022-12-05 PROCEDURE — 97530 THERAPEUTIC ACTIVITIES: CPT | Mod: PN

## 2022-12-05 NOTE — PROGRESS NOTES
"[facility]   Physical Therapy Treatment Note     Name: Louise Cueto  Clinic Number: [id number]    Therapy Diagnosis:   Encounter Diagnosis   Name Primary?    Chronic right shoulder pain Yes     Physician: Thomas Alonso DO    Visit Date: 12/5/2022    Physician Orders: PT Evaluate and Treat  Medical Diagnosis: R Shoulder Pain  Evaluation Date: 11/11/22  Authorization Period Expiration: 12/31/22  Plan of Care Certification Period: 11/11/22- 1/11/23  Progress Note Due:   12/11/22  Visit # / Visits authorized: 3 / 12   FOTO: 1/ 3       Precautions: Standard    Time In: 310am  Time Out: 0400pm  Total Billable Time: 45 minutes      SUBJECTIVE     Pt reports: that she is doing well today. Pt stated that she did some yoga earlier today, so her right shoulder is feeling "fatigued"  She was compliant with home exercise program.  Response to previous treatment: Decreased shoulder pain  Functional change: continued difficulty with overhead motions and pain    Pain: 0/10  Location: left shoulder      OBJECTIVE     Objective Measures updated at progress report unless specified.       TREATMENT     Total Treatment time (time-based codes) separate from Evaluation: 60 minutes     Louise received the treatments listed below:      Patient received therapeutic exercises for 15 minutes for improved strength and AROM including:  Supine ABC's vs 2#  2 sets in pain free range  Serratus Wall Slides with no resistance (pillow case on forearms) 30x   Seated scapular squeeze with 1/2 foam roller 30x  Prone scapula retraction  prone Y, T, I 2x10 each  Bilateral Standing median nerve glide x20       Patient received manual therapeutic technique for 15 minutes for improved soft tissue and joint mobility including:  Gr I/II joint oscillations R shoulder for pain relief  R scapular mobilization into upward rotation for improved overhead motion      Patient received neuromuscular reeducation for 0 minutes for improved " "proprioception and balance including:      Patient received therapeutic activities for 10 minutes for improved tolerance to functional activities including:  +UBE 2min forward/2 min backward following R shoulder mobilization  +Ball on wall circles at 90 degrees shoulder flexion for improved endurance 30x CW/ 30x CCW       PATIENT EDUCATION AND HOME EXERCISES     Home Exercises Provided and Patient Education Provided     Education provided:   PT educated pt on importance of compliance with their HEP this visit.     Written Home Exercises Provided: Patient instructed to cont prior HEP. Exercises were reviewed and Louise was able to demonstrate them prior to the end of the session.  Louise demonstrated good  understanding of the education provided. See EMR under Patient Instructions for exercises provided during therapy sessions    ASSESSMENT   Pt tolerated tx session well today and completed all therapeutic exercises with minimal/no increase in right shoulder pain. 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 Patient reported significant "fatigue" at right shoulder after therapeutic exercise, but denied any increased pain. Right shoulder was significantly more fatigued that left shoulder with therapeutic exercise. Patient stated that she feels like she could decreased the frequency of her physical therapy visits 2nd to time constraints, but she is comfortable doing her home exercise program independently. We will continue to progress education and right shoulder strengthening with a focus on stability with overhead movements.    Louise Is progressing well towards her goals.   Pt prognosis is Good.     Pt will continue to benefit from skilled outpatient physical therapy to address the deficits listed in the problem list box on initial evaluation, provide pt/family education and to maximize pt's level of independence in the home and community " environment.     Pt's spiritual, cultural and educational needs considered and pt agreeable to plan of care and goals.     Anticipated barriers to physical therapy: None    Goals:   Short Term Goals (4 Weeks):   1. Patient will be compliant with home exercise program to assist therapy in restoring pain free motion of the shoulder. (Progressing, not met)  2. Patient will improve impaired shoulder manual muscle tests 1/2 grade bilaterally to improve strength for functional tasks.(Progressing, not met)  3. Patient will improve right shoulder flexion >/= 20 degrees to improve functional mobility of upper extremities.(Progressing, not met)  4. Patient will improve right shoulder abduction >/= 20 degrees to improve functional mobility of upper extremities. (Progressing, not met)     Long Term Goals (8 Weeks):   1. Patient will improve FOTO score by 10% to demonstrate improvements in carrying, moving, and handling objects (Progressing, not met)  2. Patient will improve impaired shoulder manual muscle tests 1 grade bilaterally to improve strength for household duties. (Progressing, not met)  3. Patient will improve right shoulder flexion to >/= 160 degrees to improve functional mobility of upper extremities. (Progressing, not met)  4. Patient will improve right shoulder abduction to >/= 160 degrees to improve functional mobility of upper extremities. (Progressing, not met)  5. 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 (Progressing, not met)  6. Patient will return to all recreational activity without pain or limitation.  (Progressing, not met)     PLAN   Plan of care Certification: 11/11/2022 to 1/11/23     Outpatient Physical Therapy 2 times weekly for 8 weeks to include the following interventions: Therapeutic Exercises, Manual Therapeutic Technique, Neuromuscular Re Education, Therapeutic Activities. Modalities, Kinesiotape prn, and Functional Dry Needling as  needed      Jassi Capellan, PT

## 2022-12-12 ENCOUNTER — CLINICAL SUPPORT (OUTPATIENT)
Dept: REHABILITATION | Facility: OTHER | Age: 52
End: 2022-12-12
Payer: COMMERCIAL

## 2022-12-12 DIAGNOSIS — M25.511 CHRONIC RIGHT SHOULDER PAIN: Primary | ICD-10-CM

## 2022-12-12 DIAGNOSIS — G89.29 CHRONIC RIGHT SHOULDER PAIN: Primary | ICD-10-CM

## 2022-12-12 PROCEDURE — 97140 MANUAL THERAPY 1/> REGIONS: CPT | Mod: PN

## 2022-12-12 PROCEDURE — 97530 THERAPEUTIC ACTIVITIES: CPT | Mod: PN

## 2022-12-12 PROCEDURE — 97110 THERAPEUTIC EXERCISES: CPT | Mod: PN

## 2022-12-12 NOTE — PROGRESS NOTES
[facility]   Physical Therapy Treatment Note     Name: Louise Cueto  Clinic Number: [id number]    Therapy Diagnosis:   Encounter Diagnosis   Name Primary?    Chronic right shoulder pain Yes       Physician: Thomas Alonso DO    Visit Date: 12/12/2022    Physician Orders: PT Evaluate and Treat  Medical Diagnosis: R Shoulder Pain  Evaluation Date: 11/11/22  Authorization Period Expiration: 12/31/22  Plan of Care Certification Period: 11/11/22- 1/11/23  Progress Note Due:   12/11/22  Visit # / Visits authorized: 4 / 12   FOTO: 1/ 3       Precautions: Standard    Time In: 310am  Time Out: 0400pm  Total Billable Time: 50 minutes      SUBJECTIVE     Pt reports: that she is doing well today. She denies any pain today.  She was compliant with home exercise program.  Response to previous treatment: Decreased shoulder pain  Functional change: continued difficulty with overhead motions and pain    Pain: 0/10  Location: left shoulder      OBJECTIVE     Objective Measures updated at progress report unless specified.       TREATMENT     Total Treatment time (time-based codes) separate from Evaluation: 60 minutes     Louise received the treatments listed below:      Patient received therapeutic exercises for 15 minutes for improved strength and AROM including:  Supine ABC's vs 2#  2 sets in pain free range  Serratus Wall Slides with no resistance (pillow case on forearms) 30x   Seated scapular squeeze with 1/2 foam roller 30x  Prone scapula retraction  prone Y, T, I 2x10 each  Bilateral Standing median nerve glide x20       Patient received manual therapeutic technique for 15 minutes for improved soft tissue and joint mobility including:  Gr I/II joint oscillations R shoulder for pain relief  R scapular mobilization into upward rotation for improved overhead motion      Patient received neuromuscular reeducation for 0 minutes for improved proprioception and balance including:      Patient received  therapeutic activities for 10 minutes for improved tolerance to functional activities including:  +UBE 2min forward/2 min backward following R shoulder mobilization  +Ball on wall circles at 90 degrees shoulder flexion for improved endurance 30x CW/ 30x CCW       PATIENT EDUCATION AND HOME EXERCISES     Home Exercises Provided and Patient Education Provided     Education provided:   PT educated pt on importance of compliance with their HEP this visit.     Written Home Exercises Provided: Patient instructed to cont prior HEP. Exercises were reviewed and Louise was able to demonstrate them prior to the end of the session.  Louise demonstrated good  understanding of the education provided. See EMR under Patient Instructions for exercises provided during therapy sessions    ASSESSMENT   Pt tolerated tx session well today and completed all therapeutic exercises with minimal/no increase in right shoulder pain. 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 She continues to report increased right upper extremity fatigue with therapeutic exercise. We will continue to progress education and right shoulder strengthening with a focus on stability with overhead movements.    Louise Is progressing well towards her goals.   Pt prognosis is Good.     Pt will continue to benefit from skilled outpatient physical therapy to address the deficits listed in the problem list box on initial evaluation, provide pt/family education and to maximize pt's level of independence in the home and community environment.     Pt's spiritual, cultural and educational needs considered and pt agreeable to plan of care and goals.     Anticipated barriers to physical therapy: None    Goals:   Short Term Goals (4 Weeks):   1. Patient will be compliant with home exercise program to assist therapy in restoring pain free motion of the shoulder. (Progressing, not met)  2. Patient will  improve impaired shoulder manual muscle tests 1/2 grade bilaterally to improve strength for functional tasks.(Progressing, not met)  3. Patient will improve right shoulder flexion >/= 20 degrees to improve functional mobility of upper extremities.(Progressing, not met)  4. Patient will improve right shoulder abduction >/= 20 degrees to improve functional mobility of upper extremities. (Progressing, not met)     Long Term Goals (8 Weeks):   1. Patient will improve FOTO score by 10% to demonstrate improvements in carrying, moving, and handling objects (Progressing, not met)  2. Patient will improve impaired shoulder manual muscle tests 1 grade bilaterally to improve strength for household duties. (Progressing, not met)  3. Patient will improve right shoulder flexion to >/= 160 degrees to improve functional mobility of upper extremities. (Progressing, not met)  4. Patient will improve right shoulder abduction to >/= 160 degrees to improve functional mobility of upper extremities. (Progressing, not met)  5. 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 (Progressing, not met)  6. Patient will return to all recreational activity without pain or limitation.  (Progressing, not met)     PLAN   Plan of care Certification: 11/11/2022 to 1/11/23     Outpatient Physical Therapy 2 times weekly for 8 weeks to include the following interventions: Therapeutic Exercises, Manual Therapeutic Technique, Neuromuscular Re Education, Therapeutic Activities. Modalities, Kinesiotape prn, and Functional Dry Needling as needed      Jassi Capellan, PT

## 2022-12-14 PROBLEM — M25.511 RIGHT SHOULDER PAIN: Status: ACTIVE | Noted: 2022-12-14

## 2022-12-22 ENCOUNTER — PATIENT MESSAGE (OUTPATIENT)
Dept: INTERNAL MEDICINE | Facility: CLINIC | Age: 52
End: 2022-12-22
Payer: COMMERCIAL

## 2022-12-27 ENCOUNTER — PATIENT MESSAGE (OUTPATIENT)
Dept: INTERNAL MEDICINE | Facility: CLINIC | Age: 52
End: 2022-12-27
Payer: COMMERCIAL

## 2022-12-29 ENCOUNTER — CLINICAL SUPPORT (OUTPATIENT)
Dept: REHABILITATION | Facility: OTHER | Age: 52
End: 2022-12-29
Payer: COMMERCIAL

## 2022-12-29 DIAGNOSIS — G89.29 CHRONIC RIGHT SHOULDER PAIN: Primary | ICD-10-CM

## 2022-12-29 DIAGNOSIS — M25.511 CHRONIC RIGHT SHOULDER PAIN: Primary | ICD-10-CM

## 2022-12-29 PROCEDURE — 97140 MANUAL THERAPY 1/> REGIONS: CPT | Mod: PN,CQ

## 2022-12-29 PROCEDURE — 97530 THERAPEUTIC ACTIVITIES: CPT | Mod: PN,CQ

## 2022-12-29 PROCEDURE — 97110 THERAPEUTIC EXERCISES: CPT | Mod: PN,CQ

## 2022-12-29 NOTE — PROGRESS NOTES
"[facility]PAT[facility] THERAPY AND [facility]LL[facility]   Physical Therapy Treatment Note     Name: Louise Cueto  Clinic Number: [id number]    Therapy Diagnosis:   Encounter Diagnosis   Name Primary?    Chronic right shoulder pain Yes       Physician: Thomas Alonso DO    Visit Date: 12/29/2022    Physician Orders: PT Evaluate and Treat  Medical Diagnosis: R Shoulder Pain  Evaluation Date: 11/11/22  Authorization Period Expiration: 12/31/22  Plan of Care Certification Period: 11/11/22- 1/11/23  Progress Note Due:   12/11/22  Visit # / Visits authorized: 5 / 12   FOTO: 1/ 3       Precautions: Standard    Time In:  1500  Time Out: 1602  Total Billable Time: 62 minutes      SUBJECTIVE     Pt reports: she has had a minor flare-up as she has been spending more time cooking and painting. Feels a bit painful and sore today. She has been performing her HEP as directed.   She was compliant with home exercise program.  Response to previous treatment: Decreased shoulder pain  Functional change: continued difficulty with overhead motions and pain    Pain: 2/10  Location: left shoulder      OBJECTIVE     Objective Measures updated at progress report unless specified.       TREATMENT     Total Treatment time (time-based codes) separate from Evaluation: 60 minutes     Louise received the treatments listed below:      Patient received therapeutic exercises for 32 minutes for improved strength and AROM including:  Supine ABC's vs 2#  2 sets in pain free range  Serratus Wall Slides with black bolster, 20x   Seated scapular squeeze with 1/2 foam roller 30x  Prone scapula retraction  prone Y, T, I 2x10 each  +Rows, red band, 3x10  +Shld extension, red band,  3x10  +Seated R UT stretch with belt, 3x30"  +R UL doorway stretch, 3x30"  Bilateral Standing median nerve glide x20       Patient received manual therapeutic technique for 10 minutes for improved soft tissue and joint mobility including:  Gr I/II joint oscillations R shoulder for pain relief  R " scapular mobilization into upward rotation for improved overhead motion  MFR to R UT      Patient received neuromuscular reeducation for 0 minutes for improved proprioception and balance including:      Patient received therapeutic activities for 20 minutes for improved tolerance to functional activities including:  UBE 3 min forward/3 min backward following R shoulder mobilization  Ball on wall circles at 90 degrees shoulder flexion for improved endurance 30x CW/ 30x CCW   +Slouch corrections, red band, 2x10  +Walking pull aparts, red band, 40ftx2      PATIENT EDUCATION AND HOME EXERCISES     Home Exercises Provided and Patient Education Provided     Education provided:   PT educated pt on importance of compliance with their HEP this visit.     Written Home Exercises Provided: Patient instructed to cont prior HEP. Exercises were reviewed and Louise was able to demonstrate them prior to the end of the session.  Louise demonstrated good  understanding of the education provided. See EMR under Patient Instructions for exercises provided during therapy sessions    ASSESSMENT   Fair tolerance with there-ex this visit. Increase in pain/symptoms appeared activity related based on subjective. Continued with postural re-education and periscapular strengthening. Tightness in R UT was noted with MFR. Pt education regarding UT stretches for HEP.     Louise Is progressing well towards her goals.   Pt prognosis is Good.     Pt will continue to benefit from skilled outpatient physical therapy to address the deficits listed in the problem list box on initial evaluation, provide pt/family education and to maximize pt's level of independence in the home and community environment.     Pt's spiritual, cultural and educational needs considered and pt agreeable to plan of care and goals.     Anticipated barriers to physical therapy: None    Goals:   Short Term Goals (4 Weeks):   1. Patient will be compliant with home exercise program to  assist therapy in restoring pain free motion of the shoulder. (Progressing, not met)  2. Patient will improve impaired shoulder manual muscle tests 1/2 grade bilaterally to improve strength for functional tasks.(Progressing, not met)  3. Patient will improve right shoulder flexion >/= 20 degrees to improve functional mobility of upper extremities.(Progressing, not met)  4. Patient will improve right shoulder abduction >/= 20 degrees to improve functional mobility of upper extremities. (Progressing, not met)     Long Term Goals (8 Weeks):   1. Patient will improve FOTO score by 10% to demonstrate improvements in carrying, moving, and handling objects (Progressing, not met)  2. Patient will improve impaired shoulder manual muscle tests 1 grade bilaterally to improve strength for household duties. (Progressing, not met)  3. Patient will improve right shoulder flexion to >/= 160 degrees to improve functional mobility of upper extremities. (Progressing, not met)  4. Patient will improve right shoulder abduction to >/= 160 degrees to improve functional mobility of upper extremities. (Progressing, not met)  5. 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 (Progressing, not met)  6. Patient will return to all recreational activity without pain or limitation.  (Progressing, not met)     PLAN   Plan of care Certification: 11/11/2022 to 1/11/23     Outpatient Physical Therapy 2 times weekly for 8 weeks to include the following interventions: Therapeutic Exercises, Manual Therapeutic Technique, Neuromuscular Re Education, Therapeutic Activities. Modalities, Kinesiotape prn, and Functional Dry Needling as needed      Gregory Riggs, PTA

## 2023-01-04 ENCOUNTER — PATIENT MESSAGE (OUTPATIENT)
Dept: REHABILITATION | Facility: OTHER | Age: 53
End: 2023-01-04

## 2023-01-04 ENCOUNTER — DOCUMENTATION ONLY (OUTPATIENT)
Dept: REHABILITATION | Facility: OTHER | Age: 53
End: 2023-01-04

## 2023-01-06 NOTE — PROGRESS NOTES
"  Date:  1/10/2023    ?  Referring Provider:   Rhonda Albert II, MD    Copies of Letters to the Following:   Rhonda Albert II, MD    Chief Complaint:  I saw Louise Cueto at the [facility] for neuro-ophthalmic evaluation.   She is a 52 y.o. female with a history of myopia OD, hyperopia OS, presbyopia OU, who presents for evaluation of esotropia and diplopia.     History:       51 y/o female presents to clinic for Neuro-ophthalmic evaluation for   history of Esotropia ET OD. Pt reports of gradually horizontal diplopia only at distance started 4 years ago, when siblings saw RT "eye wandering". Diplopia   resolves when covering one eye. She wears prisms glasses with 12 base out   RT eye. Glasses do help with diplopia, but now with breakthrough diplopia and frustration with cosmetic appearance. No headaches,migraines, floaters,   flashes of light reported today. No ocular sx/procedures reported. No FMHX   of strabismus.       No history of lazy eye, patching, surgery. Very nearsighted, started wearing glasses in the 7th grade.     Eyemeds  No gtts    Currently with 12 PD WU for esotropia.    8/2022 Eloise    ?  Current Outpatient Medications   Medication Sig Dispense Refill    ascorbic acid, vitamin C, (VITAMIN C) 1000 MG tablet Take 1,000 mg by mouth once daily.      b complex vitamins capsule Take 1 capsule by mouth once daily.      collagen, bovine, 100 % Powd Apply topically.      flu vacc [id number] 6mos up,PF, (FLUARIX QUAD [id number], PF,) 60 mcg (15 mcg x 4)/0.5 mL Syrg inject into arm 0.5 mL 0    INV TESTOSTERONE/ANASTROZOL OR PLACEBO PELLETS Inject 1 Pellet into the skin. FOR INVESTIGATIONAL USE ONLY      meloxicam (MOBIC) 15 MG tablet Take 1 tablet (15 mg total) by mouth once daily. 30 tablet 0    progesterone (PROMETRIUM) 200 MG capsule TAKE ONE CAPSULE BY MOUTH DAYS 1-12 EVERY NIGHT AT BEDTIME      valACYclovir (VALTREX) 1000 MG tablet daily as needed.       vitamin D (VITAMIN D3) 1000 units " Tab Take 1,000 Units by mouth once daily.       No current facility-administered medications for this visit.     Review of patient's allergies indicates:   Allergen Reactions    Percocet [oxycodone-acetaminophen] Nausea And Vomiting    Vicodin [hydrocodone-acetaminophen] Nausea And Vomiting     Pt not sure if she took percocet or Vicodin that caused severe nausea & vomiting     Past Medical History:   Diagnosis Date    Abdominal bloating 2019    GERD (gastroesophageal reflux disease)     Liver lesion 1/3/2019     Past Surgical History:   Procedure Laterality Date    BREAST CYST EXCISION Left     18 y/o f     SECTION      CHOLECYSTECTOMY      COLONOSCOPY N/A 2017    Procedure: COLONOSCOPY;  Surgeon: Andrews Sears MD;  [facility]  Service: Endoscopy;  Laterality: N/A;    COLONOSCOPY N/A 2022    Procedure: COLONOSCOPY;  Surgeon: Andrews Sears MD;  [facility]  Service: Endoscopy;  Laterality: N/A;  Fully Vaccinated.EC    CYSTOSCOPY N/A 2019    Procedure: CYSTOSCOPY;  Surgeon: Debbie Murphy MD;  Location: [facility];  Service: OB/GYN;  Laterality: N/A;    HYSTEROSCOPY N/A 2019    Procedure: HYSTEROSCOPY-removal of IUD; possible shaving of uterine fibroids if needed to insert new IUD;  Surgeon: Debbie Murphy MD;  Location: [facility];  Service: OB/GYN;  Laterality: N/A;    INTRAUTERINE DEVICE INSERTION N/A 2019    Procedure: IUD insertion-- mirena-- would prefer kyleena if available;  Surgeon: Debbie Murphy MD;  Location: [facility];  Service: OB/GYN;  Laterality: N/A;     Family History   Problem Relation Age of Onset    Heart disease Father         MI around 50.    Hyperlipidemia Father     Parkinsonism Father     Cancer Mother 73        colon ca    Hypertension Mother     Breast cancer Sister 48        breast    Other Sister     No Known Problems Brother     Other Daughter         TBI with left hemiparesis    No Known Problems Son      Cirrhosis Maternal Grandfather     Alcohol abuse Maternal Grandfather     Alzheimer's disease Paternal Grandmother     Parkinsonism Paternal Grandfather     No Known Problems Son     Colon cancer Neg Hx      Social History     Socioeconomic History    Marital status:      Spouse name: Dennis    Number of children: 3   Tobacco Use    Smoking status: Never    Smokeless tobacco: Never   Substance and Sexual Activity    Alcohol use: Yes     Alcohol/week: 1.0 standard drink     Types: 1 Glasses of wine per week     Comment: amt varies     Drug use: No    Sexual activity: Yes     Partners: Male   Social History Narrative    From  [place]     Moved to [place] [id number]    Exercising - runs,  wts, yoga, walks.        Kids 20, 20, 18.          Retired  and .      - .           ?  Review of Systems:  Detailed review of systems was negative except as noted below.  Endocrine (hormone): Negative  Cardiovascular ( heart/blood vessels): Negative  Fevers/weight loss (constitutional):Negative  Ear, nose, or throat : Negative  Respiratory (lung): Negative  Gastrointestinal (stomach): Negative  Genitourinary (bladder/kidneys): Negative  Musculoskeletal (muscle/bones): Negative  Skin: Negative  Psychiatric: Negative  Hematologic (blood): Negative    Examination:  She was well-appearing. She was alert and oriented. Attention span and concentration were normal. Speech, language, memory, and general knowledge were intact.      Her distance visual acuity with correction was 20/25-2 in the right eye and 20/20  in the left eye. Her near visual acuity with correction was J1 in the right eye and J1 in the left eye.     Visual fields were intact to confrontation. She perceived 8/8 OD and 8/8 OS Ishihara color plates correctly. Pupils were brisk to light without an afferent defect. Ocular ductions were full except impaired abduction OU and some adduction on attempted upgaze in each eye.  There  "was no nystagmus. Lids were symmetric.     Sensorimotor Examination    Stereopsis  100 seconds of arc    Cross cover at distance (with 12 PD base out glasses on)  Primary 8ET  Left 16 ET  Right 6 ET    Optic discs appeared normal without swelling or pallor. Pupillary dilation was not necessary for visualization of the optic disc today.     On the remainder of the neurologic examination, facial sensation was intact. Face was symmetric. Tongue was midline.    Laboratories Reviewed:     N/a  ?  Neuroimaging Reviewed:     N/a  ?  Ocular Imaging, Photos, Records Reviewed:     N/a  ?  Impression:  Louise Cueto has Pt reports of gradually horizontal diplopia only at distance started 4 years ago, when siblings saw RT "eye wandering". Diplopia   resolves when covering one eye. She wears prisms glasses with 12 base out RT eye. Glasses do help with diplopia, but now with breakthrough diplopia and frustration with cosmetic appearance. Neuro-ophthalmologic examination was notable for excellent corrected visual acuities, full color vision, motility notable for impaired abduction OU and adduction on attempted upgaze. She has esotropia in all directions of gaze. Her exam and history are most consistent with heavy eye syndrome. Will obtain MRI head and orbits to confirm. Will refer to Dr. Thompson for evaluation for possible strabismus surgery.   ?  Plan:  1. MRI head and orbits w/wo contrast  2. Sydney Thompson for strabismus surgery consult  3. Follow up with Dr. Albert as planned    Follow-up:  I will see her in follow-up based on results of above  ?  ?  Visit Checklist (as applicable):  1. Status of new and prior symptoms discussed? yes  2. Neuroimaging reviewed/ ordered as appropriate? yes  3. Ocular imaging and photos reviewed/ ordered as appropriate? N/a  4. Plan for work-up and treatment discussed with patient? yes  5. Potential medication side-effects and monitoring plan discussed? N/a  6. Review of outside medical records " was performed and pertinent details are summarized in the HPI above? yes    Time spent on this encounter: 45 minutes. This includes face to face time and non-face to face time preparing to see the patient (eg, review of tests), obtaining and/or reviewing separately obtained history, documenting clinical information in the electronic or other health record, independently interpreting results and communicating results to the patient/family/caregiver, or care coordinator.      HECTOR Long  Neuro-Ophthalmology Consultant

## 2023-01-10 ENCOUNTER — OFFICE VISIT (OUTPATIENT)
Dept: OPHTHALMOLOGY | Facility: CLINIC | Age: 53
End: 2023-01-10
Payer: COMMERCIAL

## 2023-01-10 DIAGNOSIS — H52.10 MYOPIA, UNSPECIFIED LATERALITY: ICD-10-CM

## 2023-01-10 DIAGNOSIS — H53.2 DIPLOPIA: Primary | ICD-10-CM

## 2023-01-10 DIAGNOSIS — H50.00 ESOTROPIA: ICD-10-CM

## 2023-01-10 PROCEDURE — 1159F PR MEDICATION LIST DOCUMENTED IN MEDICAL RECORD: ICD-10-PCS | Mod: CPTII,S$GLB,, | Performed by: STUDENT IN AN ORGANIZED HEALTH CARE EDUCATION/TRAINING PROGRAM

## 2023-01-10 PROCEDURE — 99204 OFFICE O/P NEW MOD 45 MIN: CPT | Mod: S$GLB,,, | Performed by: STUDENT IN AN ORGANIZED HEALTH CARE EDUCATION/TRAINING PROGRAM

## 2023-01-10 PROCEDURE — 1159F MED LIST DOCD IN RCRD: CPT | Mod: CPTII,S$GLB,, | Performed by: STUDENT IN AN ORGANIZED HEALTH CARE EDUCATION/TRAINING PROGRAM

## 2023-01-10 PROCEDURE — 1160F PR REVIEW ALL MEDS BY PRESCRIBER/CLIN PHARMACIST DOCUMENTED: ICD-10-PCS | Mod: CPTII,S$GLB,, | Performed by: STUDENT IN AN ORGANIZED HEALTH CARE EDUCATION/TRAINING PROGRAM

## 2023-01-10 PROCEDURE — 99204 PR OFFICE/OUTPT VISIT, NEW, LEVL IV, 45-59 MIN: ICD-10-PCS | Mod: S$GLB,,, | Performed by: STUDENT IN AN ORGANIZED HEALTH CARE EDUCATION/TRAINING PROGRAM

## 2023-01-10 PROCEDURE — 1160F RVW MEDS BY RX/DR IN RCRD: CPT | Mod: CPTII,S$GLB,, | Performed by: STUDENT IN AN ORGANIZED HEALTH CARE EDUCATION/TRAINING PROGRAM

## 2023-01-10 PROCEDURE — 99999 PR PBB SHADOW E&M-EST. PATIENT-LVL III: CPT | Mod: PBBFAC,,, | Performed by: STUDENT IN AN ORGANIZED HEALTH CARE EDUCATION/TRAINING PROGRAM

## 2023-01-10 PROCEDURE — 99999 PR PBB SHADOW E&M-EST. PATIENT-LVL III: ICD-10-PCS | Mod: PBBFAC,,, | Performed by: STUDENT IN AN ORGANIZED HEALTH CARE EDUCATION/TRAINING PROGRAM

## 2023-01-10 NOTE — LETTER
"     [facility] - [address] [place] [address]  [address]  [facility] [address]  Phone: [phone]  Fax: [phone]   January 10, 2023    Rhonda Albert II, MD  [facility] [address]    Patient: Louise Cueto   MR Number: [id number]   YOB: 1970   Date of Visit: 1/10/2023       Dear Dr. Albert :    Thank you for referring Louise Cueto to me for evaluation. Here is my assessment and plan of care:    Impression:  Louise Cueto has Pt reports of gradually horizontal diplopia only at distance started 4 years ago, when siblings saw RT "eye wandering". Diplopia   resolves when covering one eye. She wears prisms glasses with 12 base out RT eye. Glasses do help with diplopia, but now with breakthrough diplopia and frustration with cosmetic appearance. Neuro-ophthalmologic examination was notable for excellent corrected visual acuities, full color vision, motility notable for impaired abduction OU and adduction on attempted upgaze. She has esotropia in all directions of gaze. Her exam and history are most consistent with heavy eye syndrome. Will obtain MRI head and orbits to confirm. Will refer to Dr. Thompson for evaluation for possible strabismus surgery.      Plan:  1. MRI head and orbits w/wo contrast  2. Sydney Thompson for strabismus surgery consult  3. Follow up with Dr. Albert as planned    Follow-up:  I will see her in follow-up based on results of above    If you have questions, please do not hesitate to call me. I look forward to following Ms. Louise Cueto along with you.    Sincerely,        Sydney Novoa MD       CC  No Recipients         "

## 2023-01-11 ENCOUNTER — PATIENT MESSAGE (OUTPATIENT)
Dept: REHABILITATION | Facility: OTHER | Age: 53
End: 2023-01-11

## 2023-01-11 ENCOUNTER — DOCUMENTATION ONLY (OUTPATIENT)
Dept: REHABILITATION | Facility: OTHER | Age: 53
End: 2023-01-11
Payer: COMMERCIAL

## 2023-01-11 NOTE — PROGRESS NOTES
Patient was scheduled for a physical therapy appointment at [facility] location on 1/11/23. Patient failed to appear for the appointment without prior notification today.     Jassi Capellan PT, DPT, OCS

## 2023-01-24 ENCOUNTER — OFFICE VISIT (OUTPATIENT)
Dept: INTERNAL MEDICINE | Facility: CLINIC | Age: 53
End: 2023-01-24
Payer: COMMERCIAL

## 2023-01-24 ENCOUNTER — CLINICAL SUPPORT (OUTPATIENT)
Dept: INTERNAL MEDICINE | Facility: CLINIC | Age: 53
End: 2023-01-24
Payer: COMMERCIAL

## 2023-01-24 VITALS
HEART RATE: 65 BPM | SYSTOLIC BLOOD PRESSURE: 107 MMHG | WEIGHT: 120.81 LBS | HEIGHT: 65 IN | DIASTOLIC BLOOD PRESSURE: 70 MMHG | BODY MASS INDEX: 20.13 KG/M2

## 2023-01-24 DIAGNOSIS — Z00.00 ANNUAL PHYSICAL EXAM: Primary | ICD-10-CM

## 2023-01-24 DIAGNOSIS — H53.2 MONOCULAR DIPLOPIA OF BOTH EYES: ICD-10-CM

## 2023-01-24 DIAGNOSIS — Z15.89 MTHFR GENE MUTATION: ICD-10-CM

## 2023-01-24 DIAGNOSIS — G89.29 CHRONIC RIGHT SHOULDER PAIN: ICD-10-CM

## 2023-01-24 DIAGNOSIS — E55.9 VITAMIN D DEFICIENCY: ICD-10-CM

## 2023-01-24 DIAGNOSIS — M25.511 CHRONIC RIGHT SHOULDER PAIN: ICD-10-CM

## 2023-01-24 LAB
ALBUMIN SERPL BCP-MCNC: 4.4 G/DL (ref 3.5–5.2)
ALP SERPL-CCNC: 67 U/L (ref 55–135)
ALT SERPL W/O P-5'-P-CCNC: 18 U/L (ref 10–44)
ANION GAP SERPL CALC-SCNC: 10 MMOL/L (ref 8–16)
AST SERPL-CCNC: 28 U/L (ref 10–40)
BILIRUB SERPL-MCNC: 0.3 MG/DL (ref 0.1–1)
BUN SERPL-MCNC: 18 MG/DL (ref 6–20)
CALCIUM SERPL-MCNC: 9.8 MG/DL (ref 8.7–10.5)
CHLORIDE SERPL-SCNC: 102 MMOL/L (ref 95–110)
CHOLEST SERPL-MCNC: 227 MG/DL (ref 120–199)
CHOLEST/HDLC SERPL: 3.9 {RATIO} (ref 2–5)
CO2 SERPL-SCNC: 27 MMOL/L (ref 23–29)
CREAT SERPL-MCNC: 0.7 MG/DL (ref 0.5–1.4)
ERYTHROCYTE [DISTWIDTH] IN BLOOD BY AUTOMATED COUNT: 13.4 % (ref 11.5–14.5)
EST. GFR  (NO RACE VARIABLE): >60 ML/MIN/1.73 M^2
ESTIMATED AVG GLUCOSE: 100 MG/DL (ref 68–131)
FERRITIN SERPL-MCNC: 53 NG/ML (ref 20–300)
GLUCOSE SERPL-MCNC: 84 MG/DL (ref 70–110)
HBA1C MFR BLD: 5.1 % (ref 4–5.6)
HCT VFR BLD AUTO: 40.7 % (ref 37–48.5)
HDLC SERPL-MCNC: 58 MG/DL (ref 40–75)
HDLC SERPL: 25.6 % (ref 20–50)
HGB BLD-MCNC: 13.6 G/DL (ref 12–16)
IRON SERPL-MCNC: 83 UG/DL (ref 30–160)
LDLC SERPL CALC-MCNC: 150 MG/DL (ref 63–159)
MCH RBC QN AUTO: 30.1 PG (ref 27–31)
MCHC RBC AUTO-ENTMCNC: 33.4 G/DL (ref 32–36)
MCV RBC AUTO: 90 FL (ref 82–98)
NONHDLC SERPL-MCNC: 169 MG/DL
PLATELET # BLD AUTO: 458 K/UL (ref 150–450)
PMV BLD AUTO: 9.1 FL (ref 9.2–12.9)
POTASSIUM SERPL-SCNC: 3.8 MMOL/L (ref 3.5–5.1)
PROT SERPL-MCNC: 7.6 G/DL (ref 6–8.4)
RBC # BLD AUTO: 4.52 M/UL (ref 4–5.4)
SATURATED IRON: 23 % (ref 20–50)
SODIUM SERPL-SCNC: 139 MMOL/L (ref 136–145)
TESTOST SERPL-MCNC: 256 NG/DL (ref 5–73)
TOTAL IRON BINDING CAPACITY: 366 UG/DL (ref 250–450)
TRANSFERRIN SERPL-MCNC: 247 MG/DL (ref 200–375)
TRIGL SERPL-MCNC: 95 MG/DL (ref 30–150)
TSH SERPL DL<=0.005 MIU/L-ACNC: 2.45 UIU/ML (ref 0.4–4)
WBC # BLD AUTO: 11.11 K/UL (ref 3.9–12.7)

## 2023-01-24 PROCEDURE — 1159F PR MEDICATION LIST DOCUMENTED IN MEDICAL RECORD: ICD-10-PCS | Mod: CPTII,S$GLB,, | Performed by: INTERNAL MEDICINE

## 2023-01-24 PROCEDURE — 83036 HEMOGLOBIN GLYCOSYLATED A1C: CPT | Performed by: INTERNAL MEDICINE

## 2023-01-24 PROCEDURE — 3044F HG A1C LEVEL LT 7.0%: CPT | Mod: CPTII,S$GLB,, | Performed by: INTERNAL MEDICINE

## 2023-01-24 PROCEDURE — 3078F PR MOST RECENT DIASTOLIC BLOOD PRESSURE < 80 MM HG: ICD-10-PCS | Mod: CPTII,S$GLB,, | Performed by: INTERNAL MEDICINE

## 2023-01-24 PROCEDURE — 82728 ASSAY OF FERRITIN: CPT | Performed by: INTERNAL MEDICINE

## 2023-01-24 PROCEDURE — 3008F PR BODY MASS INDEX (BMI) DOCUMENTED: ICD-10-PCS | Mod: CPTII,S$GLB,, | Performed by: INTERNAL MEDICINE

## 2023-01-24 PROCEDURE — 3078F DIAST BP <80 MM HG: CPT | Mod: CPTII,S$GLB,, | Performed by: INTERNAL MEDICINE

## 2023-01-24 PROCEDURE — 1159F MED LIST DOCD IN RCRD: CPT | Mod: CPTII,S$GLB,, | Performed by: INTERNAL MEDICINE

## 2023-01-24 PROCEDURE — 3074F SYST BP LT 130 MM HG: CPT | Mod: CPTII,S$GLB,, | Performed by: INTERNAL MEDICINE

## 2023-01-24 PROCEDURE — 84443 ASSAY THYROID STIM HORMONE: CPT | Performed by: INTERNAL MEDICINE

## 2023-01-24 PROCEDURE — 3008F BODY MASS INDEX DOCD: CPT | Mod: CPTII,S$GLB,, | Performed by: INTERNAL MEDICINE

## 2023-01-24 PROCEDURE — 84466 ASSAY OF TRANSFERRIN: CPT | Performed by: INTERNAL MEDICINE

## 2023-01-24 PROCEDURE — 84403 ASSAY OF TOTAL TESTOSTERONE: CPT | Performed by: INTERNAL MEDICINE

## 2023-01-24 PROCEDURE — 99396 PR PREVENTIVE VISIT,EST,40-64: ICD-10-PCS | Mod: S$GLB,,, | Performed by: INTERNAL MEDICINE

## 2023-01-24 PROCEDURE — 80061 LIPID PANEL: CPT | Performed by: INTERNAL MEDICINE

## 2023-01-24 PROCEDURE — 1160F RVW MEDS BY RX/DR IN RCRD: CPT | Mod: CPTII,S$GLB,, | Performed by: INTERNAL MEDICINE

## 2023-01-24 PROCEDURE — 3044F PR MOST RECENT HEMOGLOBIN A1C LEVEL <7.0%: ICD-10-PCS | Mod: CPTII,S$GLB,, | Performed by: INTERNAL MEDICINE

## 2023-01-24 PROCEDURE — 85027 COMPLETE CBC AUTOMATED: CPT | Performed by: INTERNAL MEDICINE

## 2023-01-24 PROCEDURE — 3074F PR MOST RECENT SYSTOLIC BLOOD PRESSURE < 130 MM HG: ICD-10-PCS | Mod: CPTII,S$GLB,, | Performed by: INTERNAL MEDICINE

## 2023-01-24 PROCEDURE — 99999 PR PBB SHADOW E&M-EST. PATIENT-LVL III: CPT | Mod: PBBFAC,,, | Performed by: INTERNAL MEDICINE

## 2023-01-24 PROCEDURE — 99396 PREV VISIT EST AGE 40-64: CPT | Mod: S$GLB,,, | Performed by: INTERNAL MEDICINE

## 2023-01-24 PROCEDURE — 36415 COLL VENOUS BLD VENIPUNCTURE: CPT | Performed by: INTERNAL MEDICINE

## 2023-01-24 PROCEDURE — 1160F PR REVIEW ALL MEDS BY PRESCRIBER/CLIN PHARMACIST DOCUMENTED: ICD-10-PCS | Mod: CPTII,S$GLB,, | Performed by: INTERNAL MEDICINE

## 2023-01-24 PROCEDURE — 99999 PR PBB SHADOW E&M-EST. PATIENT-LVL III: ICD-10-PCS | Mod: PBBFAC,,, | Performed by: INTERNAL MEDICINE

## 2023-01-24 PROCEDURE — 80053 COMPREHEN METABOLIC PANEL: CPT | Performed by: INTERNAL MEDICINE

## 2023-01-24 RX ORDER — MAGNESIUM 30 MG
TABLET ORAL NIGHTLY
COMMUNITY

## 2023-01-30 ENCOUNTER — PATIENT MESSAGE (OUTPATIENT)
Dept: INTERNAL MEDICINE | Facility: CLINIC | Age: 53
End: 2023-01-30
Payer: COMMERCIAL

## 2023-03-24 ENCOUNTER — HOSPITAL ENCOUNTER (OUTPATIENT)
Dept: RADIOLOGY | Facility: HOSPITAL | Age: 53
Discharge: HOME | End: 2023-03-24
Attending: STUDENT IN AN ORGANIZED HEALTH CARE EDUCATION/TRAINING PROGRAM
Payer: COMMERCIAL

## 2023-03-24 ENCOUNTER — PATIENT MESSAGE (OUTPATIENT)
Dept: OPHTHALMOLOGY | Facility: CLINIC | Age: 53
End: 2023-03-24
Payer: COMMERCIAL

## 2023-03-24 DIAGNOSIS — H53.2 DIPLOPIA: ICD-10-CM

## 2023-03-24 PROCEDURE — 70553 MRI BRAIN STEM W/O & W/DYE: CPT | Mod: 26,,, | Performed by: RADIOLOGY

## 2023-03-24 PROCEDURE — 25500020 PHARM REV CODE 255: Performed by: STUDENT IN AN ORGANIZED HEALTH CARE EDUCATION/TRAINING PROGRAM

## 2023-03-24 PROCEDURE — 70543 MRI ORBT/FAC/NCK W/O &W/DYE: CPT | Mod: 26,,, | Performed by: RADIOLOGY

## 2023-03-24 PROCEDURE — 70543 MRI HEAD-ORBITS W/WO CONTRAST (XPD): ICD-10-PCS | Mod: 26,,, | Performed by: RADIOLOGY

## 2023-03-24 PROCEDURE — A9585 GADOBUTROL INJECTION: HCPCS | Performed by: STUDENT IN AN ORGANIZED HEALTH CARE EDUCATION/TRAINING PROGRAM

## 2023-03-24 PROCEDURE — 70543 MRI ORBT/FAC/NCK W/O &W/DYE: CPT | Mod: TC

## 2023-03-24 PROCEDURE — 70553 MRI HEAD-ORBITS W/WO CONTRAST (XPD): ICD-10-PCS | Mod: 26,,, | Performed by: RADIOLOGY

## 2023-03-24 RX ORDER — GADOBUTROL 604.72 MG/ML
6 INJECTION INTRAVENOUS
Status: COMPLETED | OUTPATIENT
Start: 2023-03-24 | End: 2023-03-24

## 2023-03-24 RX ADMIN — GADOBUTROL 6 ML: 604.72 INJECTION INTRAVENOUS at 11:03

## 2023-04-04 ENCOUNTER — TELEPHONE (OUTPATIENT)
Dept: OPHTHALMOLOGY | Facility: CLINIC | Age: 53
End: 2023-04-04
Payer: COMMERCIAL

## 2023-04-04 NOTE — TELEPHONE ENCOUNTER
Lvm with time and date of appt.    -jh   ----- Message from Sydney Thompson MD sent at 4/3/2023  8:35 AM CDT -----  Regarding: RE: Appointment  Okay to schedule   ----- Message -----  From: Kate Jose  Sent: 3/29/2023  11:01 AM CDT  To: Sydney Thompson MD  Subject: FW: Appointment                                  Giles carnes notes when you get a chance    ----- Message -----  From: Sarah Flores  Sent: 3/29/2023   8:41 AM CDT  To: Donna STANLEY Staff  Subject: Appointment                                      Pt wants to schedule an appt with Dr. Thompson. She was recommended by Dr. Carnes. Pt completed an MRI last week.      Louise @ [phone]

## 2023-06-09 ENCOUNTER — OFFICE VISIT (OUTPATIENT)
Dept: OPHTHALMOLOGY | Facility: CLINIC | Age: 53
End: 2023-06-09
Payer: COMMERCIAL

## 2023-06-09 ENCOUNTER — LAB VISIT (OUTPATIENT)
Dept: LAB | Facility: HOSPITAL | Age: 53
End: 2023-06-09
Attending: STUDENT IN AN ORGANIZED HEALTH CARE EDUCATION/TRAINING PROGRAM
Payer: COMMERCIAL

## 2023-06-09 DIAGNOSIS — H53.2 DIPLOPIA: ICD-10-CM

## 2023-06-09 DIAGNOSIS — H50.00 ESOTROPIA OF BOTH EYES: Primary | ICD-10-CM

## 2023-06-09 DIAGNOSIS — H50.00 ESOTROPIA OF BOTH EYES: ICD-10-CM

## 2023-06-09 DIAGNOSIS — H51.9 ABNORMAL EYE MOVEMENTS: ICD-10-CM

## 2023-06-09 LAB
T4 FREE SERPL-MCNC: 0.89 NG/DL (ref 0.71–1.51)
TSH SERPL DL<=0.005 MIU/L-ACNC: 2.77 UIU/ML (ref 0.4–4)

## 2023-06-09 PROCEDURE — 1159F MED LIST DOCD IN RCRD: CPT | Mod: CPTII,S$GLB,, | Performed by: STUDENT IN AN ORGANIZED HEALTH CARE EDUCATION/TRAINING PROGRAM

## 2023-06-09 PROCEDURE — 1159F PR MEDICATION LIST DOCUMENTED IN MEDICAL RECORD: ICD-10-PCS | Mod: CPTII,S$GLB,, | Performed by: STUDENT IN AN ORGANIZED HEALTH CARE EDUCATION/TRAINING PROGRAM

## 2023-06-09 PROCEDURE — 3044F PR MOST RECENT HEMOGLOBIN A1C LEVEL <7.0%: ICD-10-PCS | Mod: CPTII,S$GLB,, | Performed by: STUDENT IN AN ORGANIZED HEALTH CARE EDUCATION/TRAINING PROGRAM

## 2023-06-09 PROCEDURE — 92060 SENSORIMOTOR EXAMINATION: CPT | Mod: S$GLB,,, | Performed by: STUDENT IN AN ORGANIZED HEALTH CARE EDUCATION/TRAINING PROGRAM

## 2023-06-09 PROCEDURE — 92060 PR SPECIAL EYE EVAL,SENSORIMOTOR: ICD-10-PCS | Mod: S$GLB,,, | Performed by: STUDENT IN AN ORGANIZED HEALTH CARE EDUCATION/TRAINING PROGRAM

## 2023-06-09 PROCEDURE — 99214 OFFICE O/P EST MOD 30 MIN: CPT | Mod: S$GLB,,, | Performed by: STUDENT IN AN ORGANIZED HEALTH CARE EDUCATION/TRAINING PROGRAM

## 2023-06-09 PROCEDURE — 99999 PR PBB SHADOW E&M-EST. PATIENT-LVL III: ICD-10-PCS | Mod: PBBFAC,,, | Performed by: STUDENT IN AN ORGANIZED HEALTH CARE EDUCATION/TRAINING PROGRAM

## 2023-06-09 PROCEDURE — 84445 ASSAY OF TSI GLOBULIN: CPT | Performed by: STUDENT IN AN ORGANIZED HEALTH CARE EDUCATION/TRAINING PROGRAM

## 2023-06-09 PROCEDURE — 3044F HG A1C LEVEL LT 7.0%: CPT | Mod: CPTII,S$GLB,, | Performed by: STUDENT IN AN ORGANIZED HEALTH CARE EDUCATION/TRAINING PROGRAM

## 2023-06-09 PROCEDURE — 99214 PR OFFICE/OUTPT VISIT, EST, LEVL IV, 30-39 MIN: ICD-10-PCS | Mod: S$GLB,,, | Performed by: STUDENT IN AN ORGANIZED HEALTH CARE EDUCATION/TRAINING PROGRAM

## 2023-06-09 PROCEDURE — 84443 ASSAY THYROID STIM HORMONE: CPT | Performed by: STUDENT IN AN ORGANIZED HEALTH CARE EDUCATION/TRAINING PROGRAM

## 2023-06-09 PROCEDURE — 99999 PR PBB SHADOW E&M-EST. PATIENT-LVL III: CPT | Mod: PBBFAC,,, | Performed by: STUDENT IN AN ORGANIZED HEALTH CARE EDUCATION/TRAINING PROGRAM

## 2023-06-09 PROCEDURE — 84439 ASSAY OF FREE THYROXINE: CPT | Performed by: STUDENT IN AN ORGANIZED HEALTH CARE EDUCATION/TRAINING PROGRAM

## 2023-06-09 NOTE — PROGRESS NOTES
"HPI    Strab Eval per DR. Novoa    Pt states no changes to va or symptoms since seeing Dr. Bright Novoa notes:  51 y/o female presents to clinic for Neuro-ophthalmic evaluation for   history of Esotropia ET OD. Pt reports of gradually horizontal diplopia of   RT started 4 years ago, when siblings saw RT "eye wondering". Diplopia   resolves when covering one eye. She wears prisms glasses with 12 base out   RT eye. Glasses does help with diplopia. No headaches,migraines, floaters,   flashes of light reported today. No ocular sx/procedures reported. No FMHX   of strabismus.   Last edited by Sydney Thompson MD on 6/9/2023  8:48 AM.        ROS    Positive for: Eyes  Negative for: Constitutional  Last edited by Sydney Thompson MD on 6/9/2023  8:40 AM.        Assessment /Plan     For exam results, see Encounter Report.    Esotropia of both eyes  -     Myasthenia Gravis Panel, Adult; Future; Expected date: 06/09/2023  -     TSH; Future; Expected date: 06/09/2023  -     THYROID STIMULATING IMMUNOGLOBULIN; Future; Expected date: 06/09/2023  -     T4, FREE; Future; Expected date: 06/09/2023    Abnormal eye movements    Diplopia      Educated about ocular alignment   Advised lab work to rule out systemic dz  Treatment options for ET include prism glasses vs surgical correction vs patching one eye   Advised would like to get one more measurement before proceeding with surgical correction (if she wishes to proceed with that) as variable and builds to higher number in clinic today     RTC 1 -2 months     This service was scribed by Baylee Solares for and in the presence of Dr. Thompson who personally performed this service.    Baylee Solares, COA    Sydney Thompson MD                    "

## 2023-06-12 LAB — TSI SER-ACNC: <0.1 IU/L

## 2023-06-15 ENCOUNTER — PATIENT MESSAGE (OUTPATIENT)
Dept: INTERNAL MEDICINE | Facility: CLINIC | Age: 53
End: 2023-06-15
Payer: COMMERCIAL

## 2023-06-15 RX ORDER — VALACYCLOVIR HYDROCHLORIDE 1 G/1
500 TABLET, FILM COATED ORAL EVERY 12 HOURS
Qty: 14 TABLET | Refills: 5 | Status: SHIPPED | OUTPATIENT
Start: 2023-06-15

## 2023-06-15 NOTE — TELEPHONE ENCOUNTER
No care due was identified.  [facility] Embedded Care Due Messages. Reference number: [id number].   6/15/2023 12:00:30 PM CDT

## 2023-07-17 ENCOUNTER — LAB VISIT (OUTPATIENT)
Dept: LAB | Facility: HOSPITAL | Age: 53
End: 2023-07-17
Attending: STUDENT IN AN ORGANIZED HEALTH CARE EDUCATION/TRAINING PROGRAM
Payer: COMMERCIAL

## 2023-07-17 ENCOUNTER — OFFICE VISIT (OUTPATIENT)
Dept: OPHTHALMOLOGY | Facility: CLINIC | Age: 53
End: 2023-07-17
Payer: COMMERCIAL

## 2023-07-17 DIAGNOSIS — H50.21 HYPERTROPIA OF RIGHT EYE: ICD-10-CM

## 2023-07-17 DIAGNOSIS — H53.2 DIPLOPIA: ICD-10-CM

## 2023-07-17 DIAGNOSIS — H50.00 ESOTROPIA OF BOTH EYES: ICD-10-CM

## 2023-07-17 DIAGNOSIS — H50.00 ESOTROPIA OF BOTH EYES: Primary | ICD-10-CM

## 2023-07-17 PROBLEM — H52.31 ANISOMETROPIA: Status: ACTIVE | Noted: 2023-07-17

## 2023-07-17 PROCEDURE — 1159F PR MEDICATION LIST DOCUMENTED IN MEDICAL RECORD: ICD-10-PCS | Mod: CPTII,S$GLB,, | Performed by: STUDENT IN AN ORGANIZED HEALTH CARE EDUCATION/TRAINING PROGRAM

## 2023-07-17 PROCEDURE — 3044F PR MOST RECENT HEMOGLOBIN A1C LEVEL <7.0%: ICD-10-PCS | Mod: CPTII,S$GLB,, | Performed by: STUDENT IN AN ORGANIZED HEALTH CARE EDUCATION/TRAINING PROGRAM

## 2023-07-17 PROCEDURE — 99999 PR PBB SHADOW E&M-EST. PATIENT-LVL II: ICD-10-PCS | Mod: PBBFAC,,, | Performed by: STUDENT IN AN ORGANIZED HEALTH CARE EDUCATION/TRAINING PROGRAM

## 2023-07-17 PROCEDURE — 99214 PR OFFICE/OUTPT VISIT, EST, LEVL IV, 30-39 MIN: ICD-10-PCS | Mod: S$GLB,,, | Performed by: STUDENT IN AN ORGANIZED HEALTH CARE EDUCATION/TRAINING PROGRAM

## 2023-07-17 PROCEDURE — 92060 PR SPECIAL EYE EVAL,SENSORIMOTOR: ICD-10-PCS | Mod: S$GLB,,, | Performed by: STUDENT IN AN ORGANIZED HEALTH CARE EDUCATION/TRAINING PROGRAM

## 2023-07-17 PROCEDURE — 36415 COLL VENOUS BLD VENIPUNCTURE: CPT | Performed by: STUDENT IN AN ORGANIZED HEALTH CARE EDUCATION/TRAINING PROGRAM

## 2023-07-17 PROCEDURE — 1159F MED LIST DOCD IN RCRD: CPT | Mod: CPTII,S$GLB,, | Performed by: STUDENT IN AN ORGANIZED HEALTH CARE EDUCATION/TRAINING PROGRAM

## 2023-07-17 PROCEDURE — 83519 RIA NONANTIBODY: CPT | Performed by: STUDENT IN AN ORGANIZED HEALTH CARE EDUCATION/TRAINING PROGRAM

## 2023-07-17 PROCEDURE — 99999 PR PBB SHADOW E&M-EST. PATIENT-LVL II: CPT | Mod: PBBFAC,,, | Performed by: STUDENT IN AN ORGANIZED HEALTH CARE EDUCATION/TRAINING PROGRAM

## 2023-07-17 PROCEDURE — 92060 SENSORIMOTOR EXAMINATION: CPT | Mod: S$GLB,,, | Performed by: STUDENT IN AN ORGANIZED HEALTH CARE EDUCATION/TRAINING PROGRAM

## 2023-07-17 PROCEDURE — 99214 OFFICE O/P EST MOD 30 MIN: CPT | Mod: S$GLB,,, | Performed by: STUDENT IN AN ORGANIZED HEALTH CARE EDUCATION/TRAINING PROGRAM

## 2023-07-17 PROCEDURE — 3044F HG A1C LEVEL LT 7.0%: CPT | Mod: CPTII,S$GLB,, | Performed by: STUDENT IN AN ORGANIZED HEALTH CARE EDUCATION/TRAINING PROGRAM

## 2023-07-17 PROCEDURE — 83519 RIA NONANTIBODY: CPT | Mod: 59 | Performed by: STUDENT IN AN ORGANIZED HEALTH CARE EDUCATION/TRAINING PROGRAM

## 2023-07-17 NOTE — PROGRESS NOTES
LORI Gil is here today for  follow up of strabismus. She notes no changes   since last month. Her double vision is interfering with her daily life   activities and has episodes of breakthrough double vision regularly. She   is interested in surgical correction to try and fix the deviation.   Last edited by Sydney Thompson MD on 7/18/2023  8:42 AM.        ROS    Positive for: Eyes  Negative for: Constitutional  Last edited by Sydney Thompson MD on 7/17/2023 12:13 PM.        Assessment /Plan     For exam results, see Encounter Report.    Esotropia of both eyes  -     Myasthenia Gravis/Lambert-Eaton; Future; Expected date: 07/17/2023    Diplopia  -     Myasthenia Gravis/Lambert-Eaton; Future; Expected date: 07/17/2023    Hypertropia of right eye      Advised stable measurements from exam to exam.    - Given patient's deviation is large with poor control recommend surgical intervention to try and restore binocularity   - Discussed all alternatives, risks, and benefits of surgery as well as what to expect post operatively with patient and family today. Discussed all risks including but not limited to over or under correction, need for additional surgery, damage to the eye or surrounding structures, redness, pain, double vision, less attractive appearance, asymmetry of the eyes, damage to retina (retinal detachment), infection, bleeding, and lost or slipped muscle.   - Discussed high success rate of 85-90% with one surgery alone, however went over possibility of needing another surgery at some point in their lifetime.   - After thorough discussion and all questions answered, informed consent was given and signed.   -Discussed risks/benfits/alternatives to adjustable suture - she would like to proceed with adjustable     Schedule Bilateral MR recession with use of adjustable suture right eye    RTC post operatively     This service was scribed by Baylee Solares for and in the presence of Dr. Thompson who personally  performed this service.    Baylee Solares, COA    Sydney Thompson MD

## 2023-07-20 ENCOUNTER — TELEPHONE (OUTPATIENT)
Dept: OPHTHALMOLOGY | Facility: CLINIC | Age: 53
End: 2023-07-20
Payer: COMMERCIAL

## 2023-07-21 ENCOUNTER — TELEPHONE (OUTPATIENT)
Dept: OPHTHALMOLOGY | Facility: CLINIC | Age: 53
End: 2023-07-21
Payer: COMMERCIAL

## 2023-07-22 LAB
ACHR BIND AB SER-SCNC: 0 NMOL/L
VGCC-N BIND AB SER-SCNC: NORMAL PMOL/L
VGCC-P/Q BIND AB SER-SCNC: 0 NMOL/L

## 2023-07-25 LAB — MUSK ANTIBODY TEST: 0 NMOL/L (ref 0–0.02)

## 2023-08-14 ENCOUNTER — TELEPHONE (OUTPATIENT)
Dept: OPHTHALMOLOGY | Facility: CLINIC | Age: 53
End: 2023-08-14
Payer: COMMERCIAL

## 2023-08-25 NOTE — PROGRESS NOTES
"Physical Therapy Visit Note    Name: Louise Cueto  Clinic Number: [id number]      Diagnosis:   Encounter Diagnoses   Name Primary?    Cervical pain     Acute pain of left shoulder      Physician: Chip Somers MD  Treatment Orders: PT Eval and Treat    Past Medical History:   Diagnosis Date    GERD (gastroesophageal reflux disease)      No current outpatient prescriptions on file.     No current facility-administered medications for this visit.      Review of patient's allergies indicates:  No Known Allergies  Precautions: N/A    Today's Date:  11/2/2017  Evaluation Date: 10/19/77630  Visit # authorized: 5/20  Authorization period: 12/31/2017  Time In:  1005  Time Out:  1110    Subjective     Patient states she felt okay after last treatment but this morning woke up very stiff and sore/tight feeling.    Patient Goals: Return to full functional mobility, fitness and daily activities without pain or difficulty    Objective     Posture: Patient demonstrates forward head, decreased cervical lordosis, bilaterally abducted scapulae, with forward head/rounded shoulders posture.    Passive Range of Motion: WNL.     Active Range of Motion: WNL.    Cervical ROM:  Moderately limited side bend and rotation w/ pain reproduction to L.    Strength:  4+/5 in all directions; scapular collapse with ER, difficulty maintaining with cues.    Special Tests:  AC Joint Right Left   Cervical Distraction (+) resolution/ stretch felt    Spurling's (-) (-)   Empty Can (-) (-)   Subscaputlaris Lift Off (-) (-)   Hawkin's Kenndy (-) (-)   Neer's Test (-) (-)   Speed's test (-) (-)   1st Rib Spring Test:  (+) reproduction of symptoms  Tay's Test:  (+) for "cold" sensation L UE   Scapular Dyskinesis (+)     Joint Mobility: Moderately restricted cervical vertebral mobility, especially C5-6 with rotation noted.  No radicular symptoms reported.    Palpation: Severe point tenderness over C5-6 transverse processes on L; moderate point " "tenderness over UT, scalenes and distal levator as well as cervical paraspinals on L.    Sensation: Intact.    Flexibility: Moderately restricted pec minor B; Severely restricted B UT, scalenes, moderately restricted levator.    Functional Limitations Reports   Category: Lifting/Carrying  Tool: FOTO Shoulder/Cervical    TREATMENT:  Louise received therapeutic exercises to develop strength and endurance, flexibility for 30 minutes including:  MHP x 10' cervical region  Prone Scap Retraction w/ Ext (caused "shooting pain down spine", so stopped) 5sh x 10  GERSON Cat/Came w/ Serratus Press 5sh 10x2  Qped Thoracic Rotations 10x2B    Not today:  Prone Scap Retractions 5sh x 20  Supine Cervical Retraction w/ Rotation 20xB  HEP discussion and Education    Louise  received the following manual therapy techniques x 20 min. To include Joint mobilizations and Soft tissue Mobilization were applied to the: cervical region To include:   Gentle STM as tolerated thoracic musculature, trigger point release  Rotational mobs entire thoracic spine as tolerated  Rib mobs especially left thoracic region     Supine pec stretch over towel roll with IFC x 10' at end of treatment 2/2 soreness from manual therapy.    Instructed pt. regarding: Proper technique with all exercises. Pt demonstrated good understanding of the education provided. Louise demonstrated good return demonstration of activities.    Assessment     Today's Assessment:  Patient felt better after IFC but continues to have significant joint stiffness in thoracic and cervical spine.  Progress as able.    This is a 46 y.o. female referred to outpatient physical therapy and presents with a medical diagnosis of cervical pain and scapular dyskinesis.  Patient presents with signs and symptoms consistent with medical diagnosis exacerbated by poor scapular and cervical strength, and postural control.  Patient's chief complaint is pain.  Patient will benefit from skilled physical therapy " services, specifically normalized posture and strengthening, normalized joint mobility and flexibility, progressing to functional mobility and fitness activities as tolerated.     Medical necessity is demonstrated by the following IMPAIRMENTS/PROBLEM LIST:   1)Increase in pain level limiting function   2)Decreased strength   3)Decreased posture   4)Decreased functional mobility   5)Lack of HEP    GOALS: Short Term Goals:  6-8 weeks  1.  Report decreased cervical pain < / =  2/10  to increase tolerance for daily and fitness activities as desired.  2.  Increase cervical AROM to painfree and normal limits in all directions in order to return to daily and fitness activities as desired.   3.  Patient will demonstrate normalized scapular positioning and motion in order to perform daily and fitness activities as desired with minimal pain or difficulty.  4.  Pt to tolerate HEP to improve ROM and independence with ADL's.  5.  Patient will report ability to perform daily and fitness activities as desired without pain or difficulty.    Plan     Pt will be treated by physical therapy 1-2 times a week for Pt Education, HEP, therapeutic exercises, neuromuscular re-education, manual therapy, joint mobilizations, modalities prn to achieve established goals. Louise may at times be seen by a PTA as part of the Rehab Team.     Cont PT for 6-8 weeks.     Zaina Denton, PT, DPT, SCS        I certify the need for these services furnished under this plan of treatment and while under my care.______________________________ Physician/Referring Practitioner  Date of Signature   61 y.o. female s/p Left TKA POD 0 by Dr. Do

## 2023-09-21 ENCOUNTER — TELEPHONE (OUTPATIENT)
Dept: OPHTHALMOLOGY | Facility: CLINIC | Age: 53
End: 2023-09-21
Payer: COMMERCIAL

## 2023-09-21 DIAGNOSIS — H50.00 ESOTROPIA OF BOTH EYES: Primary | ICD-10-CM

## 2023-09-26 DIAGNOSIS — Z78.0 MENOPAUSE: ICD-10-CM

## 2023-09-26 DIAGNOSIS — Z12.31 ENCOUNTER FOR SCREENING MAMMOGRAM FOR MALIGNANT NEOPLASM OF BREAST: Primary | ICD-10-CM

## 2023-10-11 NOTE — PROGRESS NOTES
[facility] VISIT NEW PT NOTE    REFERRING PROVIDER:  Dr. Jonna Lozano    CHIEF COMPLAINT: liver lesion     HPI: This is a 48 y.o. White female with PMH noted below, presenting for evaluation of liver lesions noted on imaging    Of note: has had episode of RUQ/lower abdominal pain (cramping and radiation to back) with ED visit 2018. 1 additional episode of lower abd cramping only approx 2 weeks ago     Labs done 2018 show normal transaminase levels, low alk phos. Normal synthetic liver function except no INR on file     Lab Results   Component Value Date    ALT 14 08/15/2018    AST 17 08/15/2018    ALKPHOS 44 (L) 08/15/2018    BILITOT 0.5 08/15/2018    ALBUMIN 4.0 08/15/2018     (H) 2018     Imaging:  -- US : 0.8 x 0.8  0.9 cm hyperechoic solid mass in the right hepatic lobe  -- US 2016: 1.1 x 1.2 x 0.8 cm echogenic lesion within the right hepatic lobe, felt to likely represent hemangioma  -- CT 2018: multiple hepatic hypodensities present likely representing hepatic cysts  -- US 2018: 1.1 cm hyperechoic lesion in the right hepatic lobe is unchanged.  Unchanged cyst in the right hepatic lobe    Denies jaundice, dark urine, abdominal distention, hematemesis, melena, slowed mentation. No abnormal skin rashes. No generalized joint or muscle pain.      Review of patient's allergies indicates:  No Known Allergies    Current Outpatient Medications on File Prior to Visit   Medication Sig Dispense Refill    ferrous gluconate (FERGON) 324 MG tablet Take 1 tablet (324 mg total) by mouth 2 (two) times daily with meals.       No current facility-administered medications on file prior to visit.        PMHX:  has a past medical history of GERD (gastroesophageal reflux disease).    PSHX:  has a past surgical history that includes  section; Cholecystectomy; Colonoscopy (N/A, 2017); and Breast cyst excision (Left).    FAMILY HISTORY: Negative for liver disease, reviewed  R: [place]  Access Inpatient Bed Call Log  10/11/2023 12:11 AM  Intake has called facilities that have not updated their bed status within the last 12 hours.??      ADULTS:     *[facility]: @ cap per website.    Pt remains on waitlist pending appropriate placement availability.     "in [facility]    SOCIAL HISTORY:   Social History     Tobacco Use   Smoking Status Never Smoker   Smokeless Tobacco Never Used       Social History     Substance and Sexual Activity   Alcohol Use Yes    Frequency: Never    Comment: rare       Social History     Substance and Sexual Activity   Drug Use No       ROS:   GENERAL: Denies fever, chills, weight loss/gain, fatigue  HEENT: Denies headaches, dizziness, vision/hearing changes  CARDIOVASCULAR: Denies chest pain, palpitations, or edema  RESPIRATORY: Denies dyspnea, cough  GI: previous RUQ and lower cramping abdominal pain, radiation to back. None since august, only lower abd x1. Denies rectal bleeding, nausea, vomiting. No change in bowel pattern or color  : Denies dysuria, hematuria   SKIN: Denies rash, itching   NEURO: Denies confusion, memory loss, or mood changes  PSYCH: Denies depression or anxiety  HEME/LYMPH: Denies easy bruising or bleeding    PHYSICAL EXAM:   Friendly White female, in no acute distress; alert and oriented to person, place and time  VITALS: /63 (BP Location: Right arm, Patient Position: Sitting, BP Method: Medium (Automatic))   Pulse 72   Temp 97.6 °F (36.4 °C) (Oral)   Resp 17   Ht 5' 5" (1.651 m)   Wt 56.7 kg (125 lb 0 oz)   SpO2 100%   BMI 20.80 kg/m²   HENT: Normocephalic, without obvious abnormality. Oral mucosa pink and moist. Dentition good.  EYES: Sclerae anicteric. No conjunctival pallor.   NECK: Supple. No masses or cervical adenopathy.  CARDIOVASCULAR: Regular rate and rhythm. No murmurs.  RESPIRATORY: Normal respiratory effort. BBS CTA. No wheezes or crackles.  GI: Soft, non-tender, non-distended. No hepatosplenomegaly. No masses palpable. No ascites.  EXTREMITIES:  No clubbing, cyanosis or edema.  SKIN: Warm and dry. No jaundice. No rashes noted to exposed skin. No telangectasias noted. No palmar erythema.  NEURO:  Normal gait. No asterixis.  PSYCH:  Memory intact. Thought and speech pattern appropriate. Behavior " normal. No depression or anxiety noted.    RECENT LABS:    Hepatitis A and B immunity markers:    No results found for: HEPAIGG    No results found for: HEPBSAG  No results found for: HEPBCAB  Hep B S Ab   Date Value Ref Range Status   10/07/2009 Negative  Final     Immunization History   Administered Date(s) Administered    Hepatitis A / Hepatitis B 10/15/2009    Influenza 10/01/2009    Influenza - Intradermal - Quadrivalent - PF 10/01/2018    Influenza - Quadrivalent 10/06/2016    Td (ADULT) 10/01/2009    Tdap 10/01/2009, 08/11/2017       Labs:  Lab Results   Component Value Date    WBC 8.54 12/12/2018    HGB 13.0 12/12/2018    HCT 39.3 12/12/2018     (H) 12/12/2018    CHOL 182 12/12/2018    TRIG 44 12/12/2018    HDL 66 12/12/2018     08/15/2018    K 3.9 08/15/2018    CREATININE 0.8 08/15/2018    ALT 14 08/15/2018    AST 17 08/15/2018    ALKPHOS 44 (L) 08/15/2018    BILITOT 0.5 08/15/2018    ALBUMIN 4.0 08/15/2018       DIAGNOSTIC STUDIES:  ABD. U/S-   Done 12/17/18  FINDINGS:  Liver: Enlarged in size, measuring 18.5 cm. Homogeneous echotexture. 1.1 cm hyperechoic lesion in the right hepatic lobe is unchanged.  Unchanged cyst in the right hepatic lobe.    Gallbladder: Absent.    Biliary system: The common duct is within normal limits, measuring 4 mm.  No intrahepatic ductal dilatation.    Right kidney: Normal in size with no hydronephrosis, measuring 11.6 cm.    Miscellaneous: No upper abdominal ascites.      Impression       Mild hepatomegaly.  No evidence of steatosis.    Hepatic cyst and probable hemangioma are unchanged.       CT SCAN-   Done 8/15/18  FINDINGS:  The lung bases are clear.  The bones are intact.  There is no evidence for acute fracture or bone destruction.  There are hepatic hypodensities present likely representing hepatic cysts.  The gallbladder is surgically absent.  There is no evidence for intrahepatic or extrahepatic biliary dilatation.  The spleen, stomach, and pancreas  appear unremarkable.  The adrenal glands are not enlarged.  The kidneys are normal in size, position, and contour and are noted to concentrate and excrete contrast symmetrically.  The abdominal aorta tapers normally without aneurysmal dilatation.  No para-aortic lymphadenopathy is identified.  The appendix is not confidently identified, however there are no CT findings to strongly suggest acute appendicitis.  There are no dilated loops of bowel evident.  The uterus is present with multiple masses consistent with uterine fibroids.  There is an IUD in place.  No abnormal adnexal masses are identified.  There is a trace amount of free fluid within the pelvis which can be a normal finding.  Urinary bladder appears unremarkable.  There is no evidence for pelvic or inguinal lymphadenopathy.      Impression       Fibroid uterus with IUD in place.    Small amount of free fluid within the pelvis which can be a normal finding.    Multiple hepatic cysts.       MRI-   None   LIVER BIOPSY-  None   FIBROSCAN -   None     ASSESSMENT:  48 y.o. White female with:  1.  Liver lesion  -- Labs done 8/2018 show normal transaminase levels, low alk phos. Normal synthetic liver function except no INR on file   -- Imaging:  -- US 2014: 0.8 x 0.8  0.9 cm hyperechoic solid mass in the right hepatic lobe  -- US 8/2016: 1.1 x 1.2 x 0.8 cm echogenic lesion within the right hepatic lobe, felt to likely represent hemangioma  -- CT 8/2018: multiple hepatic hypodensities present likely representing hepatic cysts  -- US 12/2018: 1.1 cm hyperechoic lesion in the right hepatic lobe is unchanged.  Unchanged cyst in the right hepatic lobe.    PLAN:  1. Submit previous images to IR conference to determine any need for future surveillance  2. Recommend f/u with OB-GYN given fibroids on imaging and report of lower abd/pelvic cramping. Do not suspect abd pain of liver etiology   3. F/u pending results of IR conference    Thank you for allowing me to  participate in the care of Louise Trinidad NP-C    CC'ed note to:   Jonna Lozano MD

## 2023-10-23 ENCOUNTER — TELEPHONE (OUTPATIENT)
Dept: OPHTHALMOLOGY | Facility: CLINIC | Age: 53
End: 2023-10-23
Payer: COMMERCIAL

## 2023-10-23 NOTE — OP NOTE
Patient Name: Louise Cueto  Medical Record Number: [id number]  Surgeon: Sydney Thompson MD  Assistant: Nickie Barton MD   Pre-op Diagnosis:  Alternating Esotropia   Post-op Diagnosis:  Alternating Esotropia   Procedure: Bilateral medial rectus muscle recessions 4.5mm both eyes with adjustable suture on Right eye   Anesthesia: General  Complications: none   Date: 10/24/23    DESCRIPTION OF PROCEDURE:   The patient was identified in the pre-operative holding area and brought to the operating room, where anesthesia monitoring was established and general anesthesia induced in the supine position. The area about both eyes was prepped and draped in the usual sterile fashion and an eyelid speculum placed in the right eye. The globe was grasped at the limbus with forceps and rotated superotemporally. A 1-cm inferonasal conjunctival incision was created in the fornix with Virginie scissors. Tenon's capsule was opened revealing bare sclera beneath. A Ferguson hook followed by two Green hooks were used to engage the right medial rectus muscle. The conjunctiva was lifted over the toe of the hook to expose the muscle insertion. Tenon's attachments were severed with Virginie scissors. A double-armed suture of 6-0 Vicryl was passed through the muscle tendon near its insertion with a central locking and locking bites at both poles. The muscle was then severed from the globe.  Using a crossed-swords technique, the muscle was re-sewn back to the original insertion and allowed to hang back 4.5 mm posterior to the insertion as measured by calipers.  The suture ends were tied together using a noose technique so that the muscle could later be adjusted if needed and the muscle found secure in its new position. The conjunctiva was closed with interrupted sutures of 6-0 plain gut. The eyelid speculum was removed from the right eye and placed in the left eye, where a similar procedure was performed without complication however the muscle was  sewn down 4.5mm posterior to the insertion with no adjustable suture on the left eye.     The eyelid speculum was then removed. A drop of dilute Betadine solution was placed in both eyes followed by Maxitrol ophthalmic ointment. The patient was awakened from anesthesia and taken to the postoperative recovery area in stable condition, having tolerated the procedure well.

## 2023-10-24 ENCOUNTER — ANESTHESIA (OUTPATIENT)
Dept: SURGERY | Facility: HOSPITAL | Age: 53
End: 2023-10-24
Payer: COMMERCIAL

## 2023-10-24 ENCOUNTER — HOSPITAL ENCOUNTER (OUTPATIENT)
Facility: HOSPITAL | Age: 53
Discharge: HOME | End: 2023-10-24
Attending: STUDENT IN AN ORGANIZED HEALTH CARE EDUCATION/TRAINING PROGRAM | Admitting: STUDENT IN AN ORGANIZED HEALTH CARE EDUCATION/TRAINING PROGRAM
Payer: COMMERCIAL

## 2023-10-24 ENCOUNTER — ANESTHESIA EVENT (OUTPATIENT)
Dept: SURGERY | Facility: HOSPITAL | Age: 53
End: 2023-10-24
Payer: COMMERCIAL

## 2023-10-24 VITALS
HEIGHT: 65 IN | WEIGHT: 128 LBS | OXYGEN SATURATION: 97 % | TEMPERATURE: 98 F | DIASTOLIC BLOOD PRESSURE: 60 MMHG | HEART RATE: 56 BPM | BODY MASS INDEX: 21.33 KG/M2 | SYSTOLIC BLOOD PRESSURE: 98 MMHG | RESPIRATION RATE: 23 BRPM

## 2023-10-24 DIAGNOSIS — H50.00 ESOTROPIA: ICD-10-CM

## 2023-10-24 DIAGNOSIS — H50.00 ESOTROPIA OF BOTH EYES: Primary | ICD-10-CM

## 2023-10-24 LAB
B-HCG UR QL: NEGATIVE
CTP QC/QA: YES

## 2023-10-24 PROCEDURE — 71000015 HC POSTOP RECOV 1ST HR: Performed by: STUDENT IN AN ORGANIZED HEALTH CARE EDUCATION/TRAINING PROGRAM

## 2023-10-24 PROCEDURE — 81025 URINE PREGNANCY TEST: CPT | Performed by: STUDENT IN AN ORGANIZED HEALTH CARE EDUCATION/TRAINING PROGRAM

## 2023-10-24 PROCEDURE — 25000003 PHARM REV CODE 250

## 2023-10-24 PROCEDURE — 63600175 PHARM REV CODE 636 W HCPCS: Performed by: ANESTHESIOLOGY

## 2023-10-24 PROCEDURE — 36000706: Performed by: STUDENT IN AN ORGANIZED HEALTH CARE EDUCATION/TRAINING PROGRAM

## 2023-10-24 PROCEDURE — D9220A PRA ANESTHESIA: Mod: CRNA,,, | Performed by: STUDENT IN AN ORGANIZED HEALTH CARE EDUCATION/TRAINING PROGRAM

## 2023-10-24 PROCEDURE — 67311 REVISE EYE MUSCLE: CPT | Mod: 50,,, | Performed by: STUDENT IN AN ORGANIZED HEALTH CARE EDUCATION/TRAINING PROGRAM

## 2023-10-24 PROCEDURE — D9220A PRA ANESTHESIA: Mod: ANES,,, | Performed by: ANESTHESIOLOGY

## 2023-10-24 PROCEDURE — 71000044 HC DOSC ROUTINE RECOVERY FIRST HOUR: Performed by: STUDENT IN AN ORGANIZED HEALTH CARE EDUCATION/TRAINING PROGRAM

## 2023-10-24 PROCEDURE — 37000009 HC ANESTHESIA EA ADD 15 MINS: Performed by: STUDENT IN AN ORGANIZED HEALTH CARE EDUCATION/TRAINING PROGRAM

## 2023-10-24 PROCEDURE — D9220A PRA ANESTHESIA: ICD-10-PCS | Mod: CRNA,,, | Performed by: STUDENT IN AN ORGANIZED HEALTH CARE EDUCATION/TRAINING PROGRAM

## 2023-10-24 PROCEDURE — 71000016 HC POSTOP RECOV ADDL HR: Performed by: STUDENT IN AN ORGANIZED HEALTH CARE EDUCATION/TRAINING PROGRAM

## 2023-10-24 PROCEDURE — D9220A PRA ANESTHESIA: ICD-10-PCS | Mod: ANES,,, | Performed by: ANESTHESIOLOGY

## 2023-10-24 PROCEDURE — 67335 EYE SUTURE DURING SURGERY: CPT | Mod: RT,,, | Performed by: STUDENT IN AN ORGANIZED HEALTH CARE EDUCATION/TRAINING PROGRAM

## 2023-10-24 PROCEDURE — 25000003 PHARM REV CODE 250: Performed by: STUDENT IN AN ORGANIZED HEALTH CARE EDUCATION/TRAINING PROGRAM

## 2023-10-24 PROCEDURE — 63600175 PHARM REV CODE 636 W HCPCS: Performed by: STUDENT IN AN ORGANIZED HEALTH CARE EDUCATION/TRAINING PROGRAM

## 2023-10-24 PROCEDURE — 37000008 HC ANESTHESIA 1ST 15 MINUTES: Performed by: STUDENT IN AN ORGANIZED HEALTH CARE EDUCATION/TRAINING PROGRAM

## 2023-10-24 PROCEDURE — 67335 PR STABISMUS SURG,PLACE ADJUST SUTURE: ICD-10-PCS | Mod: RT,,, | Performed by: STUDENT IN AN ORGANIZED HEALTH CARE EDUCATION/TRAINING PROGRAM

## 2023-10-24 PROCEDURE — 67311 PR STABISMUS SURG,ONE HORIZ MUSCLE: ICD-10-PCS | Mod: 50,,, | Performed by: STUDENT IN AN ORGANIZED HEALTH CARE EDUCATION/TRAINING PROGRAM

## 2023-10-24 PROCEDURE — 36000707: Performed by: STUDENT IN AN ORGANIZED HEALTH CARE EDUCATION/TRAINING PROGRAM

## 2023-10-24 RX ORDER — DEXMEDETOMIDINE HYDROCHLORIDE 100 UG/ML
INJECTION, SOLUTION INTRAVENOUS
Status: DISCONTINUED | OUTPATIENT
Start: 2023-10-24 | End: 2023-10-24

## 2023-10-24 RX ORDER — ONDANSETRON 2 MG/ML
4 INJECTION INTRAMUSCULAR; INTRAVENOUS DAILY PRN
Status: DISCONTINUED | OUTPATIENT
Start: 2023-10-24 | End: 2023-10-24 | Stop reason: HOSPADM

## 2023-10-24 RX ORDER — NEOMYCIN SULFATE, POLYMYXIN B SULFATE, AND DEXAMETHASONE 3.5; 10000; 1 MG/G; [USP'U]/G; MG/G
OINTMENT OPHTHALMIC
Status: DISCONTINUED | OUTPATIENT
Start: 2023-10-24 | End: 2023-10-24 | Stop reason: HOSPADM

## 2023-10-24 RX ORDER — METOCLOPRAMIDE HYDROCHLORIDE 5 MG/ML
10 INJECTION INTRAMUSCULAR; INTRAVENOUS EVERY 10 MIN PRN
Status: DISCONTINUED | OUTPATIENT
Start: 2023-10-24 | End: 2023-10-24 | Stop reason: HOSPADM

## 2023-10-24 RX ORDER — PROPOFOL 10 MG/ML
VIAL (ML) INTRAVENOUS
Status: DISCONTINUED | OUTPATIENT
Start: 2023-10-24 | End: 2023-10-24

## 2023-10-24 RX ORDER — SODIUM CHLORIDE 0.9 % (FLUSH) 0.9 %
10 SYRINGE (ML) INJECTION
Status: DISCONTINUED | OUTPATIENT
Start: 2023-10-24 | End: 2023-10-24 | Stop reason: HOSPADM

## 2023-10-24 RX ORDER — LIDOCAINE HYDROCHLORIDE 20 MG/ML
INJECTION INTRAVENOUS
Status: DISCONTINUED | OUTPATIENT
Start: 2023-10-24 | End: 2023-10-24

## 2023-10-24 RX ORDER — KETOROLAC TROMETHAMINE 30 MG/ML
30 INJECTION, SOLUTION INTRAMUSCULAR; INTRAVENOUS ONCE
Status: COMPLETED | OUTPATIENT
Start: 2023-10-24 | End: 2023-10-24

## 2023-10-24 RX ORDER — NEOMYCIN SULFATE, POLYMYXIN B SULFATE AND DEXAMETHASONE 3.5; 10000; 1 MG/ML; [USP'U]/ML; MG/ML
SUSPENSION/ DROPS OPHTHALMIC
Status: DISCONTINUED
Start: 2023-10-24 | End: 2023-10-24 | Stop reason: HOSPADM

## 2023-10-24 RX ORDER — HYDROCODONE BITARTRATE AND ACETAMINOPHEN 5; 325 MG/1; MG/1
1 TABLET ORAL EVERY 6 HOURS PRN
Qty: 8 TABLET | Refills: 0 | Status: SHIPPED | OUTPATIENT
Start: 2023-10-24

## 2023-10-24 RX ORDER — NEOMYCIN SULFATE, POLYMYXIN B SULFATE AND DEXAMETHASONE 3.5; 10000; 1 MG/ML; [USP'U]/ML; MG/ML
1 SUSPENSION/ DROPS OPHTHALMIC 4 TIMES DAILY
Qty: 5 ML | Refills: 0 | Status: SHIPPED | OUTPATIENT
Start: 2023-10-24

## 2023-10-24 RX ORDER — DEXAMETHASONE SODIUM PHOSPHATE 4 MG/ML
INJECTION, SOLUTION INTRA-ARTICULAR; INTRALESIONAL; INTRAMUSCULAR; INTRAVENOUS; SOFT TISSUE
Status: DISCONTINUED | OUTPATIENT
Start: 2023-10-24 | End: 2023-10-24

## 2023-10-24 RX ORDER — ACETAMINOPHEN 10 MG/ML
INJECTION, SOLUTION INTRAVENOUS
Status: DISCONTINUED | OUTPATIENT
Start: 2023-10-24 | End: 2023-10-24

## 2023-10-24 RX ORDER — PHENYLEPHRINE HYDROCHLORIDE 25 MG/ML
SOLUTION/ DROPS OPHTHALMIC
Status: DISCONTINUED | OUTPATIENT
Start: 2023-10-24 | End: 2023-10-24 | Stop reason: HOSPADM

## 2023-10-24 RX ORDER — ONDANSETRON 2 MG/ML
INJECTION INTRAMUSCULAR; INTRAVENOUS
Status: DISCONTINUED | OUTPATIENT
Start: 2023-10-24 | End: 2023-10-24

## 2023-10-24 RX ORDER — NEOMYCIN SULFATE, POLYMYXIN B SULFATE, AND DEXAMETHASONE 3.5; 10000; 1 MG/G; [USP'U]/G; MG/G
OINTMENT OPHTHALMIC
Status: DISCONTINUED
Start: 2023-10-24 | End: 2023-10-24 | Stop reason: HOSPADM

## 2023-10-24 RX ORDER — NEOMYCIN SULFATE, POLYMYXIN B SULFATE AND DEXAMETHASONE 3.5; 10000; 1 MG/ML; [USP'U]/ML; MG/ML
SUSPENSION/ DROPS OPHTHALMIC
Status: DISCONTINUED | OUTPATIENT
Start: 2023-10-24 | End: 2023-10-24 | Stop reason: HOSPADM

## 2023-10-24 RX ORDER — NEOMYCIN SULFATE, POLYMYXIN B SULFATE, AND DEXAMETHASONE 3.5; 10000; 1 MG/G; [USP'U]/G; MG/G
OINTMENT OPHTHALMIC NIGHTLY
Qty: 3.5 G | Refills: 1 | Status: SHIPPED | OUTPATIENT
Start: 2023-10-24 | End: 2023-11-07

## 2023-10-24 RX ORDER — ONDANSETRON 4 MG/1
8 TABLET, ORALLY DISINTEGRATING ORAL EVERY 8 HOURS PRN
Qty: 16 TABLET | Refills: 0 | Status: SHIPPED | OUTPATIENT
Start: 2023-10-24

## 2023-10-24 RX ORDER — PHENYLEPHRINE HYDROCHLORIDE 25 MG/ML
SOLUTION/ DROPS OPHTHALMIC
Status: DISCONTINUED
Start: 2023-10-24 | End: 2023-10-24 | Stop reason: HOSPADM

## 2023-10-24 RX ORDER — HYDROMORPHONE HYDROCHLORIDE 1 MG/ML
0.2 INJECTION, SOLUTION INTRAMUSCULAR; INTRAVENOUS; SUBCUTANEOUS EVERY 5 MIN PRN
Status: DISCONTINUED | OUTPATIENT
Start: 2023-10-24 | End: 2023-10-24 | Stop reason: HOSPADM

## 2023-10-24 RX ORDER — MIDAZOLAM HYDROCHLORIDE 1 MG/ML
INJECTION, SOLUTION INTRAMUSCULAR; INTRAVENOUS
Status: DISCONTINUED | OUTPATIENT
Start: 2023-10-24 | End: 2023-10-24

## 2023-10-24 RX ADMIN — ACETAMINOPHEN 1000 MG: 10 INJECTION, SOLUTION INTRAVENOUS at 08:10

## 2023-10-24 RX ADMIN — DEXMEDETOMIDINE 8 MCG: 100 INJECTION, SOLUTION, CONCENTRATE INTRAVENOUS at 09:10

## 2023-10-24 RX ADMIN — KETOROLAC TROMETHAMINE 30 MG: 30 INJECTION INTRAMUSCULAR; INTRAVENOUS at 10:10

## 2023-10-24 RX ADMIN — DEXMEDETOMIDINE 4 MCG: 100 INJECTION, SOLUTION, CONCENTRATE INTRAVENOUS at 08:10

## 2023-10-24 RX ADMIN — SODIUM CHLORIDE: 9 INJECTION, SOLUTION INTRAVENOUS at 07:10

## 2023-10-24 RX ADMIN — DEXAMETHASONE SODIUM PHOSPHATE 4 MG: 4 INJECTION, SOLUTION INTRAMUSCULAR; INTRAVENOUS at 08:10

## 2023-10-24 RX ADMIN — MIDAZOLAM HYDROCHLORIDE 2 MG: 1 INJECTION, SOLUTION INTRAMUSCULAR; INTRAVENOUS at 08:10

## 2023-10-24 RX ADMIN — ONDANSETRON 4 MG: 2 INJECTION INTRAMUSCULAR; INTRAVENOUS at 08:10

## 2023-10-24 RX ADMIN — GLYCOPYRROLATE 0.2 MG: 0.2 INJECTION INTRAMUSCULAR; INTRAVENOUS at 09:10

## 2023-10-24 RX ADMIN — PROPOFOL 200 MG: 10 INJECTION, EMULSION INTRAVENOUS at 08:10

## 2023-10-24 RX ADMIN — LIDOCAINE HYDROCHLORIDE 100 MG: 20 INJECTION INTRAVENOUS at 08:10

## 2023-10-24 NOTE — ANESTHESIA PREPROCEDURE EVALUATION
10/24/2023  Louise Cueto is a 52 y.o., female.      Pre-op Assessment    I have reviewed the Patient Summary Reports.     I have reviewed the Nursing Notes. I have reviewed the NPO Status.   I have reviewed the Medications.     Review of Systems  Anesthesia Hx:  No problems with previous Anesthesia  Denies Family Hx of Anesthesia complications.   Denies Personal Hx of Anesthesia complications.   Social:  Social Alcohol Use    Hematology/Oncology:  Hematology Normal   Oncology Normal     EENT/Dental:   B/L esotropia   Cardiovascular:  Cardiovascular Normal     Pulmonary:  Pulmonary Normal    Hepatic/GI:   GERD    Musculoskeletal:  Musculoskeletal Normal    Neurological:  Neurology Normal    Dermatological:  Skin Normal    Psych:  Psychiatric Normal           Physical Exam  General: Well nourished, Cooperative and Alert    Airway:  Mallampati: I   Mouth Opening: Normal  TM Distance: Normal  Tongue: Normal  Neck ROM: Normal ROM    Dental:  Intact    Chest/Lungs:  Clear to auscultation, Normal Respiratory Rate    Heart:  Rate: Normal  Rhythm: Regular Rhythm  Sounds: Normal        Anesthesia Plan  Type of Anesthesia, risks & benefits discussed:    Anesthesia Type: Gen Supraglottic Airway  Intra-op Monitoring Plan: Standard ASA Monitors  Post Op Pain Control Plan: multimodal analgesia  Airway Plan: Direct  Informed Consent: Patient consented to blood products? No  ASA Score: 1  Day of Surgery Review of History & Physical: H&P Update referred to the surgeon/provider.    Ready For Surgery From Anesthesia Perspective.     .

## 2023-10-24 NOTE — DISCHARGE SUMMARY
Discharge Summary  Ophthalmology Service    Admit Date: 10/24/2023     Discharge Date: 10/24/2023     Attending Physician: Sydney Thompson MD     Discharge Physician: Same    Discharged Condition: Good    Reason for Admission: Esotropia of both eyes [H50.00]  Esotropia [H50.00]     Treatments/Procedures: Procedure(s) (LRB):  STRABISMUS SURGERY, 1 MUSCLE EACH EYE (Bilateral) (see dictated report for details)    Hospital Course: Stable    Consults: None    Significant Diagnostic Studies: None     Disposition: Home    Patient Instructions:   - Resume same diet as prior to surgery  - Limit rubbing/touching eyes.  - No swimming or dunking head underwater for 2 weeks   - Start maxitrol drops 4 times per day for 2 weeks and maxitrol ointment nightly for 2 weeks.   - If able to tolerate, start Norco as needed for pain and Zofran as needed for nausea  - Apply ice packs to operative eyes for first 48 hours as tolerated  - Dr. Thompson's office will call you to schedule 4 week follow up. Please call her office if you have not received a phone call within 2 weeks.       Patient Instructions:   Current Discharge Medication List        START taking these medications    Details   HYDROcodone-acetaminophen (NORCO) 5-325 mg per tablet Take 1 tablet by mouth every 6 (six) hours as needed for Pain.  Qty: 8 tablet, Refills: 0    Comments: Quantity prescribed more than 7 day supply? No      neomycin-polymyxin-dexamethasone (MAXITROL) 3.5 mg/g-10,000 unit/g-0.1 % Oint Place into both eyes every evening. for 14 days  Qty: 3.5 g, Refills: 1      neomycin-polymyxin-dexamethasone (MAXITROL) 3.5mg/mL-10,000 unit/mL-0.1 % DrpS Place 1 drop into both eyes 4 (four) times daily.  Qty: 5 mL, Refills: 0      ondansetron (ZOFRAN-ODT) 4 MG TbDL Take 2 tablets (8 mg total) by mouth every 8 (eight) hours as needed.  Qty: 16 tablet, Refills: 0           CONTINUE these medications which have NOT CHANGED    Details   ascorbic acid, vitamin C, (VITAMIN C)  1000 MG tablet Take 1,000 mg by mouth every evening.      b complex vitamins capsule Take 1 capsule by mouth every evening.      collagen, bovine, 100 % Powd Apply topically.      Lactobacillus rhamnosus GG (CULTURELLE) 10 billion cell capsule Take 1 capsule by mouth every evening.      magnesium 30 mg Tab Take by mouth every evening.      progesterone (PROMETRIUM) 200 MG capsule TAKE ONE CAPSULE BY MOUTH DAYS 1-12 EVERY NIGHT AT BEDTIME      vitamin D (VITAMIN D3) 1000 units Tab Take 1,000 Units by mouth every evening.      INV TESTOSTERONE/ANASTROZOL OR PLACEBO PELLETS Inject 1 Pellet into the skin. FOR INVESTIGATIONAL USE ONLY      valACYclovir (VALTREX) 1000 MG tablet Take 0.5 tablets (500 mg total) by mouth every 12 (twelve) hours.  Qty: 14 tablet, Refills: 5             No discharge procedures on file.

## 2023-10-24 NOTE — TRANSFER OF CARE
"Anesthesia Transfer of Care Note    Patient: Louise Cueto    Procedure(s) Performed: Procedure(s) (LRB):  STRABISMUS SURGERY, 1 MUSCLE EACH EYE (Bilateral)    Patient location: PACU    Anesthesia Type: general    Transport from OR: Transported from OR on 6-10 L/min O2 by face mask with adequate spontaneous ventilation    Post pain: adequate analgesia    Post assessment: no apparent anesthetic complications and tolerated procedure well    Post vital signs: stable    Level of consciousness: awake and responds to stimulation    Nausea/Vomiting: no nausea/vomiting    Complications: none    Transfer of care protocol was followed      Last vitals:   Visit Vitals  /71 (BP Location: Right arm, Patient Position: Lying)   Pulse 98   Temp 36.5 °C (97.7 °F) (Temporal)   Resp (!) 23   Ht 5' 5" (1.651 m)   Wt 58.1 kg (128 lb)   SpO2 99%   Breastfeeding No   BMI 21.30 kg/m²     "

## 2023-10-24 NOTE — ANESTHESIA POSTPROCEDURE EVALUATION
Anesthesia Post Evaluation    Patient: Louise Cueto    Procedure(s) Performed: Procedure(s) (LRB):  STRABISMUS SURGERY, 1 MUSCLE EACH EYE (Bilateral)    Final Anesthesia Type: general      Patient location during evaluation: PACU  Patient participation: Yes- Able to Participate  Level of consciousness: awake and alert  Post-procedure vital signs: reviewed and stable  Pain management: adequate  Airway patency: patent    PONV status at discharge: No PONV  Anesthetic complications: no      Cardiovascular status: hemodynamically stable and blood pressure returned to baseline  Respiratory status: unassisted  Hydration status: euvolemic  Follow-up not needed.          Vitals Value Taken Time   /63 10/24/23 1102   Temp 36.5 °C (97.7 °F) 10/24/23 0955   Pulse 61 10/24/23 1105   Resp 29 10/24/23 0955   SpO2 97 % 10/24/23 1105   Vitals shown include unvalidated device data.      No case tracking events are documented in the log.      Pain/Ozzy Score: Pain Rating Prior to Med Admin: 9 (10/24/2023 10:42 AM)  Ozzy Score: 9 (10/24/2023 10:00 AM)

## 2023-10-24 NOTE — ANESTHESIA PROCEDURE NOTES
Intubation    Date/Time: 10/24/2023 8:43 AM    Performed by: Nat Ruff CRNA  Authorized by: Ramon Loza DO    Intubation:     Induction:  Intravenous    Intubated:  Postinduction    Mask Ventilation:  Easy mask    Attempted By (2nd Attempt):  CRNA    Difficult Airway Encountered?: No      Complications:  None    Airway Device:  Supraglottic airway/LMA    Airway Device Size:  4.0    Style/Cuff Inflation:  Cuffed (inflated to minimal occlusive pressure)    Secured at:  The lips    Placement Verified By:  Capnometry    Complicating Factors:  None    Findings Post-Intubation:  BS equal bilateral and atraumatic/condition of teeth unchanged

## 2023-10-24 NOTE — DISCHARGE INSTRUCTIONS
Patient Instructions:   - Resume same diet as prior to surgery  - Limit rubbing/touching eyes.  - No swimming or dunking head underwater for 2 weeks   - Start maxitrol drops 4 times per day for 2 weeks and maxitrol ointment nightly for 2 weeks.   - If able to tolerate, start Norco as needed for pain and Zofran as needed for nausea  - Apply ice packs to operative eyes for first 48 hours as tolerated  - Dr. Thompson's office will call you to schedule 4 week follow up. Please call her office if you have not received a phone call within 2 weeks.

## 2023-10-24 NOTE — H&P
Pre-Operative History & Physical  Ophthalmology      SUBJECTIVE:     History of Present Illness:  Patient is a 52 y.o. female presents with Esotropia of both eyes [H50.00]  Esotropia [H50.00].    MEDICATIONS:   PTA Medications   Medication Sig    ascorbic acid, vitamin C, (VITAMIN C) 1000 MG tablet Take 1,000 mg by mouth every evening.    b complex vitamins capsule Take 1 capsule by mouth every evening.    collagen, bovine, 100 % Powd Apply topically.    Lactobacillus rhamnosus GG (CULTURELLE) 10 billion cell capsule Take 1 capsule by mouth every evening.    magnesium 30 mg Tab Take by mouth every evening.    progesterone (PROMETRIUM) 200 MG capsule TAKE ONE CAPSULE BY MOUTH DAYS 1-12 EVERY NIGHT AT BEDTIME    vitamin D (VITAMIN D3) 1000 units Tab Take 1,000 Units by mouth every evening.    INV TESTOSTERONE/ANASTROZOL OR PLACEBO PELLETS Inject 1 Pellet into the skin. FOR INVESTIGATIONAL USE ONLY    valACYclovir (VALTREX) 1000 MG tablet Take 0.5 tablets (500 mg total) by mouth every 12 (twelve) hours.       ALLERGIES:   Review of patient's allergies indicates:   Allergen Reactions    Percocet [oxycodone-acetaminophen] Nausea And Vomiting    Vicodin [hydrocodone-acetaminophen] Nausea And Vomiting     Pt not sure if she took percocet or Vicodin that caused severe nausea & vomiting       PAST MEDICAL HISTORY:   Past Medical History:   Diagnosis Date    Abdominal bloating 2019    GERD (gastroesophageal reflux disease)     Liver lesion 1/3/2019     PAST SURGICAL HISTORY:   Past Surgical History:   Procedure Laterality Date    BREAST CYST EXCISION Left     20 y/o f     SECTION      CHOLECYSTECTOMY      45 year old    COLONOSCOPY N/A 2017    Procedure: COLONOSCOPY;  Surgeon: Andrews Sears MD;  Location: [address]);  Service: Endoscopy;  Laterality: N/A;    COLONOSCOPY N/A 2022    Procedure: COLONOSCOPY;  Surgeon: Andrews Sears MD;  Location: [facility] ([address] [place] [address]);  Service:  Endoscopy;  Laterality: N/A;  Fully Vaccinated.EC    CYSTOSCOPY N/A 09/17/2019    Procedure: CYSTOSCOPY;  Surgeon: Debbie Murphy MD;  Location: [facility];  Service: OB/GYN;  Laterality: N/A;    HYSTEROSCOPY N/A 09/17/2019    Procedure: HYSTEROSCOPY-removal of IUD; possible shaving of uterine fibroids if needed to insert new IUD;  Surgeon: Debbie Murphy MD;  Location: [facility] OR;  Service: OB/GYN;  Laterality: N/A;    INTRAUTERINE DEVICE INSERTION N/A 09/17/2019    Procedure: IUD insertion-- mirena-- would prefer kyleena if available;  Surgeon: Debbie Murphy MD;  Location: [facility];  Service: OB/GYN;  Laterality: N/A;     PAST FAMILY HISTORY:   Family History   Problem Relation Age of Onset    Cancer Mother 73        colon ca    Hypertension Mother     Arrhythmia Mother     Heart disease Father         MI around 50.    Hyperlipidemia Father     Parkinsonism Father     Cancer Sister 49        breast    Breast cancer Sister 48        breast    Other Sister     No Known Problems Brother     Other Daughter         TBI with left hemiparesis    No Known Problems Son     Arrhythmia Son         SVT    Cirrhosis Maternal Grandfather     Alcohol abuse Maternal Grandfather     Alzheimer's disease Paternal Grandmother     Parkinsonism Paternal Grandfather     Cancer Cousin         ovarian and one with lung    Colon cancer Neg Hx      SOCIAL HISTORY:   Social History     Tobacco Use    Smoking status: Never    Smokeless tobacco: Never   Substance Use Topics    Alcohol use: Yes     Alcohol/week: 1.0 standard drink of alcohol     Types: 1 Glasses of wine per week     Comment: amt varies     Drug use: No        MENTAL STATUS: Alert    REVIEW OF SYSTEMS: Negative    OBJECTIVE:     Vital Signs (Most Recent)  Temp: 98.1 °F (36.7 °C) (10/24/23 0747)  Pulse: 73 (10/24/23 0747)  Resp: 16 (10/24/23 0747)  BP: 112/74 (10/24/23 0747)  SpO2: 97 % (10/24/23 0747)    Physical Exam:  General: NAD  HEENT: Strabismus, Atraumatic  Lungs:  Adequate respirations  Heart: + pulses  Abdomen: Soft    ASSESSMENT/PLAN:     Patient is a 52 y.o. female with Esotropia of both eyes [H50.00]  Esotropia [H50.00].     - Plan for surgical correction with Bilateral medial rectus muscle recessions both eyes with adjustable    - Risks/benefits/alternatives of the procedure including, but not limited to scarring, bleeding, infection, loss or decreased vision, and/or need for possible repeat surgery discussed with the patient and family.   - Informed consent obtained prior to surgery and the patient/family voiced good understanding.     Nickie Barton MD  [facility]

## 2023-10-25 ENCOUNTER — TELEPHONE (OUTPATIENT)
Dept: OPHTHALMOLOGY | Facility: CLINIC | Age: 53
End: 2023-10-25
Payer: COMMERCIAL

## 2023-10-25 NOTE — TELEPHONE ENCOUNTER
LVM requesting call back to schedule post op appt.    -ds  ----- Message from Sydney Thompson MD sent at 10/25/2023  4:26 PM CDT -----  Please schedule 4 week post op from surgery 10/23/23. Thanks!

## 2023-11-01 ENCOUNTER — TELEPHONE (OUTPATIENT)
Dept: OPTOMETRY | Facility: CLINIC | Age: 53
End: 2023-11-01
Payer: COMMERCIAL

## 2023-11-13 ENCOUNTER — PATIENT MESSAGE (OUTPATIENT)
Dept: INTERNAL MEDICINE | Facility: CLINIC | Age: 53
End: 2023-11-13
Payer: COMMERCIAL

## 2023-11-14 ENCOUNTER — PATIENT MESSAGE (OUTPATIENT)
Dept: INTERNAL MEDICINE | Facility: CLINIC | Age: 53
End: 2023-11-14
Payer: COMMERCIAL

## 2023-11-19 ENCOUNTER — PATIENT MESSAGE (OUTPATIENT)
Dept: INTERNAL MEDICINE | Facility: CLINIC | Age: 53
End: 2023-11-19
Payer: COMMERCIAL

## 2023-11-27 ENCOUNTER — HOSPITAL ENCOUNTER (OUTPATIENT)
Dept: RADIOLOGY | Facility: HOSPITAL | Age: 53
Discharge: HOME | End: 2023-11-27
Attending: OBSTETRICS & GYNECOLOGY
Payer: COMMERCIAL

## 2023-11-27 ENCOUNTER — HOSPITAL ENCOUNTER (OUTPATIENT)
Dept: RADIOLOGY | Facility: CLINIC | Age: 53
Discharge: HOME | End: 2023-11-27
Attending: OBSTETRICS & GYNECOLOGY
Payer: COMMERCIAL

## 2023-11-27 VITALS — BODY MASS INDEX: 20.8 KG/M2 | WEIGHT: 125 LBS

## 2023-11-27 DIAGNOSIS — Z78.0 MENOPAUSE: ICD-10-CM

## 2023-11-27 DIAGNOSIS — Z12.31 ENCOUNTER FOR SCREENING MAMMOGRAM FOR MALIGNANT NEOPLASM OF BREAST: ICD-10-CM

## 2023-11-27 PROCEDURE — 77067 MAMMO DIGITAL SCREENING BILAT WITH TOMO: ICD-10-PCS | Mod: 26,,, | Performed by: RADIOLOGY

## 2023-11-27 PROCEDURE — 77080 DXA BONE DENSITY AXIAL: CPT | Mod: 26,,, | Performed by: INTERNAL MEDICINE

## 2023-11-27 PROCEDURE — 77080 DXA BONE DENSITY AXIAL SKELETON 1 OR MORE SITES: ICD-10-PCS | Mod: 26,,, | Performed by: INTERNAL MEDICINE

## 2023-11-27 PROCEDURE — 77063 MAMMO DIGITAL SCREENING BILAT WITH TOMO: ICD-10-PCS | Mod: 26,,, | Performed by: RADIOLOGY

## 2023-11-27 PROCEDURE — 77067 SCR MAMMO BI INCL CAD: CPT | Mod: TC

## 2023-11-27 PROCEDURE — 77067 SCR MAMMO BI INCL CAD: CPT | Mod: 26,,, | Performed by: RADIOLOGY

## 2023-11-27 PROCEDURE — 77080 DXA BONE DENSITY AXIAL: CPT | Mod: TC

## 2023-11-27 PROCEDURE — 77063 BREAST TOMOSYNTHESIS BI: CPT | Mod: 26,,, | Performed by: RADIOLOGY

## 2023-11-29 ENCOUNTER — OFFICE VISIT (OUTPATIENT)
Dept: OPHTHALMOLOGY | Facility: CLINIC | Age: 53
End: 2023-11-29
Payer: COMMERCIAL

## 2023-11-29 DIAGNOSIS — Z98.890 HISTORY OF STRABISMUS SURGERY: ICD-10-CM

## 2023-11-29 DIAGNOSIS — Z98.890 POST-OPERATIVE STATE: Primary | ICD-10-CM

## 2023-11-29 PROCEDURE — 99024 PR POST-OP FOLLOW-UP VISIT: ICD-10-PCS | Mod: S$GLB,,, | Performed by: STUDENT IN AN ORGANIZED HEALTH CARE EDUCATION/TRAINING PROGRAM

## 2023-11-29 PROCEDURE — 3044F PR MOST RECENT HEMOGLOBIN A1C LEVEL <7.0%: ICD-10-PCS | Mod: CPTII,S$GLB,, | Performed by: STUDENT IN AN ORGANIZED HEALTH CARE EDUCATION/TRAINING PROGRAM

## 2023-11-29 PROCEDURE — 99024 POSTOP FOLLOW-UP VISIT: CPT | Mod: S$GLB,,, | Performed by: STUDENT IN AN ORGANIZED HEALTH CARE EDUCATION/TRAINING PROGRAM

## 2023-11-29 PROCEDURE — 1159F MED LIST DOCD IN RCRD: CPT | Mod: CPTII,S$GLB,, | Performed by: STUDENT IN AN ORGANIZED HEALTH CARE EDUCATION/TRAINING PROGRAM

## 2023-11-29 PROCEDURE — 99999 PR PBB SHADOW E&M-EST. PATIENT-LVL III: ICD-10-PCS | Mod: PBBFAC,,, | Performed by: STUDENT IN AN ORGANIZED HEALTH CARE EDUCATION/TRAINING PROGRAM

## 2023-11-29 PROCEDURE — 1159F PR MEDICATION LIST DOCUMENTED IN MEDICAL RECORD: ICD-10-PCS | Mod: CPTII,S$GLB,, | Performed by: STUDENT IN AN ORGANIZED HEALTH CARE EDUCATION/TRAINING PROGRAM

## 2023-11-29 PROCEDURE — 99999 PR PBB SHADOW E&M-EST. PATIENT-LVL III: CPT | Mod: PBBFAC,,, | Performed by: STUDENT IN AN ORGANIZED HEALTH CARE EDUCATION/TRAINING PROGRAM

## 2023-11-29 PROCEDURE — 3044F HG A1C LEVEL LT 7.0%: CPT | Mod: CPTII,S$GLB,, | Performed by: STUDENT IN AN ORGANIZED HEALTH CARE EDUCATION/TRAINING PROGRAM

## 2023-11-29 NOTE — PROGRESS NOTES
HPI    S/p 10/24/2023  Bilateral medial rectus muscle recessions 4.5mm both eyes   with adjustable suture on Right eye.     comes in for a post op follow up. She is happy with her surgical   results. Denies pain/discomfort. No double vision. Hx of eso ou  Last edited by Ab Yuan MA on 11/29/2023 10:13 AM.            Assessment /Plan     For exam results, see Encounter Report.    Post-operative state    History of strabismus surgery      Discussed findings with patient today.  -Healing well - slight suture reaction OD - start PF TID for 2 weeks then stop   -no deviation noted today  -Desired results of surgical correction achieved and patient is happy with results.    RTC as needed     This service was scribed by Kate Jose for and in the presence of Dr. Thompson who personally performed this service.    Kate Jose, technician     Sydney Thompson MD

## 2024-02-09 ENCOUNTER — TELEPHONE (OUTPATIENT)
Dept: INTERNAL MEDICINE | Facility: CLINIC | Age: 54
End: 2024-02-09
Payer: COMMERCIAL

## 2024-02-09 RX ORDER — AMOXICILLIN AND CLAVULANATE POTASSIUM 875; 125 MG/1; MG/1
1 TABLET, FILM COATED ORAL EVERY 12 HOURS
Qty: 14 TABLET | Refills: 0 | Status: SHIPPED | OUTPATIENT
Start: 2024-02-09 | End: 2024-02-16

## 2024-02-19 ENCOUNTER — OFFICE VISIT (OUTPATIENT)
Dept: INTERNAL MEDICINE | Facility: CLINIC | Age: 54
End: 2024-02-19
Payer: COMMERCIAL

## 2024-02-19 ENCOUNTER — CLINICAL SUPPORT (OUTPATIENT)
Dept: INTERNAL MEDICINE | Facility: CLINIC | Age: 54
End: 2024-02-19
Payer: COMMERCIAL

## 2024-02-19 VITALS
BODY MASS INDEX: 20.63 KG/M2 | WEIGHT: 124 LBS | DIASTOLIC BLOOD PRESSURE: 58 MMHG | SYSTOLIC BLOOD PRESSURE: 94 MMHG | HEART RATE: 70 BPM | OXYGEN SATURATION: 98 %

## 2024-02-19 DIAGNOSIS — Z00.00 ANNUAL PHYSICAL EXAM: Primary | ICD-10-CM

## 2024-02-19 DIAGNOSIS — Z80.0 FAMILY HISTORY OF COLON CANCER: ICD-10-CM

## 2024-02-19 DIAGNOSIS — H90.3 SENSORINEURAL HEARING LOSS (SNHL) OF BOTH EARS: ICD-10-CM

## 2024-02-19 DIAGNOSIS — Z15.89 MTHFR GENE MUTATION: ICD-10-CM

## 2024-02-19 LAB
25(OH)D3+25(OH)D2 SERPL-MCNC: 107 NG/ML (ref 30–96)
ALBUMIN SERPL BCP-MCNC: 3.8 G/DL (ref 3.5–5.2)
ALP SERPL-CCNC: 56 U/L (ref 55–135)
ALT SERPL W/O P-5'-P-CCNC: 16 U/L (ref 10–44)
ANION GAP SERPL CALC-SCNC: 7 MMOL/L (ref 8–16)
AST SERPL-CCNC: 20 U/L (ref 10–40)
BASOPHILS # BLD AUTO: 0.06 K/UL (ref 0–0.2)
BASOPHILS NFR BLD: 0.7 % (ref 0–1.9)
BILIRUB SERPL-MCNC: 0.2 MG/DL (ref 0.1–1)
BUN SERPL-MCNC: 14 MG/DL (ref 6–20)
CALCIUM SERPL-MCNC: 9.3 MG/DL (ref 8.7–10.5)
CHLORIDE SERPL-SCNC: 108 MMOL/L (ref 95–110)
CHOLEST SERPL-MCNC: 181 MG/DL (ref 120–199)
CHOLEST/HDLC SERPL: 3.5 {RATIO} (ref 2–5)
CO2 SERPL-SCNC: 27 MMOL/L (ref 23–29)
CREAT SERPL-MCNC: 0.8 MG/DL (ref 0.5–1.4)
DIFFERENTIAL METHOD BLD: NORMAL
EOSINOPHIL # BLD AUTO: 0.3 K/UL (ref 0–0.5)
EOSINOPHIL NFR BLD: 3.7 % (ref 0–8)
ERYTHROCYTE [DISTWIDTH] IN BLOOD BY AUTOMATED COUNT: 12.9 % (ref 11.5–14.5)
EST. GFR  (NO RACE VARIABLE): >60 ML/MIN/1.73 M^2
ESTIMATED AVG GLUCOSE: 103 MG/DL (ref 68–131)
GLUCOSE SERPL-MCNC: 73 MG/DL (ref 70–110)
HBA1C MFR BLD: 5.2 % (ref 4–5.6)
HCT VFR BLD AUTO: 39.5 % (ref 37–48.5)
HCYS SERPL-SCNC: 3.9 UMOL/L (ref 4–15.5)
HDLC SERPL-MCNC: 51 MG/DL (ref 40–75)
HDLC SERPL: 28.2 % (ref 20–50)
HGB BLD-MCNC: 13.1 G/DL (ref 12–16)
IMM GRANULOCYTES # BLD AUTO: 0.02 K/UL (ref 0–0.04)
IMM GRANULOCYTES NFR BLD AUTO: 0.2 % (ref 0–0.5)
LDLC SERPL CALC-MCNC: 119.2 MG/DL (ref 63–159)
LYMPHOCYTES # BLD AUTO: 2.1 K/UL (ref 1–4.8)
LYMPHOCYTES NFR BLD: 25.8 % (ref 18–48)
MCH RBC QN AUTO: 30.5 PG (ref 27–31)
MCHC RBC AUTO-ENTMCNC: 33.2 G/DL (ref 32–36)
MCV RBC AUTO: 92 FL (ref 82–98)
MONOCYTES # BLD AUTO: 0.5 K/UL (ref 0.3–1)
MONOCYTES NFR BLD: 6.3 % (ref 4–15)
NEUTROPHILS # BLD AUTO: 5.2 K/UL (ref 1.8–7.7)
NEUTROPHILS NFR BLD: 63.3 % (ref 38–73)
NONHDLC SERPL-MCNC: 130 MG/DL
NRBC BLD-RTO: 0 /100 WBC
PLATELET # BLD AUTO: 414 K/UL (ref 150–450)
PMV BLD AUTO: 9.7 FL (ref 9.2–12.9)
POTASSIUM SERPL-SCNC: 3.8 MMOL/L (ref 3.5–5.1)
PROT SERPL-MCNC: 6.5 G/DL (ref 6–8.4)
RBC # BLD AUTO: 4.3 M/UL (ref 4–5.4)
SODIUM SERPL-SCNC: 142 MMOL/L (ref 136–145)
TRIGL SERPL-MCNC: 54 MG/DL (ref 30–150)
TSH SERPL DL<=0.005 MIU/L-ACNC: 1.93 UIU/ML (ref 0.4–4)
VIT B12 SERPL-MCNC: 774 PG/ML (ref 210–950)
WBC # BLD AUTO: 8.29 K/UL (ref 3.9–12.7)

## 2024-02-19 PROCEDURE — 85025 COMPLETE CBC W/AUTO DIFF WBC: CPT | Performed by: INTERNAL MEDICINE

## 2024-02-19 PROCEDURE — 1160F RVW MEDS BY RX/DR IN RCRD: CPT | Mod: CPTII,S$GLB,, | Performed by: INTERNAL MEDICINE

## 2024-02-19 PROCEDURE — 83036 HEMOGLOBIN GLYCOSYLATED A1C: CPT | Performed by: INTERNAL MEDICINE

## 2024-02-19 PROCEDURE — 3044F HG A1C LEVEL LT 7.0%: CPT | Mod: CPTII,S$GLB,, | Performed by: INTERNAL MEDICINE

## 2024-02-19 PROCEDURE — 99396 PREV VISIT EST AGE 40-64: CPT | Mod: 25,S$GLB,, | Performed by: INTERNAL MEDICINE

## 2024-02-19 PROCEDURE — 83090 ASSAY OF HOMOCYSTEINE: CPT | Performed by: INTERNAL MEDICINE

## 2024-02-19 PROCEDURE — 3074F SYST BP LT 130 MM HG: CPT | Mod: CPTII,S$GLB,, | Performed by: INTERNAL MEDICINE

## 2024-02-19 PROCEDURE — 80061 LIPID PANEL: CPT | Performed by: INTERNAL MEDICINE

## 2024-02-19 PROCEDURE — 90677 PCV20 VACCINE IM: CPT | Mod: S$GLB,,, | Performed by: INTERNAL MEDICINE

## 2024-02-19 PROCEDURE — 84443 ASSAY THYROID STIM HORMONE: CPT | Performed by: INTERNAL MEDICINE

## 2024-02-19 PROCEDURE — 1159F MED LIST DOCD IN RCRD: CPT | Mod: CPTII,S$GLB,, | Performed by: INTERNAL MEDICINE

## 2024-02-19 PROCEDURE — 82306 VITAMIN D 25 HYDROXY: CPT | Performed by: INTERNAL MEDICINE

## 2024-02-19 PROCEDURE — 90471 IMMUNIZATION ADMIN: CPT | Mod: S$GLB,,, | Performed by: INTERNAL MEDICINE

## 2024-02-19 PROCEDURE — 3078F DIAST BP <80 MM HG: CPT | Mod: CPTII,S$GLB,, | Performed by: INTERNAL MEDICINE

## 2024-02-19 PROCEDURE — 80053 COMPREHEN METABOLIC PANEL: CPT | Performed by: INTERNAL MEDICINE

## 2024-02-19 PROCEDURE — 99999 PR PBB SHADOW E&M-EST. PATIENT-LVL III: CPT | Mod: PBBFAC,,, | Performed by: INTERNAL MEDICINE

## 2024-02-19 PROCEDURE — 82607 VITAMIN B-12: CPT | Performed by: INTERNAL MEDICINE

## 2024-02-19 PROCEDURE — 3008F BODY MASS INDEX DOCD: CPT | Mod: CPTII,S$GLB,, | Performed by: INTERNAL MEDICINE

## 2024-02-20 ENCOUNTER — PATIENT MESSAGE (OUTPATIENT)
Dept: INTERNAL MEDICINE | Facility: CLINIC | Age: 54
End: 2024-02-20
Payer: COMMERCIAL

## 2024-02-20 NOTE — PROGRESS NOTES
Subjective:       Patient ID: Louise Cueto is a 53 y.o. female who presents today for:    Chief Complaint:   Chief Complaint   Patient presents with    Annual Exam       HPI:  Very pleasant 53 nonsmoking female here for an annual physical exam.  The patient is very healthy and exercises regularly in the form of wanting, yoga and weight lifting.  She is now on hormone replacement therapy with pellets by Dr. Mix.  She did have strabismus surgery back in October that was successful.  She has a family history significant for Parkinson's and another family member with Alzheimer's dementia which she expressed some concern of inheritance.    She does take supplements to include a B complex as she was diagnosed with MTHFR gene, uncertain if it is heterozygous or homozygous, a vitamin-D supplement, probiotic, magnesium and Omega fish oils.    Review of Systems   Constitutional:  Negative for chills, fever and weight loss.   HENT:  Negative for sore throat.    Eyes:  Negative for blurred vision and double vision.   Respiratory:  Negative for cough and shortness of breath.    Cardiovascular:  Negative for chest pain and palpitations.   Gastrointestinal:  Negative for constipation, diarrhea, nausea and vomiting.   Genitourinary:  Negative for dysuria and hematuria.   Musculoskeletal:  Negative for joint pain and myalgias.   Skin:  Negative for itching and rash.   Neurological:  Negative for sensory change, focal weakness and headaches.   Endo/Heme/Allergies:  Does not bruise/bleed easily.   Psychiatric/Behavioral:  Negative for depression and suicidal ideas.         Medications:  Outpatient Encounter Medications as of 2024   Medication Sig Note Dispense Refill    [] amoxicillin-clavulanate 875-125mg (AUGMENTIN) 875-125 mg per tablet Take 1 tablet by mouth every 12 (twelve) hours. for 7 days  14 tablet 0    ascorbic acid, vitamin C, (VITAMIN C) 1000 MG tablet Take 1,000 mg by mouth every evening. 10/23/2023:  Hold until after surgery      b complex vitamins capsule Take 1 capsule by mouth every evening. 10/23/2023: Hold until after surgery      collagen, bovine, 100 % Powd Apply topically.       HYDROcodone-acetaminophen (NORCO) 5-325 mg per tablet Take 1 tablet by mouth every 6 (six) hours as needed for Pain.  8 tablet 0    INV TESTOSTERONE/ANASTROZOL OR PLACEBO PELLETS Inject 1 Pellet into the skin. FOR INVESTIGATIONAL USE ONLY       Lactobacillus rhamnosus GG (CULTURELLE) 10 billion cell capsule Take 1 capsule by mouth every evening. 10/23/2023: Hold until after surgery      magnesium 30 mg Tab Take by mouth every evening. 10/23/2023: Hold until after surgery      neomycin-polymyxin-dexamethasone (MAXITROL) 3.5mg/mL-10,000 unit/mL-0.1 % DrpS Place 1 drop into both eyes 4 (four) times daily.  5 mL 0    ondansetron (ZOFRAN-ODT) 4 MG TbDL Dissolve 2 tablets (8 mg total) by mouth every 8 (eight) hours as needed.  16 tablet 0    progesterone (PROMETRIUM) 200 MG capsule TAKE ONE CAPSULE BY MOUTH DAYS 1-12 EVERY NIGHT AT BEDTIME 10/23/2023: Not due night before surgery      valACYclovir (VALTREX) 1000 MG tablet Take 0.5 tablets (500 mg total) by mouth every 12 (twelve) hours. (Patient not taking: Reported on 11/29/2023) 10/23/2023: Currently not taking 14 tablet 5    vitamin D (VITAMIN D3) 1000 units Tab Take 1,000 Units by mouth every evening. 10/23/2023: Hold until after surgery       No facility-administered encounter medications on file as of 2/19/2024.       Allergies:  Review of patient's allergies indicates:   Allergen Reactions    Percocet [oxycodone-acetaminophen] Nausea And Vomiting    Vicodin [hydrocodone-acetaminophen] Nausea And Vomiting     Pt not sure if she took percocet or Vicodin that caused severe nausea & vomiting       Health Maintenance:  Immunization History   Administered Date(s) Administered    COVID-19, MRNA, LN-S, PF (Pfizer) (Purple Cap) 03/01/2021, 03/22/2021, 09/10/2021    Hepatitis A /  Hepatitis B 10/15/2009    Influenza 10/01/2009    Influenza - Intradermal - Quadrivalent - PF 10/01/2018    Influenza - Quadrivalent 10/06/2016    Influenza - Quadrivalent - PF *Preferred* (6 months and older) 10/24/2019, 10/26/2020, 10/26/2020, 11/23/2021, 11/14/2022, 11/14/2023    Pneumococcal Conjugate - 20 Valent 02/19/2024    Td (ADULT) 10/01/2009    Tdap 10/01/2009, 08/11/2017    Zoster Recombinant 10/26/2020, 10/26/2020, 01/22/2021      Health Maintenance   Topic Date Due    Mammogram  11/27/2024    Colorectal Cancer Screening  07/25/2027    TETANUS VACCINE  08/11/2027    DEXA Scan  11/27/2028    Lipid Panel  02/19/2029    Hepatitis C Screening  Completed    Shingles Vaccine  Completed          Objective:      Vital Signs  Pulse: 70  SpO2: 98 %  BP: (!) 94/58  Pain Score: 0-No pain  Height and Weight  Weight: 56.2 kg (124 lb)]    Physical Exam  HENT:      Head: Normocephalic and atraumatic.   Eyes:      General: No scleral icterus.  Cardiovascular:      Rate and Rhythm: Normal rate and regular rhythm.      Heart sounds: No murmur heard.  Pulmonary:      Effort: Pulmonary effort is normal.      Breath sounds: Normal breath sounds. No rales.   Abdominal:      General: Bowel sounds are normal.      Palpations: Abdomen is soft.      Tenderness: There is no abdominal tenderness.   Musculoskeletal:         General: No tenderness.      Cervical back: Neck supple.   Neurological:      Mental Status: She is alert and oriented to person, place, and time.   Psychiatric:         Mood and Affect: Mood normal.         Behavior: Behavior normal.         Thought Content: Thought content normal.         Judgment: Judgment normal.        Lab Results   Component Value Date    WBC 8.29 02/19/2024    HGB 13.1 02/19/2024    HCT 39.5 02/19/2024     02/19/2024    CHOL 181 02/19/2024    TRIG 54 02/19/2024    HDL 51 02/19/2024    ALT 16 02/19/2024    AST 20 02/19/2024     02/19/2024    K 3.8 02/19/2024      02/19/2024    CREATININE 0.8 02/19/2024    BUN 14 02/19/2024    CO2 27 02/19/2024    TSH 1.934 02/19/2024    HGBA1C 5.2 02/19/2024       Assessment/plan:     Louise Cueto is a 53 y.o.female with:    Annual physical exam    Sensorineural hearing loss (SNHL) of both ears  -     Ambulatory referral/consult to Audiology; Future; Expected date: 02/26/2024    MTHFR gene mutation- ? which alleles , heter or homo?  Comments:  Homocystine is actually low which is good.  Continue L methyl folate    Family history of colon cancer  Comments:  Next due July of 2027    Other orders  -     (In Office Administered) Pneumococcal Conjugate Vaccine (20 Valent) (IM) (Preferred)    LDL or bad cholesterol had a nice improvement Louise from 150 down to 119.  Which is more your norm and an good LDL!        Future Appointments   Date Time Provider Department Center   2/21/2024  1:30 PM Mayda Walters AU.D [facility]   3/1/2024 10:00 AM NUTRITION, EXECUTIVE HEALTH [facility]   3/1/2024 11:00 [facility]  [facility]

## 2024-03-01 ENCOUNTER — CLINICAL SUPPORT (OUTPATIENT)
Dept: INTERNAL MEDICINE | Facility: CLINIC | Age: 54
End: 2024-03-01

## 2024-03-01 DIAGNOSIS — Z00.00 ROUTINE GENERAL MEDICAL EXAMINATION AT A HEALTH CARE FACILITY: Primary | ICD-10-CM

## 2024-03-01 NOTE — PROGRESS NOTES
Pt. Has no significant cardiovascular or pulmonary history.    Physical Limitations:  Patient has undiagnosed chronic and recently acute neck and left shoulder pain for which she is currently in physical therapy.  Patient is limited from lifting overhead.      Current exercise routine:  Patient currently runs for 15 minutes, practices full body resistance training exercises, high intensity aerobic interval training, and stretches, 3 days a week.  Patient walks for  minutes, 2 days a week.  Patient attends a 75 minutes Yoga class, 2 days a week.     Goals:  Patient would like to gain some lean muscle mass.    Notes:  Patient was very friendly and engaged.  She is dedicated to her exercise routine but noted that she has used the same weights for a while.  She is working on bettering her posture and we reviewed appropriate neck stretches and posture exercises.  Patient asked questions and was receptive to all recommendations made.      The fitness evaluation results are as follows:    D.O.S. 3/1/2024   Height (in): 65   Weight (lbs): 125.5   BMI: 20.99794   Body Fat (%): 23.70   Waist (cm): 70   RBP (mmHg): 102/64   RHR (bpm): 64    Strength Dominant (Lbs): 76    Strength Non Dominant (Lbs): 61   Push-up Assessment: 34   Curl-up Assessment: 56   Flexibility Testing (cm): 40   REE (kcals): 1308       Age/gender stratified assessment:    Resting BP: Within Normal Limits   Body Fat %: Excellent   Waist Circumference: Low Risk    Strength Dominant: 90th    Strength Non Dominant: 50th   Upper Body Endurance: Excellent   Abdominal Endurance: Well Above Average   Lower body Flexibiltiy: Excellent       Recommended fitness guidelines:    -150 minutes of moderate intensity aerobic exercise per week or 75 minutes of vigorous intensity aerobic exercise per week.      -2 to 4 days per week of resistance training for each muscle group.  Make sure you are keeping your body challenged and progressing your  strength training routine by adding sets, repetitions, weight, time under tension, or switching up an exercise about every 6-8 weeks or once an exercise starts to feel easy for you.      -Daily stretching with a hold of at least 30 seconds per muscle group.   Practice the seated upper trap and levaor stretches, demonstrated during the evaluation, daily.

## 2024-03-01 NOTE — PROGRESS NOTES
"Nutrition Assessment  Session Time:  60 minutes      Client name:  Louise Cueto  :  1970  Age:  53 y.o.  Gender:  female    Client states:  A very pleasant Ms. Cueto is here for her initial  Health physical. Patient is  with 3 kids, 2 in college and 1 graduated. She loves to paint, garden, cook and bake. She has no significant medical history, but she is concerned about her family history of Alzheimer's and Parkinson's disease (her dad).  This past year was difficult and very stressful for her because her  was diagnosed and treated for cancer. We reviewed her blood work taken last week, and it is all WNL. Her LDL has improved considerably, most likely because she is no longer under so much stress. Patient is active and either runs 1.5 miles and then does interval or weight training 3x per week or she does yoga for an hour or walking for 1-2 hours on the other 3-4 days per week. Her diet is very heart healthy. She has not yet gone through menopause but started HRT to alleviate hot flashes at night, which improved her sleep. She gets about 8 hours per night. Her weight has been stable for a long time. Educated her on the advantages of walking several times a week with a weighted backpack.    Anthropometrics  Height:  65"     Weight:  125.5 lbs  BMI:  20.9  % Body Fat:  23.7    Clinical Signs/Symptoms  N/V/D:  none  Appetite:  good       Past Medical History:   Diagnosis Date    Abdominal bloating 2019    BRCA1 negative     BRCA2 negative     GERD (gastroesophageal reflux disease)     Liver lesion 2019       Past Surgical History:   Procedure Laterality Date    BREAST CYST EXCISION Left     20 y/o f     SECTION      CHOLECYSTECTOMY      45 year old    COLONOSCOPY N/A 2017    Procedure: COLONOSCOPY;  Surgeon: Andrews Sears MD;  Location: [address]);  Service: Endoscopy;  Laterality: N/A;    COLONOSCOPY N/A 2022    Procedure: COLONOSCOPY;  " Surgeon: Andrews Sears MD;  Location: [facility] ENDO (4TH FLR);  Service: Endoscopy;  Laterality: N/A;  Fully Vaccinated.EC    CYSTOSCOPY N/A 09/17/2019    Procedure: CYSTOSCOPY;  Surgeon: Debbie Murphy MD;  Location: [facility];  Service: OB/GYN;  Laterality: N/A;    EYE SURGERY  10/24/2023    strabismus surgery  bilateral    HYSTEROSCOPY N/A 09/17/2019    Procedure: HYSTEROSCOPY-removal of IUD; possible shaving of uterine fibroids if needed to insert new IUD;  Surgeon: Debbie Murphy MD;  Location: [facility];  Service: OB/GYN;  Laterality: N/A;    INTRAUTERINE DEVICE INSERTION N/A 09/17/2019    Procedure: IUD insertion-- mirena-- would prefer kyleena if available;  Surgeon: Debbie Murphy MD;  Location: [facility];  Service: OB/GYN;  Laterality: N/A;       Medications    has a current medication list which includes the following prescription(s): ascorbic acid (vitamin c), b complex vitamins, collagen (bovine), hydrocodone-acetaminophen, INV TESTOSTERONE/ANASTROZOL OR PLACEBO PELLETS, lactobacillus rhamnosus gg, magnesium, neomycin-polymyxin-dexamethasone, ondansetron, progesterone, valacyclovir, and vitamin d.    Vitamins, Minerals, and/or Supplements:  vitamin C,D and B complex, lactobacillus rhamnosus, magnesium, omega 3, MVI, DIM     Food/Medication Interactions:  Reviewed     Food Allergies or Intolerances:  none     Social History    Marital status:    Employment:  not employed    Social History     Tobacco Use    Smoking status: Never    Smokeless tobacco: Never   Substance Use Topics    Alcohol use: Yes     Alcohol/week: 1.0 standard drink of alcohol     Types: 1 Glasses of wine per week     Comment: amt varies         Lab Reports   Sodium   Date Value Ref Range Status   02/19/2024 142 136 - 145 mmol/L Final     Potassium   Date Value Ref Range Status   02/19/2024 3.8 3.5 - 5.1 mmol/L Final     Chloride   Date Value Ref Range Status   02/19/2024 108 95 - 110 mmol/L Final     CO2   Date Value Ref  Range Status   02/19/2024 27 23 - 29 mmol/L Final     Glucose   Date Value Ref Range Status   02/19/2024 73 70 - 110 mg/dL Final     BUN   Date Value Ref Range Status   02/19/2024 14 6 - 20 mg/dL Final     Creatinine   Date Value Ref Range Status   02/19/2024 0.8 0.5 - 1.4 mg/dL Final     Calcium   Date Value Ref Range Status   02/19/2024 9.3 8.7 - 10.5 mg/dL Final     Total Protein   Date Value Ref Range Status   02/19/2024 6.5 6.0 - 8.4 g/dL Final     Albumin   Date Value Ref Range Status   02/19/2024 3.8 3.5 - 5.2 g/dL Final     Total Bilirubin   Date Value Ref Range Status   02/19/2024 0.2 0.1 - 1.0 mg/dL Final     Comment:     For infants and newborns, interpretation of results should be based  on gestational age, weight and in agreement with clinical  observations.    Premature Infant recommended reference ranges:  Up to 24 hours.............<8.0 mg/dL  Up to 48 hours............<12.0 mg/dL  3-5 days..................<15.0 mg/dL  6-29 days.................<15.0 mg/dL       Alkaline Phosphatase   Date Value Ref Range Status   02/19/2024 56 55 - 135 U/L Final     AST   Date Value Ref Range Status   02/19/2024 20 10 - 40 U/L Final     ALT   Date Value Ref Range Status   02/19/2024 16 10 - 44 U/L Final     Anion Gap   Date Value Ref Range Status   02/19/2024 7 (L) 8 - 16 mmol/L Final     eGFR if    Date Value Ref Range Status   08/15/2018 >60 >60 mL/min/1.73 m^2 Final     eGFR if non    Date Value Ref Range Status   08/15/2018 >60 >60 mL/min/1.73 m^2 Final     Comment:     Calculation used to obtain the estimated glomerular filtration  rate (eGFR) is the CKD-EPI equation.         Lab Results   Component Value Date    WBC 8.29 02/19/2024    HGB 13.1 02/19/2024    HCT 39.5 02/19/2024    MCV 92 02/19/2024     02/19/2024        Lab Results   Component Value Date    CHOL 181 02/19/2024     Lab Results   Component Value Date    HDL 51 02/19/2024     Lab Results   Component Value  Date    LDLCALC 119.2 02/19/2024     Lab Results   Component Value Date    TRIG 54 02/19/2024     Lab Results   Component Value Date    CHOLHDL 28.2 02/19/2024     Lab Results   Component Value Date    HGBA1C 5.2 02/19/2024     BP Readings from Last 1 Encounters:   02/19/24 (!) 94/58       Food History  Breakfast:  2-3 cups coffee with cinnamon and oat milk; Greek yogurt with cinnamon, honey, walnuts, berries OR cottage cheese with olive oil, salt & pepper  Mid-morning Snack:  none  Lunch:  leftovers from dinner: the protein on a salad  Mid-afternoon Snack:  orange OR cottage cheese OR yogurt OR nuts  Dinner:  homemade usually: spinach risotto (with chicken if  is home) OR shrimp/fish/tofu/beans with vegetables and rice or pasta  Snack:  some dark chocolate OR a few cookies (home baked)  *Fluid intake:  coffee, water  Alcohol: periodically 1-2 glasses of wine     Exercise History:  Patient currently runs for 15 minutes, practices full body resistance training exercises, high intensity aerobic interval training, and stretches, 3 days a week. Patient walks for  minutes, 2 days a week. Patient attends a 75 minutes Yoga class, 2 days a week.     Cultural/Spiritual/Personal Preferences:  None identified    Support System:  spouse, children, and friends    State of Change:  Action    Barriers to Change:  none    Diagnosis    No nutrition-related diagnosis at this time..    Intervention    RMR (Method:  InBody):  1308 kcal  Activity Factor:  1.4    SLIME:  1831 kcal    Goals:  1.  [place] with walking with a weighted backpack.  2.  Ensure adequate protein at meals.    Nutrition Education  The following education was provided to the patient:  Complimented patient on proactive role in health maintenance.  Complimented patient on physical activity efforts.  Discussed nutrition-related lab values and dietary and/or lifestyle factors affecting them.  Discussed [place] client resources (may include but not limited to  Eat Fit Shopping List, Fueling Well on the Go, and Meal Planning Guide).    Patient verbalized understanding of nutrition education and recommendations received.    Handouts Provided  Meal Planning Guide  Eat Fit Shopping List  Fueling Well On-The-Go    Monitoring/Evaluation    Monitor the following:  Weight  BMI  % Body Fat  Caloric intake  Labs:  none    Follow Up Plan:  Communication with referring healthcare provider is unnecessary at this time as patient presented as part of annual wellness exam.  However, will follow up with patient in 1-2 years.

## 2024-03-25 ENCOUNTER — PATIENT MESSAGE (OUTPATIENT)
Dept: INTERNAL MEDICINE | Facility: CLINIC | Age: 54
End: 2024-03-25
Payer: COMMERCIAL

## 2024-03-27 ENCOUNTER — CLINICAL SUPPORT (OUTPATIENT)
Dept: AUDIOLOGY | Facility: CLINIC | Age: 54
End: 2024-03-27
Payer: COMMERCIAL

## 2024-03-27 DIAGNOSIS — H90.3 SENSORINEURAL HEARING LOSS (SNHL), BILATERAL: Primary | ICD-10-CM

## 2024-03-27 PROCEDURE — 99999 PR PBB SHADOW E&M-EST. PATIENT-LVL I: CPT | Mod: PBBFAC,,,

## 2024-03-27 PROCEDURE — 92557 COMPREHENSIVE HEARING TEST: CPT | Mod: S$GLB,,,

## 2024-03-27 PROCEDURE — 92570 ACOUSTIC IMMITANCE TESTING: CPT | Mod: 52,S$GLB,,

## 2024-03-27 NOTE — PROGRESS NOTES
History:  Louise Cueto, a 53 y.o. female, was seen today for an audiologic evaluation. She reported difficulty understanding speech in noise, most noticeable in the last year. She also reported an isolated episode of severe dizziness about 2 weeks ago, seemingly unprovoked and lasting about an hour. Patient also noted occasional pain/fullness in either ear. Family history reportedly includes hearing loss (patient's father) and Meniere's disease (patient's mother). Patient denied tinnitus.    Results:  Tympanometry revealed Type A tympanogram in the right ear and Type A tympanogram in the left ear. Probe-left acoustic reflexes were present at normal levels, and probe-left contralateral reflex decay was negative. Probe-right acoustic reflexes were absent, and probe-right contralateral reflex decay could not be tested due to absent reflexes.  Pure tone audiometry revealed normal hearing sensitivity sloping to mild sensorineural hearing loss with slight 1000 Hz conductive component in the right ear, and normal hearing sensitivity sloping to moderate sensorineural hearing loss in the left ear.  Speech reception thresholds were obtained at 15 dB in the right ear and 15 dB in the left ear.  Word recognition scores in quiet were 100% in the right ear and 92% in the left ear.  Hopela speech-in-noise screening indicated signal to noise ratio (SNR) loss of 0.5 dB binaurally (no significant impairment in noise), 2.5 dB in the right ear (no significant impairment), and 3.5 dB in the left ear (mild impairment).    Discussion of Results:  Hearing sensitivity is not ideal for continued communication, especially in complex listening environments. Patient may benefit from closer proximity to speakers, increased visual cues, louder speaking voices, and elimination/reduction of environmental noise. Patient may benefit from a hearing aid consultation to discuss amplification options if communication difficulties persist with the use  of communication strategies. ENT consult is warranted given dizziness, absent acoustic reflexes, and slight speech-in-noise asymmetry on testing today.    Recommendations:  Otologic evaluation ; patient was given contact information for ENT scheduling.  Use communication strategies as described above. Patient was given a handout with information on additional communication strategies.  Use hearing protection when in noise.  Hearing aid consultation if communication difficulties persist with use of communication strategies.  Return for follow-up audiologic evaluation annually or sooner if a change in hearing is noted.

## 2024-05-31 RX ORDER — DOXYCYCLINE HYCLATE 100 MG
100 TABLET ORAL EVERY 12 HOURS
Qty: 14 TABLET | Refills: 1 | Status: SHIPPED | OUTPATIENT
Start: 2024-05-31 | End: 2024-06-07

## 2024-10-17 DIAGNOSIS — N63.0 LUMP OR MASS IN BREAST: Primary | ICD-10-CM

## 2024-10-24 ENCOUNTER — HOSPITAL ENCOUNTER (OUTPATIENT)
Dept: RADIOLOGY | Facility: HOSPITAL | Age: 54
Discharge: HOME | End: 2024-10-24
Attending: OBSTETRICS & GYNECOLOGY
Payer: COMMERCIAL

## 2024-10-24 VITALS — BODY MASS INDEX: 20.66 KG/M2 | WEIGHT: 124 LBS | HEIGHT: 65 IN

## 2024-10-24 DIAGNOSIS — N63.0 LUMP OR MASS IN BREAST: ICD-10-CM

## 2024-10-24 PROCEDURE — 76642 ULTRASOUND BREAST LIMITED: CPT | Mod: 26,RT,, | Performed by: RADIOLOGY

## 2024-10-24 PROCEDURE — 76642 ULTRASOUND BREAST LIMITED: CPT | Mod: TC,RT

## 2024-10-24 PROCEDURE — 77065 DX MAMMO INCL CAD UNI: CPT | Mod: TC,RT

## 2024-10-24 PROCEDURE — 77065 DX MAMMO INCL CAD UNI: CPT | Mod: 26,RT,, | Performed by: RADIOLOGY

## 2024-10-24 PROCEDURE — 77061 BREAST TOMOSYNTHESIS UNI: CPT | Mod: 26,RT,, | Performed by: RADIOLOGY

## 2024-10-25 ENCOUNTER — TELEPHONE (OUTPATIENT)
Dept: RADIOLOGY | Facility: HOSPITAL | Age: 54
End: 2024-10-25
Payer: COMMERCIAL

## 2024-10-25 NOTE — TELEPHONE ENCOUNTER
Spoke with patient. Reviewed breast biopsy procedure and reviewed instructions for breast biopsy. Patient expressed understanding and all questions were answered. Provided patient with my phone number to call for any further concerns or questions.   Patient scheduled breast biopsy at the [facility] on 11/5/2024.

## 2024-10-29 ENCOUNTER — HOSPITAL ENCOUNTER (OUTPATIENT)
Dept: RADIOLOGY | Facility: HOSPITAL | Age: 54
Discharge: HOME | End: 2024-10-29
Attending: OBSTETRICS & GYNECOLOGY
Payer: COMMERCIAL

## 2024-10-29 DIAGNOSIS — R92.8 ABNORMAL FINDING ON BREAST IMAGING: ICD-10-CM

## 2024-10-29 DIAGNOSIS — N63.0 BREAST MASS IN FEMALE: Primary | ICD-10-CM

## 2024-10-29 PROCEDURE — 88342 IMHCHEM/IMCYTCHM 1ST ANTB: CPT | Performed by: PATHOLOGY

## 2024-10-29 PROCEDURE — 88305 TISSUE EXAM BY PATHOLOGIST: CPT | Performed by: PATHOLOGY

## 2024-10-29 PROCEDURE — 19083 BX BREAST 1ST LESION US IMAG: CPT | Mod: RT,,, | Performed by: RADIOLOGY

## 2024-10-29 PROCEDURE — 77065 DX MAMMO INCL CAD UNI: CPT | Mod: 26,RT,, | Performed by: RADIOLOGY

## 2024-10-29 PROCEDURE — 77065 DX MAMMO INCL CAD UNI: CPT | Mod: TC,RT

## 2024-10-29 PROCEDURE — 19083 BX BREAST 1ST LESION US IMAG: CPT | Mod: RT

## 2024-10-29 PROCEDURE — 63600175 PHARM REV CODE 636 W HCPCS: Performed by: RADIOLOGY

## 2024-10-29 PROCEDURE — 88341 IMHCHEM/IMCYTCHM EA ADD ANTB: CPT | Performed by: PATHOLOGY

## 2024-10-29 PROCEDURE — 88360 TUMOR IMMUNOHISTOCHEM/MANUAL: CPT | Mod: 59 | Performed by: PATHOLOGY

## 2024-10-29 RX ORDER — LIDOCAINE HYDROCHLORIDE AND EPINEPHRINE 20; 10 MG/ML; UG/ML
10 INJECTION, SOLUTION INFILTRATION; PERINEURAL ONCE
Status: COMPLETED | OUTPATIENT
Start: 2024-10-29 | End: 2024-10-29

## 2024-10-29 RX ORDER — LIDOCAINE HYDROCHLORIDE 10 MG/ML
3 INJECTION, SOLUTION EPIDURAL; INFILTRATION; INTRACAUDAL; PERINEURAL ONCE
Status: COMPLETED | OUTPATIENT
Start: 2024-10-29 | End: 2024-10-29

## 2024-10-29 RX ADMIN — LIDOCAINE HYDROCHLORIDE,EPINEPHRINE BITARTRATE 10 ML: 20; .01 INJECTION, SOLUTION INFILTRATION; PERINEURAL at 08:10

## 2024-10-29 RX ADMIN — LIDOCAINE HYDROCHLORIDE 3 ML: 10 INJECTION, SOLUTION EPIDURAL; INFILTRATION; INTRACAUDAL; PERINEURAL at 08:10

## 2024-10-30 ENCOUNTER — PATIENT MESSAGE (OUTPATIENT)
Dept: INTERNAL MEDICINE | Facility: CLINIC | Age: 54
End: 2024-10-30
Payer: COMMERCIAL

## 2024-10-31 LAB
FINAL PATHOLOGIC DIAGNOSIS: NORMAL
GROSS: NORMAL
Lab: NORMAL

## 2024-11-01 ENCOUNTER — TELEPHONE (OUTPATIENT)
Dept: SURGERY | Facility: CLINIC | Age: 54
End: 2024-11-01
Payer: COMMERCIAL

## 2024-11-01 ENCOUNTER — DOCUMENTATION ONLY (OUTPATIENT)
Dept: SURGERY | Facility: CLINIC | Age: 54
End: 2024-11-01
Payer: COMMERCIAL

## 2024-11-01 DIAGNOSIS — C50.911 INFILTRATING DUCTAL CARCINOMA OF RIGHT BREAST: Primary | ICD-10-CM

## 2024-11-04 ENCOUNTER — HOSPITAL ENCOUNTER (OUTPATIENT)
Dept: RADIOLOGY | Facility: HOSPITAL | Age: 54
Discharge: HOME | End: 2024-11-04
Attending: SURGERY
Payer: COMMERCIAL

## 2024-11-04 DIAGNOSIS — C50.911 INFILTRATING DUCTAL CARCINOMA OF RIGHT BREAST: ICD-10-CM

## 2024-11-04 PROCEDURE — 77049 MRI BREAST C-+ W/CAD BI: CPT | Mod: 26,,, | Performed by: RADIOLOGY

## 2024-11-04 PROCEDURE — 25500020 PHARM REV CODE 255: Performed by: SURGERY

## 2024-11-04 PROCEDURE — A9577 INJ MULTIHANCE: HCPCS | Performed by: SURGERY

## 2024-11-04 PROCEDURE — 77049 MRI BREAST C-+ W/CAD BI: CPT | Mod: TC

## 2024-11-04 RX ADMIN — GADOBENATE DIMEGLUMINE 12 ML: 529 INJECTION, SOLUTION INTRAVENOUS at 09:11

## 2024-11-04 NOTE — H&P (VIEW-ONLY)
Breast Surgery  [facility]  Department of Surgery      REFERRING PROVIDER:   Paola Mix MD  [address]  [place] [address]    Chief Complaint: New Patient, R IDC ++ -, and Breast Cancer      Subjective:      Patient ID: Louise Cueto is a 53 y.o. female who presents with newly diagnosed invasive ductal carcinoma of the right breast.     Patient noted a mass in the right breast. Diagnostic mammogram on 10/17/24 showed extremely dense breast tissue without a discrete mass. Ultrasound performed the same day demonstrated an irregular hypoechoic mass measuring 10 x 8 x 7 mm in the 1 o'clock position 3 cm from the nipple. An ultrasound guided biopsy was performed on 10/29/24 with pathology revealing infiltrating ductal carcinoma of the breast.     Follow-up breast MRI (11/1/24) showed a 1 x 1 x 1.6 cm irregularly shaped mass with irregular margins and heterogeneous internal enhancement seen in the upper inner quadrant of the right breast at 1 o'clock in the posterior depth, 4 cm from the nipple, 1.7 cm from the skin, and 0.2 cm from the chest wall.     Patient does routinely do self breast exams.  Patient has noted a change on breast exam.  Patient denies nipple discharge. Patient admits to to previous breast biopsy. Patient denies a personal history of breast cancer.    Findings at that time were the following:   Tumor size: 1.6 cm on MRI  Tumor ndgndrndanddndend:nd nd2nd Estrogen Receptor: positive   Progesterone Receptor: positive   Her-2 jorge: negative   Lymph node status: clinically negative    Lymphatic invasion: N/A     GYN History:  Age of menarche was 14. Premenopausal.  Patient admits to hormonal therapy. Patient is G3P. Age of first live birth was 28. Patient did breast feed.    Past Medical History:   Diagnosis Date    Abdominal bloating 06/29/2019    BRCA1 negative     BRCA2 negative     GERD (gastroesophageal reflux disease)     resolved with lap tono    Liver lesion 01/03/2019    Malignant  neoplasm of upper-inner quadrant of right female breast      Past Surgical History:   Procedure Laterality Date    BREAST CYST EXCISION Left     18 y/o f     SECTION      CHOLECYSTECTOMY      45 year old    COLONOSCOPY N/A 2017    Procedure: COLONOSCOPY;  Surgeon: Andrews Sears MD;  [address]  Service: Endoscopy;  Laterality: N/A;    COLONOSCOPY N/A 2022    Procedure: COLONOSCOPY;  Surgeon: Andrews Sears MD;  [facility]  Service: Endoscopy;  Laterality: N/A;  Fully Vaccinated.EC    CYSTOSCOPY N/A 2019    Procedure: CYSTOSCOPY;  Surgeon: Debbie Murphy MD;  Location: [facility];  Service: OB/GYN;  Laterality: N/A;    EYE SURGERY  10/24/2023    strabismus surgery  bilateral    HYSTEROSCOPY N/A 2019    Procedure: HYSTEROSCOPY-removal of IUD; possible shaving of uterine fibroids if needed to insert new IUD;  Surgeon: Debbie Murphy MD;  Location: [facility];  Service: OB/GYN;  Laterality: N/A;    INTRAUTERINE DEVICE INSERTION N/A 2019    Procedure: IUD insertion-- mirena-- would prefer kyleena if available;  Surgeon: Debbie Murphy MD;  Location: [facility];  Service: OB/GYN;  Laterality: N/A;     No current facility-administered medications on file prior to visit.     Current Outpatient Medications on File Prior to Visit   Medication Sig Dispense Refill    ascorbic acid, vitamin C, (VITAMIN C) 1000 MG tablet Take 1,000 mg by mouth every evening.      b complex vitamins capsule Take 1 capsule by mouth every evening.      collagen, bovine, 100 % Powd Apply topically.      INV TESTOSTERONE/ANASTROZOL OR PLACEBO PELLETS Inject 1 Pellet into the skin. FOR INVESTIGATIONAL USE ONLY      Lactobacillus rhamnosus GG (CULTURELLE) 10 billion cell capsule Take 1 capsule by mouth every evening.      magnesium 30 mg Tab Take by mouth every evening.      valACYclovir (VALTREX) 1000 MG tablet Take 0.5 tablets (500 mg total) by mouth every 12  (twelve) hours. (Patient taking differently: Take 500 mg by mouth every 12 (twelve) hours. PRN) 14 tablet 5    vitamin D (VITAMIN D3) 1000 units Tab Take 1,000 Units by mouth every evening.       Social History     Socioeconomic History    Marital status:      Spouse name: Dennis    Number of children: 3   Tobacco Use    Smoking status: Never    Smokeless tobacco: Never   Substance and Sexual Activity    Alcohol use: Yes     Alcohol/week: 1.0 standard drink of alcohol     Types: 1 Glasses of wine per week     Comment: amt varies     Drug use: No    Sexual activity: Yes     Partners: Male   Social History Narrative    From  [place]     Moved to [place] [id number]    Exercising - runs,  wts, yoga, walks.        Kids 22, 20, 18.          Retired  and .      - .         Family History   Problem Relation Name Age of Onset    Cancer Mother  73        colon ca    Hypertension Mother      Arrhythmia Mother      Heart disease Father 72         MI around 50.    Hyperlipidemia Father 72     Parkinsonism Father 72     Cancer Sister  49        breast    Breast cancer Sister  48        breast    Other Sister      No Known Problems Brother pt is 3rd     Other Daughter middle         TBI with left hemiparesis    No Known Problems Son      Arrhythmia Son          SVT    Cirrhosis Maternal Grandfather      Alcohol abuse Maternal Grandfather      Alzheimer's disease Paternal Grandmother      Parkinsonism Paternal Grandfather      Ovarian cancer Cousin pat first cousin     Cancer Cousin pat first cousin         ovarian and one with lung    Colon cancer Neg Hx          Review of Systems   Constitutional:  Negative for chills, fatigue, fever and unexpected weight change.   Eyes:  Negative for visual disturbance.   Respiratory:  Negative for cough, shortness of breath and wheezing.    Cardiovascular:  Negative for chest pain and palpitations.   Musculoskeletal:  Negative for back pain.   Skin:  " Negative for color change, pallor, rash and wound.   Neurological:  Negative for dizziness and headaches.     Objective:   /70   Pulse 71   Ht 5' 5" (1.651 m)   Wt 56.2 kg (124 lb)   BMI 20.63 kg/m²     Physical Exam   Vitals reviewed.  Constitutional: She is oriented to person, place, and time.   Eyes: Right eye exhibits no discharge. Left eye exhibits no discharge.   Cardiovascular:  Normal pulses.            Pulmonary/Chest: Effort normal. No respiratory distress. She has no wheezes. Right breast exhibits mass. Right breast exhibits no inverted nipple, no nipple discharge, no skin change and no tenderness. Left breast exhibits no inverted nipple, no mass, no nipple discharge, no skin change and no tenderness.       Abdominal: Soft. Normal appearance. She exhibits no distension.   Musculoskeletal: Normal range of motion. Lymphadenopathy:      Cervical: No cervical adenopathy.     Neurological: She is alert and oriented to person, place, and time.   Skin: Skin is warm and dry. No rash noted. No erythema. No pallor.         Radiology review: Images personally reviewed by me in the clinic.     Diagnostic Mammogram:  The breasts are extremely dense, which lowers the sensitivity of mammography.  Metallic skin BBs localize a palpable abnormality in the upper aspect of the right breast without a discrete underlying mass.  No suspicious microcalcifications or distortion.      Breast Ultrasound:  Limited Breast ultrasound was performed. Ultrasound evaluation demonstrates no suspicious abnormality.      Impression:  Real-time evaluation of the right breast was performed targeted to the palpable abnormality localized by the patient to the 1 o'clock position 3 cm from the nipple.  An irregular taller than wide hypoechoic mass is identified, measuring 10 x 8 x 7 mm.  There are no posterior features, no increased vascularity is demonstrated with color Doppler assessment.  No additional focal abnormalities are seen in " this location.  The visualized right axilla is unremarkable.     BI-RADS Category 4:  Biopsy recommended    Breast MRI:  The breasts have extreme amounts of fibroglandular tissue. The background parenchymal enhancement is mild and symmetric.      Right  There is a 1 cm x 1 cm x 1.6 cm irregularly shaped mass with irregular margins and heterogeneous internal enhancement seen in the upper inner quadrant of the right breast at 1 o'clock in the posterior depth, 4 cm from the nipple, 1.7 cm from the skin, and 0.2 cm from the chest wall. The mass correlates with known cancer. Kinetics initial phase is slow. Delayed phase is persistent.  There is a radar  reflector positioned along the anteromedial border of the biopsy-proven malignancy.  There is no evidence of skin, nipple, or chest involvement.      Left  There is no evidence of suspicious masses, abnormal enhancement, or other abnormal findings in the left breast.     Impression:  Right  Mass: Right breast 1 cm x 1 cm x 1.6 cm mass at the upper inner position and posterior depth and 1 o'clock. Assessment: 6 - Known biopsy, proven malignancy. Surgical Consult is recommended.      Left  There is no MR evidence of malignancy in the left breast.     BI-RADS Category:   Overall: 6 - Known Biopsy-Proven Malignancy    Assessment:       1. 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 Pre-op testing    3. Invasive ductal carcinoma of breast, female, right        Plan:     Options for management were discussed with the patient and her family. We reviewed the existing data noting the equivalency of breast conserving surgery with radiation therapy and mastectomy. We also reviewed the guidelines of the National Comprehensive Cancer Network for Stage 1 breast carcinoma. We discussed the need for lumpectomy margins to be negative for carcinoma, the necessity for postoperative radiation therapy after breast conservation in  most cases, the possibility of a failed or false negative sentinel lymph node biopsy and the potential need for complete lymphadenectomy for a failed or positive sentinel lymph node biopsy were fully discussed. In the setting of mastectomy, delayed or immediate reconstruction options are available and were discussed.     In the setting of lumpectomy, radiation therapy would be recommended majority of the time.  The duration and treatment side effects were discussed with the patient.  This will coordinated with the radiation oncologist pending final pathology.    We also discussed the role of systemic therapy in the treatment of early stage breast cancer.  We discussed that this is based on tumor biology and jacob status and will be determined based on final pathology.  We discussed that if the cancer is hormone positive, endocrine therapy would be recommended in most cases and its use can reduce the risk of recurrence as well as improve survival. Side effects of treatment were briefly discussed. We also discussed the potential role for chemotherapy based on a number of factors such as tumor phenotype (ER+ vs. triple negative vs. Cyd5oex+) and this would be determined in coordination with the medical oncologist.    Genetics obtained prior - negative   Referral to Integrative Oncology   The patient, in consultation with her family, has elected to proceed with bilateral total mastectomy and right sentinel lymph node biopsy with reconstruction. The operative risks of bleeding, infection, recurrence, scarring, and anesthetic complications and the possibility of requiring further surgery were all noted.  Referral to Plastic Surgery for reconstructive discussion - Implant recon     Patient was educated on breast cancer, receptors, wire localization lumpectomy, mastectomy, sentinel lymph node mapping and biopsy, axillary lymph node dissection, reconstruction, breast prosthesis with post-mastectomy bra and radiation therapy.  Patient was given patient information binder including [facility] breast cancer treatment brochure.  All her questions were answered.    Total time spent with the patient: 65 minutes.  45 minutes of face to face consultation and 20 minutes of chart review and coordination of care.

## 2024-11-04 NOTE — PROGRESS NOTES
Breast Surgery  [facility]  Department of Surgery      REFERRING PROVIDER:   Paola Mix MD  [address]  [place] [address]    Chief Complaint: New Patient, R IDC ++ -, and Breast Cancer      Subjective:      Patient ID: Louise Cueto is a 53 y.o. female who presents with newly diagnosed invasive ductal carcinoma of the right breast.     Patient noted a mass in the right breast. Diagnostic mammogram on 10/17/24 showed extremely dense breast tissue without a discrete mass. Ultrasound performed the same day demonstrated an irregular hypoechoic mass measuring 10 x 8 x 7 mm in the 1 o'clock position 3 cm from the nipple. An ultrasound guided biopsy was performed on 10/29/24 with pathology revealing infiltrating ductal carcinoma of the breast.     Follow-up breast MRI (11/1/24) showed a 1 x 1 x 1.6 cm irregularly shaped mass with irregular margins and heterogeneous internal enhancement seen in the upper inner quadrant of the right breast at 1 o'clock in the posterior depth, 4 cm from the nipple, 1.7 cm from the skin, and 0.2 cm from the chest wall.     Patient does routinely do self breast exams.  Patient has noted a change on breast exam.  Patient denies nipple discharge. Patient admits to to previous breast biopsy. Patient denies a personal history of breast cancer.    Findings at that time were the following:   Tumor size: 1.6 cm on MRI  Tumor ndgndrndanddndend:nd nd2nd Estrogen Receptor: positive   Progesterone Receptor: positive   Her-2 jorge: negative   Lymph node status: clinically negative    Lymphatic invasion: N/A     GYN History:  Age of menarche was 14. Premenopausal.  Patient admits to hormonal therapy. Patient is G3P. Age of first live birth was 28. Patient did breast feed.    Past Medical History:   Diagnosis Date    Abdominal bloating 06/29/2019    BRCA1 negative     BRCA2 negative     GERD (gastroesophageal reflux disease)     resolved with lap tono    Liver lesion 01/03/2019    Malignant  neoplasm of upper-inner quadrant of right female breast      Past Surgical History:   Procedure Laterality Date    BREAST CYST EXCISION Left     20 y/o f     SECTION      CHOLECYSTECTOMY      45 year old    COLONOSCOPY N/A 2017    Procedure: COLONOSCOPY;  Surgeon: Andrews Sears MD;  [address]  Service: Endoscopy;  Laterality: N/A;    COLONOSCOPY N/A 2022    Procedure: COLONOSCOPY;  Surgeon: Andrews Sears MD;  [facility]  Service: Endoscopy;  Laterality: N/A;  Fully Vaccinated.EC    CYSTOSCOPY N/A 2019    Procedure: CYSTOSCOPY;  Surgeon: Debbie Murphy MD;  Location: [facility];  Service: OB/GYN;  Laterality: N/A;    EYE SURGERY  10/24/2023    strabismus surgery  bilateral    HYSTEROSCOPY N/A 2019    Procedure: HYSTEROSCOPY-removal of IUD; possible shaving of uterine fibroids if needed to insert new IUD;  Surgeon: Debbie Murphy MD;  Location: [facility];  Service: OB/GYN;  Laterality: N/A;    INTRAUTERINE DEVICE INSERTION N/A 2019    Procedure: IUD insertion-- mirena-- would prefer kyleena if available;  Surgeon: Debbie Murphy MD;  Location: [facility];  Service: OB/GYN;  Laterality: N/A;     No current facility-administered medications on file prior to visit.     Current Outpatient Medications on File Prior to Visit   Medication Sig Dispense Refill    ascorbic acid, vitamin C, (VITAMIN C) 1000 MG tablet Take 1,000 mg by mouth every evening.      b complex vitamins capsule Take 1 capsule by mouth every evening.      collagen, bovine, 100 % Powd Apply topically.      INV TESTOSTERONE/ANASTROZOL OR PLACEBO PELLETS Inject 1 Pellet into the skin. FOR INVESTIGATIONAL USE ONLY      Lactobacillus rhamnosus GG (CULTURELLE) 10 billion cell capsule Take 1 capsule by mouth every evening.      magnesium 30 mg Tab Take by mouth every evening.      valACYclovir (VALTREX) 1000 MG tablet Take 0.5 tablets (500 mg total) by mouth every 12  (twelve) hours. (Patient taking differently: Take 500 mg by mouth every 12 (twelve) hours. PRN) 14 tablet 5    vitamin D (VITAMIN D3) 1000 units Tab Take 1,000 Units by mouth every evening.       Social History     Socioeconomic History    Marital status:      Spouse name: Dennis    Number of children: 3   Tobacco Use    Smoking status: Never    Smokeless tobacco: Never   Substance and Sexual Activity    Alcohol use: Yes     Alcohol/week: 1.0 standard drink of alcohol     Types: 1 Glasses of wine per week     Comment: amt varies     Drug use: No    Sexual activity: Yes     Partners: Male   Social History Narrative    From  [place]     Moved to [place] [id number]    Exercising - runs,  wts, yoga, walks.        Kids 22, 20, 18.          Retired  and .      - .         Family History   Problem Relation Name Age of Onset    Cancer Mother  73        colon ca    Hypertension Mother      Arrhythmia Mother      Heart disease Father 72         MI around 50.    Hyperlipidemia Father 72     Parkinsonism Father 72     Cancer Sister  49        breast    Breast cancer Sister  48        breast    Other Sister      No Known Problems Brother pt is 3rd     Other Daughter middle         TBI with left hemiparesis    No Known Problems Son      Arrhythmia Son          SVT    Cirrhosis Maternal Grandfather      Alcohol abuse Maternal Grandfather      Alzheimer's disease Paternal Grandmother      Parkinsonism Paternal Grandfather      Ovarian cancer Cousin pat first cousin     Cancer Cousin pat first cousin         ovarian and one with lung    Colon cancer Neg Hx          Review of Systems   Constitutional:  Negative for chills, fatigue, fever and unexpected weight change.   Eyes:  Negative for visual disturbance.   Respiratory:  Negative for cough, shortness of breath and wheezing.    Cardiovascular:  Negative for chest pain and palpitations.   Musculoskeletal:  Negative for back pain.   Skin:  " Negative for color change, pallor, rash and wound.   Neurological:  Negative for dizziness and headaches.     Objective:   /70   Pulse 71   Ht 5' 5" (1.651 m)   Wt 56.2 kg (124 lb)   BMI 20.63 kg/m²     Physical Exam   Vitals reviewed.  Constitutional: She is oriented to person, place, and time.   Eyes: Right eye exhibits no discharge. Left eye exhibits no discharge.   Cardiovascular:  Normal pulses.            Pulmonary/Chest: Effort normal. No respiratory distress. She has no wheezes. Right breast exhibits mass. Right breast exhibits no inverted nipple, no nipple discharge, no skin change and no tenderness. Left breast exhibits no inverted nipple, no mass, no nipple discharge, no skin change and no tenderness.       Abdominal: Soft. Normal appearance. She exhibits no distension.   Musculoskeletal: Normal range of motion. Lymphadenopathy:      Cervical: No cervical adenopathy.     Neurological: She is alert and oriented to person, place, and time.   Skin: Skin is warm and dry. No rash noted. No erythema. No pallor.         Radiology review: Images personally reviewed by me in the clinic.     Diagnostic Mammogram:  The breasts are extremely dense, which lowers the sensitivity of mammography.  Metallic skin BBs localize a palpable abnormality in the upper aspect of the right breast without a discrete underlying mass.  No suspicious microcalcifications or distortion.      Breast Ultrasound:  Limited Breast ultrasound was performed. Ultrasound evaluation demonstrates no suspicious abnormality.      Impression:  Real-time evaluation of the right breast was performed targeted to the palpable abnormality localized by the patient to the 1 o'clock position 3 cm from the nipple.  An irregular taller than wide hypoechoic mass is identified, measuring 10 x 8 x 7 mm.  There are no posterior features, no increased vascularity is demonstrated with color Doppler assessment.  No additional focal abnormalities are seen in " this location.  The visualized right axilla is unremarkable.     BI-RADS Category 4:  Biopsy recommended    Breast MRI:  The breasts have extreme amounts of fibroglandular tissue. The background parenchymal enhancement is mild and symmetric.      Right  There is a 1 cm x 1 cm x 1.6 cm irregularly shaped mass with irregular margins and heterogeneous internal enhancement seen in the upper inner quadrant of the right breast at 1 o'clock in the posterior depth, 4 cm from the nipple, 1.7 cm from the skin, and 0.2 cm from the chest wall. The mass correlates with known cancer. Kinetics initial phase is slow. Delayed phase is persistent.  There is a radar  reflector positioned along the anteromedial border of the biopsy-proven malignancy.  There is no evidence of skin, nipple, or chest involvement.      Left  There is no evidence of suspicious masses, abnormal enhancement, or other abnormal findings in the left breast.     Impression:  Right  Mass: Right breast 1 cm x 1 cm x 1.6 cm mass at the upper inner position and posterior depth and 1 o'clock. Assessment: 6 - Known biopsy, proven malignancy. Surgical Consult is recommended.      Left  There is no MR evidence of malignancy in the left breast.     BI-RADS Category:   Overall: 6 - Known Biopsy-Proven Malignancy    Assessment:       1. 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 Pre-op testing    3. Invasive ductal carcinoma of breast, female, right        Plan:     Options for management were discussed with the patient and her family. We reviewed the existing data noting the equivalency of breast conserving surgery with radiation therapy and mastectomy. We also reviewed the guidelines of the National Comprehensive Cancer Network for Stage 1 breast carcinoma. We discussed the need for lumpectomy margins to be negative for carcinoma, the necessity for postoperative radiation therapy after breast conservation in  most cases, the possibility of a failed or false negative sentinel lymph node biopsy and the potential need for complete lymphadenectomy for a failed or positive sentinel lymph node biopsy were fully discussed. In the setting of mastectomy, delayed or immediate reconstruction options are available and were discussed.     In the setting of lumpectomy, radiation therapy would be recommended majority of the time.  The duration and treatment side effects were discussed with the patient.  This will coordinated with the radiation oncologist pending final pathology.    We also discussed the role of systemic therapy in the treatment of early stage breast cancer.  We discussed that this is based on tumor biology and jacob status and will be determined based on final pathology.  We discussed that if the cancer is hormone positive, endocrine therapy would be recommended in most cases and its use can reduce the risk of recurrence as well as improve survival. Side effects of treatment were briefly discussed. We also discussed the potential role for chemotherapy based on a number of factors such as tumor phenotype (ER+ vs. triple negative vs. [id number]+) and this would be determined in coordination with the medical oncologist.    Genetics obtained prior - negative   Referral to Integrative Oncology   The patient, in consultation with her family, has elected to proceed with bilateral total mastectomy and right sentinel lymph node biopsy with reconstruction. The operative risks of bleeding, infection, recurrence, scarring, and anesthetic complications and the possibility of requiring further surgery were all noted.  Referral to Plastic Surgery for reconstructive discussion - Implant recon     Patient was educated on breast cancer, receptors, wire localization lumpectomy, mastectomy, sentinel lymph node mapping and biopsy, axillary lymph node dissection, reconstruction, breast prosthesis with post-mastectomy bra and radiation therapy.  Patient was given patient information binder including [facility] breast cancer treatment brochure.  All her questions were answered.    Total time spent with the patient: 65 minutes.  45 minutes of face to face consultation and 20 minutes of chart review and coordination of care.

## 2024-11-05 ENCOUNTER — TELEPHONE (OUTPATIENT)
Dept: PLASTIC SURGERY | Facility: CLINIC | Age: 54
End: 2024-11-05
Payer: COMMERCIAL

## 2024-11-05 ENCOUNTER — OFFICE VISIT (OUTPATIENT)
Dept: SURGERY | Facility: CLINIC | Age: 54
End: 2024-11-05
Payer: COMMERCIAL

## 2024-11-05 ENCOUNTER — DOCUMENTATION ONLY (OUTPATIENT)
Dept: SURGERY | Facility: CLINIC | Age: 54
End: 2024-11-05
Payer: COMMERCIAL

## 2024-11-05 VITALS
WEIGHT: 124 LBS | HEART RATE: 71 BPM | BODY MASS INDEX: 20.66 KG/M2 | HEIGHT: 65 IN | SYSTOLIC BLOOD PRESSURE: 103 MMHG | DIASTOLIC BLOOD PRESSURE: 70 MMHG

## 2024-11-05 DIAGNOSIS — Z01.818 PRE-OP TESTING: ICD-10-CM

## 2024-11-05 DIAGNOSIS — C50.911 INVASIVE DUCTAL CARCINOMA OF BREAST, FEMALE, RIGHT: ICD-10-CM

## 2024-11-05 DIAGNOSIS — C50.211 MALIGNANT NEOPLASM OF UPPER-INNER QUADRANT OF RIGHT BREAST IN FEMALE, ESTROGEN RECEPTOR POSITIVE: Primary | ICD-10-CM

## 2024-11-05 DIAGNOSIS — Z17.0 MALIGNANT NEOPLASM OF UPPER-INNER QUADRANT OF RIGHT BREAST IN FEMALE, ESTROGEN RECEPTOR POSITIVE: Primary | ICD-10-CM

## 2024-11-05 PROCEDURE — 3008F BODY MASS INDEX DOCD: CPT | Mod: CPTII,S$GLB,, | Performed by: SURGERY

## 2024-11-05 PROCEDURE — 99999 PR PBB SHADOW E&M-EST. PATIENT-LVL III: CPT | Mod: PBBFAC,,, | Performed by: SURGERY

## 2024-11-05 PROCEDURE — 3078F DIAST BP <80 MM HG: CPT | Mod: CPTII,S$GLB,, | Performed by: SURGERY

## 2024-11-05 PROCEDURE — 3044F HG A1C LEVEL LT 7.0%: CPT | Mod: CPTII,S$GLB,, | Performed by: SURGERY

## 2024-11-05 PROCEDURE — 3074F SYST BP LT 130 MM HG: CPT | Mod: CPTII,S$GLB,, | Performed by: SURGERY

## 2024-11-05 PROCEDURE — 99205 OFFICE O/P NEW HI 60 MIN: CPT | Mod: S$GLB,,, | Performed by: SURGERY

## 2024-11-05 NOTE — PROGRESS NOTES
Nurse Navigator Note:     Met with patient during her consult with Dr. Mahmood.  Patient and I reviewed the information she discussed with Dr. Mahmood, including treatment options, diagnosis, and future plans for workup. Patient and I went through the new patient booklet, explained some of the information and why it is provided.     Also offered patient consults with our other specialty clinics: Integrative Oncology, Survivorship and/or Women's Gynecologic needs, our breast physical therapy department for pre-op and post-operative assessments, Oncologic Psychology for psychological support, and Oncologic Nutrition for nutritional counseling. Explained to patient that all of these support services are completely optional. Discussed that physical therapy may call patient to offer pre-op appt, and what that appt would entail.     Patient was given a copy of her appointments, Dr. Mahmood's card, and my card. Encouraged her to call me if she has any questions or concerns or would like to schedule any additional appointments. Verbalized understanding of all information.     Referrals placed to PT and integrative oncology. Email added to support group.     Genetics completed around 4 years ago. Will check with Emi Hernandez in regards to any additional testing needed.    Oncology Navigation   Intake  Date of Diagnosis: 10/29/24 (R IDC)  Cancer Type: Breast  Type of Referral: Internal  Date of Referral: 10/31/24  Initial Nurse Navigator Contact: 10/31/24  Referral to Initial Contact Timeline (days): 0  First Appointment Available: 11/05/24  Appointment Date: 11/05/24  First Available Date vs. Scheduled Date (days): 0     Treatment  Current Status: Staging work-up    Surgical Oncologist: Dr. Kassi Mahmood  Plastic Surgeon: Dr. Amandeep Noel  Type of Surgery: Bilateral mastectomy with R SLNB and recon  Consult Date: 11/05/24          Procedures: Biopsy; MRI; Diagnostic Mammogram  Biopsy Schedule Date:  10/29/24  Diagnostic Mammo Schedule Date: 10/24/24  MRI Schedule Date: 11/04/24    General Referrals: Integrative Medicine; Support Group; Physical Therapy  Physical Therapy Referral Date: 11/05/24    ER: Positive  MS: Positive  Her2: Negative       Support Systems: Spouse/significant other     Acuity      Follow Up  No follow-ups on file.

## 2024-11-05 NOTE — TELEPHONE ENCOUNTER
Spoke to pt and scheduled appt for a consult to see Dr Deleon 2pm at [facility], 2nd floor. [address]. Provided patient with appointment time and date, address of the location and call back # to RN navigator. All questions and concerns addressed. Pt verbalized understanding.

## 2024-11-06 ENCOUNTER — TELEPHONE (OUTPATIENT)
Dept: HEMATOLOGY/ONCOLOGY | Facility: CLINIC | Age: 54
End: 2024-11-06
Payer: COMMERCIAL

## 2024-11-06 NOTE — TELEPHONE ENCOUNTER
Spoke to pt. in regards to scheduling Integrative Oncology referral placed by Dr. Mahmood. Pt approved virtual appt for Tuesday 11/19 @ 8AM with Tammie Rodrigues NP. MA advised pt. that this is an introductory visit whereby the provider will review pt's overall health and discuss the therapies that the dept has to offer. Pt stated pt is familiar with utilizing portal for virtual appt.       SREE GONZALEZ Ext [phone]

## 2024-11-07 DIAGNOSIS — Z01.818 PRE-OP EXAM: Primary | ICD-10-CM

## 2024-11-08 ENCOUNTER — OFFICE VISIT (OUTPATIENT)
Dept: PLASTIC SURGERY | Facility: CLINIC | Age: 54
End: 2024-11-08
Payer: COMMERCIAL

## 2024-11-08 VITALS — HEART RATE: 73 BPM | SYSTOLIC BLOOD PRESSURE: 115 MMHG | DIASTOLIC BLOOD PRESSURE: 78 MMHG

## 2024-11-08 DIAGNOSIS — Z85.3 PERSONAL HISTORY OF BREAST CANCER: Primary | ICD-10-CM

## 2024-11-08 DIAGNOSIS — C50.911 INVASIVE DUCTAL CARCINOMA OF BREAST, FEMALE, RIGHT: ICD-10-CM

## 2024-11-08 PROCEDURE — 3074F SYST BP LT 130 MM HG: CPT | Mod: CPTII,S$GLB,, | Performed by: SURGERY

## 2024-11-08 PROCEDURE — 3078F DIAST BP <80 MM HG: CPT | Mod: CPTII,S$GLB,, | Performed by: SURGERY

## 2024-11-08 PROCEDURE — 99205 OFFICE O/P NEW HI 60 MIN: CPT | Mod: S$GLB,,, | Performed by: SURGERY

## 2024-11-08 PROCEDURE — 1159F MED LIST DOCD IN RCRD: CPT | Mod: CPTII,S$GLB,, | Performed by: SURGERY

## 2024-11-08 PROCEDURE — 3044F HG A1C LEVEL LT 7.0%: CPT | Mod: CPTII,S$GLB,, | Performed by: SURGERY

## 2024-11-08 PROCEDURE — 99999 PR PBB SHADOW E&M-EST. PATIENT-LVL III: CPT | Mod: PBBFAC,,, | Performed by: SURGERY

## 2024-11-10 PROBLEM — C50.911 INVASIVE DUCTAL CARCINOMA OF BREAST, FEMALE, RIGHT: Status: ACTIVE | Noted: 2024-11-10

## 2024-11-10 NOTE — PROGRESS NOTES
Plastic Surgery History & Physical    SUBJECTIVE:   Chief complaint:  Discuss breast reconstruction    History of Present Illness:  53 y.o. female initially presented with a palpable breast mask.  She underwent imaging was found has a 1.6 cm invasive ductal carcinoma.  She was seeing Dr. Mahmood and discussed options.  She understands that a lumpectomy would be fairly deforming with a small breasts.  Also think she favors the peace of mind of bilateral mastectomy with reconstruction.  She was done the limited research of her options sent for.      Denies other past medical history.  She was not take any medications   Past surgical history includes a fibroadenoma excision at 19 years old, cholecystectomy, and an ice surgery  She was a nonsmoker.    She does not work.    She was through kids between the age of 23 and 19    She was interested in minimizing the amount of surgery and recovery that she needs it was anxious to get back to her activities including yoga and tennis    Past Medical History:   Diagnosis Date    Abdominal bloating 2019    BRCA1 negative     BRCA2 negative     GERD (gastroesophageal reflux disease)     Liver lesion 2019       Past Surgical History:   Procedure Laterality Date    BREAST CYST EXCISION Left     20 y/o f     SECTION      CHOLECYSTECTOMY      45 year old    COLONOSCOPY N/A 2017    Procedure: COLONOSCOPY;  Surgeon: Andrews Sears MD;  Location: [address]);  Service: Endoscopy;  Laterality: N/A;    COLONOSCOPY N/A 2022    Procedure: COLONOSCOPY;  Surgeon: Andrews Sears MD;  Location: [address]);  Service: Endoscopy;  Laterality: N/A;  Fully Vaccinated.EC    CYSTOSCOPY N/A 2019    Procedure: CYSTOSCOPY;  Surgeon: Debbie Murphy MD;  Location: [facility];  Service: OB/GYN;  Laterality: N/A;    EYE SURGERY  10/24/2023    strabismus surgery  bilateral    HYSTEROSCOPY N/A 2019    Procedure: HYSTEROSCOPY-removal of IUD;  possible shaving of uterine fibroids if needed to insert new IUD;  Surgeon: Debbie Murphy MD;  Location: [facility] OR;  Service: OB/GYN;  Laterality: N/A;    INTRAUTERINE DEVICE INSERTION N/A 09/17/2019    Procedure: IUD insertion-- mirena-- would prefer kyleena if available;  Surgeon: Debbie Murphy MD;  Location: [facility] OR;  Service: OB/GYN;  Laterality: N/A;       Family History   Problem Relation Name Age of Onset    Cancer Mother  73        colon ca    Hypertension Mother      Arrhythmia Mother      Heart disease Father 72         MI around 50.    Hyperlipidemia Father 72     Parkinsonism Father 72     Cancer Sister  49        breast    Breast cancer Sister  48        breast    Other Sister      No Known Problems Brother pt is 3rd     Other Daughter middle         TBI with left hemiparesis    No Known Problems Son      Arrhythmia Son          SVT    Cirrhosis Maternal Grandfather      Alcohol abuse Maternal Grandfather      Alzheimer's disease Paternal Grandmother      Parkinsonism Paternal Grandfather      Ovarian cancer Cousin pat first cousin     Cancer Cousin pat first cousin         ovarian and one with lung    Colon cancer Neg Hx         Social History     Socioeconomic History    Marital status:      Spouse name: Dennis    Number of children: 3   Tobacco Use    Smoking status: Never    Smokeless tobacco: Never   Substance and Sexual Activity    Alcohol use: Yes     Alcohol/week: 1.0 standard drink of alcohol     Types: 1 Glasses of wine per week     Comment: amt varies     Drug use: No    Sexual activity: Yes     Partners: Male   Social History Narrative    From  [place]     Moved to [facility] 1998    Exercising - runs,  wts, yoga, walks.        Kids 22, 20, 18.          Retired  and .      - .           Current Outpatient Medications   Medication Sig Dispense Refill    ascorbic acid, vitamin C, (VITAMIN C) 1000 MG tablet Take 1,000 mg by mouth every  evening.      b complex vitamins capsule Take 1 capsule by mouth every evening.      collagen, bovine, 100 % Powd Apply topically.      Lactobacillus rhamnosus GG (CULTURELLE) 10 billion cell capsule Take 1 capsule by mouth every evening.      magnesium 30 mg Tab Take by mouth every evening.      vitamin D (VITAMIN D3) 1000 units Tab Take 1,000 Units by mouth every evening.      HYDROcodone-acetaminophen (NORCO) 5-325 mg per tablet Take 1 tablet by mouth every 6 (six) hours as needed for Pain. 8 tablet 0    INV TESTOSTERONE/ANASTROZOL OR PLACEBO PELLETS Inject 1 Pellet into the skin. FOR INVESTIGATIONAL USE ONLY (Patient not taking: Reported on 11/8/2024)      neomycin-polymyxin-dexamethasone (MAXITROL) 3.5mg/mL-10,000 unit/mL-0.1 % DrpS Place 1 drop into both eyes 4 (four) times daily. (Patient not taking: Reported on 11/8/2024) 5 mL 0    ondansetron (ZOFRAN-ODT) 4 MG TbDL Dissolve 2 tablets (8 mg total) by mouth every 8 (eight) hours as needed. (Patient not taking: Reported on 11/8/2024) 16 tablet 0    progesterone (PROMETRIUM) 200 MG capsule TAKE ONE CAPSULE BY MOUTH DAYS 1-12 EVERY NIGHT AT BEDTIME (Patient not taking: Reported on 11/8/2024)      valACYclovir (VALTREX) 1000 MG tablet Take 0.5 tablets (500 mg total) by mouth every 12 (twelve) hours. (Patient not taking: Reported on 11/8/2024) 14 tablet 5     No current facility-administered medications for this visit.       Review of patient's allergies indicates:   Allergen Reactions    Percocet [oxycodone-acetaminophen] Nausea And Vomiting    Vicodin [hydrocodone-acetaminophen] Nausea And Vomiting     Pt not sure if she took percocet or Vicodin that caused severe nausea & vomiting                 OBJECTIVE:     /78 (BP Location: Right arm, Patient Position: Sitting)   Pulse 73       Physical Exam:  Gen: NAD, appears stated age  Neuro: normal without focal findings, mental status and speech normal  HEENT: NCAT, neck supple, PEERL  CV: RRR  Pulm: Breathing  non-labored, chest wall movement equal bilaterally   Breast:  Small breasts, some deflation, bruising on the right from the biopsy with palpable fullness in the upper outer quadrant.  No ptosis   Right Left   SN-N 20 20   N-N 19   N-IMF 5 4   Width 12-12.25       Abdomen: soft, nontender, no guarding  Minimal skin and excess adiposity  Gu: genitalia not examined  Extremity:normal strength, no cyanosis or edema  Does have some excess inner thigh and buttocks adiposity  Skin: Skin color, texture, turgor normal. No rashes or lesions  Psych: oriented to time, place and person          ASSESSMENT/PLAN:     Invasive ductal carcinoma of breast, female, right   Not a good candidate for oncoplastic breast techniques as has small breasts will little additional tissue    Began with discussion of implant based reconstruction. This included both direct-to-implant, tissue expander based reconstruction, the possible use of ADM, drains and expected post-operative course.  Risks of implant based recon including: hematoma, seroma, infection, extrusion, capsular contracture, risks of rupture, need for replacement later in life and BII/VALERY-ALCL. Discussed basics of revision procedures including: tailoring of the skin envelope, repositioning of the implant, liposuction and fat grafting. Any questions answered.    Next we discussed autologous reconstruction with the most common donor sites (abdomen, thigh, back). Explained that this procedure will reconstruction the breast with the patients own living tissue, without the need for an implant, but in exchange for a longer initial procedure and on average three day hospital stay afterwards.  Discussed flap monitoring, risks of take back/flap failure/ expected hospital course, use of drains, and recovery.  Risks including flap loss, fat necrosis, infection, wound breakdown, seroma, hematoma, scarring, need for reoperation, blood clots were all covered.  Typical revision procedures including  lifting the breast, tailoring the skin, liposuction and fat grafting for contour were also discussed.     I had a long talk about her different options and expected outcomes.  To have the most adequate tissue for the breasts I think she would need a PAP flaps.  It was Nancy flaps think she would have to be smaller than her current size.  Also think autologous reconstruction it was more surgery than she was interested in.  She was a very sedating getting back to her regular activities.  Discussed implant based reconstruction in detail.  Ultimately think it was what she was leaning towards.  She would be a good candidate for nipple sparing with the IMF incision.  Because of the small breast size and goals to be larger, I do not think she was a good candidate for direct implant reconstruction because of the stretch on the skin and we would be better suited for tissue expander.  I did discuss ordering implants in case the skin envelope was adequate    She should decide in the coming days.  She can reach out to my office or to Dr. Mahmood's office.  I will plan on seeing her back for a preoperative visit.      Deniz Noel, DO  Plastic and Reconstructive Surgery    I spent a total of 60 minutes on the day of the visit.  This includes face to face time and non-face to face time preparing to see the patient (eg, review of tests), obtaining and/or reviewing separately obtained history, documenting clinical information in the electronic or other health record, independently interpreting results and communicating results to the patient/family/caregiver, or care coordinator.

## 2024-11-10 NOTE — ASSESSMENT & PLAN NOTE
Not a good candidate for oncoplastic breast techniques as has small breasts will little additional tissue    Began with discussion of implant based reconstruction. This included both direct-to-implant, tissue expander based reconstruction, the possible use of ADM, drains and expected post-operative course.  Risks of implant based recon including: hematoma, seroma, infection, extrusion, capsular contracture, risks of rupture, need for replacement later in life and BII/VALERY-ALCL. Discussed basics of revision procedures including: tailoring of the skin envelope, repositioning of the implant, liposuction and fat grafting. Any questions answered.    Next we discussed autologous reconstruction with the most common donor sites (abdomen, thigh, back). Explained that this procedure will reconstruction the breast with the patients own living tissue, without the need for an implant, but in exchange for a longer initial procedure and on average three day hospital stay afterwards.  Discussed flap monitoring, risks of take back/flap failure/ expected hospital course, use of drains, and recovery.  Risks including flap loss, fat necrosis, infection, wound breakdown, seroma, hematoma, scarring, need for reoperation, blood clots were all covered.  Typical revision procedures including lifting the breast, tailoring the skin, liposuction and fat grafting for contour were also discussed.     I had a long talk about her different options and expected outcomes.  To have the most adequate tissue for the breasts I think she would need a PAP flaps.  It was Nancy flaps think she would have to be smaller than her current size.  Also think autologous reconstruction it was more surgery than she was interested in.  She was a very sedating getting back to her regular activities.  Discussed implant based reconstruction in detail.  Ultimately think it was what she was leaning towards.  She would be a good candidate for nipple sparing with the F  incision.    She should decide in the coming days.  She can reach out to my office or to Dr. Puga's office.  I will plan on seeing her back for a preoperative visit.

## 2024-11-11 ENCOUNTER — PATIENT MESSAGE (OUTPATIENT)
Dept: PLASTIC SURGERY | Facility: CLINIC | Age: 54
End: 2024-11-11
Payer: COMMERCIAL

## 2024-11-11 ENCOUNTER — TELEPHONE (OUTPATIENT)
Dept: PLASTIC SURGERY | Facility: CLINIC | Age: 54
End: 2024-11-11
Payer: COMMERCIAL

## 2024-11-11 ENCOUNTER — PATIENT MESSAGE (OUTPATIENT)
Dept: SURGERY | Facility: CLINIC | Age: 54
End: 2024-11-11
Payer: COMMERCIAL

## 2024-11-11 RX ORDER — SODIUM CHLORIDE 9 MG/ML
INJECTION, SOLUTION INTRAVENOUS CONTINUOUS
Status: CANCELLED | OUTPATIENT
Start: 2024-11-11

## 2024-11-11 NOTE — TELEPHONE ENCOUNTER
Spoke to pt and confirmed  a surgery date  11/18/24 at the [facility] / [address] [place] [address] -[facility] Same day   Pt agreeable. Provided patient with details of pre /post op appts, basic prep for sx, arrival time the day before by Dr Mahmood  and address of the locations.  call back # to RN navigator given should any questions or concerns arise  All questions and concerns addressed. Pt voiced understanding.

## 2024-11-12 ENCOUNTER — OFFICE VISIT (OUTPATIENT)
Dept: PLASTIC SURGERY | Facility: CLINIC | Age: 54
End: 2024-11-12
Payer: COMMERCIAL

## 2024-11-12 DIAGNOSIS — C50.911 INVASIVE DUCTAL CARCINOMA OF BREAST, FEMALE, RIGHT: Primary | ICD-10-CM

## 2024-11-12 PROCEDURE — 99214 OFFICE O/P EST MOD 30 MIN: CPT | Mod: S$GLB,,, | Performed by: SURGERY

## 2024-11-12 PROCEDURE — 3044F HG A1C LEVEL LT 7.0%: CPT | Mod: CPTII,S$GLB,, | Performed by: SURGERY

## 2024-11-12 PROCEDURE — 99999 PR PBB SHADOW E&M-EST. PATIENT-LVL II: CPT | Mod: PBBFAC,,, | Performed by: SURGERY

## 2024-11-13 ENCOUNTER — PATIENT MESSAGE (OUTPATIENT)
Dept: INTERNAL MEDICINE | Facility: CLINIC | Age: 54
End: 2024-11-13
Payer: COMMERCIAL

## 2024-11-13 ENCOUNTER — HOSPITAL ENCOUNTER (OUTPATIENT)
Dept: PREADMISSION TESTING | Facility: OTHER | Age: 54
Discharge: HOME | End: 2024-11-13
Attending: SURGERY
Payer: COMMERCIAL

## 2024-11-13 ENCOUNTER — ANESTHESIA EVENT (OUTPATIENT)
Dept: SURGERY | Facility: OTHER | Age: 54
End: 2024-11-13
Payer: COMMERCIAL

## 2024-11-13 VITALS
DIASTOLIC BLOOD PRESSURE: 69 MMHG | BODY MASS INDEX: 20.66 KG/M2 | SYSTOLIC BLOOD PRESSURE: 114 MMHG | HEART RATE: 58 BPM | OXYGEN SATURATION: 99 % | RESPIRATION RATE: 16 BRPM | HEIGHT: 65 IN | TEMPERATURE: 98 F | WEIGHT: 124 LBS

## 2024-11-13 DIAGNOSIS — Z01.818 PRE-OP TESTING: ICD-10-CM

## 2024-11-13 LAB
ALBUMIN SERPL BCP-MCNC: 4.3 G/DL (ref 3.5–5.2)
ALP SERPL-CCNC: 53 U/L (ref 40–150)
ALT SERPL W/O P-5'-P-CCNC: 18 U/L (ref 10–44)
ANION GAP SERPL CALC-SCNC: 9 MMOL/L (ref 8–16)
AST SERPL-CCNC: 21 U/L (ref 10–40)
BASOPHILS # BLD AUTO: 0.05 K/UL (ref 0–0.2)
BASOPHILS NFR BLD: 0.8 % (ref 0–1.9)
BILIRUB SERPL-MCNC: 0.3 MG/DL (ref 0.1–1)
BUN SERPL-MCNC: 17 MG/DL (ref 6–20)
CALCIUM SERPL-MCNC: 9.5 MG/DL (ref 8.7–10.5)
CHLORIDE SERPL-SCNC: 106 MMOL/L (ref 95–110)
CO2 SERPL-SCNC: 24 MMOL/L (ref 23–29)
CREAT SERPL-MCNC: 0.8 MG/DL (ref 0.5–1.4)
DIFFERENTIAL METHOD BLD: ABNORMAL
EOSINOPHIL # BLD AUTO: 0.5 K/UL (ref 0–0.5)
EOSINOPHIL NFR BLD: 7.6 % (ref 0–8)
ERYTHROCYTE [DISTWIDTH] IN BLOOD BY AUTOMATED COUNT: 12.8 % (ref 11.5–14.5)
EST. GFR  (NO RACE VARIABLE): >60 ML/MIN/1.73 M^2
GLUCOSE SERPL-MCNC: 100 MG/DL (ref 70–110)
HCT VFR BLD AUTO: 42 % (ref 37–48.5)
HGB BLD-MCNC: 14.6 G/DL (ref 12–16)
IMM GRANULOCYTES # BLD AUTO: 0.02 K/UL (ref 0–0.04)
IMM GRANULOCYTES NFR BLD AUTO: 0.3 % (ref 0–0.5)
LYMPHOCYTES # BLD AUTO: 2.3 K/UL (ref 1–4.8)
LYMPHOCYTES NFR BLD: 35.8 % (ref 18–48)
MCH RBC QN AUTO: 30.5 PG (ref 27–31)
MCHC RBC AUTO-ENTMCNC: 34.8 G/DL (ref 32–36)
MCV RBC AUTO: 88 FL (ref 82–98)
MONOCYTES # BLD AUTO: 0.4 K/UL (ref 0.3–1)
MONOCYTES NFR BLD: 6.1 % (ref 4–15)
NEUTROPHILS # BLD AUTO: 3.2 K/UL (ref 1.8–7.7)
NEUTROPHILS NFR BLD: 49.4 % (ref 38–73)
NRBC BLD-RTO: 0 /100 WBC
PLATELET # BLD AUTO: 402 K/UL (ref 150–450)
PMV BLD AUTO: 8.9 FL (ref 9.2–12.9)
POTASSIUM SERPL-SCNC: 4.5 MMOL/L (ref 3.5–5.1)
PROT SERPL-MCNC: 7 G/DL (ref 6–8.4)
RBC # BLD AUTO: 4.78 M/UL (ref 4–5.4)
SODIUM SERPL-SCNC: 139 MMOL/L (ref 136–145)
WBC # BLD AUTO: 6.42 K/UL (ref 3.9–12.7)

## 2024-11-13 PROCEDURE — 93010 ELECTROCARDIOGRAM REPORT: CPT | Mod: ,,, | Performed by: INTERNAL MEDICINE

## 2024-11-13 PROCEDURE — 36415 COLL VENOUS BLD VENIPUNCTURE: CPT | Performed by: SURGERY

## 2024-11-13 PROCEDURE — 93005 ELECTROCARDIOGRAM TRACING: CPT

## 2024-11-13 PROCEDURE — 80053 COMPREHEN METABOLIC PANEL: CPT | Performed by: SURGERY

## 2024-11-13 PROCEDURE — 85025 COMPLETE CBC W/AUTO DIFF WBC: CPT | Performed by: SURGERY

## 2024-11-13 RX ORDER — SODIUM CHLORIDE, SODIUM LACTATE, POTASSIUM CHLORIDE, CALCIUM CHLORIDE 600; 310; 30; 20 MG/100ML; MG/100ML; MG/100ML; MG/100ML
INJECTION, SOLUTION INTRAVENOUS CONTINUOUS
OUTPATIENT
Start: 2024-11-13

## 2024-11-13 RX ORDER — AMOXICILLIN 500 MG
1 CAPSULE ORAL DAILY
COMMUNITY

## 2024-11-13 RX ORDER — LIDOCAINE HYDROCHLORIDE 10 MG/ML
0.5 INJECTION, SOLUTION EPIDURAL; INFILTRATION; INTRACAUDAL; PERINEURAL ONCE
OUTPATIENT
Start: 2024-11-13 | End: 2024-11-13

## 2024-11-13 NOTE — ANESTHESIA PREPROCEDURE EVALUATION
11/13/2024  Louise Cueto is a 53 y.o., female.      Pre-op Assessment    I have reviewed the Patient Summary Reports.     I have reviewed the Nursing Notes. I have reviewed the NPO Status.   I have reviewed the Medications.     Review of Systems  Anesthesia Hx:             Denies Family Hx of Anesthesia complications.   Personal Hx of Anesthesia complications, Post-Operative Nausea/Vomiting                    Social:  Non-Smoker       Hematology/Oncology:  Hematology Normal                     Current/Recent Cancer.  Breast    right          Cardiovascular:  Cardiovascular Normal                                              Pulmonary:  Pulmonary Normal                       Renal/:  Renal/ Normal                 Hepatic/GI:     GERD Liver Disease,               Neurological:  Neurology Normal                                      Endocrine:  Endocrine Normal                Physical Exam  General: Well nourished, Cooperative, Alert and Oriented    Airway:  Mallampati: II   Mouth Opening: Normal  TM Distance: Normal  Tongue: Normal  Neck ROM: Normal ROM    Dental:  Intact, Retainer        Anesthesia Plan  Type of Anesthesia, risks & benefits discussed:    Anesthesia Type: Gen ETT  Intra-op Monitoring Plan: Standard ASA Monitors  Post Op Pain Control Plan: multimodal analgesia  Induction:  IV  Airway Plan: Video, Post-Induction  Informed Consent: Informed consent signed with the Patient and all parties understand the risks and agree with anesthesia plan.  All questions answered.   ASA Score: 2  Anesthesia Plan Notes: CBC, BMP in Epic. normal    Ready For Surgery From Anesthesia Perspective.     .

## 2024-11-13 NOTE — DISCHARGE INSTRUCTIONS
Information to Prepare you for your Surgery    PRE-ADMIT TESTING   [address]  [place] [facility]  [address]     Your surgery has been scheduled at [facility]. We are pleased to have the opportunity to serve you. For Further Information please call [phone].    On the day of surgery please report to Registration on the 1st floor of the [facility].    CONTACT YOUR PHYSICIAN'S OFFICE THE DAY PRIOR TO YOUR SURGERY TO OBTAIN YOUR ARRIVAL TIME.     The evening before surgery do not eat anything after 9 p.m. ( this includes hard candy, chewing gum and mints).  You may only have GATORADE, POWERADE AND WATER  from 9 p.m. until you leave your home.   DO NOT DRINK ANY LIQUIDS ON THE WAY TO THE HOSPITAL.      Why does your anesthesiologist allow you to drink Gatorade/Powerade before surgery?  Gatorade/Powerade helps to increase your comfort before surgery and to decrease your nausea after surgery.   The carbohydrates in Gatorade/Powerade help reduce your body's stress response to surgery.  If you are a diabetic-drink only water prior to surgery.    Outpatient Surgery- May allow 2 adults (18 and older)/ Support Persons (1 being the designated ) for all surgical/procedural patients. A breastfeeding mother will be allowed her infant and 2 adult Support Persons. No one under the age of 18 will be allowed in the building.    MEDICATION INSTRUCTIONS: TAKE medications checked off by the Anesthesiologist on your Medication List.    Surgery Patients:  If you take ASPIRIN - Your PHYSICIAN/SURGEON will need to inform you IF/OR when you need to stop taking aspirin prior to your surgery.     Starting the week prior to surgery, do not take any medications containing IBUPROFEN or NSAIDS (Advil, Aleve, BC, Celebrex, Goody's, Ketorolac, Meloxicam, Mobic, Motrin, Naproxen, Toradol, etc).  If you are not sure if you should take a medicine please call your surgeon's office.  You may take Tylenol.    Do  Not Wear any make-up (especially eye make-up) to surgery. Please remove any false eyelashes or eyelash extensions. If you arrive the day of surgery with makeup/eyelashes on you will be required to remove prior to surgery. (There is a risk of corneal abrasions if eye makeup/eyelash extensions are not removed)    Leave all valuables at home.   Do Not wear any jewelry or watches, including any metal in body piercings. Jewelry must be removed prior to coming to the hospital.  There is a possibility that rings that are unable to be removed may be cut off if they are on the surgical extremity.    Please remove all hair extensions, wigs, clips and any other metal accessories/ ornaments from your hair.  These items may pose a flammable/fire risk in Surgery and must be removed.    Do not shave your surgical area at least 5 days prior to your surgery. The surgical prep will be performed at the hospital according to Infection Control regulations.    Contact Lens must be removed before surgery. Either do not wear the contact lens or bring a case and solution for storage.  Please bring a container for eyeglasses or dentures as required.  Bring any paperwork your physician has provided, such as consent forms,  history and physicals, doctor's orders, etc.   Bring comfortable clothes that are loose fitting to wear upon discharge. Take into consideration the type of surgery being performed.  Maintain your diet as advised per your physician the day prior to surgery.    Adequate rest the night before surgery is advised.   Park in the Parking lot behind the hospital or in the [facility] across the street from the parking lot. Parking is complimentary.  If you will be discharged the same day as your procedure, please arrange for a responsible adult to drive you home or to accompany you if traveling by taxi.   YOU WILL NOT BE PERMITTED TO DRIVE OR TO LEAVE THE HOSPITAL ALONE AFTER SURGERY.   If you are being discharged the  same day, it is strongly recommended that you arrange for someone to remain with you for the first 24 hrs following your surgery.    The Surgeon will speak to your family/visitor after your surgery regarding the outcome of your surgery and post op care.  The Surgeon may speak to you after your surgery, but there is a possibility you may not remember the details.  Please check with your family members regarding the conversation with the Surgeon.    We strongly recommend whoever is bringing you home be present for discharge instructions.  This will ensure a thorough understanding for your post op home care.              Bathing Instructions with Hibiclens  Shower the evening before and morning of your procedure with Chlorhexidine (Hibiclens)    Do not use Chlorhexidine on your face or genitals. Do not get in your eyes.  Wash your face with water and your regular face wash/soap  Use your regular shampoo  Apply Chlorhexidine (Hibiclens) directly on your skin or on a wet washcloth and wash gently. When showering: Move away from the shower stream when applying Chlorhexidine (Hibiclens) to avoid rinsing off too soon.  Rinse thoroughly with warm water  Do not dilute Chlorhexidine (Hibiclens)   Dry off as usual, do not use any deodorant, powder, body lotions, perfume, after shave or cologne.     If the patient has fever, cough, or signs/symptoms of Flu or Covid please do not come in for your surgery.   Contact your surgeon and your primary care physician for further instructions.   Please also call [facility] Outpatient Surgery [phone]. The unit opens at 5 AM.    If applicable, please bring your blood pressure & diabetes medications the day of surgery.

## 2024-11-13 NOTE — PROGRESS NOTES
Plastic Surgery History & Physical    SUBJECTIVE:   Chief complaint: Preoperative visit for tissue expander placement    History of Present Illness:  53 y.o. female presenting to plastic surgery clinic for a preoperative visit. The patient was last seen on 24 at which time we discussed immediate bilateral tissue expander placement with nipple sparing mastectomy.  Patient understands the details of tissue expander placement and has elected to undergo surgery on 24. The long term surgical plan for the patient includes implant based reconstruction. Since the last clinic visit there have been no significant changes in the patient's history.  States she gets very nauseated and vomits with narcotic pain medication    Past Medical History:   Diagnosis Date    Abdominal bloating 2019    BRCA1 negative     BRCA2 negative     GERD (gastroesophageal reflux disease)     resolved with lap tono    Liver lesion 2019    Malignant neoplasm of upper-inner quadrant of right female breast        Past Surgical History:   Procedure Laterality Date    BREAST CYST EXCISION Left     18 y/o f     SECTION      CHOLECYSTECTOMY      45 year old    COLONOSCOPY N/A 2017    Procedure: COLONOSCOPY;  Surgeon: Andrews Sears MD;  Location: [address]);  Service: Endoscopy;  Laterality: N/A;    COLONOSCOPY N/A 2022    Procedure: COLONOSCOPY;  Surgeon: Andrews Sears MD;  Location: [facility] ([address] [place] [address]);  Service: Endoscopy;  Laterality: N/A;  Fully Vaccinated.EC    CYSTOSCOPY N/A 2019    Procedure: CYSTOSCOPY;  Surgeon: Debbie Murphy MD;  Location: [facility];  Service: OB/GYN;  Laterality: N/A;    EYE SURGERY  10/24/2023    strabismus surgery  bilateral    HYSTEROSCOPY N/A 2019    Procedure: HYSTEROSCOPY-removal of IUD; possible shaving of uterine fibroids if needed to insert new IUD;  Surgeon: Debbie Murphy MD;  Location: [facility] OR;  Service: OB/GYN;  Laterality: N/A;     INTRAUTERINE DEVICE INSERTION N/A 09/17/2019    Procedure: IUD insertion-- mirena-- would prefer kyleena if available;  Surgeon: Debbie Murphy MD;  Location: [facility];  Service: OB/GYN;  Laterality: N/A;       Family History   Problem Relation Name Age of Onset    Cancer Mother  73        colon ca    Hypertension Mother      Arrhythmia Mother      Heart disease Father 72         MI around 50.    Hyperlipidemia Father 72     Parkinsonism Father 72     Cancer Sister  49        breast    Breast cancer Sister  48        breast    Other Sister      No Known Problems Brother pt is 3rd     Other Daughter middle         TBI with left hemiparesis    No Known Problems Son      Arrhythmia Son          SVT    Cirrhosis Maternal Grandfather      Alcohol abuse Maternal Grandfather      Alzheimer's disease Paternal Grandmother      Parkinsonism Paternal Grandfather      Ovarian cancer Cousin pat first cousin     Cancer Cousin pat first cousin         ovarian and one with lung    Colon cancer Neg Hx         Social History     Socioeconomic History    Marital status:      Spouse name: Dennis    Number of children: 3   Tobacco Use    Smoking status: Never    Smokeless tobacco: Never   Substance and Sexual Activity    Alcohol use: Yes     Alcohol/week: 1.0 standard drink of alcohol     Types: 1 Glasses of wine per week     Comment: amt varies     Drug use: No    Sexual activity: Yes     Partners: Male   Social History Narrative    From  [place]     Moved to [facility] 1998    Exercising - runs,  wts, yoga, walks.        Kids 22, 20, 18.          Retired  and .      - .           Current Outpatient Medications   Medication Sig Dispense Refill    ascorbic acid, vitamin C, (VITAMIN C) 1000 MG tablet Take 1,000 mg by mouth every evening.      b complex vitamins capsule Take 1 capsule by mouth every evening.      collagen, bovine, 100 % Powd Apply topically.      INV  TESTOSTERONE/ANASTROZOL OR PLACEBO PELLETS Inject 1 Pellet into the skin. FOR INVESTIGATIONAL USE ONLY      Lactobacillus rhamnosus GG (CULTURELLE) 10 billion cell capsule Take 1 capsule by mouth every evening.      magnesium 30 mg Tab Take by mouth every evening.      omega-3 fatty acids/fish oil (FISH OIL-OMEGA-3 FATTY ACIDS) 300-1,000 mg capsule Take 1 capsule by mouth once daily.      valACYclovir (VALTREX) 1000 MG tablet Take 0.5 tablets (500 mg total) by mouth every 12 (twelve) hours. (Patient taking differently: Take 500 mg by mouth every 12 (twelve) hours. PRN) 14 tablet 5    vitamin D (VITAMIN D3) 1000 units Tab Take 1,000 Units by mouth every evening.       No current facility-administered medications for this visit.       Review of patient's allergies indicates:   Allergen Reactions    Percocet [oxycodone-acetaminophen] Nausea And Vomiting     Can take tylenol & tramadol     Vicodin [hydrocodone-acetaminophen] Nausea And Vomiting     Pt not sure if she took percocet or Vicodin that caused severe nausea & vomiting           OBJECTIVE:     There were no vitals taken for this visit.      Physical Exam:  Gen: NAD, appears stated age  Neuro: normal without focal findings, mental status and speech normal  HEENT: NCAT, neck supple, PEERL  CV: RRR  Pulm: Breathing non-labored, chest wall movement equal bilaterally   Breast: Exam deferred, reviewed medical photography  Abdomen: soft, nontender, no guarding  Extremity:normal strength, no cyanosis or edema  Skin: Skin color, texture, turgor normal. No rashes or lesions  Psych: oriented to time, place and person, mood and affect are within normal limits          ASSESSMENT/PLAN:     Louise Cueto was seen preoperatively today for Tissue Expander based breast reconstruction.  Ordered both 300 and 375 cc expanders.  Discussed use of ADM  Will plan to DC with tramadol instead of oxycodone    We discussed tissue expander based reconstruction, the possible use of ADM,  drains and expected post-operative course.  Risks including: hematoma, seroma, infection, extrusion, capsular contracture, risks of rupture, malposition, wound breakdown, scarring, need for reoperation, blood clots were all covered. Discussed importance of patient wearing post operative garment as instructed.   Consents signed    Patient understands tissue expanders are temporary placeholders for long term reconstruction planning. In most cases the expander is left flat or partially filled with air in the operating room and the patient will undergo tissue expander fills with air/saline in the clinic as appropriate.     Reviewed multimodal pain control regimen used in the post operative period. Prescriptions will be sent to the outpatient pharmacy the day of surgery.  The patient was given a packet including general instructions for post-operative care, instructions for the day of surgery, drain care and process to complete FMLA/Disability paperwork.  All questions were answered. The patient was given a business card with contact info and advised to contact the clinic with any questions or concerns before or after surgery.      Amandeep Noel  Plastic and Reconstructive Surgery    I spent a total of 30 minutes on the day of the visit.  This includes face to face time and non-face to face time preparing to see the patient (eg, review of tests), obtaining and/or reviewing separately obtained history, documenting clinical information in the electronic or other health record, independently interpreting results and communicating results to the patient/family/caregiver, or care coordinator.

## 2024-11-14 LAB
OHS QRS DURATION: 82 MS
OHS QTC CALCULATION: 414 MS

## 2024-11-15 ENCOUNTER — TELEPHONE (OUTPATIENT)
Dept: SURGERY | Facility: CLINIC | Age: 54
End: 2024-11-15
Payer: COMMERCIAL

## 2024-11-18 ENCOUNTER — HOSPITAL ENCOUNTER (OUTPATIENT)
Dept: RADIOLOGY | Facility: OTHER | Age: 54
Discharge: HOME | End: 2024-11-18
Attending: SURGERY
Payer: COMMERCIAL

## 2024-11-18 ENCOUNTER — HOSPITAL ENCOUNTER (OUTPATIENT)
Facility: OTHER | Age: 54
Discharge: HOME | End: 2024-11-18
Attending: SURGERY | Admitting: SURGERY
Payer: COMMERCIAL

## 2024-11-18 ENCOUNTER — ANESTHESIA (OUTPATIENT)
Dept: SURGERY | Facility: OTHER | Age: 54
End: 2024-11-18
Payer: COMMERCIAL

## 2024-11-18 VITALS
TEMPERATURE: 98 F | OXYGEN SATURATION: 97 % | DIASTOLIC BLOOD PRESSURE: 67 MMHG | RESPIRATION RATE: 16 BRPM | WEIGHT: 124 LBS | BODY MASS INDEX: 20.66 KG/M2 | HEIGHT: 65 IN | HEART RATE: 77 BPM | SYSTOLIC BLOOD PRESSURE: 125 MMHG

## 2024-11-18 DIAGNOSIS — C50.211 MALIGNANT NEOPLASM OF UPPER-INNER QUADRANT OF RIGHT BREAST IN FEMALE, ESTROGEN RECEPTOR POSITIVE: ICD-10-CM

## 2024-11-18 DIAGNOSIS — Z17.0 MALIGNANT NEOPLASM OF UPPER-INNER QUADRANT OF RIGHT BREAST IN FEMALE, ESTROGEN RECEPTOR POSITIVE: ICD-10-CM

## 2024-11-18 LAB
B-HCG UR QL: NEGATIVE
CTP QC/QA: YES

## 2024-11-18 PROCEDURE — 63600175 PHARM REV CODE 636 W HCPCS: Performed by: STUDENT IN AN ORGANIZED HEALTH CARE EDUCATION/TRAINING PROGRAM

## 2024-11-18 PROCEDURE — 36000706: Performed by: SURGERY

## 2024-11-18 PROCEDURE — 19303 MAST SIMPLE COMPLETE: CPT | Mod: 50,,, | Performed by: SURGERY

## 2024-11-18 PROCEDURE — C9290 INJ, BUPIVACAINE LIPOSOME: HCPCS | Performed by: SURGERY

## 2024-11-18 PROCEDURE — 37000008 HC ANESTHESIA 1ST 15 MINUTES: Performed by: SURGERY

## 2024-11-18 PROCEDURE — 38900 IO MAP OF SENT LYMPH NODE: CPT | Mod: RT,,, | Performed by: SURGERY

## 2024-11-18 PROCEDURE — 88342 IMHCHEM/IMCYTCHM 1ST ANTB: CPT | Performed by: PATHOLOGY

## 2024-11-18 PROCEDURE — 71000039 HC RECOVERY, EACH ADD'L HOUR: Performed by: SURGERY

## 2024-11-18 PROCEDURE — 63600175 PHARM REV CODE 636 W HCPCS: Performed by: NURSE ANESTHETIST, CERTIFIED REGISTERED

## 2024-11-18 PROCEDURE — 88341 IMHCHEM/IMCYTCHM EA ADD ANTB: CPT | Mod: 59 | Performed by: PATHOLOGY

## 2024-11-18 PROCEDURE — 27201423 OPTIME MED/SURG SUP & DEVICES STERILE SUPPLY: Performed by: SURGERY

## 2024-11-18 PROCEDURE — 25000003 PHARM REV CODE 250: Performed by: NURSE ANESTHETIST, CERTIFIED REGISTERED

## 2024-11-18 PROCEDURE — 63600175 PHARM REV CODE 636 W HCPCS: Performed by: SURGERY

## 2024-11-18 PROCEDURE — 71000016 HC POSTOP RECOV ADDL HR: Performed by: SURGERY

## 2024-11-18 PROCEDURE — 11981 INSERTION DRUG DLVR IMPLANT: CPT | Mod: 51,,, | Performed by: SURGERY

## 2024-11-18 PROCEDURE — 37000009 HC ANESTHESIA EA ADD 15 MINS: Performed by: SURGERY

## 2024-11-18 PROCEDURE — 63600175 PHARM REV CODE 636 W HCPCS: Performed by: ANESTHESIOLOGY

## 2024-11-18 PROCEDURE — 71000033 HC RECOVERY, INTIAL HOUR: Performed by: SURGERY

## 2024-11-18 PROCEDURE — 15860 IV NJX TST VASC FLO FLAP/GRF: CPT | Mod: 51,,, | Performed by: SURGERY

## 2024-11-18 PROCEDURE — 25000003 PHARM REV CODE 250: Performed by: STUDENT IN AN ORGANIZED HEALTH CARE EDUCATION/TRAINING PROGRAM

## 2024-11-18 PROCEDURE — 81025 URINE PREGNANCY TEST: CPT | Performed by: ANESTHESIOLOGY

## 2024-11-18 PROCEDURE — C1789 PROSTHESIS, BREAST, IMP: HCPCS | Performed by: SURGERY

## 2024-11-18 PROCEDURE — 88307 TISSUE EXAM BY PATHOLOGIST: CPT | Performed by: PATHOLOGY

## 2024-11-18 PROCEDURE — 19357 TISS XPNDR PLMT BRST RCNSTJ: CPT | Mod: 50,,, | Performed by: SURGERY

## 2024-11-18 PROCEDURE — 38525 BIOPSY/REMOVAL LYMPH NODES: CPT | Mod: 51,RT,, | Performed by: SURGERY

## 2024-11-18 PROCEDURE — 15777 ACELLULAR DERM MATRIX IMPLT: CPT | Mod: 51,RT,, | Performed by: SURGERY

## 2024-11-18 PROCEDURE — C1713 ANCHOR/SCREW BN/BN,TIS/BN: HCPCS | Performed by: SURGERY

## 2024-11-18 PROCEDURE — 36000707: Performed by: SURGERY

## 2024-11-18 PROCEDURE — 99499 UNLISTED E&M SERVICE: CPT | Mod: ,,, | Performed by: PHYSICIAN ASSISTANT

## 2024-11-18 PROCEDURE — C1729 CATH, DRAINAGE: HCPCS | Performed by: SURGERY

## 2024-11-18 PROCEDURE — C1819 TISSUE LOCALIZATION-EXCISION: HCPCS | Performed by: SURGERY

## 2024-11-18 PROCEDURE — A9520 TC99 TILMANOCEPT DIAG 0.5MCI: HCPCS | Performed by: SURGERY

## 2024-11-18 PROCEDURE — 71000015 HC POSTOP RECOV 1ST HR: Performed by: SURGERY

## 2024-11-18 PROCEDURE — 25000003 PHARM REV CODE 250: Performed by: ANESTHESIOLOGY

## 2024-11-18 PROCEDURE — P9045 ALBUMIN (HUMAN), 5%, 250 ML: HCPCS | Mod: JZ,JG | Performed by: NURSE ANESTHETIST, CERTIFIED REGISTERED

## 2024-11-18 DEVICE — CEMENT BONE ANTIBIO SIMPLEX P: Type: IMPLANTABLE DEVICE | Site: BREAST | Status: FUNCTIONAL

## 2024-11-18 DEVICE — GRAFT FLEXHD THIN SM 17.5X20CM: Type: IMPLANTABLE DEVICE | Site: BREAST | Status: FUNCTIONAL

## 2024-11-18 RX ORDER — SODIUM CHLORIDE 0.9 % (FLUSH) 0.9 %
3 SYRINGE (ML) INJECTION
Status: DISCONTINUED | OUTPATIENT
Start: 2024-11-18 | End: 2024-11-18 | Stop reason: HOSPADM

## 2024-11-18 RX ORDER — LIDOCAINE HYDROCHLORIDE 10 MG/ML
0.5 INJECTION, SOLUTION EPIDURAL; INFILTRATION; INTRACAUDAL; PERINEURAL ONCE
Status: DISCONTINUED | OUTPATIENT
Start: 2024-11-18 | End: 2024-11-18 | Stop reason: HOSPADM

## 2024-11-18 RX ORDER — OXYCODONE HYDROCHLORIDE 5 MG/1
5 TABLET ORAL
Status: DISCONTINUED | OUTPATIENT
Start: 2024-11-18 | End: 2024-11-18 | Stop reason: HOSPADM

## 2024-11-18 RX ORDER — SODIUM CHLORIDE 9 MG/ML
INJECTION, SOLUTION INTRAVENOUS CONTINUOUS
Status: DISCONTINUED | OUTPATIENT
Start: 2024-11-18 | End: 2024-11-18 | Stop reason: HOSPADM

## 2024-11-18 RX ORDER — MIDAZOLAM HYDROCHLORIDE 1 MG/ML
INJECTION INTRAMUSCULAR; INTRAVENOUS
Status: DISCONTINUED | OUTPATIENT
Start: 2024-11-18 | End: 2024-11-18

## 2024-11-18 RX ORDER — FENTANYL CITRATE 50 UG/ML
INJECTION, SOLUTION INTRAMUSCULAR; INTRAVENOUS
Status: DISCONTINUED | OUTPATIENT
Start: 2024-11-18 | End: 2024-11-18

## 2024-11-18 RX ORDER — METHOCARBAMOL 500 MG/1
500 TABLET, FILM COATED ORAL 4 TIMES DAILY
Qty: 56 TABLET | Refills: 0 | Status: SHIPPED | OUTPATIENT
Start: 2024-11-18 | End: 2024-12-02

## 2024-11-18 RX ORDER — LIDOCAINE HYDROCHLORIDE 20 MG/ML
INJECTION INTRAVENOUS
Status: DISCONTINUED | OUTPATIENT
Start: 2024-11-18 | End: 2024-11-18

## 2024-11-18 RX ORDER — BUPIVACAINE 13.3 MG/ML
INJECTION, SUSPENSION, LIPOSOMAL INFILTRATION
Status: DISCONTINUED | OUTPATIENT
Start: 2024-11-18 | End: 2024-11-18 | Stop reason: HOSPADM

## 2024-11-18 RX ORDER — INDOCYANINE GREEN AND WATER 25 MG
KIT INJECTION
Status: DISCONTINUED | OUTPATIENT
Start: 2024-11-18 | End: 2024-11-18

## 2024-11-18 RX ORDER — DEXAMETHASONE SODIUM PHOSPHATE 4 MG/ML
INJECTION, SOLUTION INTRA-ARTICULAR; INTRALESIONAL; INTRAMUSCULAR; INTRAVENOUS; SOFT TISSUE
Status: DISCONTINUED | OUTPATIENT
Start: 2024-11-18 | End: 2024-11-18

## 2024-11-18 RX ORDER — VANCOMYCIN HYDROCHLORIDE 1 G/20ML
INJECTION, POWDER, LYOPHILIZED, FOR SOLUTION INTRAVENOUS
Status: DISCONTINUED | OUTPATIENT
Start: 2024-11-18 | End: 2024-11-18 | Stop reason: HOSPADM

## 2024-11-18 RX ORDER — GABAPENTIN 300 MG/1
300 CAPSULE ORAL 3 TIMES DAILY
Qty: 21 CAPSULE | Refills: 0 | Status: SHIPPED | OUTPATIENT
Start: 2024-11-18 | End: 2024-12-02 | Stop reason: SDUPTHER

## 2024-11-18 RX ORDER — MEPERIDINE HYDROCHLORIDE 25 MG/ML
12.5 INJECTION INTRAMUSCULAR; INTRAVENOUS; SUBCUTANEOUS ONCE AS NEEDED
Status: DISCONTINUED | OUTPATIENT
Start: 2024-11-18 | End: 2024-11-18 | Stop reason: HOSPADM

## 2024-11-18 RX ORDER — CEPHALEXIN 500 MG/1
500 CAPSULE ORAL EVERY 6 HOURS
Qty: 56 CAPSULE | Refills: 0 | Status: SHIPPED | OUTPATIENT
Start: 2024-11-18 | End: 2024-12-02

## 2024-11-18 RX ORDER — SODIUM CHLORIDE, SODIUM LACTATE, POTASSIUM CHLORIDE, CALCIUM CHLORIDE 600; 310; 30; 20 MG/100ML; MG/100ML; MG/100ML; MG/100ML
INJECTION, SOLUTION INTRAVENOUS CONTINUOUS
Status: DISCONTINUED | OUTPATIENT
Start: 2024-11-18 | End: 2024-11-18 | Stop reason: HOSPADM

## 2024-11-18 RX ORDER — GLUCAGON 1 MG
1 KIT INJECTION
Status: DISCONTINUED | OUTPATIENT
Start: 2024-11-18 | End: 2024-11-18 | Stop reason: HOSPADM

## 2024-11-18 RX ORDER — PROPOFOL 10 MG/ML
VIAL (ML) INTRAVENOUS
Status: DISCONTINUED | OUTPATIENT
Start: 2024-11-18 | End: 2024-11-18

## 2024-11-18 RX ORDER — TRAMADOL HYDROCHLORIDE 50 MG/1
50 TABLET ORAL EVERY 6 HOURS PRN
Qty: 15 TABLET | Refills: 0 | Status: SHIPPED | OUTPATIENT
Start: 2024-11-18 | End: 2024-11-22 | Stop reason: SDUPTHER

## 2024-11-18 RX ORDER — ONDANSETRON HYDROCHLORIDE 2 MG/ML
INJECTION, SOLUTION INTRAVENOUS
Status: DISCONTINUED | OUTPATIENT
Start: 2024-11-18 | End: 2024-11-18

## 2024-11-18 RX ORDER — ROCURONIUM BROMIDE 10 MG/ML
INJECTION, SOLUTION INTRAVENOUS
Status: DISCONTINUED | OUTPATIENT
Start: 2024-11-18 | End: 2024-11-18

## 2024-11-18 RX ORDER — ONDANSETRON 8 MG/1
8 TABLET, ORALLY DISINTEGRATING ORAL ONCE
Status: COMPLETED | OUTPATIENT
Start: 2024-11-18 | End: 2024-11-18

## 2024-11-18 RX ORDER — ONDANSETRON 4 MG/1
8 TABLET, FILM COATED ORAL EVERY 6 HOURS PRN
Qty: 30 TABLET | Refills: 1 | Status: SHIPPED | OUTPATIENT
Start: 2024-11-18

## 2024-11-18 RX ORDER — ACETAMINOPHEN 500 MG
500 TABLET ORAL EVERY 6 HOURS
Qty: 30 TABLET | Refills: 1 | Status: SHIPPED | OUTPATIENT
Start: 2024-11-18

## 2024-11-18 RX ORDER — ALBUMIN HUMAN 50 G/1000ML
SOLUTION INTRAVENOUS CONTINUOUS PRN
Status: DISCONTINUED | OUTPATIENT
Start: 2024-11-18 | End: 2024-11-18

## 2024-11-18 RX ORDER — PROCHLORPERAZINE EDISYLATE 5 MG/ML
5 INJECTION INTRAMUSCULAR; INTRAVENOUS EVERY 30 MIN PRN
Status: DISCONTINUED | OUTPATIENT
Start: 2024-11-18 | End: 2024-11-18 | Stop reason: HOSPADM

## 2024-11-18 RX ORDER — IBUPROFEN 600 MG/1
600 TABLET ORAL 3 TIMES DAILY
Qty: 30 TABLET | Refills: 1 | Status: SHIPPED | OUTPATIENT
Start: 2024-11-18

## 2024-11-18 RX ORDER — HYDROMORPHONE HYDROCHLORIDE 2 MG/ML
0.4 INJECTION, SOLUTION INTRAMUSCULAR; INTRAVENOUS; SUBCUTANEOUS EVERY 5 MIN PRN
Status: DISCONTINUED | OUTPATIENT
Start: 2024-11-18 | End: 2024-11-18 | Stop reason: HOSPADM

## 2024-11-18 RX ORDER — OXYCODONE HYDROCHLORIDE 5 MG/1
5 TABLET ORAL EVERY 6 HOURS PRN
Qty: 10 TABLET | Refills: 0 | Status: SHIPPED | OUTPATIENT
Start: 2024-11-18

## 2024-11-18 RX ORDER — CEFAZOLIN 2 G/1
2 INJECTION, POWDER, FOR SOLUTION INTRAMUSCULAR; INTRAVENOUS
Status: COMPLETED | OUTPATIENT
Start: 2024-11-18 | End: 2024-11-18

## 2024-11-18 RX ORDER — KETAMINE HCL IN 0.9 % NACL 50 MG/5 ML
SYRINGE (ML) INTRAVENOUS
Status: DISCONTINUED | OUTPATIENT
Start: 2024-11-18 | End: 2024-11-18

## 2024-11-18 RX ADMIN — TILMANOCEPT 526 MICROCURIE: KIT at 12:11

## 2024-11-18 RX ADMIN — HYDROMORPHONE HYDROCHLORIDE 0.4 MG: 2 INJECTION INTRAMUSCULAR; INTRAVENOUS; SUBCUTANEOUS at 05:11

## 2024-11-18 RX ADMIN — GLYCOPYRROLATE 0.2 MG: 0.2 INJECTION, SOLUTION INTRAMUSCULAR; INTRAVITREAL at 03:11

## 2024-11-18 RX ADMIN — ROCURONIUM BROMIDE 30 MG: 10 INJECTION INTRAVENOUS at 12:11

## 2024-11-18 RX ADMIN — ONDANSETRON 8 MG: 8 TABLET, ORALLY DISINTEGRATING ORAL at 07:11

## 2024-11-18 RX ADMIN — ROCURONIUM BROMIDE 20 MG: 10 INJECTION INTRAVENOUS at 03:11

## 2024-11-18 RX ADMIN — SUGAMMADEX 200 MG: 100 INJECTION, SOLUTION INTRAVENOUS at 04:11

## 2024-11-18 RX ADMIN — LIDOCAINE HYDROCHLORIDE 60 MG: 20 INJECTION, SOLUTION INTRAVENOUS at 12:11

## 2024-11-18 RX ADMIN — SODIUM CHLORIDE, SODIUM LACTATE, POTASSIUM CHLORIDE, AND CALCIUM CHLORIDE: 600; 310; 30; 20 INJECTION, SOLUTION INTRAVENOUS at 02:11

## 2024-11-18 RX ADMIN — INDOCYANINE GREEN AND WATER 10 MG: KIT at 03:11

## 2024-11-18 RX ADMIN — DEXAMETHASONE SODIUM PHOSPHATE 8 MG: 4 INJECTION, SOLUTION INTRAMUSCULAR; INTRAVENOUS at 12:11

## 2024-11-18 RX ADMIN — ALBUMIN (HUMAN) 250 ML: 12.5 SOLUTION INTRAVENOUS at 03:11

## 2024-11-18 RX ADMIN — ROCURONIUM BROMIDE 20 MG: 10 INJECTION INTRAVENOUS at 04:11

## 2024-11-18 RX ADMIN — PROPOFOL 50 MCG/KG/MIN: 10 INJECTION, EMULSION INTRAVENOUS at 12:11

## 2024-11-18 RX ADMIN — SODIUM CHLORIDE, SODIUM LACTATE, POTASSIUM CHLORIDE, AND CALCIUM CHLORIDE: 600; 310; 30; 20 INJECTION, SOLUTION INTRAVENOUS at 12:11

## 2024-11-18 RX ADMIN — ALBUMIN (HUMAN) 250 ML: 12.5 SOLUTION INTRAVENOUS at 02:11

## 2024-11-18 RX ADMIN — INDOCYANINE GREEN AND WATER 10 MG: KIT at 04:11

## 2024-11-18 RX ADMIN — FENTANYL CITRATE 50 MCG: 50 INJECTION, SOLUTION INTRAMUSCULAR; INTRAVENOUS at 03:11

## 2024-11-18 RX ADMIN — FENTANYL CITRATE 100 MCG: 50 INJECTION, SOLUTION INTRAMUSCULAR; INTRAVENOUS at 12:11

## 2024-11-18 RX ADMIN — ONDANSETRON HYDROCHLORIDE 4 MG: 2 INJECTION INTRAMUSCULAR; INTRAVENOUS at 04:11

## 2024-11-18 RX ADMIN — FENTANYL CITRATE 25 MCG: 50 INJECTION, SOLUTION INTRAMUSCULAR; INTRAVENOUS at 01:11

## 2024-11-18 RX ADMIN — Medication 25 MG: at 01:11

## 2024-11-18 RX ADMIN — PROPOFOL 150 MG: 10 INJECTION, EMULSION INTRAVENOUS at 12:11

## 2024-11-18 RX ADMIN — MIDAZOLAM HYDROCHLORIDE 2 MG: 1 INJECTION, SOLUTION INTRAMUSCULAR; INTRAVENOUS at 12:11

## 2024-11-18 RX ADMIN — CEFAZOLIN 2 G: 2 INJECTION, POWDER, FOR SOLUTION INTRAMUSCULAR; INTRAVENOUS at 12:11

## 2024-11-18 NOTE — ANESTHESIA PROCEDURE NOTES
Intubation    Date/Time: 11/18/2024 12:52 PM    Performed by: Sydney Beach CRNA  Authorized by: Jon Moreno MD    Intubation:     Induction:  Intravenous    Intubated:  Postinduction    Mask Ventilation:  Easy mask    Attempts:  1    Attempted By:  CRNA    Method of Intubation:  Video laryngoscopy    Blade:  Villaseñor 3    Laryngeal View Grade: Grade I - full view of cords      Difficult Airway Encountered?: No      Complications:  None    Airway Device:  Oral endotracheal tube    Airway Device Size:  7.0    Style/Cuff Inflation:  Cuffed (inflated to minimal occlusive pressure)    Tube secured:  21    Secured at:  The lips    Placement Verified By:  Capnometry    Complicating Factors:  None    Findings Post-Intubation:  BS equal bilateral and atraumatic/condition of teeth unchanged

## 2024-11-18 NOTE — OP NOTE
[facility] - Surgery ([place])  Plastic Surgery  Operative Note    SUMMARY     Date of Procedure: 11/18/2024     Location:  [facility]    Procedure(s): Procedure(s) (LRB):  MASTECTOMY, BILATERAL (Bilateral)  BIOPSY, LYMPH NODE, SENTINEL (Right)  INJECTION, FOR SENTINEL NODE IDENTIFICATION (Right)  LYMPHADENECTOMY, AXILLARY / POSSIBLE RIGHT (Right)  INSERTION, TISSUE EXPANDER, BREAST (Bilateral)  INJECTION, AGENT, INTRAVENOUS, FOR ASSESSMENT OF BLOOD FLOW IN FLAP OR GRAFT (Bilateral)     Immediate insertion of bilateral breast tissue expanders   Bilateral use of ADM anterior wrap for soft tissue and implant support  Creation and insertion of antibiotic drug-eluting discs  Bilateral intercostal rib blocks with Exparel for postoperative pain control    Surgeons and Role:  Panel 1:     * Kassi Mahmood MD - Primary  Panel 2:     * Amandeep Noel DO - Primary     * Ramon Bey MD - Resident - Assisting    Assistant: ROBBIN Ortiz and Ramon Bey MD, Fellow    Pre-Operative Diagnosis: Malignant neoplasm of upper-inner quadrant of right female breast [C50.211]    Post-Operative Diagnosis: same    Anesthesia: General    Indications for Procedure:  This is a 53-year-old woman he was found to have a right breast cancer.  She had small breasts.  Any lumpectomy would have been deforming to her.  She elected to undergo bilateral mastectomy, nipple sparing, with implant based breast reconstruction.  Because she was like her breasts little bit larger and she was so small, we elected to place a tissue expander    Operative Findings (including complications, if any):   Right mastectomy weighed 144 g  Left mastectomy weighed 96 g  Bilateral 300 cc Artoura smooth high-profile tissue expander  Bilateral FlexHD 17 x 20 pre pliable ADM for anterior wrap  Methylmethacrylate disc with vancomycin and tobramycin  Bilateral 15 Emirati Blcak drains brought out the anterior axillary    Description of Procedures:  The  patient was seen in the preoperative holding area and her breasts were marked.  Surgical and photographic consents were signed at her preoperative visit.  Incisions were discussed preoperatively with Kassi Mahmood MD and we  decided upon IMF nipple sparing.  The patient was taken to the operating room.  General anesthesia was induced.  The breasts and axilla were prepped and draped the usual sterile fashion.  Please see Kassi Mahmood MD operative note for their portion of the procedure.    When I entered the operating room the bilateral nipple sparing mastectomy and right sentinel lymph node biopsy was complete.  Next the skin was re-prepped with chlorhexidine and new drapes were laid down.  The bilateral breast pockets were checked for hemostasis.  By the base width was measured and 11 cm was thought to be an appropriate sized implant.    To 300 cc smooth Artoura high-profile tissue expanders and 2 pieces of 17 x 20 FlexHD pre pliable was opened on the back table and allowed to soak antibiotic solution.    Additionally the methylmethacrylate with tobramycin was mixed with 3 g of vancomycin powder.  They were mixed and then the activating solution was used.  It was mixed with the mixing bowl.  Two discs were then formed on the back table.  Both roughly 5 by 0.2 cm in thickness.  They were allowed to cure on the back table     20cc of exparel was diluted with 20cc 0.25% marcaine.  Intercostal rib blocks were performed as well as injection of the VICTORIANO drain sites and the PEC1 plane under the pec major muscle.  I elected to temporarily place the expanders in the pocket and performed the ICG angiography now due to the small mastectomy and thin flaps.  4 cc of reconstituted ICG was injected through the IV.  ICG angiography was then performed using the spy phi system.  It was immediate filling of both mastectomy flaps.  By 90 seconds both nipples were well-perfused.  The entirety both flaps were well-perfused the for a tiny  sliver that I think it was due to the staples but it was marked to be excised along the IMF on the left side.  The tissue expanders at this time were mostly deflated    Next the ADM was sewn to the chest wall.  It was oriented on the back table with the cardinal directions.  It was laid over 1 of the 300 cc expanders in the excess ADM was trimmed.  The edges were then rolled under.  It was placed into the pocket on the right than the left.  Running 2-0 PDS were used from the 12:00 to 4:00 and 12:00 p.m. to the 8:00 a.m. positions.  It was done on both sides    The mastectomy pocket(s) were irrigated with dilute betadine solution followed by vashe.  15 Upper sorbian saul drain(s) were placed and brought out through the anterior axillary line. The drains were sewn in using a 3-0 nylon suture.      Next the expanders and antibiotic discs were placed underneath the ADM and were sewn in to place on the chest wall using 2-0 PDS sutures with the knots buried under the tabs.  The 6:00 a.m. and immediately medial tabs were used    The mastectomy utilized a IMF pattern skin incision. The incision was temporaily stapled close.  No air was added to the expander.    ICG angiography was performed using the spy phi machine. 4cc of reconstituted ICG was injected by anesthesia.  In the same fashion repeated ICG angiography with the expander and ADM in place as this took some additional volume.  I removed a small amount of air additionally more from each expander.  I checked before the ICG was in the skin to make sure that it was not significant venous congestion and it was not.  There again it was a quick filling of the mastectomy flaps all the way to the nipple.  Both lower poles were perfused and there was not any focal area of ischemia  Pockets were irrigated 1 final time with Vashe    Skin was closed with buried 3-0 Monocryl and running 3-0 Stratafix    The wound was dressed with Dermabond, ABD pads and a post op bra.  VICTORIANO drain sites  were dressed with Tegaderm CHG drain dressings.  Some nitro paste was placed on the nipple lower pole of each breast  Patient tolerated the procedure well and was taken to the recovery room in stable condition.      Significant Surgical Tasks Conducted by the Assistant(s), if Applicable: The indicated resident served as first assistant for this procedure.    Estimated Blood Loss (EBL): 10mL           Implants:   Implant Name Type Inv. Item Serial No.  Lot No. LRB No. Used Action   [id number]  GRAFT FLEXHD THIN SM 17.5X20CM [id number] [id number] Right 1 Implanted   [id number]  GRAFT FLEXHD THIN SM 17.5X20CM [id number] [id number] Left 1 Implanted   MENTOR ARTOURA PLUS Smooth Breast Tissue Expanders   [id number]  [id number] Right 1 Implanted   MENTOR ARTOURA PLUS Smooth Breast Tissue   [id number]  [id number] Left 1 Implanted   CEMENT BONE ANTIBIO SIMPLEX P - [id number]  CEMENT BONE ANTIBIO SIMPLEX P  Omniox. [id number] Bilateral 1 Implanted       Specimens:   Specimen (24h ago, onward)       Start     Ordered    11/18/24 1611  Specimen to Pathology, Surgery Breast  Once        Comments: Pre-op Diagnosis: Malignant neoplasm of upper-inner quadrant of right female breast [C50.211]Procedure(s):MASTECTOMY, BILATERALBIOPSY, LYMPH NODE, SENTINELINJECTION, FOR SENTINEL NODE IDENTIFICATIONLYMPHADENECTOMY, AXILLARY / POSSIBLE RIGHTINSERTION, TISSUE EXPANDER, BREASTINJECTION, AGENT, INTRAVENOUS, FOR ASSESSMENT OF BLOOD FLOW IN FLAP OR GRAFT Number of specimens: 3Name of specimens: 1.Right breast mastectomy short stitch superior long stitch lateral loop subareolar2.Right axillary sentinel lymph  node hot  88361. Left breast mastectomy short stitch superior long stitch lateral loop subareolar     References:    Click here for ordering Quick Tip   Question Answer Comment   Procedure Type: Breast    Specimen Class: Known or suspected malignancy    Release to patient Immediate         11/18/24 9250                            Condition: Good    Disposition: PACU - hemodynamically stable., DC home, follow up in one week    Attestation: I was present and scrubbed for the entire procedure.

## 2024-11-18 NOTE — BRIEF OP NOTE
Certification of Assistant at Surgery       Surgery Date: 11/18/2024     Participating Surgeons:  Surgeons and Role:  Panel 1:     * Kassi Mahmood MD - Primary  Panel 2:     * Amandeep Noel DO - Primary    Procedures:  Procedure(s) (LRB):  MASTECTOMY, BILATERAL (Bilateral)  BIOPSY, LYMPH NODE, SENTINEL (Right)  INJECTION, FOR SENTINEL NODE IDENTIFICATION (Right)    INSERTION, TISSUE EXPANDER, BREAST (Bilateral)  INJECTION, AGENT, INTRAVENOUS, FOR ASSESSMENT OF BLOOD FLOW IN FLAP OR GRAFT (Bilateral)    Assistant Surgeon's Certification of Necessity:  I understand that section 1842 (b) (6) (d) of the Social Security Act generally prohibits Medicare Part B reasonable charge payment for the services of assistants at surgery in teaching hospitals when qualified residents are available to furnish such services. I certify that the services for which payment is claimed were medically necessary, and that no qualified resident was available to perform the services. I further understand that these services are subject to post-payment review by the Medicare carrier.      Melva Matute PA-C    11/18/2024  3:13 PM

## 2024-11-18 NOTE — BRIEF OP NOTE
Brief Operative Note     SUMMARY     Surgery Date: 11/18/2024     Surgeons and Role:  Panel 1:     * Kassi Mahmood MD - Primary  Panel 2:     * Amandeep Noel DO - Primary     * Ramon Bey MD - Resident - Assisting        Pre-op Diagnosis:  Malignant neoplasm of upper-inner quadrant of right female breast [C50.211]    Post-op Diagnosis:  Malignant neoplasm of upper-inner quadrant of right female breast [C50.211]    Procedure(s) (LRB):  MASTECTOMY, BILATERAL (Bilateral)  BIOPSY, LYMPH NODE, SENTINEL (Right)  INJECTION, FOR SENTINEL NODE IDENTIFICATION (Right)  LYMPHADENECTOMY, AXILLARY / POSSIBLE RIGHT (Right)  INSERTION, TISSUE EXPANDER, BREAST (Bilateral)  INJECTION, AGENT, INTRAVENOUS, FOR ASSESSMENT OF BLOOD FLOW IN FLAP OR GRAFT (Bilateral)    Anesthesia: General    Description of Procedure:   Bilateral Tissue expander placement, intercostal nerve block    Findings/Key Components:  See op note    Estimated Blood Loss: less than 50 mL         Specimens Removed:   Specimen (24h ago, onward)       Start     Ordered    11/18/24 1611  Specimen to Pathology, Surgery Breast  Once        Comments: Pre-op Diagnosis: Malignant neoplasm of upper-inner quadrant of right female breast [C50.211]Procedure(s):MASTECTOMY, BILATERALBIOPSY, LYMPH NODE, SENTINELINJECTION, FOR SENTINEL NODE IDENTIFICATIONLYMPHADENECTOMY, AXILLARY / POSSIBLE RIGHTINSERTION, TISSUE EXPANDER, BREASTINJECTION, AGENT, INTRAVENOUS, FOR ASSESSMENT OF BLOOD FLOW IN FLAP OR GRAFT Number of specimens: 3Name of specimens: 1.Right breast mastectomy short stitch superior long stitch lateral loop subareolar2.Right axillary sentinel lymph  node hot  01035. Left breast mastectomy short stitch superior long stitch lateral loop subareolar     References:    Click here for ordering Quick Tip   Question Answer Comment   Procedure Type: Breast    Specimen Class: Known or suspected malignancy    Release to patient Immediate        11/18/24 1611                     Discharge Note      SUMMARY     Admit Date: 11/18/2024    Attending Physician: Kassi Mahmood MD     Discharge Physician: Kassi Mahmood MD    Discharge Date: 11/18/2024     Final Diagnosis: Malignant neoplasm of upper-inner quadrant of right female breast [C50.211]    Hospital Course: Patient was admitted for an outpatient procedure and tolerated the procedure well with no complications.    Disposition: Home or Self Care    Follow Up/Patient Instructions:   Current Discharge Medication List        START taking these medications    Details   acetaminophen (TYLENOL) 500 MG tablet Take 1 tablet (500 mg total) by mouth every 6 (six) hours.  Qty: 30 tablet, Refills: 1      cephALEXin (KEFLEX) 500 MG capsule Take 1 capsule (500 mg total) by mouth every 6 (six) hours. for 14 days  Qty: 56 capsule, Refills: 0      gabapentin (NEURONTIN) 300 MG capsule Take 1 capsule (300 mg total) by mouth 3 (three) times daily. for 7 days  Qty: 21 capsule, Refills: 0      ibuprofen (ADVIL,MOTRIN) 600 MG tablet Take 1 tablet (600 mg total) by mouth 3 (three) times daily.  Qty: 30 tablet, Refills: 1      methocarbamoL (ROBAXIN) 500 MG Tab Take 1 tablet (500 mg total) by mouth 4 (four) times daily. for 14 days  Qty: 56 tablet, Refills: 0      traMADoL (ULTRAM) 50 mg tablet Take 1 tablet (50 mg total) by mouth every 6 (six) hours as needed for Pain.  Qty: 15 tablet, Refills: 0    Comments: Quantity prescribed more than 7 day supply? 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      b complex vitamins capsule Take 1 capsule by mouth every evening.      collagen, bovine, 100 % Powd Apply topically.      INV TESTOSTERONE/ANASTROZOL OR PLACEBO PELLETS Inject 1 Pellet into the skin. FOR INVESTIGATIONAL  USE ONLY      Lactobacillus rhamnosus GG (CULTURELLE) 10 billion cell capsule Take 1 capsule by mouth every evening.      magnesium 30 mg Tab Take by mouth every evening.      omega-3 fatty acids/fish oil (FISH OIL-OMEGA-3 FATTY ACIDS) 300-1,000 mg capsule Take 1 capsule by mouth once daily.      vitamin D (VITAMIN D3) 1000 units Tab Take 1,000 Units by mouth every evening.      valACYclovir (VALTREX) 1000 MG tablet Take 0.5 tablets (500 mg total) by mouth every 12 (twelve) hours.  Qty: 14 tablet, Refills: 5             Discharge Procedure Orders (must include Diet, Follow-up, Activity)  No discharge procedures on file.

## 2024-11-18 NOTE — TRANSFER OF CARE
"Anesthesia Transfer of Care Note    Patient: Louise Cueto    Procedure(s) Performed: Procedure(s) (LRB):  MASTECTOMY, BILATERAL (Bilateral)  BIOPSY, LYMPH NODE, SENTINEL (Right)  INJECTION, FOR SENTINEL NODE IDENTIFICATION (Right)  LYMPHADENECTOMY, AXILLARY / POSSIBLE RIGHT (Right)  INSERTION, TISSUE EXPANDER, BREAST (Bilateral)  INJECTION, AGENT, INTRAVENOUS, FOR ASSESSMENT OF BLOOD FLOW IN FLAP OR GRAFT (Bilateral)    Patient location: PACU    Anesthesia Type: general    Transport from OR: Transported from OR on 6-10 L/min O2 by face mask with adequate spontaneous ventilation    Post pain: adequate analgesia    Post assessment: tolerated procedure well and no apparent anesthetic complications    Post vital signs: stable    Level of consciousness: awake, alert and oriented    Nausea/Vomiting: no nausea/vomiting    Complications: none    Transfer of care protocol was followed      Last vitals: Visit Vitals  BP (!) 105/54 (BP Location: Left arm, Patient Position: Lying)   Pulse 84   Temp 36.3 °C (97.4 °F) (Temporal)   Resp 18   Ht 5' 5" (1.651 m)   Wt 56.2 kg (124 lb)   SpO2 97%   Breastfeeding No   BMI 20.63 kg/m²     "

## 2024-11-18 NOTE — ANESTHESIA POSTPROCEDURE EVALUATION
Anesthesia Post Evaluation    Patient: Louise Cueto    Procedure(s) Performed: Procedure(s) (LRB):  MASTECTOMY, BILATERAL (Bilateral)  BIOPSY, LYMPH NODE, SENTINEL (Right)  INJECTION, FOR SENTINEL NODE IDENTIFICATION (Right)  LYMPHADENECTOMY, AXILLARY / POSSIBLE RIGHT (Right)  INSERTION, TISSUE EXPANDER, BREAST (Bilateral)  INJECTION, AGENT, INTRAVENOUS, FOR ASSESSMENT OF BLOOD FLOW IN FLAP OR GRAFT (Bilateral)    Final Anesthesia Type: general      Patient location during evaluation: PACU  Patient participation: Yes- Able to Participate  Level of consciousness: awake and alert  Post-procedure vital signs: reviewed and stable  Pain management: adequate  Airway patency: patent    PONV status at discharge: No PONV  Anesthetic complications: no      Cardiovascular status: blood pressure returned to baseline  Respiratory status: unassisted  Hydration status: euvolemic  Follow-up not needed.              Vitals Value Taken Time   /56 11/18/24 1731   Temp 36.3 °C (97.4 °F) 11/18/24 1654   Pulse 75 11/18/24 1745   Resp 18 11/18/24 1730   SpO2 96 % 11/18/24 1745   Vitals shown include unfiled device data.      No case tracking events are documented in the log.      Pain/Ozzy Score: Pain Rating Prior to Med Admin: 7 (11/18/2024  5:17 PM)  Pain Rating Post Med Admin: 5 (11/18/2024  5:25 PM)  Ozzy Score: 9 (11/18/2024  5:25 PM)

## 2024-11-18 NOTE — OP NOTE
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 Noel  INSERTION, TISSUE EXPANDER, BREAST  INJECTION, AGENT, INTRAVENOUS, FOR ASSESSMENT OF BLOOD FLOW IN FLAP OR GRAFT     SURGEON: Surgeons and Role:  Panel 1:     * Kassi Mahmood MD - Primary  Resident: Dorcas Hiceky  Assist: Melva Matute    Panel 2:     * Amandeep Noel, DO - Primary     * Ramon Bey MD - Resident - Assisting    ANESTHESIA: General     OPERATIVE FINDINGS: Healthy appearing skin flaps and nipples at the completion of the mastectomies.  [place] node biopsy without clinical suspicion.    INDICATION FOR PROCEDURE: This patient presents with a history of Invasive ductal carcinoma of the right breast    PROCEDURE IN DETAIL:  Louise Cueto is a 53 y.o. female brought to the operating room for definitive surgery of invasive ductal carcinoma of the right breast.  The patient has elected to undergo bilateral nipple sparing mastectomy with sentinel lymph node biopsy for jacob assessment. The patient was informed of the possible risks and complications of the procedure, including but not limited to anesthetic risks, bleeding, infection, and need for additional surgery.  The patient concurred with the proposed plan, and has given informed consent.  The site of surgery was properly noted/marked in the preoperative holding area.    The patient was then brought to the operating room and placed in the supine position with both upper extremities extended.  general anesthesia was administered. Perioperative antibiotics were administered consisting of Ancef and a time out was performed confirming the patient, site, and  procedure.  The patient's right breast was injected with technetium to facilitate sentinel lymph node identification. The bilateral chest and axilla was then prepped and draped in the usual sterile fashion.    We first turned our attention to the left prophylactic breast where an inframammary incision was made, sparing the nipple areolar complex.  The incision was made with a plasmablade and deepened through the subcutaneous tissues with plasmablade.  Skin flaps were raised to the clavicle superiorly, to the lateral border of the sternum medially, to the inframammary fold inferiorly, and to the anterior border of the latissimus dorsi muscle laterally. Lighted retractors were used to assist in the creation of the skin flaps. The tissue beneath the nipple areolar complex was sharply dissected being careful to dissect to the dermis.  We were careful to ensure that the ductal tissue beneath the nipple was removed.  The breast tissue was then excised off the chest wall using plasmablade and taking care to incorporate the pectoralis fascia while leaving the serratus fascia behind.  The resulting mastectomy specimen was marked using a short stitch superiorly and a long stitch laterally, looped stitch subareolar.  The breast was sent to pathology for permanent evaluation.   The operative field was irrigated with normal saline and all bleeding points were secured with plasmablade.   The incision will be closed by the plastic surgery service.        We then turned our attention back to the right breast where an inframammary incision was fashioned sparing the nipple areolar complex.  The incision was made with a plasmablade and extended through the subcutaneous tissues with plasmablade.  Skin flaps were raised to the clavicle superiorly.  We then proceeded to raise the remainder of the flaps to the lateral border of the sternum medially, to the inframammary fold inferiorly, and to the anterior border of the latissimus dorsi  muscle laterally.  Lighted retractors were used to assist in the creation of the skin flaps. The tissue beneath the nipple areolar complex was sharply dissected being careful to dissect to the dermis.  We were careful to ensure that the ductal tissue beneath the nipple was removed. The breast tissue was excised off the chest wall taking care to incorporate the pectoralis fascia while leaving the serratus fascia behind.  The resulting mastectomy specimen was marked using a short stitch superiorly and long stitch laterally, looped stitch subareolar.  The breast was sent to pathology for permanent evaluation.      We then turned our attention to the right axilla.    The gamma probe was used to identify an area of increased radioactivity within the lower axilla.  A small incision was made in a transverse inferior fashion just beneath the hairline in the axilla. The clavipectoral sheath was sharply incised to reveal the level I axillary lymph nodes. The probe was used to identify a single node with increased radioactivity.  This node was brought into the operative field and carefully dissected free of the surrounding lymphovascular structures.  The highest ex vivo count of the node was 8200.  The node was then sent to pathology for  evaluation, labeled as sentinel node #1.  A total of 1 axillary sentinel nodes and 0 axillary non-sentinel nodes were identified, excised and submitted to pathology.  Bed counts were obtained to confirm that the 10% rule had not been violated.   The wound was irrigated with normal saline, and all bleeding points were secured with cautery.  The incision was closed with an interrupted 3-0 vicryl deep dermal stitch followed by a running 4-0 vicryl subcuticular.       Therefore, the operative field was irrigated with normal saline and all bleeding points were secured with Bovie electrocautery.  The incision will be closed by the plastic surgery service.      At the end of my portion of the  operation, all sponge, instrument, and needle counts x 2 were correct.    ESTIMATED BLOOD LOSS: less than 50 mL    COMPLICATIONS: none    DISPOSITION: intraop transfer to the plastic surgery service    ATTESTATION:   I was present and scrubbed for the entire procedure.    [place] Node Biopsy for Breast Cancer  Operation performed with curative intent Yes   Tracer(s) used to identify sentinel nodes in the upfront surgery (non-neoadjuvant) setting Radioactive tracer   Tracer(s) used to identify sentinel nodes in the neoadjuvant setting N/A   All nodes (colored or non-colored) present at the end of a dye-filled lymphatic channel were removed N/A   All significantly radioactive nodes were removed Yes   All palpably suspicious nodes were removed N/A   Biopsy-proven positive nodes marked with clips prior to chemotherapy were identified and removed N/A

## 2024-11-18 NOTE — DISCHARGE INSTRUCTIONS
Anesthesia: After Your Surgery  Youve just had surgery. During surgery, you received medication called anesthesia to keep you comfortable and pain-free. After surgery, you may experience some pain or nausea. This is common. Here are some tips for feeling better and recovering after surgery.    Going home  Your doctor or nurse will show you how to take care of yourself when you go home. He or she will also answer your questions. Have an adult family member or friend drive you home. For the first 24 hours after your surgery:  Do not drive or use heavy equipment.  Do not make important decisions or sign legal documents.  Avoid alcohol.  Have someone stay with you, if needed. He or she can watch for problems and help keep you safe.  Take your time getting up from a seated or lying position. You may experience dizziness for 24 hours  Be sure to keep all follow-up appointments with your doctor. And rest after your procedure for as long as your doctor tells you to.    Coping with pain  If you have pain after surgery, pain medication will help you feel better. Take it as directed, before pain becomes severe. Also, ask your doctor or pharmacist about other ways to control pain, such as with heat, ice, and relaxation. And follow any other instructions your surgeon or nurse gives you.    URINARY RETENTION  Should you experience a decrease in your urine output or are unable to urinate following surgery, this can be due to the medications given during surgery.  We recommend you going to the nearest Emergency Department.    Tips for taking pain medication  To get the best relief possible, remember these points:  Pain medications can upset your stomach. Taking them with a little food may help.  Most pain relievers taken by mouth need at least 20 to 30 minutes to take effect.  Taking medication on a schedule can help you remember to take it. Try to time your medication so that you can take it before beginning an activity, such  as dressing, walking, or sitting down for dinner.  Constipation is a common side effect of pain medications. Contact your doctor before taking any medications like laxatives or stool softeners to help relieve constipation. Also ask about any dietary restrictions, because drinking lots of fluids and eating foods like fruits and vegetables that are high in fiber can also help. Remember, dont take laxatives unless your surgeon has prescribed them.  Mixing alcohol and pain medication can cause dizziness and slow your breathing. It can even be fatal. Dont drink alcohol while taking pain medication.  Pain medication can slow your reflexes. Dont drive or operate machinery while taking pain medication.  If your health care provider tells you to take acetaminophen to help relieve your pain, ask him or her how much you are supposed to take each day. (Acetaminophen is the generic name for Tylenol and other brand-name pain relievers.) Acetaminophen or other pain relievers may interact with your prescription medicines or other over-the-counter (OTC) drugs. Some prescription medications contain acetaminophen along with other active ingredients. Using both prescription and OTC acetaminophen for pain can cause you to overdose. The FDA recommends that you read the labels on your OTC medications carefully. This will help you to clearly understand the list of active ingredients, dosing instructions, and any warnings. It may also help you avoid taking too much acetaminophen. If you have questions or don't understand the information, ask your pharmacist or health care provider to explain it to you before you take the OTC medication.    Managing nausea  Some people have an upset stomach after surgery. This is often due to anesthesia, pain, pain medications, or the stress of surgery. The following tips will help you manage nausea and get good nutrition as you recover. If you were on a special diet before surgery, ask your doctor if you  should follow it during recovery. These tips may help:  Dont push yourself to eat. Your body will tell you when to eat and how much.  Start off with clear liquids and soup. They are easier to digest.  Progress to semi-solid foods (mashed potatoes, applesauce, and gelatin) as you feel ready.  Slowly move to solid foods. Dont eat fatty, rich, or spicy foods at first.  Dont force yourself to have three large meals a day. Instead, eat smaller amounts more often.  Take pain medications with a small amount of solid food, such as crackers or toast to avoid nausea.      Call your surgeon if    You feel too sleepy, dizzy, or groggy (medication may be too strong).  You have side effects like nausea, vomiting, or skin changes (rash, itching, or hives).   © 7991-7140 [employer]. [address] [place] [address]. All rights reserved. This information is not intended as a substitute for professional medical care. Always follow your healthcare professional's instructions.                  Plastic Surgery Discharge Instructions  Deniz Noel, DO    Wound Care  1. Shower daily beginning 48 hours after surgery  2. Okay to let warm soapy water run over the wound at that time  3. Do not submerge wounds in the bath  4. Leave any skin glue or mesh tape in place    Drain Care  If you have drains, strip tubing and record output when drain bulb becomes half full, or at least twice daily  Record output for each drain and bring to our follow up appointment    Diet  Regular Diet    Activity  Light activity only - no lifting greater than 10 lbs  Avoid strenuous activity that would cause you to get hot or sweaty    Medications  You have been given a prescription for antibiotics, narcotic pain medication, anti-inflammatory pain, muscle relaxer, and nerve pain  Unless otherwise contraindicated by your doctor, take 2 extra strength Tylenol and 600mg of ibuprofen every 8 hours  Please take medications as prescribed      What to call for:  1. Sustained fever > 101.0  2. Changes in color, sensation, temperature or pain at surgical site  3. Redness and/or drainage from the surgical site  4. Any reaction to prescribed medications  5. Continuous bloody drainage from surgical drains  6. Wound vac malfunction  7. Questions related to the procedure    Follow-up  1. Please call [phone] to schedule or confirm your follow up visit at the [facility] clinic on [facility]  2. Call with any questions or concerns.  2. After hours call [phone] - ask to speak with the Plastic Surgery fellow on call

## 2024-11-18 NOTE — BRIEF OP NOTE
[facility] ([place])  Brief Operative Note    Surgery Date: 11/18/2024     Surgeons and Role:  Panel 1:     * Kassi Mahmood MD - Primary  Panel 2:     * Amandeep Noel DO - Primary    Assisting Surgeon: None    Pre-op Diagnosis:  Malignant neoplasm of upper-inner quadrant of right female breast [C50.211]    Post-op Diagnosis:  Post-Op Diagnosis Codes:     * Malignant neoplasm of upper-inner quadrant of right female breast [C50.211]    Procedure(s) (LRB):  MASTECTOMY, BILATERAL (Bilateral)  BIOPSY, LYMPH NODE, DEEP SENTINEL (Right)  INJECTION, FOR SENTINEL NODE IDENTIFICATION (Right)  Surgeon interpretation of specimen radiograph    TO be dictated separately by Dr. Noel:    INSERTION, TISSUE EXPANDER, BREAST (Bilateral)  INJECTION, AGENT, INTRAVENOUS, FOR ASSESSMENT OF BLOOD FLOW IN FLAP OR GRAFT (Bilateral)    Anesthesia: General    Operative Findings: Bilateral nipple-sparing mastectomies. Reflector visualized in R breast specimen. [place] axillary lymph node biopsy x1, hot.     Estimated Blood Loss: 50 mL         Specimens:     L mastectomy  R mastectomy  R sentinel lymph node, hot  Specimen (24h ago, onward)      None

## 2024-11-19 NOTE — PLAN OF CARE
Louise Cueto has met all discharge criteria from Phase II. Vital Signs are stable, ambulating  without difficulty.Pain is now under control and tolerable for the pt. Pain score 5 at this time.  Discharge instructions given, patient verbalized understanding. Discharged from facility via wheelchair in stable condition.

## 2024-11-21 ENCOUNTER — PATIENT MESSAGE (OUTPATIENT)
Dept: PLASTIC SURGERY | Facility: CLINIC | Age: 54
End: 2024-11-21
Payer: COMMERCIAL

## 2024-11-22 DIAGNOSIS — C50.211 MALIGNANT NEOPLASM OF UPPER-INNER QUADRANT OF RIGHT BREAST IN FEMALE, ESTROGEN RECEPTOR POSITIVE: ICD-10-CM

## 2024-11-22 DIAGNOSIS — Z17.0 MALIGNANT NEOPLASM OF UPPER-INNER QUADRANT OF RIGHT BREAST IN FEMALE, ESTROGEN RECEPTOR POSITIVE: ICD-10-CM

## 2024-11-22 RX ORDER — TRAMADOL HYDROCHLORIDE 50 MG/1
50 TABLET ORAL EVERY 6 HOURS PRN
Qty: 15 TABLET | Refills: 0 | Status: SHIPPED | OUTPATIENT
Start: 2024-11-22

## 2024-11-26 ENCOUNTER — OFFICE VISIT (OUTPATIENT)
Dept: PLASTIC SURGERY | Facility: CLINIC | Age: 54
End: 2024-11-26
Payer: COMMERCIAL

## 2024-11-26 VITALS — HEART RATE: 68 BPM | DIASTOLIC BLOOD PRESSURE: 74 MMHG | SYSTOLIC BLOOD PRESSURE: 109 MMHG

## 2024-11-26 DIAGNOSIS — Z17.0 MALIGNANT NEOPLASM OF UPPER-INNER QUADRANT OF RIGHT BREAST IN FEMALE, ESTROGEN RECEPTOR POSITIVE: ICD-10-CM

## 2024-11-26 DIAGNOSIS — C50.211 MALIGNANT NEOPLASM OF UPPER-INNER QUADRANT OF RIGHT BREAST IN FEMALE, ESTROGEN RECEPTOR POSITIVE: ICD-10-CM

## 2024-11-26 PROCEDURE — 3078F DIAST BP <80 MM HG: CPT | Mod: CPTII,S$GLB,, | Performed by: SURGERY

## 2024-11-26 PROCEDURE — 3074F SYST BP LT 130 MM HG: CPT | Mod: CPTII,S$GLB,, | Performed by: SURGERY

## 2024-11-26 PROCEDURE — 99024 POSTOP FOLLOW-UP VISIT: CPT | Mod: S$GLB,,, | Performed by: SURGERY

## 2024-11-26 PROCEDURE — 99999 PR PBB SHADOW E&M-EST. PATIENT-LVL III: CPT | Mod: PBBFAC,,, | Performed by: SURGERY

## 2024-11-26 PROCEDURE — 3044F HG A1C LEVEL LT 7.0%: CPT | Mod: CPTII,S$GLB,, | Performed by: SURGERY

## 2024-11-26 RX ORDER — LIDOCAINE 50 MG/G
1 PATCH TOPICAL DAILY PRN
Qty: 30 PATCH | Refills: 0 | Status: SHIPPED | OUTPATIENT
Start: 2024-11-26 | End: 2024-12-26

## 2024-11-26 RX ORDER — TRAMADOL HYDROCHLORIDE 50 MG/1
50 TABLET ORAL EVERY 6 HOURS PRN
Qty: 20 TABLET | Refills: 0 | Status: SHIPPED | OUTPATIENT
Start: 2024-11-26

## 2024-11-27 ENCOUNTER — TELEPHONE (OUTPATIENT)
Dept: SURGERY | Facility: CLINIC | Age: 54
End: 2024-11-27
Payer: COMMERCIAL

## 2024-11-27 ENCOUNTER — TELEPHONE (OUTPATIENT)
Dept: HEMATOLOGY/ONCOLOGY | Facility: CLINIC | Age: 54
End: 2024-11-27
Payer: COMMERCIAL

## 2024-11-27 LAB
COMMENT: ABNORMAL
FINAL PATHOLOGIC DIAGNOSIS: ABNORMAL
GROSS: ABNORMAL
Lab: ABNORMAL

## 2024-11-27 NOTE — PROGRESS NOTES
Louise Cueto presents to Plastic Surgery Clinic for a follow up visit status post  bilateral nipple sparing mastectomy  with tissue expander placement on 11/18/24. She has 300cc [place] tissue expanders.     PHYSICAL EXAMINATION  Bilateral breast incision(s) well approximated with no issues.   Bilateral tissue expanders in place   Nipple(s) is/are well perfused   Drains are in place with moderate output       The fill ports on the right and left tissue expander was marked using the magnet and a pen.  The skin was then prepped using chloroprep.  Using sterile technique the right port was accessed with a 21G butterfly needle. Air was added to the expander.  The skin was checked for tension and cap refill.  The same was done on the contralateral breast    Air added          ASSESSMENT/PLAN  Louise Cueto is s/p bilateral NSM with Lis  - Reviewed process of expansion, normal intervals, and performed first air fill.  - VICTORIANO drain(s) remains in place, continue empiric antibiotics  - Follow up 1 weeks for drain evaluation and expansion      All questions were answered. The patient was advised to contact the clinic with any questions or concerns prior to their next visit.       Rosie Bradshaw PA-C  Plastic and Reconstructive Surgery

## 2024-11-29 ENCOUNTER — TELEPHONE (OUTPATIENT)
Dept: SURGERY | Facility: CLINIC | Age: 54
End: 2024-11-29
Payer: COMMERCIAL

## 2024-11-29 ENCOUNTER — DOCUMENTATION ONLY (OUTPATIENT)
Dept: SURGERY | Facility: CLINIC | Age: 54
End: 2024-11-29
Payer: COMMERCIAL

## 2024-11-29 ENCOUNTER — PATIENT MESSAGE (OUTPATIENT)
Dept: SURGERY | Facility: CLINIC | Age: 54
End: 2024-11-29
Payer: COMMERCIAL

## 2024-11-29 DIAGNOSIS — C50.211 MALIGNANT NEOPLASM OF UPPER-INNER QUADRANT OF RIGHT BREAST IN FEMALE, ESTROGEN RECEPTOR POSITIVE: Primary | ICD-10-CM

## 2024-11-29 DIAGNOSIS — Z17.0 MALIGNANT NEOPLASM OF UPPER-INNER QUADRANT OF RIGHT BREAST IN FEMALE, ESTROGEN RECEPTOR POSITIVE: Primary | ICD-10-CM

## 2024-11-29 NOTE — PROGRESS NOTES
Post-Op  [facility]  Department of Surgery    REFERRING PROVIDER: No referring provider defined for this encounter. Cyndie Jack MD  MEDICAL ONCOLOGIST:    JASEN Dowd MD  RADIATION ONCOLOGIST:   N/a    DIAGNOSIS:    This is a 53 y.o. female with a stage pT1c (sn)N0  grade 1 ER + OH + HER2 - IDC of the right breast.    TREATMENT SUMMARY:  The patient is status post bilateral total mastectomy and sentinel node biopsy on 11/18/2024.  Final pathology showed 1.7 cm IDC and 1 mm associated DCIS. Closest margin of invasive carcinoma is 1 mm from posterior margin. Closest DCIS margin is 6 mm from posterior.Two benign axillary sentinel lymph nodes, negative for metastatic carcinoma (0/2).     INTERVAL HISTORY:   Louise Cueto comes in for a post-op check.  She denies fever, chills, chest pain or shortness of breath.  Her pain was difficult to control initially but has improved.      MEDICATIONS:  Current Outpatient Medications   Medication Sig Dispense Refill    acetaminophen (TYLENOL) 500 MG tablet Take 1 tablet (500 mg total) by mouth every 6 (six) hours. 30 tablet 1    ascorbic acid, vitamin C, (VITAMIN C) 1000 MG tablet Take 1,000 mg by mouth every evening. (Patient not taking: Reported on 11/26/2024)      b complex vitamins capsule Take 1 capsule by mouth every evening. (Patient not taking: Reported on 11/26/2024)      cephALEXin (KEFLEX) 500 MG capsule Take 1 capsule (500 mg total) by mouth every 6 (six) hours. for 14 days 56 capsule 0    collagen, bovine, 100 % Powd Apply topically.      gabapentin (NEURONTIN) 300 MG capsule Take 1 capsule (300 mg total) by mouth 3 (three) times daily. for 7 days 21 capsule 0    ibuprofen (ADVIL,MOTRIN) 600 MG tablet Take 1 tablet (600 mg total) by mouth 3 (three) times daily. 30 tablet 1    INV TESTOSTERONE/ANASTROZOL OR PLACEBO PELLETS Inject 1 Pellet into the skin. FOR INVESTIGATIONAL USE ONLY      Lactobacillus rhamnosus GG (CULTURELLE) 10 billion cell capsule  Take 1 capsule by mouth every evening.      LIDOcaine (LIDODERM) 5 % Place 1 patch onto the skin daily as needed (pain). Remove & Discard patch within 12 hours or as directed by MD 30 patch 0    magnesium 30 mg Tab Take by mouth every evening. (Patient not taking: Reported on 11/26/2024)      methocarbamoL (ROBAXIN) 500 MG Tab Take 1 tablet (500 mg total) by mouth 4 (four) times daily. for 14 days (Patient not taking: Reported on 11/26/2024) 56 tablet 0    omega-3 fatty acids/fish oil (FISH OIL-OMEGA-3 FATTY ACIDS) 300-1,000 mg capsule Take 1 capsule by mouth once daily.      ondansetron (ZOFRAN) 4 MG tablet Take 2 tablets (8 mg total) by mouth every 6 (six) hours as needed for Nausea. 30 tablet 1    oxyCODONE (ROXICODONE) 5 MG immediate release tablet Take 1 tablet (5 mg total) by mouth every 6 (six) hours as needed for Pain. 10 tablet 0    traMADoL (ULTRAM) 50 mg tablet Take 1 tablet (50 mg total) by mouth every 6 (six) hours as needed for Pain. 20 tablet 0    valACYclovir (VALTREX) 1000 MG tablet Take 0.5 tablets (500 mg total) by mouth every 12 (twelve) hours. (Patient not taking: Reported on 11/26/2024) 14 tablet 5    vitamin D (VITAMIN D3) 1000 units Tab Take 1,000 Units by mouth every evening. (Patient not taking: Reported on 11/26/2024)       No current facility-administered medications for this visit.       ALLERGIES:   Review of patient's allergies indicates:   Allergen Reactions    Percocet [oxycodone-acetaminophen] Nausea And Vomiting     Can take tylenol & tramadol     Vicodin [hydrocodone-acetaminophen] Nausea And Vomiting     Pt not sure if she took percocet or Vicodin that caused severe nausea & vomiting       PHYSICAL EXAMINATION:   General:  This is a well appearing female with appropriate speech, affect and gait.     Breast:  Incision clean, dry, and intact. TE in place      IMPRESSION:   The patient has had an uneventful postoperative course.  Difficulty with pain control    PLAN:   1. return in  6 months for a follow up office visit and breast exam  2.No imaging recommended in the setting of bilateral mastectomy.   3. The patient is advised in continued exam of the breast chest wall and to report to this office sooner should she note any areas of abnormality or concern.   4.  She has been instructed to meet with med onc for discussion of adjuvant treatment recommendations. Medical oncology, Dr. Dowd on 12/19, Oncotype pending.   5. Onc psych, integrative onc

## 2024-11-29 NOTE — TELEPHONE ENCOUNTER
Called pt for PO check. She reports persistent back pain that is keeping her awake at night. She is also having what sounds like some nerve pain. I will reach out to Plastics team about medication management.   Pt is interested in Integrative Oncology for acupuncture - her original apt was canceled 2/2 surgery.   We discussed her path in detail. All questions answered.   Message sent to [facility]  to set up Med Onc and Oncotype.

## 2024-11-29 NOTE — PROGRESS NOTES
Oncotype submitted. Acupuncture referral placed. Spoke with patient and scheduled her consultation with Dr. Dowd on 12/19/24. Reviewed location of [facility]. Pt verbalized understanding.    Oncology Navigation   Intake  Date of Diagnosis: 10/29/24 (R IDC)  Cancer Type: Breast  Type of Referral: Internal  Date of Referral: 10/31/24  Initial Nurse Navigator Contact: 10/31/24  Referral to Initial Contact Timeline (days): 0  First Appointment Available: 11/05/24  Appointment Date: 11/05/24  First Available Date vs. Scheduled Date (days): 0     Treatment  Current Status: Active    Surgery: Completed  Surgical Oncologist: Dr. Kassi Mahmood  Plastic Surgeon: Dr. Amandeep Noel  Type of Surgery: Bilateral mastectomy with R SLNB and recon  Consult Date: 11/05/24  Surgery Schedule Date: 11/18/24    Medical Oncologist: Dr. Kaylie Dowd  Consult Date: 12/19/24       Procedures: Genomic testing  Biopsy Schedule Date: 10/29/24  Diagnostic Mammo Schedule Date: 10/24/24  MRI Schedule Date: 11/04/24  Genomic Testing Date Sent: 11/29/24    General Referrals: Integrative Medicine; Support Group; Physical Therapy  Physical Therapy Referral Date: 11/05/24    ER: Positive  ID: Positive  Her2: Negative       Support Systems: Spouse/significant other     Acuity      Follow Up  Follow up in about 2 weeks (around 12/13/2024) for Oncotype Results.

## 2024-12-01 ENCOUNTER — PATIENT MESSAGE (OUTPATIENT)
Dept: SURGERY | Facility: CLINIC | Age: 54
End: 2024-12-01
Payer: COMMERCIAL

## 2024-12-02 ENCOUNTER — TELEPHONE (OUTPATIENT)
Dept: HEMATOLOGY/ONCOLOGY | Facility: CLINIC | Age: 54
End: 2024-12-02
Payer: COMMERCIAL

## 2024-12-02 RX ORDER — GABAPENTIN 300 MG/1
300 CAPSULE ORAL 3 TIMES DAILY
Qty: 21 CAPSULE | Refills: 0 | Status: SHIPPED | OUTPATIENT
Start: 2024-12-02 | End: 2024-12-09 | Stop reason: SDUPTHER

## 2024-12-02 NOTE — TELEPHONE ENCOUNTER
Spoke to pt. in regards to scheduling Integrative Oncology referral placed by Kassi Mahmood  Pt approved virtual appt for 12/17/2024 @ 1pm with Tammie Rodrigues NP. MA advised pt. that this is an introductory visit whereby the provider will review pt's overall health and discuss the therapies that the dept has to offer. Location of clinic provided to pt.       SREE MCBRIDE

## 2024-12-03 ENCOUNTER — PATIENT MESSAGE (OUTPATIENT)
Dept: INTERNAL MEDICINE | Facility: CLINIC | Age: 54
End: 2024-12-03
Payer: COMMERCIAL

## 2024-12-03 ENCOUNTER — OFFICE VISIT (OUTPATIENT)
Dept: PLASTIC SURGERY | Facility: CLINIC | Age: 54
End: 2024-12-03
Payer: COMMERCIAL

## 2024-12-03 VITALS — SYSTOLIC BLOOD PRESSURE: 116 MMHG | HEART RATE: 66 BPM | DIASTOLIC BLOOD PRESSURE: 88 MMHG

## 2024-12-03 DIAGNOSIS — Z09 SURGERY FOLLOW-UP: Primary | ICD-10-CM

## 2024-12-03 PROCEDURE — 3044F HG A1C LEVEL LT 7.0%: CPT | Mod: CPTII,S$GLB,, | Performed by: SURGERY

## 2024-12-03 PROCEDURE — 3074F SYST BP LT 130 MM HG: CPT | Mod: CPTII,S$GLB,, | Performed by: SURGERY

## 2024-12-03 PROCEDURE — 99999 PR PBB SHADOW E&M-EST. PATIENT-LVL III: CPT | Mod: PBBFAC,,, | Performed by: SURGERY

## 2024-12-03 PROCEDURE — 3079F DIAST BP 80-89 MM HG: CPT | Mod: CPTII,S$GLB,, | Performed by: SURGERY

## 2024-12-03 PROCEDURE — 99024 POSTOP FOLLOW-UP VISIT: CPT | Mod: S$GLB,,, | Performed by: SURGERY

## 2024-12-04 ENCOUNTER — TELEPHONE (OUTPATIENT)
Dept: PLASTIC SURGERY | Facility: CLINIC | Age: 54
End: 2024-12-04
Payer: COMMERCIAL

## 2024-12-04 NOTE — TELEPHONE ENCOUNTER
Lft pt vm regarding upcoming appt change from 12/20/24 to 12/19/24 with hilda on Thursday morning.

## 2024-12-05 ENCOUNTER — OFFICE VISIT (OUTPATIENT)
Dept: SURGERY | Facility: CLINIC | Age: 54
End: 2024-12-05
Payer: COMMERCIAL

## 2024-12-05 VITALS
WEIGHT: 124 LBS | DIASTOLIC BLOOD PRESSURE: 74 MMHG | SYSTOLIC BLOOD PRESSURE: 107 MMHG | HEART RATE: 76 BPM | BODY MASS INDEX: 20.66 KG/M2 | HEIGHT: 65 IN

## 2024-12-05 DIAGNOSIS — Z17.0 MALIGNANT NEOPLASM OF UPPER-INNER QUADRANT OF RIGHT BREAST IN FEMALE, ESTROGEN RECEPTOR POSITIVE: Primary | ICD-10-CM

## 2024-12-05 DIAGNOSIS — C50.211 MALIGNANT NEOPLASM OF UPPER-INNER QUADRANT OF RIGHT BREAST IN FEMALE, ESTROGEN RECEPTOR POSITIVE: Primary | ICD-10-CM

## 2024-12-05 PROCEDURE — 3074F SYST BP LT 130 MM HG: CPT | Mod: CPTII,S$GLB,, | Performed by: SURGERY

## 2024-12-05 PROCEDURE — 99999 PR PBB SHADOW E&M-EST. PATIENT-LVL IV: CPT | Mod: PBBFAC,,, | Performed by: SURGERY

## 2024-12-05 PROCEDURE — 3044F HG A1C LEVEL LT 7.0%: CPT | Mod: CPTII,S$GLB,, | Performed by: SURGERY

## 2024-12-05 PROCEDURE — 99024 POSTOP FOLLOW-UP VISIT: CPT | Mod: S$GLB,,, | Performed by: SURGERY

## 2024-12-05 PROCEDURE — 3078F DIAST BP <80 MM HG: CPT | Mod: CPTII,S$GLB,, | Performed by: SURGERY

## 2024-12-09 ENCOUNTER — CLINICAL SUPPORT (OUTPATIENT)
Dept: REHABILITATION | Facility: HOSPITAL | Age: 54
End: 2024-12-09

## 2024-12-09 ENCOUNTER — TELEPHONE (OUTPATIENT)
Dept: HEMATOLOGY/ONCOLOGY | Facility: CLINIC | Age: 54
End: 2024-12-09
Payer: COMMERCIAL

## 2024-12-09 ENCOUNTER — PATIENT MESSAGE (OUTPATIENT)
Dept: PLASTIC SURGERY | Facility: CLINIC | Age: 54
End: 2024-12-09
Payer: COMMERCIAL

## 2024-12-09 ENCOUNTER — TELEPHONE (OUTPATIENT)
Dept: PSYCHIATRY | Facility: CLINIC | Age: 54
End: 2024-12-09
Payer: COMMERCIAL

## 2024-12-09 DIAGNOSIS — G89.29 CHRONIC RIGHT SHOULDER PAIN: ICD-10-CM

## 2024-12-09 DIAGNOSIS — M54.9 MID BACK PAIN ON LEFT SIDE: Primary | ICD-10-CM

## 2024-12-09 DIAGNOSIS — G62.9 NEUROPATHY: ICD-10-CM

## 2024-12-09 DIAGNOSIS — Z90.13 STATUS POST BILATERAL MASTECTOMY: Primary | ICD-10-CM

## 2024-12-09 DIAGNOSIS — M54.59 OTHER LOW BACK PAIN: ICD-10-CM

## 2024-12-09 DIAGNOSIS — M25.511 CHRONIC RIGHT SHOULDER PAIN: ICD-10-CM

## 2024-12-09 PROCEDURE — 97810 ACUP 1/> WO ESTIM 1ST 15 MIN: CPT | Performed by: ACUPUNCTURIST

## 2024-12-09 PROCEDURE — 97811 ACUP 1/> W/O ESTIM EA ADD 15: CPT | Performed by: ACUPUNCTURIST

## 2024-12-09 RX ORDER — GABAPENTIN 300 MG/1
300 CAPSULE ORAL 3 TIMES DAILY
Qty: 63 CAPSULE | Refills: 0 | Status: SHIPPED | OUTPATIENT
Start: 2024-12-09 | End: 2024-12-30

## 2024-12-09 NOTE — PROGRESS NOTES
Acupuncture Evaluation Note     Name: Louise Cueto  Clinic Number: [id number]    Traditional Chinese Medicine (TCM) Diagnosis: Qi Stagnation and Blood Stasis  Medical Diagnosis:   Encounter Diagnoses   Name Primary?    Mid back pain on left side Yes    Chronic right shoulder pain         Evaluation Date: 12/9/2024    Visit #/Visits authorized: self pay - visit 1     Precautions: Standard    Subjective     Chief Concern: back pain that is present in the mid back and up to the scapula worse on the L side. Sleeping issues due to discomfort     Medical necessity is demonstrated by the following IMPAIRMENTS: Medical Necessity: Decreased mobility limits day to day activities, social, and emergent situations and Decreased quality of life              Aggravating Factors:  lying down   Relieving Factors:  sitting up    Symptom Description:     Quality:  Aching and Shooting  Severity:  4  Frequency:  continuously    Previous Treatments Tried:   advil & tylenol daily and gabapentin     HEENT:  none    Chest:  feels trouble catching breath or take a full breathe since surgery     Digestion:     Diet: well balanced   Fluids: coffee 1x /day and tea 1x /day, is drinking moderate amounts of fluids, none  Taste/Appetite: on lower side, but eating soups and toast to help. Is going to increase protein this week   Symptoms: constipation    Sleep: trouble staying asleep, discomfort waking her up, and  snoring - gets around 3-5 hours, hasn't had a full night sleep since recon    Energy Levels:  not great     Psychological Symptoms:  health related anxiety     Other Symptoms: none    GYN Symptoms: none     Objective     Observation: n/a    Tongue:      Body:  scalloped and swollen   Color:  pink   Coating:  thick and white,    Pulse:        floating and slippery       New Findings:  none    Treatment     Treatment Principles:  move st qi and blood    Acupuncture points used:  4 GUIDO, Du20, Gb34, Ki3, Ki6, Li11, REN12, TREJO MEN,  Sp10, Sp6, Sp9, St25, St36, and YIN CRAMER    Bilateral points:  Unilateral points:  Auricular Treatment:  wang men    Needles In: 22  Needles Out: 22  Needles W/O STIM placed: 3:00 PM  Needles W/O STIM removed: 3:30 PM      Other Traditional Chinese Medicine Modalities -  heat lamp    Assessment     After treatment, patient felt relaxed. Distal points used only for now until patient is comfortable to lay on stomach to deal with back pain and stiffness. Starting PT as well soon to help with movement post surgery. Continue with care     Patient prognosis is Good.     Patient will continue to benefit from acupuncture treatment to address the deficits listed in the problem list box on initial evaluation, provide patient family education and to maximize pt's level of independence in the home and community environment.     Patient's spiritual, cultural and educational needs considered and pt agreeable to plan of care and goals.     Anticipated barriers to treatment: none    Plan     Recommend 1 /week for 5 sessions then re-assess.      Education:  Patient is aware of cumulative benefit of acupuncture

## 2024-12-09 NOTE — NURSING
Patient phoned as she requests an expedited appointment with Dr. Chen. Notes difficulty with body-image and accepting her body post bilateral mastectomy. Pending appt with psychology. RN Carlitos will follow-up. Patient notes she is experiencing neuropathic pain. Taking Gabapentin 300mg TID. Tylenol and Motrin in between. Notes interest in seeing [facility] for mind-body yoga therapy. Strong interest in healing her mind, promoting well-being and acceptance. Used [facility] for yoga at local yoga studio in the past. Desires to reduce neuropathy and improve ROM once cleared from surgery. Hopes to work on balance, conditioning and immune health. Motivated to see PT for ROM and education post-operatively to improve strength post-op when able. Worries about swelling and reduced ROM as she did not see PT pre-op. Will assist in expediting education/evaluation for patient. Patient is awaiting Oncotype results for adjuvant therapy recommendations. Desires to see Dr. Chen for follow-up thereafter. She confirms upcoming virtual visit with Tammie Rodrigues NP. LOCO Morales.

## 2024-12-11 ENCOUNTER — CLINICAL SUPPORT (OUTPATIENT)
Dept: REHABILITATION | Facility: HOSPITAL | Age: 54
End: 2024-12-11
Payer: COMMERCIAL

## 2024-12-11 DIAGNOSIS — R29.898 WEAKNESS OF BOTH UPPER EXTREMITIES: ICD-10-CM

## 2024-12-11 DIAGNOSIS — Z90.13 STATUS POST BILATERAL MASTECTOMY: ICD-10-CM

## 2024-12-11 DIAGNOSIS — M54.59 OTHER LOW BACK PAIN: ICD-10-CM

## 2024-12-11 DIAGNOSIS — R29.3 POOR POSTURE: ICD-10-CM

## 2024-12-11 DIAGNOSIS — F43.29 STRESS AND ADJUSTMENT REACTION: ICD-10-CM

## 2024-12-11 DIAGNOSIS — M25.619 DECREASED RANGE OF MOTION OF SHOULDER, UNSPECIFIED LATERALITY: Primary | ICD-10-CM

## 2024-12-11 DIAGNOSIS — R53.81 PHYSICAL DECONDITIONING: Primary | ICD-10-CM

## 2024-12-11 DIAGNOSIS — G62.9 NEUROPATHY: ICD-10-CM

## 2024-12-11 DIAGNOSIS — Z91.89 AT RISK FOR LYMPHEDEMA: ICD-10-CM

## 2024-12-11 PROCEDURE — 97165 OT EVAL LOW COMPLEX 30 MIN: CPT

## 2024-12-11 PROCEDURE — 97110 THERAPEUTIC EXERCISES: CPT

## 2024-12-11 PROCEDURE — 97162 PT EVAL MOD COMPLEX 30 MIN: CPT

## 2024-12-11 NOTE — PROGRESS NOTES
Louise Cueto presents to [facility] for a follow up visit status post bilateral mastectomy with tissue expander placement on 11/18/24. She has 300cc Artoura tissue expanders.     PHYSICAL EXAMINATION  Bilateral breast incision(s) well approximated with no issues.   Bilateral tissue expanders in place   Nipple(s) is/are well perfused   Drains are in place with low output       The fill ports on the right and left tissue expander was marked using the magnet and a pen.  The skin was then prepped using chloroprep.  Using sterile technique the right port was accessed with a 21G butterfly needle. Sterile injectable saline was added to the expander.  The skin was checked for tension and cap refill.  The same was done on the contralateral breast    Right volume added: 90 cc                 Left volume added: 90 cc  Right total volume: 90 cc                    Left total volume: 90 cc          ASSESSMENT/PLAN  Louise Cueto is s/p bilateral NSM with expanders  - Expanded today, patient tolerated well  - VICTORIANO drains removed, counseled on seroma precautions  - Follow up 2 weeks      All questions were answered. The patient was advised to contact the clinic with any questions or concerns prior to their next visit.       Rosie Bradshaw PA-C  Plastic and Reconstructive Surgery

## 2024-12-11 NOTE — PATIENT INSTRUCTIONS
prayer stretch    - Begin seated in a chair, with good posture, core tight.  Place a large physioball in between your legs.  Place hands on physioball and slowly bend forward as far as you can, allowing the ball to roll forward with you.  Hold for the desired amount of time and roll back up into starting position. Now, repeat, but instead of rolling forward, roll towards the left (about 45 degrees) so you feel a stretch in the RIGHT side of the back. Now, repeat, but instead of rolling forward, roll towards the right (about 45 degrees) so you feel a stretch in the LEFT side of the back.  Do 10 reps in each direction.     ROM: Flexion (Alternate)        Slide right arm up wall, with palm out, by leaning toward wall. Hold 2 seconds.  Repeat 10 times per set. Do 1 sets per session. Do 2 sessions per day.

## 2024-12-11 NOTE — PLAN OF CARE
[facility]  Physical Therapy Initial Evaluation    Name: Louise Cueto  Clinic Number: [id number]    Therapy Diagnosis:   Encounter Diagnoses   Name Primary?    Status post bilateral mastectomy     Other low back pain     Neuropathy     Decreased range of motion of shoulder, unspecified laterality Yes    Weakness of both upper extremities     At risk for lymphedema     Poor posture         Physician: Tammie Rodrigues FNP-C    Physician Orders: PT Eval and Treat   Medical Diagnosis from Referral:   Z90.13 (ICD-10-CM) - Status post bilateral mastectomy   M54.59 (ICD-10-CM) - Other low back pain   G62.9 (ICD-10-CM) - Neuropathy   Evaluation Date: 12/11/2024  Authorization Period Expiration: 12/31/2024  Plan of Care Expiration: 2/7/2025  Progress Note Due: 1/8/2025  Visit # / Visits authorized: 1/ 1   FOTO: 1/3    Precautions: Standard and cancer     Time In: 4:09 pm  Time Out: 4:55 pm  Total Appointment Time (timed & untimed codes): 46 minutes      History   History of Present Illness: Louise is a 54 y.o. female that presents to  [facility] at the [facility] s/p bilateral breast surgery.   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 She still can't do anything overhead, moving her arms out to the sides hurts, it is hard to reach across her body and under her arms, has difficulty reaching back to get the seatbelt, difficulty doing her usual household duties(reaching down into the washer, emptying the , sweeping, mopping), difficulty lifting and reaching, and she is unable to work in her garden. She is also not doing her usual work outs (weights 10-12 lbs, yoga, tennis, running, swimming). She is unable to paint at this time, prefers to take a tub bath but is unsure if she can get up from the bottom of the tub, and is having to sleep on back and propped up. She  is normally side sleeper.  Surgical History:  Louise Cueto  has a past surgical history that includes  section; Colonoscopy (N/A, 2017); Breast cyst excision (Left); Hysteroscopy (N/A, 2019); Intrauterine device insertion (N/A, 2019); Cystoscopy (N/A, 2019); Colonoscopy (N/A, 2022); Cholecystectomy; Eye surgery (10/24/2023); Bilateral mastectomy (Bilateral, 2024); [place] lymph node biopsy (Right, 2024); Injection for sentinel node identification (Right, 2024); Axillary node dissection (Right, 2024); Insertion of breast tissue expander (Bilateral, 2024); and injection, agent, intravenous, for assessment of blood flow in flap or graft (Bilateral, 2024).    Chemotherapy: TBD  Radiation: N/A  Endocrine therapy: TBD  Targeted/immunotherapy: TBD    Past Medical History:   Diagnosis Date    Abdominal bloating 2019    BRCA1 negative     BRCA2 negative     GERD (gastroesophageal reflux disease)     resolved with lap tono    Liver lesion 2019    Malignant neoplasm of upper-inner quadrant of right female breast           Medications:  Louise has a current medication list which includes the following prescription(s): acetaminophen, ascorbic acid (vitamin c), b complex vitamins, collagen (bovine), gabapentin, ibuprofen, INV TESTOSTERONE/ANASTROZOL OR PLACEBO PELLETS, lactobacillus rhamnosus gg, lidocaine, magnesium, fish oil-omega-3 fatty acids, ondansetron, oxycodone, tramadol, valacyclovir, and vitamin d.    Allergies:  Review of patient's allergies indicates:   Allergen Reactions    Percocet [oxycodone-acetaminophen] Nausea And Vomiting     Can take tylenol & tramadol     Vicodin [hydrocodone-acetaminophen] Nausea And Vomiting     Pt not sure if she took percocet or Vicodin that caused severe nausea & vomiting        Prior Therapy: PT previously for shoulder  Social History: lives with their family  Home Environment: two story home,  "bedroom on second floor, 6 steps to enter, no handrails  Durable Medical Equipment: none  Occupation:   Prior Level of Function: independent  Current Level of Function: Mod A with ADLs  Exercise routine prior to onset: lifting weights 2x week, yoga 2x week, tennis 2x week, walking on weekends  Hand dominance: right      Patient's Goals: To get back to where I was        Subjective   Pt states: having pain and pulling when moving her arms, had a sharp pain last night that went down the side of her neck into her R biceps  Pain: 2/10 on VAS currently.   Best: 2/10  Worst: 10/10   Pain location: bilateral axilla, B shoulders to midbiceps   Objective   Mental status: alert & oriented x 3    Postural examination/scapular alignment: Rounded shoulder    Skin Integrity:   Scar Location: B breast, R axilla  Appearance: clean, dry, intact per breast surgery note  Signs of infection: No  Drainage: none  Color:N/A    Edema: none  Location: N/A    Axillary Web Syndrome/Cording:   Location: TBA  Degree of Cording: TBA (mild, moderate etc...)   Number of cords present: TBA    Sensation: numbness noted around incisions        Range of Motion:      Shoulder Range of Motion:   Active /Passive ROM Right Left   Flexion 65 70   Abduction 40 45   Extension 30 35   IR/45deg 90 90   ER/45deg 10@45 60@45          Strength: manual muscle test grades below   Upper Extremity Strength   (R) UE (L) UE   Shoulder flexion: 2-/5 2-/5   Shoulder Abduction: 2-/5 2-/5   Shoulder IR 3/5 3/5   Shoulder ER 3+/5 4/5   Elbow flexion: 4-/5 4-/5   Elbow extension: 4/5 4/5   Wrist flexion: 5/5 5/5   Wrist extension: 5/5 5/5    23.8 51.3       Baseline Measurements of BL UE's for early detection of Lymphedema:     LANDMARK RIGHT UE LEFT UE DIFFERENCE   E + 8" 29.5 cm 29 cm 0.5 cm   E + 6" 27 cm 26.5 cm 0.5 cm   E + 4" 26 cm 24.5 cm 1.5 cm   E + 2" 22.5 cm 22 cm 0.5 cm   Elbow 22.5 cm 22.5 cm 0 cm   W+ 8" 22.5 cm 22 cm 0.5 cm   W +  6" 21.5 cm 20 cm " "1.5 cm   W + 4" 17 cm 17 cm 0 cm   Wrist 14 cm 14.5 cm 0.5 cm   DPC 19 cm 19.5 cm 0.5 cm   IP Thumb 6.0 cm 6.0 cm 0 cm     Functional Mobility (Bed mobility, transfers)  Min A     ADL's (Activities of Daily Living):  Mod A    Gait Assessment:   - Assistive device used: none  - Assistance: independent  - Distance: community distances     Patient Education Provided   - role of therapy in multi - disciplinary team, goals for therapy  - Pt was educated in lymphedema etiology and management plans.    - Pt was provided with written risk reductions and precautions for managing lymphedema.     Pt has no cultural, educational or language barriers to learning provided.  Treatment and Instruction of Home Exercise Program      Total Time Separate from Evaluation: 12 minutes    Louise received therapeutic exercises to develop ROM for 10 minutes including:   Physioball roll outs, 3 ways  Pulleys, flexion  Wall walks flexion    Louise received the following self-care/home management techniques for 2 minutes, including:  Patient was educated on desensitization techniques to apply to areas of hypersensitivity in her chest and axilla.    Written Home Exercises Provided: yes.  Exercises were reviewed and Louise was able to demonstrate them prior to the end of the session.  Louise demonstrated good  understanding of the education provided.     See EMR under Patient Instructions for exercises provided 12/11/2024.      Functional Limitations Reporting        Intake Outcome Measure for FOTO Shoulder Survey    Therapist reviewed FOTO scores for Louise Cueto on 12/11/2024.   FOTO documents entered into [employer] - see Media section.    Intake Score: 34%             Assessment   This is a 54 y.o. female referred to outpatient physical therapy and presents with a medical diagnosis of right breast cancer and was seen today post-operatively to establish PT plan of care for impairments following surgery including: decreased range of motion of both " shoulders, weakness of both upper extremities, poor posture, decreased functional mobility, and at risk for lymphedema.     Pt instructed in HEP this session and was able to perform all exercises given independently. Pt instructed to follow up with therapist if any concerns arise with program established. Pt will continue to benefit from skilled physical therapy to address the impairments stated in chart below, provide patient/family education and to maximize pt's level of independence in home and community environment     Pt prognosis is Good.    Anticipated barriers to physical therapy: none anticipated     Pt's spiritual, cultural and educational needs considered and pt agreeable to plan of care and goals as stated below:       Medical Necessity is demonstrated by the following:  History  Co-morbidities and personal factors that may impact the plan of care [] LOW: no personal factors / co-morbidities  [x] MODERATE: 1-2 personal factors / co-morbidities  [] HIGH: 3+ personal factors / co-morbidities    Moderate / High Support Documentation:   Co-morbidities affecting plan of care: history of cancer    Personal Factors:   no deficits     Examination  Body Structures and Functions, activity limitations and participation restrictions that may impact the plan of care [] LOW: addressing 1-2 elements  [] MODERATE: 3+ elements  [x] HIGH: 4+ elements (please support below)    Moderate / High Support Documentation: decreased ROM of B shoulders, weakness of B UE, poor posture, difficulty with transfers, performing usual household duties, recreational activities, and ADLs     Clinical Presentation [] LOW: stable  [x] MODERATE: Evolving  [] HIGH: Unstable     Decision Making/ Complexity Score: moderate       Goals: Pt agrees with goals set    Short Term goals: 4 weeks  1. Patient will demonstrate 100% understanding of lymphedema risk reduction practices to include self monitoring for lymphedema. (progressing, not met)  2.  Patient will demonstrate independence with Home Exercise program established. (progressing, not met)  3. Pt will increase AROM/PROM in shoulder abduction ROM to 90 degrees bilaterally to improve functional reach, carry, push, pull pain free. (progressing, not met)  4. Pt will increase AROM/PROM in shoulder flexion to 120 degrees bilaterally to improve functional reach, carry, push, pull pain free.(progressing, not met)  5. Pt will increase AROM/PROM in shoulder external rotation at 45 degrees abduction to 80 degrees bilaterally to improve functional reach, carry, push, pull pain free.(progressing, not met)    Long Term Goals: 8 weeks   1.  Pt will increase AROM/PROM in shoulder flexion to 180 degrees bilaterally to improve functional reach, carry, push, pull pain free. (progressing, not met)  2. Pt will increase strength to 4+/5 in gross UE musculature to improve tolerance to all functional activities pain free. (progressing, not met)  3. Pt will demonstrate full/maximized tissue mobility to increase ROM and promote healthy tissue to be pain free at discharge. (progressing, not met)  4. Pt will report decrease in overall worst pain to 2/10 at discharge. (progressing, not met)  5. Pt will increase AROM/PROM in shoulder abduction ROM to 150 degrees bilaterally to improve functional reach, carry, push, pull pain free. (progressing, not met)  6.  Pt will increase AROM/PROM in shoulder external rotation ROM to 90 degrees bilaterally to improve functional reach, carry, push, pull pain free.(progressing, not met)  7. Patient will report compliance with walking program 5x week for 20 - 30 min each day to improve overall cardiovascular function and decrease cancer related fatigue at discharge. (progressing, not met)      Plan   Outpatient physical therapy 2x week for 8 weeks to include the following:   Manual Therapy, Neuromuscular Re-ed, Patient Education, Self Care, Therapeutic Activities, and Therapeutic Exercise.    Plan  of care Certification Period: 12/11/2024 to 2/7/2025.    Therapist: Katie Currie, PT  12/11/2024

## 2024-12-11 NOTE — PLAN OF CARE
[facility]   Occupational Therapy Initial Evaluation - Therapeutic Yoga    Date: 12/11/2024   Name: Louise Cueot  Clinic Number: [id number]    Therapy Diagnosis:   Encounter Diagnoses   Name Primary?    Status post bilateral mastectomy     Other low back pain     Neuropathy     Physical deconditioning Yes    Stress and adjustment reaction      Physician: Tammie Rodrigues FNP-C    Physician Orders: Eval and Treat   Medical Diagnosis from Referral: Status post bilateral mastectomy [Z90.13], Other low back pain [M54.59], Neuropathy [G62.9]   Evaluation Date: 12/11/2024  Authorization Period Expiration: 12/31/24  Plan of Care Expiration: 4/11/25  Progress Note Due: 3/11/25  Visit # / Visits authorized: 1/ 1    Precautions: Standard and cancer     Medical necessity is demonstrated by the following IMPAIRMENTS: Medical Necessity: Decreased mobility limits day to day activities, social, and emergent situations and Decreased quality of life     Time In: 1:pm  Time Out: 1:45 pm  Total Appointment Time (timed & untimed codes): 45 minutes      SUBJECTIVE     Date of onset: 10/7/24    History of current condition: Louise reports: She noticed a lump in her right breast and on 10/7/24 she underwent mammogram and sentinal node biopsy.  On 11/8/24, she underwent bilateral mastectomy with tissue expanders and planned implants.     Falls: none    Prior Therapy: none  Social History:  lives with their spouse  Occupation: artist-  Prior Level of Function: Worked daily as an artist.  She was also taking care of her children and  home.  Current Level of Function: She is curently 3 weeks s/p  surgery  and  is unable to return to her work as a .  She is unable to exercise, drive or maintain her home. She reports she can prepare a simple meal with occasional assist.     Pain:  Current 3/10, worst 10/10, best 3/10   Location: bilateral breasts and back      Pt. Goals:  1. quiet my mind so I can heal  more.  2. Manage my posture issues.  3. Improve my sleep.  4. Regain my strength.  5. Increase my endurance.         Medical History:   Past Medical History:   Diagnosis Date    Abdominal bloating 2019    BRCA1 negative     BRCA2 negative     GERD (gastroesophageal reflux disease)     resolved with lap tono    Liver lesion 2019    Malignant neoplasm of upper-inner quadrant of right female breast        Surgical History:   Louise Cueto  has a past surgical history that includes  section; Colonoscopy (N/A, 2017); Breast cyst excision (Left); Hysteroscopy (N/A, 2019); Intrauterine device insertion (N/A, 2019); Cystoscopy (N/A, 2019); Colonoscopy (N/A, 2022); Cholecystectomy; Eye surgery (10/24/2023); Bilateral mastectomy (Bilateral, 2024); [place] lymph node biopsy (Right, 2024); Injection for sentinel node identification (Right, 2024); Axillary node dissection (Right, 2024); Insertion of breast tissue expander (Bilateral, 2024); and injection, agent, intravenous, for assessment of blood flow in flap or graft (Bilateral, 2024).    Medications:   Louise has a current medication list which includes the following prescription(s): acetaminophen, ascorbic acid (vitamin c), b complex vitamins, collagen (bovine), gabapentin, ibuprofen, INV TESTOSTERONE/ANASTROZOL OR PLACEBO PELLETS, lactobacillus rhamnosus gg, lidocaine, magnesium, fish oil-omega-3 fatty acids, ondansetron, oxycodone, tramadol, valacyclovir, and vitamin d.    Allergies:   Review of patient's allergies indicates:   Allergen Reactions    Percocet [oxycodone-acetaminophen] Nausea And Vomiting     Can take tylenol & tramadol     Vicodin [hydrocodone-acetaminophen] Nausea And Vomiting     Pt not sure if she took percocet or Vicodin that caused severe nausea & vomiting          OBJECTIVE           Patient Specific Functional Scale:         Activity 24       1.stress 2        2. fatigue 1       3.sleep 1       4. posture 1       5.strength 1       6.        SCORE    1.2         Total Score = Sum of activity scores / number of activities  Minimum Detectable Change (90% CII) for average score = 2 points  Minimum Detectable Change (90% CI) for single activity score = 3 points    Lifestyle Questionnaire:      Lifestyle Response   Emotional Response to Health Status good   Patient's Communication Skills good   Reported Eating Habits good   Reported Sleeping Patterns poor   Reported Energy Level poor   Knowledge of Exercises & Fitness good   Frequency of Exercise Sessions/Week <3       TREATMENT     Total Treatment time (time-based codes) separate from Evaluation: 0 minutes     PATIENT EDUCATION AND HOME EXERCISES     Education provided:   - - physiology of yoga/meditation and immune health    Written Home Exercises Provided: yes. Exercises were reviewed and Louise was able to demonstrate them prior to the end of the session.  Louise demonstrated good  understanding of the education provided. See EMR under Patient Instructions for exercises provided during therapy sessions.    ASSESSMENT     Louise is a 54 y.o. female referred to outpatient Occupational Therapy with a medical diagnosis of Status post bilateral mastectomy [Z90.13], Other low back pain [M54.59], Neuropathy [G62.9] . Patient presents with the following impairments:      Self-Care Limitations, Deconditioning, Sleep Disturbance, Upper Extremity - Decreased Range of Motion, and Upper Extremity - Decreased Strength    Following medical record review, it is determined that Louise will benefit from skilled outpatient Occupational Therapy to address the impairments stated above and in the chart below in order to maximize functional activities. The following goals were discussed with the patient and patient is in agreement with them as addressed in the treatment plan. The patient's rehab potential is Good. Plan of care discussed with  patient: Yes  Patient's spiritual, cultural and educational needs considered and patient is agreeable to the plan of care and goals as stated below:     Anticipated Barriers for therapy: none    Medical Necessity is demonstrated by the following    Profile and History Assessment of Occupational Performance Level of Clinical Decision Making Complexity Score   Occupational Profile:   Louise Cueto is a 54 y.o. female who lives with their spouse and is  self employed . Louise has difficulty with  ADLs and IADLs as listed previously, which  Affecting herdaily functional abilities.      Comorbidities:    has a past medical history of Abdominal bloating, BRCA1 negative, BRCA2 negative, GERD (gastroesophageal reflux disease), Liver lesion, and Malignant neoplasm of upper-inner quadrant of right female breast.    Medical and Therapy History Review:   Brief               Performance Deficits    Physical:  Joint Mobility  Joint Stability  Muscle Power/Strength  Muscle Endurance  Pain    Cognitive:  No Deficits    Psychosocial:    No Deficits     Clinical Decision Making:  low    Assessment Process:  Problem-Focused Assessments    Modification/Need for Assistance:  Minimal-Moderate Modifications/Assistance    Intervention Selection:  Multiple Treatment Options       low  Based on PMHX, co morbidities , data from assessments and functional level of assistance required with task and clinical presentation directly impacting function.         Goals:  Short Term Goals: 6 weeks      Goal # Goal Status   1 Patient will demonstrate independence with diaphragmatic breathing in sit and supine. Progressing   2 Pt. will identify resource for audio body scan to assist with stress management/immune health    3 Patient will identify 2 new stress coping skills for stress management/immune health. Progressing   4 Patient will identify activity pacing problems.  Patient will then implement new plan for daily activity to increase endurance for  ADL's. Progressing      Long Term Goals: 12 weeks      Goal # Goal Status   1 Patient will demonstrate independence with yoga Home Exercise Program to increase strength and endurance for ADL's. Progressing   2 Patient will demonstrate independence with relaxation techniques to manage stress for immune health. Progressing   3 Patient will verbalize good understanding of stress/immune health relationship.  Progressing   4              PLAN   Plan of care Certification: 12/11/2024 to 4/11/25.    Outpatient Occupational Therapy 1 times weekly for 12 weeks to include the following interventions: Neuromuscular Re-ed, Patient Education, Self Care, Therapeutic Activities, and Therapeutic Exercise.     Tomi Griffiths, OT      I

## 2024-12-12 PROBLEM — R29.898 WEAKNESS OF BOTH UPPER EXTREMITIES: Status: ACTIVE | Noted: 2024-12-12

## 2024-12-12 PROBLEM — M25.619 DECREASED RANGE OF MOTION OF SHOULDER: Status: ACTIVE | Noted: 2024-12-12

## 2024-12-12 PROBLEM — Z91.89 AT RISK FOR LYMPHEDEMA: Status: ACTIVE | Noted: 2024-12-12

## 2024-12-12 PROBLEM — R29.3 POOR POSTURE: Status: ACTIVE | Noted: 2024-12-12

## 2024-12-16 ENCOUNTER — TELEPHONE (OUTPATIENT)
Dept: HEMATOLOGY/ONCOLOGY | Facility: CLINIC | Age: 54
End: 2024-12-16
Payer: COMMERCIAL

## 2024-12-16 NOTE — TELEPHONE ENCOUNTER
Spoke to pt to remind of appt tomorrow with Tammie. Pt verbalized understanding and confirmed appt Location was discussed.

## 2024-12-17 ENCOUNTER — PATIENT MESSAGE (OUTPATIENT)
Dept: REHABILITATION | Facility: HOSPITAL | Age: 54
End: 2024-12-17
Payer: COMMERCIAL

## 2024-12-17 ENCOUNTER — OFFICE VISIT (OUTPATIENT)
Dept: HEMATOLOGY/ONCOLOGY | Facility: CLINIC | Age: 54
End: 2024-12-17
Payer: COMMERCIAL

## 2024-12-17 ENCOUNTER — TELEPHONE (OUTPATIENT)
Dept: HEMATOLOGY/ONCOLOGY | Facility: CLINIC | Age: 54
End: 2024-12-17
Payer: COMMERCIAL

## 2024-12-17 DIAGNOSIS — Z17.0 MALIGNANT NEOPLASM OF UPPER-INNER QUADRANT OF RIGHT BREAST IN FEMALE, ESTROGEN RECEPTOR POSITIVE: ICD-10-CM

## 2024-12-17 DIAGNOSIS — R68.89: ICD-10-CM

## 2024-12-17 DIAGNOSIS — M54.59 OTHER LOW BACK PAIN: Primary | ICD-10-CM

## 2024-12-17 DIAGNOSIS — C50.211 MALIGNANT NEOPLASM OF UPPER-INNER QUADRANT OF RIGHT BREAST IN FEMALE, ESTROGEN RECEPTOR POSITIVE: ICD-10-CM

## 2024-12-17 DIAGNOSIS — M25.511 CHRONIC RIGHT SHOULDER PAIN: ICD-10-CM

## 2024-12-17 DIAGNOSIS — G89.29 CHRONIC RIGHT SHOULDER PAIN: ICD-10-CM

## 2024-12-17 PROCEDURE — 1160F RVW MEDS BY RX/DR IN RCRD: CPT | Mod: CPTII,95,, | Performed by: NURSE PRACTITIONER

## 2024-12-17 PROCEDURE — 1159F MED LIST DOCD IN RCRD: CPT | Mod: CPTII,95,, | Performed by: NURSE PRACTITIONER

## 2024-12-17 PROCEDURE — 3044F HG A1C LEVEL LT 7.0%: CPT | Mod: CPTII,95,, | Performed by: NURSE PRACTITIONER

## 2024-12-17 PROCEDURE — 99204 OFFICE O/P NEW MOD 45 MIN: CPT | Mod: 95,,, | Performed by: NURSE PRACTITIONER

## 2024-12-17 NOTE — PROGRESS NOTES
"The patient location is: home  The chief complaint leading to consultation is: fatigue,pain    Visit type: audiovisual    Each patient to whom he or she provides medical services by telemedicine is:  (1) informed of the relationship between the physician and patient and the respective role of any other health care provider with respect to management of the patient; and (2) notified that he or she may decline to receive medical services by telemedicine and may withdraw from such care at any time.    Notes:   Louise Cueto  54 y.o. is here to seek an integrative approach to discuss side effects related to breast cancer treatment. Louise Cueto  was referred by Dr. Mahmood     HPI  Mrs. Cueto felt a mass approximately 6 weeks before her routine mammogram this year. She had a bilateral mastectomy on 11/18/24 and had reconstruction with expanders placed at this time. She is waiting on her oncotype score for further treatment options. She was previously a good sleeper but surgery has made sleeping difficult. The last 3 nights she did sleep better. She has low stress and anxiety. She does have an appointment with psychology as She has not looked at her breast since surgery.  She reports the breast hurt and she knows they are really bruised. She states, "I look like Frankenstein and I just don't feel like looking at them." She describes the post surgical pain as shooting pains down her torso.  Today the pain is improved. She is having back pain. She had her first acupuncture treatment and she worked on constipation and energy and she report they will work on her back when she is able to lay on her abdomen. She has a "normal" appetite but when she is pain she wont eat as much. She does eat healthy. She is generally an active person, but due to pain post surgery   She is  and has 3 children. She has a good support system. She is not currently working.     Pillars Assessment    Sleep  How many hours of sleep per " night? 7 hours  Do you have trouble falling asleep, staying asleep or waking up earlier than you need to? no  Do you have daytime fatigue? yes  Do you need medication for sleep? no  Do you use any supplements or other interventions for sleep? no    Resilience  Rate your current level of stress- low    Nutrition   Food allergies or sensitivities: no  Do you adhere to a particular type of diet? no  Do you have any concerns with your eating habits? no    Exercise  How would you describe your physical activity level? Moderate, currently low    Past Medical History  Past Medical History:   Diagnosis Date    Abdominal bloating 2019    BRCA1 negative     BRCA2 negative     GERD (gastroesophageal reflux disease)     resolved with lap tono    Liver lesion 2019    Malignant neoplasm of upper-inner quadrant of right female breast       Past Surgical History   Past Surgical History:   Procedure Laterality Date    AXILLARY NODE DISSECTION Right 2024    Procedure: LYMPHADENECTOMY, AXILLARY / POSSIBLE RIGHT;  Surgeon: Kassi Mahmood MD;  Location: [facility];  Service: General;  Laterality: Right;    BILATERAL MASTECTOMY Bilateral 2024    Procedure: MASTECTOMY, BILATERAL;  Surgeon: Kassi Mahmood MD;  Location: [facility];  Service: General;  Laterality: Bilateral;  HIGH / TECH AND FROZEN    BREAST CYST EXCISION Left     18 y/o f     SECTION      CHOLECYSTECTOMY      45 year old    COLONOSCOPY N/A 2017    Procedure: COLONOSCOPY;  Surgeon: Andrews Sears MD;  Location: [address]);  Service: Endoscopy;  Laterality: N/A;    COLONOSCOPY N/A 2022    Procedure: COLONOSCOPY;  Surgeon: Andrews Sears MD;  Location: [address]);  Service: Endoscopy;  Laterality: N/A;  Fully Vaccinated.EC    CYSTOSCOPY N/A 2019    Procedure: CYSTOSCOPY;  Surgeon: Debbie Murphy MD;  Location: [facility];  Service: OB/GYN;  Laterality: N/A;    EYE SURGERY  10/24/2023    strabismus  surgery  bilateral    HYSTEROSCOPY N/A 09/17/2019    Procedure: HYSTEROSCOPY-removal of IUD; possible shaving of uterine fibroids if needed to insert new IUD;  Surgeon: Debbie Murphy MD;  Location: [facility];  Service: OB/GYN;  Laterality: N/A;    INJECTION FOR SENTINEL NODE IDENTIFICATION Right 11/18/2024    Procedure: INJECTION, FOR SENTINEL NODE IDENTIFICATION;  Surgeon: Kassi Mahmood MD;  Location: [facility];  Service: General;  Laterality: Right;    INJECTION, AGENT, INTRAVENOUS, FOR ASSESSMENT OF BLOOD FLOW IN FLAP OR GRAFT Bilateral 11/18/2024    Procedure: INJECTION, AGENT, INTRAVENOUS, FOR ASSESSMENT OF BLOOD FLOW IN FLAP OR GRAFT;  Surgeon: Amandeep Noel DO;  Location: [facility] OR;  Service: General;  Laterality: Bilateral;    INSERTION OF BREAST TISSUE EXPANDER Bilateral 11/18/2024    Procedure: INSERTION, TISSUE EXPANDER, BREAST;  Surgeon: Amandeep Noel DO;  Location: [facility];  Service: General;  Laterality: Bilateral;    INTRAUTERINE DEVICE INSERTION N/A 09/17/2019    Procedure: IUD insertion-- mirena-- would prefer kyleena if available;  Surgeon: Debbie Murphy MD;  Location: [facility];  Service: OB/GYN;  Laterality: N/A;    SENTINEL LYMPH NODE BIOPSY Right 11/18/2024    Procedure: BIOPSY, LYMPH NODE, SENTINEL;  Surgeon: Kassi Mahmood MD;  Location: [facility];  Service: General;  Laterality: Right;      Family History   Family History   Problem Relation Name Age of Onset    Cancer Mother  73        colon ca    Hypertension Mother      Arrhythmia Mother      Heart disease Father 72         MI around 50.    Hyperlipidemia Father 72     Parkinsonism Father 72     Cancer Sister  49        breast    Breast cancer Sister  48        breast    Other Sister      No Known Problems Brother pt is 3rd     Other Daughter middle         TBI with left hemiparesis    No Known Problems Son      Arrhythmia Son          SVT    Cirrhosis Maternal Grandfather      Alcohol abuse Maternal Grandfather       Alzheimer's disease Paternal Grandmother      Parkinsonism Paternal Grandfather      Ovarian cancer Cousin pat first cousin     Cancer Cousin pat first cousin         ovarian and one with lung    Colon cancer Neg Hx        Allergies  Review of patient's allergies indicates:   Allergen Reactions    Percocet [oxycodone-acetaminophen] Nausea And Vomiting     Can take tylenol & tramadol     Vicodin [hydrocodone-acetaminophen] Nausea And Vomiting     Pt not sure if she took percocet or Vicodin that caused severe nausea & vomiting      Current Medications:    Current Outpatient Medications:     acetaminophen (TYLENOL) 500 MG tablet, Take 1 tablet (500 mg total) by mouth every 6 (six) hours., Disp: 30 tablet, Rfl: 1    ascorbic acid, vitamin C, (VITAMIN C) 1000 MG tablet, Take 1,000 mg by mouth every evening., Disp: , Rfl:     b complex vitamins capsule, Take 1 capsule by mouth every evening., Disp: , Rfl:     collagen, bovine, 100 % Powd, Apply topically., Disp: , Rfl:     gabapentin (NEURONTIN) 300 MG capsule, Take 1 capsule (300 mg total) by mouth 3 (three) times daily. for 21 days, Disp: 63 capsule, Rfl: 0    ibuprofen (ADVIL,MOTRIN) 600 MG tablet, Take 1 tablet (600 mg total) by mouth 3 (three) times daily., Disp: 30 tablet, Rfl: 1    INV TESTOSTERONE/ANASTROZOL OR PLACEBO PELLETS, Inject 1 Pellet into the skin. FOR INVESTIGATIONAL USE ONLY, Disp: , Rfl:     Lactobacillus rhamnosus GG (CULTURELLE) 10 billion cell capsule, Take 1 capsule by mouth every evening., Disp: , Rfl:     LIDOcaine (LIDODERM) 5 %, Place 1 patch onto the skin daily as needed (pain). Remove & Discard patch within 12 hours or as directed by MD, Disp: 30 patch, Rfl: 0    magnesium 30 mg Tab, Take by mouth every evening., Disp: , Rfl:     omega-3 fatty acids/fish oil (FISH OIL-OMEGA-3 FATTY ACIDS) 300-1,000 mg capsule, Take 1 capsule by mouth once daily., Disp: , Rfl:     ondansetron (ZOFRAN) 4 MG tablet, Take 2 tablets (8 mg total) by mouth every 6  (six) hours as needed for Nausea., Disp: 30 tablet, Rfl: 1    oxyCODONE (ROXICODONE) 5 MG immediate release tablet, Take 1 tablet (5 mg total) by mouth every 6 (six) hours as needed for Pain., Disp: 10 tablet, Rfl: 0    traMADoL (ULTRAM) 50 mg tablet, Take 1 tablet (50 mg total) by mouth every 6 (six) hours as needed for Pain., Disp: 20 tablet, Rfl: 0    valACYclovir (VALTREX) 1000 MG tablet, Take 0.5 tablets (500 mg total) by mouth every 12 (twelve) hours., Disp: 14 tablet, Rfl: 5    vitamin D (VITAMIN D3) 1000 units Tab, Take 1,000 Units by mouth every evening., Disp: , Rfl:      Review of Systems  Review of Systems   Constitutional:  Positive for malaise/fatigue.   Eyes: Negative.    Respiratory: Negative.     Cardiovascular: Negative.    Gastrointestinal:  Positive for constipation.   Genitourinary: Negative.    Musculoskeletal:  Positive for back pain.   Neurological:  Positive for focal weakness.   Endo/Heme/Allergies: Negative.    Psychiatric/Behavioral: Negative.        Physical Exam      There were no vitals filed for this visit.  There is no height or weight on file to calculate BMI.  Physical Exam  Constitutional:       Appearance: Normal appearance.   Neurological:      Mental Status: She is alert.   Psychiatric:         Mood and Affect: Mood normal.         Behavior: Behavior normal.        ASSESSMENT :  1. Other low back pain    2. Alteration in body image    3. Chronic right shoulder pain    4. 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    Counseled on healthy lifestyle and behavior modifications   Continue OT   Oncology Psychology appointment scheduled for 12/20/2024  Continue Acupuncture   Referral to Nutrition  I discussed a nutritional consult with our dietician. The dietician can  help you work on weight management during treatment and further  discuss lifestyle changes. Weight loss can impact long-term survival, particularly in certain types of cancer and our dieticians are skilled at helping you through these changes.  I discussed and recommended the following support services:  Yoga I suggested Yoga as these practices reduce stress, increases flexibility and muscle strength, improves balance and promotes serenity in the power of movement to help fight disease and boost your immune system.   Music and relaxation therapy and Meditation which can decrease stress by lowering blood pressure, slowing breathing, and helping you be more present in the moment. It improves sleep by relaxing the body and mind at the end of the day.Meditation also trains you how to focus on one thing at a time, improving concentration. It also promotes emotional well-being by decreasing depression and anxiety, and helping create a more positive outlook on life.    Follow up with Integrative Services as needed    I spent a total of 45 minutes on the day of the visit.This includes face to face time and non-face to face time preparing to see the patient (eg, review of tests), obtaining and/or reviewing separately obtained history, documenting clinical information in the electronic or other health record, independently interpreting results and communicating results to the patient/family/caregiver, or care coordinator.

## 2024-12-18 ENCOUNTER — PATIENT MESSAGE (OUTPATIENT)
Dept: PLASTIC SURGERY | Facility: CLINIC | Age: 54
End: 2024-12-18
Payer: COMMERCIAL

## 2024-12-18 NOTE — PROGRESS NOTES
Physical Therapy Daily Treatment Note       Date: 12/19/2024   Name: Louise Cueto  Clinic Number: [id number]    Therapy Diagnosis:   Encounter Diagnoses   Name Primary?    Decreased range of motion of shoulder, unspecified laterality Yes    Weakness of both upper extremities     At risk for lymphedema     Poor posture      Physician: Tammie Rodrigues FNP-C    Physician Orders: PT Eval and Treat   Medical Diagnosis from Referral:   Z90.13 (ICD-10-CM) - Status post bilateral mastectomy   M54.59 (ICD-10-CM) - Other low back pain   G62.9 (ICD-10-CM) - Neuropathy   Evaluation Date: 12/11/2024  Authorization Period Expiration: 12/31/2024  Plan of Care Expiration: 2/7/2025  Progress Note Due: 1/8/2025  Visit # / Visits authorized: 1/ 20 (plus eval)   FOTO: 1/3    Precautions: Standard and cancer     Time In: 3:30 pm  Time Out: 4:15 pm  Total Appointment Time (timed & untimed codes): 45 minutes      Subjective     Pt reports: little sore from the filling earlier today and she had a seroma drained on her right side. She has been feeling better over past 3 days, not as bad as previous visit. She is still having trouble washing hair as she can't get her arms up high enough to pour the water over her head.   She was compliant with home exercise program.  Response to previous treatment: increased soreness  Pain Scale: Louise rates pain on a scale of 2/10 on VAS.   Pain location: axilla, breast bilateral    Fatigue: 3/10  Functional change: she was able to drive herself  Treatment:   Chemotherapy: TBD  Radiation: N/A  Endocrine therapy: TBD  Targeted/immunotherapy: TBD  Surgery date: Bilateral mastectomy (Bilateral, 11/18/2024); [place] lymph node biopsy (Right, 11/18/2024); Injection for sentinel node identification (Right, 11/18/2024); Axillary node dissection (Right, 11/18/2024); Insertion of breast tissue expander (Bilateral, 11/18/2024); and injection, agent, intravenous,  for assessment of blood flow in flap or graft (Bilateral, 11/18/2024).       Objective     Objective Measures updated at progress report unless specified.     Shoulder Range of Motion: 12/19/24  Active /Passive ROM Right Left   Flexion 65 70   Abduction 60 60   Extension 30 35   IR/45deg 90 90   ER/45deg 60 70     Treatment     Louise received therapeutic exercises to develop strength, ROM, and flexibility for 45 minutes including:   Pulleys, flex/scap, 2 minutes each  Physioball roll outs, straight, 2 minutes  Table slides, flexion, 1 set x 10 reps, bilateral  Table slides, scaption, 1 set x 10 reps, bilateral  Table slides, ER, attempted bilaterally  Supine pec stretch, propped on wedge, 2 minutes  Supine wand IR/ER, propped on wedge, 1 set x 10 reps  Supine wand chest press, propped on wedge, 1 set x 10 reps  Butterflies, propped on wedge, hands under chin, 1 set x 10 reps    Home Exercise Program and Patient Education   Education provided re:  - role of PT in multi - disciplinary team, goals for PT  - progress towards goals   - keeping her exercises in a pain free    Written Home Exercises Provided: yes.  Exercises were reviewed and Louise was able to demonstrate them prior to the end of the session.  Louise demonstrated good  understanding of the education provided.     See EMR under Patient Instructions for exercises provided 12/19/2024.    Pt has no cultural, educational or language barriers to learning provided.    Assessment     Patient is responding well to physical therapy. She was able to begin making progress towards her goals as she was able to perform all of today's activities with no increase in symptoms prior to leaving the clinic. Supine exercises were modified to being done with a wedge bolster under her upper body for comfort. She required occasional cues to keep her exercises in a pain free range. She demonstrated an increase in AROM as noted in the objective section. Will progress as  tolerated    Pt prognosis is Good. Pt will continue to benefit from skilled outpatient physical therapy to address the deficits listed in the problem list chart on initial evaluation, provide pt/family education and to maximize pt's level of independence in the home and community environment.     Anticipated barriers to physical therapy: none anticipated    Goals as follows:  Short Term goals: 4 weeks  1. Patient will demonstrate 100% understanding of lymphedema risk reduction practices to include self monitoring for lymphedema. (progressing, not met)  2. Patient will demonstrate independence with Home Exercise program established. (progressing, not met)  3. Pt will increase AROM/PROM in shoulder abduction ROM to 90 degrees bilaterally to improve functional reach, carry, push, pull pain free. (progressing, not met)  4. Pt will increase AROM/PROM in shoulder flexion to 120 degrees bilaterally to improve functional reach, carry, push, pull pain free.(progressing, not met)  5. Pt will increase AROM/PROM in shoulder external rotation at 45 degrees abduction to 80 degrees bilaterally to improve functional reach, carry, push, pull pain free.(progressing, not met)     Long Term Goals: 8 weeks   1.  Pt will increase AROM/PROM in shoulder flexion to 180 degrees bilaterally to improve functional reach, carry, push, pull pain free. (progressing, not met)  2. Pt will increase strength to 4+/5 in gross UE musculature to improve tolerance to all functional activities pain free. (progressing, not met)  3. Pt will demonstrate full/maximized tissue mobility to increase ROM and promote healthy tissue to be pain free at discharge. (progressing, not met)  4. Pt will report decrease in overall worst pain to 2/10 at discharge. (progressing, not met)  5. Pt will increase AROM/PROM in shoulder abduction ROM to 150 degrees bilaterally to improve functional reach, carry, push, pull pain free. (progressing, not met)  6.  Pt will increase  AROM/PROM in shoulder external rotation ROM to 90 degrees bilaterally to improve functional reach, carry, push, pull pain free.(progressing, not met)  7. Patient will report compliance with walking program 5x week for 20 - 30 min each day to improve overall cardiovascular function and decrease cancer related fatigue at discharge. (progressing, not met)     Plan     Outpatient physical therapy 2x week for 8 weeks to include the following:   Manual Therapy, Neuromuscular Re-ed, Patient Education, Self Care, Therapeutic Activities, and Therapeutic Exercise.    Therapist: Katie Currie, PT  12/19/2024

## 2024-12-19 ENCOUNTER — LAB VISIT (OUTPATIENT)
Dept: LAB | Facility: HOSPITAL | Age: 54
End: 2024-12-19
Attending: INTERNAL MEDICINE
Payer: COMMERCIAL

## 2024-12-19 ENCOUNTER — CLINICAL SUPPORT (OUTPATIENT)
Dept: REHABILITATION | Facility: HOSPITAL | Age: 54
End: 2024-12-19
Payer: COMMERCIAL

## 2024-12-19 ENCOUNTER — OFFICE VISIT (OUTPATIENT)
Dept: PLASTIC SURGERY | Facility: CLINIC | Age: 54
End: 2024-12-19
Payer: COMMERCIAL

## 2024-12-19 ENCOUNTER — OFFICE VISIT (OUTPATIENT)
Dept: HEMATOLOGY/ONCOLOGY | Facility: CLINIC | Age: 54
End: 2024-12-19
Payer: COMMERCIAL

## 2024-12-19 VITALS
RESPIRATION RATE: 18 BRPM | WEIGHT: 127 LBS | SYSTOLIC BLOOD PRESSURE: 109 MMHG | TEMPERATURE: 98 F | OXYGEN SATURATION: 100 % | BODY MASS INDEX: 21.16 KG/M2 | DIASTOLIC BLOOD PRESSURE: 71 MMHG | HEIGHT: 65 IN | HEART RATE: 68 BPM

## 2024-12-19 VITALS
HEART RATE: 71 BPM | HEIGHT: 65 IN | DIASTOLIC BLOOD PRESSURE: 71 MMHG | BODY MASS INDEX: 20.66 KG/M2 | SYSTOLIC BLOOD PRESSURE: 107 MMHG | WEIGHT: 124 LBS

## 2024-12-19 DIAGNOSIS — C50.211 MALIGNANT NEOPLASM OF UPPER-INNER QUADRANT OF RIGHT BREAST IN FEMALE, ESTROGEN RECEPTOR POSITIVE: Primary | ICD-10-CM

## 2024-12-19 DIAGNOSIS — R29.898 WEAKNESS OF BOTH UPPER EXTREMITIES: ICD-10-CM

## 2024-12-19 DIAGNOSIS — M79.2 NEUROPATHIC PAIN: Primary | ICD-10-CM

## 2024-12-19 DIAGNOSIS — Z91.89 AT RISK FOR LYMPHEDEMA: ICD-10-CM

## 2024-12-19 DIAGNOSIS — Z17.0 MALIGNANT NEOPLASM OF UPPER-INNER QUADRANT OF RIGHT BREAST IN FEMALE, ESTROGEN RECEPTOR POSITIVE: Primary | ICD-10-CM

## 2024-12-19 DIAGNOSIS — C50.211 MALIGNANT NEOPLASM OF UPPER-INNER QUADRANT OF RIGHT BREAST IN FEMALE, ESTROGEN RECEPTOR POSITIVE: ICD-10-CM

## 2024-12-19 DIAGNOSIS — M25.619 DECREASED RANGE OF MOTION OF SHOULDER, UNSPECIFIED LATERALITY: Primary | ICD-10-CM

## 2024-12-19 DIAGNOSIS — R29.3 POOR POSTURE: ICD-10-CM

## 2024-12-19 DIAGNOSIS — Z17.0 MALIGNANT NEOPLASM OF UPPER-INNER QUADRANT OF RIGHT BREAST IN FEMALE, ESTROGEN RECEPTOR POSITIVE: ICD-10-CM

## 2024-12-19 LAB
ESTRADIOL SERPL-MCNC: 28 PG/ML
FSH SERPL-ACNC: 74.66 MIU/ML
LH SERPL-ACNC: 35.5 MIU/ML

## 2024-12-19 PROCEDURE — 97110 THERAPEUTIC EXERCISES: CPT

## 2024-12-19 PROCEDURE — 36415 COLL VENOUS BLD VENIPUNCTURE: CPT | Performed by: INTERNAL MEDICINE

## 2024-12-19 PROCEDURE — 83002 ASSAY OF GONADOTROPIN (LH): CPT | Performed by: INTERNAL MEDICINE

## 2024-12-19 PROCEDURE — 99999 PR PBB SHADOW E&M-EST. PATIENT-LVL IV: CPT | Mod: PBBFAC,,, | Performed by: INTERNAL MEDICINE

## 2024-12-19 PROCEDURE — 3044F HG A1C LEVEL LT 7.0%: CPT | Mod: CPTII,S$GLB,, | Performed by: PHYSICIAN ASSISTANT

## 2024-12-19 PROCEDURE — 3074F SYST BP LT 130 MM HG: CPT | Mod: CPTII,S$GLB,, | Performed by: PHYSICIAN ASSISTANT

## 2024-12-19 PROCEDURE — 83001 ASSAY OF GONADOTROPIN (FSH): CPT | Performed by: INTERNAL MEDICINE

## 2024-12-19 PROCEDURE — 3078F DIAST BP <80 MM HG: CPT | Mod: CPTII,S$GLB,, | Performed by: PHYSICIAN ASSISTANT

## 2024-12-19 PROCEDURE — 99024 POSTOP FOLLOW-UP VISIT: CPT | Mod: S$GLB,,, | Performed by: PHYSICIAN ASSISTANT

## 2024-12-19 PROCEDURE — 99999 PR PBB SHADOW E&M-EST. PATIENT-LVL III: CPT | Mod: PBBFAC,,, | Performed by: PHYSICIAN ASSISTANT

## 2024-12-19 PROCEDURE — 82670 ASSAY OF TOTAL ESTRADIOL: CPT | Performed by: INTERNAL MEDICINE

## 2024-12-19 RX ORDER — ANASTROZOLE 1 MG/1
1 TABLET ORAL DAILY
Qty: 30 TABLET | Refills: 11 | Status: SHIPPED | OUTPATIENT
Start: 2024-12-19 | End: 2025-12-19

## 2024-12-19 RX ORDER — METHOCARBAMOL 500 MG/1
500 TABLET, FILM COATED ORAL EVERY 8 HOURS PRN
Qty: 20 TABLET | Refills: 1 | Status: SHIPPED | OUTPATIENT
Start: 2024-12-19 | End: 2024-12-26

## 2024-12-19 RX ORDER — PREGABALIN 75 MG/1
75 CAPSULE ORAL 2 TIMES DAILY
Qty: 60 CAPSULE | Refills: 0 | Status: SHIPPED | OUTPATIENT
Start: 2024-12-19 | End: 2025-01-18

## 2024-12-19 NOTE — PROGRESS NOTES
Louise Cueto presents to [facility] for a follow up visit status post bilateral mastectomy with tissue expander placement on 11/18/24. She has 300cc Artoura tissue expanders. Pain is improving, getting back massages that have helped with back pain. She still has shooting burning pain in her lateral chest on both sides.     PHYSICAL EXAMINATION  Bilateral breast incision well healing with no issues.   Bilateral tissue expanders in place, right breast is bruised  Nipples are well perfused         The fill ports on the right and left tissue expander was marked using the magnet and a pen.  The skin was then prepped using chloroprep.  Using sterile technique the right port was accessed with a 21G butterfly needle. Sterile injectable saline was added to the expander.  The skin was checked for tension and cap refill.  The same was done on the contralateral breast     Right volume added: 90 cc                 Left volume added: 90 cc  Right total volume: 180 cc                    Left total volume: 180 cc    20ccs dark thin fluid aspirated from right breast over fill port            ASSESSMENT/PLAN  Louise Cueto is s/p bilateral NSM with expanders  - Expanded today, patient tolerated well  - Small seroma on the right  - Follow up 2 weeks        All questions were answered. The patient was advised to contact the clinic with any questions or concerns prior to their next visit.         Rosie Bradshaw PA-C  Plastic and Reconstructive Surgery

## 2024-12-19 NOTE — PROGRESS NOTES
[facility]/ [facility]   at [facility] Clinic Note:      Chief Complaint:   Encounter Diagnosis   Name Primary?    Malignant neoplasm of upper-inner quadrant of right breast in female, estrogen receptor positive Yes        Cancer Staging   Malignant neoplasm of upper-inner quadrant of right breast in female, estrogen receptor positive  Staging form: Breast, AJCC 8th Edition  - Pathologic stage from 2024: Stage IA (pT1c, pN0, cM0, G1, ER+, WI+, HER2-, Oncotype DX score: 24) - Signed by Kaylie Dowd MD on 2024      HPI:  Louise Cueto is a 54 y.o. female who presents today for evaluation of newly diagnosed breast cancer.     Oncology history  Patient noted a mass in the right breast 2024.   Diagnostic mammogram on 10/17/24 showed extremely dense breast tissue without a discrete mass  Ultrasound 10/17/24 demonstrated an irregular hypoechoic mass measuring 10 x 8 x 7 mm in the 1 o'clock position 3 cm from the nipple    Biopsy 10/29/24: IDC, grade 1, ER 90%/ WI 20%/ HER2 2+ (ARCHIE negative); Ki67 3%    Breast MRI (24) showed a 1 x 1 x 1.6 cm irregularly shaped mass with irregular margins and heterogeneous internal enhancement seen in the upper inner quadrant of the right breast at 1 o'clock in the posterior depth, 4 cm from the nipple, 1.7 cm from the skin, and 0.2 cm from the chest wall.      Bilateral mastectomy 24: IDC, grade 1, 1.7cm; 0/2 LN+   Oncotype RS 24: RR 10%        GYN History:  Age of menarche was 14.   Premenopausal. LMP 2024. Patient admits to hormonal therapy (testosterone pellets, estrogen pellets, and progesterone oral) with Dr Mix, last 2024  Patient is   Age of first live birth was 28. Patient did breast feed.    Social History     Tobacco Use    Smoking status: Never    Smokeless tobacco: Never   Substance Use Topics    Alcohol use: Yes     Alcohol/week: 1.0 standard drink of alcohol     Types: 1  Glasses of wine per week     Comment: amt varies     Drug use: No     Family History   Problem Relation Name Age of Onset    Cancer Mother  73        colon ca    Hypertension Mother      Arrhythmia Mother      Heart disease Father 72         MI around 50.    Hyperlipidemia Father 72     Parkinsonism Father 72     Cancer Sister  49        breast    Breast cancer Sister  48        breast    Other Sister      No Known Problems Brother pt is 3rd     Other Daughter middle         TBI with left hemiparesis    No Known Problems Son      Arrhythmia Son          SVT    Cirrhosis Maternal Grandfather      Alcohol abuse Maternal Grandfather      Alzheimer's disease Paternal Grandmother      Parkinsonism Paternal Grandfather      Ovarian cancer Cousin pat first cousin     Cancer Cousin pat first cousin         ovarian and one with lung    Colon cancer Neg Hx       Past Medical History:   Diagnosis Date    Abdominal bloating 2019    BRCA1 negative     BRCA2 negative     GERD (gastroesophageal reflux disease)     resolved with lap tono    Liver lesion 2019    Malignant neoplasm of upper-inner quadrant of right female breast      Past Surgical History:   Procedure Laterality Date    AXILLARY NODE DISSECTION Right 2024    Procedure: LYMPHADENECTOMY, AXILLARY / POSSIBLE RIGHT;  Surgeon: Kassi Mahmood MD;  Location: [facility];  Service: General;  Laterality: Right;    BILATERAL MASTECTOMY Bilateral 2024    Procedure: MASTECTOMY, BILATERAL;  Surgeon: Kassi Mahmood MD;  Location: [facility];  Service: General;  Laterality: Bilateral;  HIGH / TECH AND FROZEN    BREAST CYST EXCISION Left     18 y/o f     SECTION      CHOLECYSTECTOMY      45 year old    COLONOSCOPY N/A 2017    Procedure: COLONOSCOPY;  Surgeon: Andrews Sears MD;  Location: [address]);  Service: Endoscopy;  Laterality: N/A;    COLONOSCOPY N/A 2022    Procedure: COLONOSCOPY;  Surgeon: Andrews Sears MD;   Location: [address]);  Service: Endoscopy;  Laterality: N/A;  Fully Vaccinated.EC    CYSTOSCOPY N/A 09/17/2019    Procedure: CYSTOSCOPY;  Surgeon: Debbie Murphy MD;  Location: [facility];  Service: OB/GYN;  Laterality: N/A;    EYE SURGERY  10/24/2023    strabismus surgery  bilateral    HYSTEROSCOPY N/A 09/17/2019    Procedure: HYSTEROSCOPY-removal of IUD; possible shaving of uterine fibroids if needed to insert new IUD;  Surgeon: Debbie Murphy MD;  Location: [facility];  Service: OB/GYN;  Laterality: N/A;    INJECTION FOR SENTINEL NODE IDENTIFICATION Right 11/18/2024    Procedure: INJECTION, FOR SENTINEL NODE IDENTIFICATION;  Surgeon: Kassi Mahmood MD;  Location: [facility];  Service: General;  Laterality: Right;    INJECTION, AGENT, INTRAVENOUS, FOR ASSESSMENT OF BLOOD FLOW IN FLAP OR GRAFT Bilateral 11/18/2024    Procedure: INJECTION, AGENT, INTRAVENOUS, FOR ASSESSMENT OF BLOOD FLOW IN FLAP OR GRAFT;  Surgeon: Amandeep Noel DO;  Location: [facility];  Service: General;  Laterality: Bilateral;    INSERTION OF BREAST TISSUE EXPANDER Bilateral 11/18/2024    Procedure: INSERTION, TISSUE EXPANDER, BREAST;  Surgeon: Amandeep Noel DO;  Location: [facility];  Service: General;  Laterality: Bilateral;    INTRAUTERINE DEVICE INSERTION N/A 09/17/2019    Procedure: IUD insertion-- mirena-- would prefer kyleena if available;  Surgeon: Debbie Murphy MD;  Location: [facility];  Service: OB/GYN;  Laterality: N/A;    SENTINEL LYMPH NODE BIOPSY Right 11/18/2024    Procedure: BIOPSY, LYMPH NODE, SENTINEL;  Surgeon: Kassi Mahmood MD;  Location: [facility];  Service: General;  Laterality: Right;       Patient Active Problem List   Diagnosis    Liver lesion    Mixed stress and urge urinary incontinence    Abnormal pelvic ultrasound    Status post hysteroscopy with hysteroscopic myomectomy & IUD Removal and Insertion    Chronic right shoulder pain    Right shoulder pain    Diplopia    Esotropia of both eyes    Myopia  "   Anisometropia    Malignant neoplasm of upper-inner quadrant of right breast in female, estrogen receptor positive    Physical deconditioning    Stress and adjustment reaction    Decreased range of motion of shoulder    Weakness of both upper extremities    At risk for lymphedema    Poor posture    Other low back pain    Alteration in body image       Current Outpatient Medications   Medication Instructions    acetaminophen (TYLENOL) 500 mg, Oral, Every 6 hours    ascorbic acid (vitamin C) (VITAMIN C) 1,000 mg, Nightly    b complex vitamins capsule 1 capsule, Nightly    collagen, bovine, 100 % Powd Apply topically.    ibuprofen (ADVIL,MOTRIN) 600 mg, Oral, 3 times daily    INV TESTOSTERONE/ANASTROZOL OR PLACEBO PELLETS 1 Pellet    Lactobacillus rhamnosus GG (CULTURELLE) 10 billion cell capsule 1 capsule, Nightly    LIDOcaine (LIDODERM) 5 % 1 patch, Transdermal, Daily PRN, Remove & Discard patch within 12 hours or as directed by MD    magnesium 30 mg Tab Nightly    methocarbamoL (ROBAXIN) 500 mg, Oral, Every 8 hours PRN    omega-3 fatty acids/fish oil (FISH OIL-OMEGA-3 FATTY ACIDS) 300-1,000 mg capsule 1 capsule, Daily    ondansetron (ZOFRAN) 8 mg, Oral, Every 6 hours PRN    oxyCODONE (ROXICODONE) 5 mg, Oral, Every 6 hours PRN    pregabalin (LYRICA) 75 mg, Oral, 2 times daily    traMADoL (ULTRAM) 50 mg, Oral, Every 6 hours PRN    valACYclovir (VALTREX) 500 mg, Oral, Every 12 hours    vitamin D (VITAMIN D3) 1,000 Units, Nightly       Review of Systems:   Review of Systems   Constitutional: Negative.    HENT: Negative.     Respiratory: Negative.     Cardiovascular: Negative.    Gastrointestinal: Negative.    Musculoskeletal: Negative.    Neurological: Negative.    All other systems reviewed and are negative.      PHYSICAL EXAM:  /71   Pulse 68   Temp 98 °F (36.7 °C) (Oral)   Resp 18   Ht 5' 5" (1.651 m)   Wt 57.6 kg (127 lb)   SpO2 100%   BMI 21.13 kg/m²     Deferred     Pertinent Labs & " Imaging:  Pathology Results  (Last 30 days)      None            No results found for this or any previous visit (from the past 24 hours).    Assessment & Plan:    1. Malignant neoplasm of upper-inner quadrant of right breast in female, estrogen receptor positive  - Estradiol; Future  - FOLLICLE STIMULATING HORMONE; Future  - LUTEINIZING HORMONE; Future      Reviewed patients referring notes, imaging and pathology. Discussed diagnosis, staging, and treatment in detail with patient.   Patient with Stage I ER+ breast cancer with low risk oncotype for patients >49yo  Patient continues to have menses, but has been on HRT. Will plan to order estradiol, FSH, LH to determine post-menopausal  Discussed  but patient is not interested in chemotherapy options  Long discussion regarding tamoxifen vs AI   We discussed that endocrine therapy is the optimal treatment for estrogen positive breast cancer. We did discuss that one of the possible side effects while on an AI is bone loss or osteoporosis. To help prevent this possible side effect, we advised that she continue taking daily Calcium and Vitamin D supplements. The daily recommended doses are 1000 IU of Vitamin D and 1200mg of Calcium. She can buy these supplements, such as Oscal-D® or Caltrate®, at most local stores. If she has osteopenia or osteoporosis on bone density, we will discuss Prolia in the near future.     Joint pain is a common side effect of both tamoxifen and AI. Pain and aching in the bones and joints can range from mild discomfort that goes away by itself, to severe achiness that may require medication. We have suggested using over-the-counter, non-steroidal anti-inflammatory medications like Ibuprofen if she were to experience this side effect.    Will review labs upon return. If pre-menopausal, plan for tamoxifen. If post-menopausal, plan for AI with 1 month lab recheck      Route Chart for Scheduling    Med Onc Chart Routing      Follow up with  physician 1 month.   Follow up with LILIAN    Infusion scheduling note    Injection scheduling note    Labs None   Scheduling:  Preferred lab:  Lab interval:     Imaging    Pharmacy appointment    Other referrals                       MDM includes  :    - Acute or chronic illness or injury that poses a threat to life or bodily function  - Review of prior external notes from unique source  - Independent review and explanation of 3+ results from unique tests  - Discussion of management and ordering 3 unique tests  - Extensive discussion of treatment and management  - Prescription drug management  - Drug therapy requiring intensive monitoring for toxicity        Kaylie Dowd MD   12/19/2024

## 2024-12-19 NOTE — PATIENT INSTRUCTIONS
Shoulder External Rotation AAROM Tabletop Exercise    With chair and body still positioned perpendicular to the table, rest the forearm of affected side on table with palm facing down and elbow bent at 90 degrees. Slowly start to bend whole body forward until a stretch is felt in affected shoulder. When doing this exercise, the forearm should stay in place and not slide when the body is bending forward. Hold this position when stretch is felt in affected shoulder for 5-10 seconds, then slowly raise body back up to starting position. Do 10 reps per set. Do 1 set per session. Do 1 session per day.        TABLE SLIDE - FLEXION    Sit in a chair and rest your injured arm on a table.  Start by leaning forward towards the table as you slide your arm forward as shown. Then, return to starting position and repeat. Do 10 reps per set. Do 1 set per session. Do 1 session per day.        TABLE SLIDE - ABDUCTION    Sit in a chair and rest your injured arm on a table.  Start by leaning sideways towards the table as you slide your arm outward as shown. Then, return to starting position and repeat. Do 10 reps per set. Do 1 set per session. Do 1 session per day.    ROM: Flexion - Wand (Supine)        Lie on back holding wand. Raise arms over head.   Repeat 10 times per set. Do 2 sets per session. Do 1 sessions per day.          Supine Pec Stretch    Lie on your back with your knees bent. Interlock your hands and place them under your head and neck for support. Allow your elbows to fall towards the floor until feeling a comfortable stretch. Hold the position.  Do 10 reps per set. Do 1 set per session. Do 1 session per day.        WAND ROTATION - STANDING IR ER    In the standing position, hold a wand/cane with both hands keeping your elbows bent. Move your arms and wand/cane side-to-side.  Your affected arm should be partially relaxed while your unaffected arm performs most of the effort. Do 10 reps per set. Do 1 set per session.  Do 1 session per day.         Pec Stretch    Using a long foam roll, lay with spine vertical to the foam roll as shown.     Allow arms to rest on the floor if able. You should feel a comfortable pec stretch when in this position. Hold for 1-2 minutes or as prescribed.

## 2024-12-20 ENCOUNTER — OFFICE VISIT (OUTPATIENT)
Dept: PSYCHIATRY | Facility: CLINIC | Age: 54
End: 2024-12-20
Payer: COMMERCIAL

## 2024-12-20 ENCOUNTER — PATIENT MESSAGE (OUTPATIENT)
Dept: PSYCHIATRY | Facility: CLINIC | Age: 54
End: 2024-12-20
Payer: COMMERCIAL

## 2024-12-20 ENCOUNTER — CLINICAL SUPPORT (OUTPATIENT)
Dept: REHABILITATION | Facility: HOSPITAL | Age: 54
End: 2024-12-20

## 2024-12-20 ENCOUNTER — TELEPHONE (OUTPATIENT)
Dept: HEMATOLOGY/ONCOLOGY | Facility: CLINIC | Age: 54
End: 2024-12-20
Payer: COMMERCIAL

## 2024-12-20 DIAGNOSIS — Z17.0 MALIGNANT NEOPLASM OF UPPER-INNER QUADRANT OF RIGHT BREAST IN FEMALE, ESTROGEN RECEPTOR POSITIVE: ICD-10-CM

## 2024-12-20 DIAGNOSIS — F43.29 STRESS AND ADJUSTMENT REACTION: Primary | ICD-10-CM

## 2024-12-20 DIAGNOSIS — C50.211 MALIGNANT NEOPLASM OF UPPER-INNER QUADRANT OF RIGHT BREAST IN FEMALE, ESTROGEN RECEPTOR POSITIVE: ICD-10-CM

## 2024-12-20 DIAGNOSIS — Z90.13 STATUS POST BILATERAL MASTECTOMY: Primary | ICD-10-CM

## 2024-12-20 DIAGNOSIS — M54.9 MID BACK PAIN ON LEFT SIDE: ICD-10-CM

## 2024-12-20 PROCEDURE — 99999 PR PBB SHADOW E&M-EST. PATIENT-LVL II: CPT | Mod: PBBFAC,,, | Performed by: PSYCHOLOGIST

## 2024-12-20 PROCEDURE — 1159F MED LIST DOCD IN RCRD: CPT | Mod: CPTII,S$GLB,, | Performed by: PSYCHOLOGIST

## 2024-12-20 PROCEDURE — 97811 ACUP 1/> W/O ESTIM EA ADD 15: CPT | Performed by: ACUPUNCTURIST

## 2024-12-20 PROCEDURE — 3044F HG A1C LEVEL LT 7.0%: CPT | Mod: CPTII,S$GLB,, | Performed by: PSYCHOLOGIST

## 2024-12-20 PROCEDURE — 90791 PSYCH DIAGNOSTIC EVALUATION: CPT | Mod: S$GLB,,, | Performed by: PSYCHOLOGIST

## 2024-12-20 PROCEDURE — 97810 ACUP 1/> WO ESTIM 1ST 15 MIN: CPT | Performed by: ACUPUNCTURIST

## 2024-12-20 NOTE — PROGRESS NOTES
Acupuncture Evaluation Note     Name: Louise Cueto  Clinic Number: [id number]    Traditional Chinese Medicine (TCM) Diagnosis: Qi Stagnation and Blood Stasis  Medical Diagnosis:   Encounter Diagnoses   Name Primary?    Status post bilateral mastectomy Yes    Mid back pain on left side         Evaluation Date: 12/20/2024    Visit #/Visits authorized: self pay - visit 2    Precautions: Standard    Subjective     Chief Concern: back pain that is present in the mid back and up to the scapula worse on the L side. Sleeping issues due to discomfort     Medical necessity is demonstrated by the following IMPAIRMENTS: Medical Necessity: Decreased mobility limits day to day activities, social, and emergent situations and Decreased quality of life              Aggravating Factors:  lying down   Relieving Factors:  sitting up    Symptom Description:     Quality:  Aching and Shooting  Severity:  4  Frequency:  continuously    Previous Treatments Tried:   advil & tylenol daily and gabapentin       Treatment     Treatment Principles:  move st qi and blood    Acupuncture points used:  4 GUIDO, Du20, Gb34, Ki3, Ki6, Li11, REN12, TREJO MEN, Sp10, Sp6, Sp9, St25, St36, and YIN CRAMER    Bilateral points:  Unilateral points:  Auricular Treatment:  trejo men    Needles In: 22  Needles Out: 22  Needles W/O STIM placed: 9:15 AM  Needles W/O STIM removed: 9:45 AM      Other Traditional Chinese Medicine Modalities -  heat lamp    Assessment     After treatment, patient felt relaxed. Inflammation points used, pain reduced this week and healing. Had fill yesterday and tightening worse on R side of chest. Sleep better but not great. Constipation better - patient increased water intake and eating more fiber foods. Recommended heating pad to help with back pain    Patient prognosis is Good.     Patient will continue to benefit from acupuncture treatment to address the deficits listed in the problem list box on initial evaluation, provide patient  family education and to maximize pt's level of independence in the home and community environment.     Patient's spiritual, cultural and educational needs considered and pt agreeable to plan of care and goals.     Anticipated barriers to treatment: none    Plan     Recommend 1 /week for 4 sessions then re-assess.      Education:  Patient is aware of cumulative benefit of acupuncture

## 2024-12-20 NOTE — TELEPHONE ENCOUNTER
Spoke w/ pt in regards to scheduling nutrition referral placed by Tammie Rodrigues NP.  Pt approved scheduling with Marcela Hendrix RD for virtual visit on Wednesday 1/22/25 @ 1PM.     Pt also approved to schedule yoga classes for Tuesdays evening for the month of January.      SREE GONZALEZ ext [phone]

## 2024-12-20 NOTE — PROGRESS NOTES
INFORMED CONSENT/ LIMITS of CONFIDENTIALITY: Prior to beginning the interview, the patient's identification was confirmed using two identifiers. Louise Cueto  was informed of the possible risks and benefits of psychological interventions (e.g., counseling, psychotherapy, testing) and provided information regarding the handling of protected health records and   the limits of confidentiality, including the importance of reporting any suicidal or homicidal ideation to ensure safety of all parties. This provider explained the purpose of today's appointment and the patient was provided with time to ask questions regarding this information.  Acceptance and understanding of these conditions was expressed, and Louise Cueto freely consented to this evaluation.     PSYCHO-ONCOLOGY INTAKE    Diagnostic Interview - CPT 00309    Date: 12/20/2024  Site: [facility]     Evaluation Length (direct face-to-face time):  1 hour     Referral Source: Kassi Mahmood MD   Oncologist:   PCP: Cyndie Jack MD    Clinical status of patient: Outpatient    Louise Cueto, a 54 y.o. female, seen for initial evaluation visit.  Met with patient.    Chief complaint/reason for encounter: adjustment to illness, anxiety    Medical/Surgical History:    Patient Active Problem List   Diagnosis    Liver lesion    Mixed stress and urge urinary incontinence    Abnormal pelvic ultrasound    Status post hysteroscopy with hysteroscopic myomectomy & IUD Removal and Insertion    Chronic right shoulder pain    Right shoulder pain    Diplopia    Esotropia of both eyes    Myopia    Anisometropia    Malignant neoplasm of upper-inner quadrant of right breast in female, estrogen receptor positive    Physical deconditioning    Stress and adjustment reaction    Decreased range of motion of shoulder    Weakness of both upper extremities    At risk for lymphedema    Poor posture    Other low back pain    Alteration in body image       Health Behaviors:        ETOH Use: Yes (social and within NIAAA healthy use limits for age and gender)       Tobacco Use: No   Illicit Drug Use:  No     Prescription Misuse:No   Caffeine: minimal   Exercise:The patient maintains a regular, healthy exercise program.    Firearms:  Yes   Advanced directives:No     Past Psychiatric History:   Inpatient treatment: No     Outpatient treatment: Yes  Parenting, Stress   Prior substance abuse treatment: No     Suicide Attempts: No     Psychotropic Medications:  Current: none       Past: none    Current medications as per below, allergies reviewed in chart.    Current Outpatient Medications   Medication    acetaminophen (TYLENOL) 500 MG tablet    anastrozole (ARIMIDEX) 1 mg Tab    ascorbic acid, vitamin C, (VITAMIN C) 1000 MG tablet    b complex vitamins capsule    collagen, bovine, 100 % Powd    ibuprofen (ADVIL,MOTRIN) 600 MG tablet    INV TESTOSTERONE/ANASTROZOL OR PLACEBO PELLETS    Lactobacillus rhamnosus GG (CULTURELLE) 10 billion cell capsule    LIDOcaine (LIDODERM) 5 %    magnesium 30 mg Tab    methocarbamoL (ROBAXIN) 500 MG Tab    omega-3 fatty acids/fish oil (FISH OIL-OMEGA-3 FATTY ACIDS) 300-1,000 mg capsule    ondansetron (ZOFRAN) 4 MG tablet    oxyCODONE (ROXICODONE) 5 MG immediate release tablet    pregabalin (LYRICA) 75 MG capsule    traMADoL (ULTRAM) 50 mg tablet    valACYclovir (VALTREX) 1000 MG tablet    vitamin D (VITAMIN D3) 1000 units Tab     No current facility-administered medications for this visit.            Social situation  Living/Social History  Born/raised:  [place]   Current living situation: lives with  in [facility]  Marital status:  26 years  Partner/Spouse Name: Adrián  Children/Dependents: 3 children (1 daughter disabled, 1 son in [place], 1 son in college)  Social support: good    Primary supports: spouse and children      Occupational History  Type of work: , but retired  Work status:  Not working   Status: no.   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  The patient was fully cooperative throughout the interview and was an adequate historian   Appearance: age appropriate, appropriately  dressed, adequately  groomed  Behavior/Cooperation: friendly and cooperative  Speech: normal in rate, volume, and tone and appropriate quality, quantity and organization of sentences  Mood: steady  Affect: mood congruent  Thought Process: goal-directed, logical  Thought Content: normal, no suicidality, no homicidality, delusions, or paranoia;did not appear to be responding to internal stimuli during the interview.   Orientation: grossly intact  Memory: Grossly intact  Attention Span/Concentration: Attends to interview without distraction; reports no difficulty  Fund of Knowledge: average  Estimate of Intelligence: average from verbal skills and history  Cognition: grossly intact  Insight: patient has awareness of illness; good insight into own behavior and behavior of others  Judgment: the patient's behavior is adequate to circumstances        12/31/2024     2:10 PM 12/19/2024    11:00 AM   DISTRESS SCREENING   Distress Score 2 0 - No Distress   Practical Concerns Taking care of others;Treatment decisions    Social Concerns Relationship with children;Relationship with family members    Emotional Concerns Worry or anxiety;Fear;Anger;Changes in appearance    Spiritual or Jew Concerns Sense of meaning or purpose;Death, dying, or afterlife    Physical Concerns Pain;Sleep;Memory or concentration;Loss or change of physical abilities       PHQ ANSWERS      12/31/2024     2:10 PM   PHQ-9 Depression Patient Health Questionnaire   Little interest or pleasure in  doing things 2    Feeling down, depressed, or hopeless 0    Trouble falling or staying asleep, or sleeping too much 3    Feeling tired or having little energy 0    Poor appetite or overeating 0    Feeling bad about yourself - or that you are a failure or have let yourself or your family down 0    Trouble concentrating on things, such as reading the newspaper or watching television 3    Moving or speaking so slowly that other people could have noticed. Or the opposite - being so fidgety or restless that you have been moving around a lot more than usual 0        Proxy-reported          MARIANGEL-7       2024     2:10 PM   MARIANGEL-7   1. Feeling nervous, anxious, or on edge? Several days    2. Not being able to stop or control worrying? Not at all    3. Worrying too much about different things? Several days    4. Trouble relaxing? Several days    5. Being so restless that it is hard to sit still? Not at all    6. Becoming easily annoyed or irritable? Several days    7. Feeling afraid as if something awful might happen? Not at all    8. If you checked off any problems, how difficult have these problems made it for you to do your work, take care of things at home, or get along with other people? Somewhat difficult    MARIANGEL-7 Score 4    Number answered (out of first 7) 7    Interpretation Normal        Proxy-reported        Pain: 2   Pain catastrophizin PCS total score, helplessness, magnification, and rumination    History of present illness:    Oncology History   Malignant neoplasm of upper-inner quadrant of right breast in female, estrogen receptor positive   11/10/2024 Initial Diagnosis    Malignant neoplasm of upper-inner quadrant of right breast in female, estrogen receptor positive     2024 Cancer Staged    Staging form: Breast, AJCC 8th Edition  - Pathologic stage from 2024: Stage IA (pT1c, pN0, cM0, G1, ER+, DE+, HER2-, Oncotype DX score: 24)       Patient with BC, s/p bilateral mastectomy, expanders  in place. Arimidex prescribed. Referred for body image concerns post surgery.    Louise Cueto has adjusted to illness with difficulty primarily through active coping strategies, focus on alternative activities, and focus on family. She has engaged in appropriate information gathering.  The patient has good family/friend support.  Her support system is coping well with the diagnosis/treatment/prognosis. Illness-related psychosocial stressors include changes in ability to engage in leisure activities.  The patient has a good partnership with her [facility] oncology treatment team. The patient reports the following barriers to cancer care:none.       Patient Reported Cancer Treatment Symptoms:  pain - breast    Behavioral Health Symptoms:   Mood: Depression: anhedonia, insomnia, psychomotor retardation, and difficulty concentrating no prior; no SI/HI  [place]han: Denies  Psychosis: Denies   Anxiety: Feeling nervous, anxious, or on edge, Difficulty relaxing, and Irritability;  no prior and prior anxiety:situational  Generalized anxiety: Denies    Panic Disorder: Denies  Social/specific phobia: Denies   OCD: Denies  Trauma: Denies  Sexual Dysfunction:  Denies  Substance abuse: denied  Cognitive functioning: denied  Health behaviors: noncontributory  Sleep: Terrible recently due to pain,  Pain: Ms. Cueto reports expander pain. Symptoms interfere with daily activity, sleeping and work.   CAM Therapies: None         Assessment - Diagnosis - Goals:       ICD-10-CM ICD-9-CM   1. Stress and adjustment reaction  F43.29 309.89   2. Malignant neoplasm of upper-inner quadrant of right breast in female, estrogen receptor positive  C50.211 174.2    Z17.0 V86.0       Plan:individual psychotherapy    Summary and Recommendations  Louise Cueto is a 54 y.o. female referred by Kassi Mahmood MD for psychological evaluation and treatment.  Ms. Cueto appears to be coping marginally with her diagnosis and proposed treatment course.  Patient has  good support system. Mood protective strategies during cancer treatment were discussed.  She is interested in individual therapy for cancer coping skills and will follow up with me for that purpose..     GOALS:   Mindfulness      Deedee Han, PhD  Clinical Psychologist  [place] License #[id number]  [place] License #[id number]

## 2024-12-24 ENCOUNTER — PATIENT MESSAGE (OUTPATIENT)
Dept: HEMATOLOGY/ONCOLOGY | Facility: CLINIC | Age: 54
End: 2024-12-24
Payer: COMMERCIAL

## 2024-12-24 ENCOUNTER — CLINICAL SUPPORT (OUTPATIENT)
Dept: REHABILITATION | Facility: HOSPITAL | Age: 54
End: 2024-12-24

## 2024-12-24 DIAGNOSIS — Z90.13 STATUS POST BILATERAL MASTECTOMY: Primary | ICD-10-CM

## 2024-12-24 PROCEDURE — 97813 ACUP 1/> W/ESTIM 1ST 15 MIN: CPT | Performed by: ACUPUNCTURIST

## 2024-12-24 PROCEDURE — 97810 ACUP 1/> WO ESTIM 1ST 15 MIN: CPT | Performed by: ACUPUNCTURIST

## 2024-12-24 PROCEDURE — 97814 ACUP 1/> W/ESTIM EA ADDL 15: CPT | Performed by: ACUPUNCTURIST

## 2024-12-24 PROCEDURE — 97811 ACUP 1/> W/O ESTIM EA ADD 15: CPT | Performed by: ACUPUNCTURIST

## 2024-12-24 NOTE — PROGRESS NOTES
Acupuncture Evaluation Note     Name: Louise Cueto  Clinic Number: [id number]    Traditional Chinese Medicine (TCM) Diagnosis: Qi Stagnation and Blood Stasis  Medical Diagnosis:   Encounter Diagnosis   Name Primary?    Status post bilateral mastectomy Yes        Evaluation Date: 12/24/2024    Visit #/Visits authorized: self pay - visit 3    Precautions: Standard    Subjective     Chief Concern: back pain that is present in the mid back and up to the scapula worse on the L side. Sleeping issues due to discomfort     Medical necessity is demonstrated by the following IMPAIRMENTS: Medical Necessity: Decreased mobility limits day to day activities, social, and emergent situations and Decreased quality of life              Aggravating Factors:  lying down   Relieving Factors:  sitting up    Symptom Description:     Quality:  Aching and Shooting  Severity:  4  Frequency:  continuously    Previous Treatments Tried:   advil & tylenol daily and gabapentin       Treatment     Treatment Principles:  move st qi and blood    Acupuncture points used:  4 GUIDO, Du20, Gb34, Ki3, Ki6, Li11, REN12, TREJO MEN, Sp10, Sp6, Sp9, St25, St36, and YIN CRAMER    Bilateral points:  Unilateral points:  Auricular Treatment:  trejo men    Needles In: 22  Needles Out: 22  Needles W/O STIM placed: 9:15 AM  Needles W/O STIM removed: 9:45 AM      Other Traditional Chinese Medicine Modalities -  heat lamp    Assessment     After treatment, patient felt relaxed. Doing well with visits but still stealing with constipation and some tenderness in chest area.     Patient prognosis is Good.     Patient will continue to benefit from acupuncture treatment to address the deficits listed in the problem list box on initial evaluation, provide patient family education and to maximize pt's level of independence in the home and community environment.     Patient's spiritual, cultural and educational needs considered and pt agreeable to plan of care and goals.      Anticipated barriers to treatment: none    Plan     Recommend 1 /week for 3 sessions then re-assess.      Education:  Patient is aware of cumulative benefit of acupuncture

## 2024-12-31 ENCOUNTER — CLINICAL SUPPORT (OUTPATIENT)
Dept: REHABILITATION | Facility: HOSPITAL | Age: 54
End: 2024-12-31

## 2024-12-31 DIAGNOSIS — M54.59 OTHER LOW BACK PAIN: Primary | ICD-10-CM

## 2024-12-31 DIAGNOSIS — T45.1X5A HOT FLASHES RELATED TO AROMATASE INHIBITOR THERAPY: ICD-10-CM

## 2024-12-31 DIAGNOSIS — R23.2 HOT FLASHES RELATED TO AROMATASE INHIBITOR THERAPY: ICD-10-CM

## 2024-12-31 PROCEDURE — 97814 ACUP 1/> W/ESTIM EA ADDL 15: CPT | Performed by: ACUPUNCTURIST

## 2024-12-31 PROCEDURE — 97813 ACUP 1/> W/ESTIM 1ST 15 MIN: CPT | Performed by: ACUPUNCTURIST

## 2024-12-31 PROCEDURE — 97810 ACUP 1/> WO ESTIM 1ST 15 MIN: CPT | Performed by: ACUPUNCTURIST

## 2024-12-31 PROCEDURE — 97811 ACUP 1/> W/O ESTIM EA ADD 15: CPT | Performed by: ACUPUNCTURIST

## 2024-12-31 NOTE — PROGRESS NOTES
Acupuncture Evaluation Note     Name: Louise Cueto  Clinic Number: [id number]    Traditional Chinese Medicine (TCM) Diagnosis: Qi Stagnation and Blood Stasis  Medical Diagnosis:   Encounter Diagnoses   Name Primary?    Other low back pain Yes    Hot flashes related to aromatase inhibitor therapy         Evaluation Date: 12/31/2024    Visit #/Visits authorized: self pay - visit 4    Precautions: Standard    Subjective     Chief Concern: back pain that is present in the mid back and up to the scapula worse on the L side. Sleeping issues due to discomfort     Medical necessity is demonstrated by the following IMPAIRMENTS: Medical Necessity: Decreased mobility limits day to day activities, social, and emergent situations and Decreased quality of life              Aggravating Factors:  lying down   Relieving Factors:  sitting up    Symptom Description:     Quality:  Aching and Shooting  Severity:  4  Frequency:  continuously    Previous Treatments Tried:   advil & tylenol daily and gabapentin       Treatment     Treatment Principles:  move st qi and blood    Acupuncture points used:  4 GUIDO, Du20, Gb34, Ki3, Ki6, Li11, REN12, TREJO MEN, Sp10, Sp6, Sp9, St25, St36, and YIN CRAMER    Bilateral points:  Unilateral points:  Auricular Treatment:  trejo men    Needles In: 22  Needles Out: 22  Needles W/O STIM placed: 10:10 AM  [place] W/O STIM removed: 10:40 AM      Other Traditional Chinese Medicine Modalities -  heat lamp    Assessment     After treatment, patient felt relaxed. Constipation still present, chest tightness reducing a lot and healing is in progress.    Patient prognosis is Good.     Patient will continue to benefit from acupuncture treatment to address the deficits listed in the problem list box on initial evaluation, provide patient family education and to maximize pt's level of independence in the home and community environment.     Patient's spiritual, cultural and educational needs considered and pt agreeable  to plan of care and goals.     Anticipated barriers to treatment: none    Plan     Recommend 1 /week for 2 sessions then re-assess.      Education:  Patient is aware of cumulative benefit of acupuncture

## 2025-01-02 ENCOUNTER — TELEPHONE (OUTPATIENT)
Dept: PLASTIC SURGERY | Facility: CLINIC | Age: 55
End: 2025-01-02
Payer: COMMERCIAL

## 2025-01-02 ENCOUNTER — PATIENT MESSAGE (OUTPATIENT)
Dept: PLASTIC SURGERY | Facility: CLINIC | Age: 55
End: 2025-01-02
Payer: COMMERCIAL

## 2025-01-02 ENCOUNTER — OFFICE VISIT (OUTPATIENT)
Dept: PLASTIC SURGERY | Facility: CLINIC | Age: 55
End: 2025-01-02
Payer: COMMERCIAL

## 2025-01-02 VITALS
BODY MASS INDEX: 21.16 KG/M2 | DIASTOLIC BLOOD PRESSURE: 60 MMHG | HEIGHT: 65 IN | SYSTOLIC BLOOD PRESSURE: 115 MMHG | HEART RATE: 75 BPM | WEIGHT: 127 LBS

## 2025-01-02 DIAGNOSIS — Z09 SURGERY FOLLOW-UP: Primary | ICD-10-CM

## 2025-01-02 PROCEDURE — 99024 POSTOP FOLLOW-UP VISIT: CPT | Mod: S$GLB,,, | Performed by: SURGERY

## 2025-01-02 PROCEDURE — 3074F SYST BP LT 130 MM HG: CPT | Mod: CPTII,S$GLB,, | Performed by: SURGERY

## 2025-01-02 PROCEDURE — 99999 PR PBB SHADOW E&M-EST. PATIENT-LVL III: CPT | Mod: PBBFAC,,, | Performed by: SURGERY

## 2025-01-02 PROCEDURE — 3078F DIAST BP <80 MM HG: CPT | Mod: CPTII,S$GLB,, | Performed by: SURGERY

## 2025-01-02 PROCEDURE — 1159F MED LIST DOCD IN RCRD: CPT | Mod: CPTII,S$GLB,, | Performed by: SURGERY

## 2025-01-02 NOTE — PROGRESS NOTES
Louise Cueto presents to [facility] for a follow up visit status post bilateral mastectomy with tissue expander placement on 11/18/24. She has 300cc Artoura tissue expanders. She reached out about warmth in both breasts over the last 2 days and increased discomfort of the right breast.      PHYSICAL EXAMINATION  Bilateral breast incision well healing with no issues.   Bilateral tissue expanders in place, right breast with resolving bruising  Nipples are well perfused         The fill ports on the right and left tissue expander was marked using the magnet and a pen.  The skin was then prepped using chloroprep.  Using sterile technique the right port was accessed with a 21G butterfly needle. Sterile injectable saline was added to the expander.  The skin was checked for tension and cap refill.  The same was done on the contralateral breast     Right volume added: 60 cc                 Left volume added: 60 cc  Right total volume: 240 cc                    Left total volume: 240 cc     20ccs dark thin fluid aspirated from right breast over fill port,   5ccs aspirated over left fill port            ASSESSMENT/PLAN  Louise Cueto is s/p bilateral NSM with expanders  - Expanded today, patient tolerated well  - Small seroma with no signs of infection  - Her size is at goal and would like a slightly larger implant. Will see her back in 2 weeks to reassess for seroma recurrence and confirm her fill volume is at desired size.  - Will start looking at surgery dates for second stage implant exchange. She'd like liposuction donor site to be buttocks as she has low body mass.  - Follow up 2 weeks        All questions were answered. The patient was advised to contact the clinic with any questions or concerns prior to their next visit.         Rosie Bardshaw PA-C  Plastic and Reconstructive Surgery

## 2025-01-02 NOTE — TELEPHONE ENCOUNTER
Pt called and stated she is experiencing warmness to her breast and is also experiencing some pain. Pt denies fever and swelling. Pt asked if she should keep her appt for tomorrow or does she need to be seen today. Informed pt that she should come to her appt tomorrow and I will let Inform team on what's going on and if anything changes I will let her know. All questions and concerns answered. Pt verbally acknowledges and understands.

## 2025-01-07 ENCOUNTER — CLINICAL SUPPORT (OUTPATIENT)
Dept: REHABILITATION | Facility: HOSPITAL | Age: 55
End: 2025-01-07

## 2025-01-07 DIAGNOSIS — M54.59 OTHER LOW BACK PAIN: Primary | ICD-10-CM

## 2025-01-07 PROCEDURE — 97810 ACUP 1/> WO ESTIM 1ST 15 MIN: CPT | Performed by: ACUPUNCTURIST

## 2025-01-07 PROCEDURE — 97811 ACUP 1/> W/O ESTIM EA ADD 15: CPT | Performed by: ACUPUNCTURIST

## 2025-01-07 NOTE — PROGRESS NOTES
Acupuncture Evaluation Note     Name: Louise Cueto  Clinic Number: [id number]    Traditional Chinese Medicine (TCM) Diagnosis: Qi Stagnation and Blood Stasis  Medical Diagnosis:   Encounter Diagnosis   Name Primary?    Other low back pain Yes          Evaluation Date: 1/7/2025    Visit #/Visits authorized: self pay - visit 5    Precautions: Standard    Subjective     Chief Concern: back pain that is present in the mid back and up to the scapula worse on the L side. Sleeping issues due to discomfort     Medical necessity is demonstrated by the following IMPAIRMENTS: Medical Necessity: Decreased mobility limits day to day activities, social, and emergent situations and Decreased quality of life              Aggravating Factors:  lying down   Relieving Factors:  sitting up    Symptom Description:     Quality:  Aching and Shooting  Severity:  4  Frequency:  continuously    Previous Treatments Tried:   advil & tylenol daily and gabapentin       Treatment     Treatment Principles:  move st qi and blood    Acupuncture points used:  4 GUIDO, Du20, Gb34, Ki3, Ki6, Li11, REN12, TREJO MEN, Sp10, Sp6, Sp9, St25, St36, and YIN CRAMER    Bilateral points:  Unilateral points:  Auricular Treatment:  trejo men    Needles In: 22  Needles Out: 22  [place] W/ STIM placed: 10:10 AM  [place] W/ STIM removed: 10:40 AM      Other Traditional Chinese Medicine Modalities -  heat lamp    Assessment     After treatment, patient felt relaxed. Constipation slightly reduced after reducing a med.    Patient prognosis is Good.     Patient will continue to benefit from acupuncture treatment to address the deficits listed in the problem list box on initial evaluation, provide patient family education and to maximize pt's level of independence in the home and community environment.     Patient's spiritual, cultural and educational needs considered and pt agreeable to plan of care and goals.     Anticipated barriers to treatment: none    Plan      Recommend 1 /week for 1 sessions then re-assess.      Education:  Patient is aware of cumulative benefit of acupuncture

## 2025-01-08 DIAGNOSIS — C50.211 MALIGNANT NEOPLASM OF UPPER-INNER QUADRANT OF RIGHT BREAST IN FEMALE, ESTROGEN RECEPTOR POSITIVE: Primary | ICD-10-CM

## 2025-01-08 DIAGNOSIS — Z17.0 MALIGNANT NEOPLASM OF UPPER-INNER QUADRANT OF RIGHT BREAST IN FEMALE, ESTROGEN RECEPTOR POSITIVE: Primary | ICD-10-CM

## 2025-01-10 ENCOUNTER — OFFICE VISIT (OUTPATIENT)
Dept: PSYCHIATRY | Facility: CLINIC | Age: 55
End: 2025-01-10
Payer: COMMERCIAL

## 2025-01-10 DIAGNOSIS — Z17.0 MALIGNANT NEOPLASM OF UPPER-INNER QUADRANT OF RIGHT BREAST IN FEMALE, ESTROGEN RECEPTOR POSITIVE: Primary | ICD-10-CM

## 2025-01-10 DIAGNOSIS — F43.29 STRESS AND ADJUSTMENT REACTION: ICD-10-CM

## 2025-01-10 DIAGNOSIS — C50.211 MALIGNANT NEOPLASM OF UPPER-INNER QUADRANT OF RIGHT BREAST IN FEMALE, ESTROGEN RECEPTOR POSITIVE: Primary | ICD-10-CM

## 2025-01-10 PROCEDURE — 90837 PSYTX W PT 60 MINUTES: CPT | Mod: S$GLB,,, | Performed by: PSYCHOLOGIST

## 2025-01-10 PROCEDURE — 99999 PR PBB SHADOW E&M-EST. PATIENT-LVL II: CPT | Mod: PBBFAC,,, | Performed by: PSYCHOLOGIST

## 2025-01-10 NOTE — PROGRESS NOTES
INFORMED CONSENT: Patient was identified using two patient-identifiers. The patient has been informed of the risks and benefits associated with engaging in psychotherapy, the handling of protected health information, the rights of privacy and the limits of confidentiality. The patient has also been informed of the importance of reporting any suicidal or homicidal ideation to this or any provider to ensure safety of all parties, and the Louise Cueto expressed understanding. The patient was agreeable to these terms and freely participates in individual psychotherapy.    PSYCHO-ONCOLOGY NOTE/ Individual Psychotherapy     Date: 1/10/2025   Site:  [facility]        Therapeutic Intervention: Met with patient.  Outpatient - Insight oriented psychotherapy 60 min - CPT code 68184      Patient was last seen by me on 12/20/2024    Problem list  Patient Active Problem List   Diagnosis    Liver lesion    Mixed stress and urge urinary incontinence    Abnormal pelvic ultrasound    Status post hysteroscopy with hysteroscopic myomectomy & IUD Removal and Insertion    Chronic right shoulder pain    Right shoulder pain    Diplopia    Esotropia of both eyes    Myopia    Anisometropia    Malignant neoplasm of upper-inner quadrant of right breast in female, estrogen receptor positive    Physical deconditioning    Stress and adjustment reaction    Decreased range of motion of shoulder    Weakness of both upper extremities    At risk for lymphedema    Poor posture    Other low back pain    Alteration in body image       Chief complaint/reason for encounter: adjustment   Met with patient to evaluate psychosocial adaptation to diagnosis/treatment/survivorship of BC    Current Medications  Current Outpatient Medications   Medication    acetaminophen (TYLENOL) 500 MG tablet    anastrozole (ARIMIDEX) 1 mg Tab    ascorbic acid, vitamin C, (VITAMIN C) 1000 MG tablet    b complex vitamins capsule    collagen, bovine, 100 % Powd    ibuprofen  (ADVIL,MOTRIN) 600 MG tablet    INV TESTOSTERONE/ANASTROZOL OR PLACEBO PELLETS    Lactobacillus rhamnosus GG (CULTURELLE) 10 billion cell capsule    magnesium 30 mg Tab    omega-3 fatty acids/fish oil (FISH OIL-OMEGA-3 FATTY ACIDS) 300-1,000 mg capsule    ondansetron (ZOFRAN) 4 MG tablet    oxyCODONE (ROXICODONE) 5 MG immediate release tablet    pregabalin (LYRICA) 75 MG capsule    traMADoL (ULTRAM) 50 mg tablet    valACYclovir (VALTREX) 1000 MG tablet    vitamin D (VITAMIN D3) 1000 units Tab     No current facility-administered medications for this visit.       Objective:  Louise Cueto arrived promptly for the session.   The patient was fully cooperative throughout the session.  Appearance: age appropriate, appropriately  dressed, adequately  groomed  Behavior/Cooperation: friendly and cooperative  Speech: normal in rate, volume, and tone and appropriate quality, quantity and organization of sentences  Mood: steady  Affect: mood congruent  Thought Process: goal-directed, logical  Thought Content: normal,  No delusions or paranoia; did not appear to be responding to internal stimuli during the session  Orientation: grossly intact  Attention Span/Concentration: Attends to session without distraction; reports no difficulty  Insight: patient has awareness of illness; good insight into own behavior and behavior of others  Judgment: the patient's behavior is adequate to circumstances    Interval history and content of current session: Patient now taken  with several side effects: vasomotor, sexual functioning changes, sleep difficulties.  Discussed diagnosis, treatment, prognosis, current adaptation to disease and treatment status, and family's adaptation to disease and treatment status. Reports to be coping in an adequate manner. Evaluated cognitive response, paying particular attention to negative intrusive thoughts of a persistent and detrimental nature. Thoughts of this type are in evidence with mild distress.  Provided cognitive behavioral therapy to address negative cognitions. Identified and evaluated psychosocial and environmental stressors secondary to diagnosis and treatment.  Examined proactive behaviors that may be implemented to minimize or ameliorate psychosocial stressors secondary to diagnosis and treatment.     Risk parameters:   Patient reports no suicidal ideation  Patient reports no homicidal ideation  Patient reports no self-injurious behavior  Patient reports no violent behavior   Safety needs:  None at this time      Verbal deficits: None     Patient's response to intervention:The patient's response to intervention is accepting.     Progress toward goals and other mental status changes:  The patient's progress toward goals is good.      Progress to date:Progress as Expected      Goals from last visit: Met     Patient reported outcomes:    Distress Thermometer:   Distress Score             Practical Problems Physical Problems                                                   Family Problems                                         Emotional Problems                                                         Spiritual/Religions Concerns               Other Problems               PHQ ANSWERS    Q1.    Q2.    Q3.    Q4.    Q5.    Q6.    Q7.    Q8.    Q9.               MARIANGEL-7 Answers        12/31/2024     2:10 PM   GAD7   1. Feeling nervous, anxious, or on edge? 1    2. Not being able to stop or control worrying? 0    3. Worrying too much about different things? 1    4. Trouble relaxing? 1    5. Being so restless that it is hard to sit still? 0    6. Becoming easily annoyed or irritable? 1    7. Feeling afraid as if something awful might happen? 0    8. If you checked off any problems, how difficult have these problems made it for you to do your work, take care of things at home, or get along with other people? 1    MARIANGEL-7 Score 4        Proxy-reported              Client Strengths: verbal, intelligent, successful,  good social support, good insight, commitment to wellness, strong stephanie, strong cultural traditions     Diagnosis:     ICD-10-CM ICD-9-CM   1. Stress and adjustment reaction  F43.29 309.89   2. Malignant neoplasm of upper-inner quadrant of right breast in female, estrogen receptor positive  C50.211 174.2    Z17.0 V86.0       Treatment Plan:individual psychotherapy and medication management by physician  Target symptoms: adjustment  Why chosen therapy is appropriate versus another modality: relevant to diagnosis, patient responds to this modality, evidence based practice  Outcome monitoring methods: self-report, observation, checklist/rating scale  Therapeutic intervention type: insight oriented psychotherapy, behavior modifying psychotherapy  Prognosis: Good      Behavioral goals:   Ref to [facility]  Sleep kit for hot flashes  Sexual Functioning Test    Return to clinic: as scheduled    Next Session:      Length of Service (minutes direct face-to-face contact): 60    Deedee Han, PhD  Clinical Psychologist  [place] License #[id number]  [place] License #[id number]

## 2025-01-12 ENCOUNTER — PATIENT MESSAGE (OUTPATIENT)
Dept: REHABILITATION | Facility: HOSPITAL | Age: 55
End: 2025-01-12
Payer: COMMERCIAL

## 2025-01-14 ENCOUNTER — CLINICAL SUPPORT (OUTPATIENT)
Dept: REHABILITATION | Facility: HOSPITAL | Age: 55
End: 2025-01-14

## 2025-01-14 DIAGNOSIS — M54.59 OTHER LOW BACK PAIN: Primary | ICD-10-CM

## 2025-01-14 PROCEDURE — 97813 ACUP 1/> W/ESTIM 1ST 15 MIN: CPT | Performed by: ACUPUNCTURIST

## 2025-01-14 PROCEDURE — 97814 ACUP 1/> W/ESTIM EA ADDL 15: CPT | Performed by: ACUPUNCTURIST

## 2025-01-15 ENCOUNTER — CLINICAL SUPPORT (OUTPATIENT)
Dept: REHABILITATION | Facility: HOSPITAL | Age: 55
End: 2025-01-15
Payer: COMMERCIAL

## 2025-01-15 DIAGNOSIS — R29.3 POOR POSTURE: ICD-10-CM

## 2025-01-15 DIAGNOSIS — M25.619 DECREASED RANGE OF MOTION OF SHOULDER, UNSPECIFIED LATERALITY: Primary | ICD-10-CM

## 2025-01-15 DIAGNOSIS — Z91.89 AT RISK FOR LYMPHEDEMA: ICD-10-CM

## 2025-01-15 DIAGNOSIS — R29.898 WEAKNESS OF BOTH UPPER EXTREMITIES: ICD-10-CM

## 2025-01-15 PROCEDURE — 97110 THERAPEUTIC EXERCISES: CPT

## 2025-01-15 PROCEDURE — 97112 NEUROMUSCULAR REEDUCATION: CPT

## 2025-01-15 NOTE — PATIENT INSTRUCTIONS
Scapular Retraction (Standing)    With arms at sides, squeeze shoulder blades together. Do not shrug shoulders and do not hold your breath. Hold 5 seconds. Repeat 10 times, 2-3 sets, 2 x day.  Can be completed seated or standing.          Theraband shoulder external rotation    Hold the band out to the front with both hands, elbows at your sides and bent 90 degrees.     Stretch the band apart as far as you can without pain. Keep the elbows in contact with your ribs. Squeeze the shoulder blades together.   Then return to start position.  Do 10 reps per set. Do 3 sets per session. Do 1 sessions per day.          ELASTIC BAND BILATERAL HORIZONTAL ABDUCTION    Start by holding an elastic band in front of your chest with your elbows straight. Then, pull your arms apart and towards the side. Return to starting position and repeat.   Do 10 reps per set. Do 3 sets per session. Do 1 session per day.          ELASTIC BAND ROWS     Holding elastic band with both hands, draw back the band as you bend your elbows. Keep your elbows near the side of your body. Do 10 reps per set. Do 3 sets per session. Do 1 session per day.        ELASTIC BAND EXTENSION BILATERAL SHOULDER    While holding an elastic band with both arms in front of you with your elbows straight, pull the band downwards and back towards your side.  Reps 10 per set. Do 3 sets per session. Do 1 session per day

## 2025-01-15 NOTE — PROGRESS NOTES
Physical Therapy Daily Treatment Note       Date: 1/15/2025   Name: Louise Cueto  Clinic Number: [id number]    Therapy Diagnosis:   Encounter Diagnoses   Name Primary?    Decreased range of motion of shoulder, unspecified laterality Yes    Weakness of both upper extremities     At risk for lymphedema     Poor posture        Physician: Tammie Rodrigues FNP-C    Physician Orders: PT Eval and Treat   Medical Diagnosis from Referral:   Z90.13 (ICD-10-CM) - Status post bilateral mastectomy   M54.59 (ICD-10-CM) - Other low back pain   G62.9 (ICD-10-CM) - Neuropathy   Evaluation Date: 12/11/2024  Authorization Period Expiration: 12/31/2025  Plan of Care Expiration: 2/7/2025  Progress Note Due: 1/8/2025  Visit # / Visits authorized: 2/ 10   FOTO: 1/3    Precautions: Standard and cancer     Time In: 2:30 pm  Time Out: 3:15 pm  Total Appointment Time (timed & untimed codes): 45 minutes      Subjective     Pt reports: feeling tighter since the last fill. She is having increased back pain and muscle stiffness.  She was compliant with home exercise program.  Response to previous treatment: felt fine  Pain Scale: Louise rates pain on a scale of 2/10 on VAS.   Pain location: R axilla, breast bilateral    Fatigue: 2/10  Functional change: she was able to drive herself  Treatment:   Chemotherapy: TBD  Radiation: N/A  Endocrine therapy: TBD  Targeted/immunotherapy: TBD  Surgery date: Bilateral mastectomy (Bilateral, 11/18/2024); Dolliver lymph node biopsy (Right, 11/18/2024); Injection for sentinel node identification (Right, 11/18/2024); Axillary node dissection (Right, 11/18/2024); Insertion of breast tissue expander (Bilateral, 11/18/2024); and injection, agent, intravenous, for assessment of blood flow in flap or graft (Bilateral, 11/18/2024).       Objective     Objective Measures updated at progress report unless specified.     Treatment     Louise received therapeutic exercises  to develop strength, ROM, and flexibility for 25 minutes including:   Pulleys, flex/scap, 2 minutes each  Physioball roll outs, straight, 3 minutes  Butterflies, hands on nose, 1 set x 10 reps  PROM B shoulder flexion, abduction, and ER, 1 set x 10 reps each    Louise participated in neuromuscular re-education activities to improve: Balance, Coordination, Proprioception and Posture for 20 minutes. The following activities were included:  B shoulder ER, no resistance, 1 set x 15 reps  Horizontal abd, no resistance, 1 set x 10 reps  Rows, yellow band, 1 set x 10 reps  Scapula retractions, 1 set x 10 reps      Home Exercise Program and Patient Education   Education provided re:  - role of PT in multi - disciplinary team, goals for PT  - progress towards goals   - keeping her exercises in a pain free    Written Home Exercises Provided: yes.  Exercises were reviewed and Louise was able to demonstrate them prior to the end of the session.  Louise demonstrated good  understanding of the education provided.     See EMR under Patient Instructions for exercises provided 12/19/2024.    Pt has no cultural, educational or language barriers to learning provided.    Assessment     Patient is responding well to physical therapy. She was able to perform all of today's new exercises with no increase in symptoms prior to leaving the clinic. When she was focused on doing diaphragmatic breathing he was able to tolerate PROM by PT during the session. During the session she was encouraged to begin swinging her tennis racket to improve her R shoulder AROM in order to return to playing tennis. Will progress as tolerated    Pt prognosis is Good. Pt will continue to benefit from skilled outpatient physical therapy to address the deficits listed in the problem list chart on initial evaluation, provide pt/family education and to maximize pt's level of independence in the home and community environment.     Anticipated barriers to physical therapy:  none anticipated    Goals as follows:  Short Term goals: 4 weeks  1. Patient will demonstrate 100% understanding of lymphedema risk reduction practices to include self monitoring for lymphedema. (progressing, not met)  2. Patient will demonstrate independence with Home Exercise program established. (progressing, not met)  3. Pt will increase AROM/PROM in shoulder abduction ROM to 90 degrees bilaterally to improve functional reach, carry, push, pull pain free. (progressing, not met)  4. Pt will increase AROM/PROM in shoulder flexion to 120 degrees bilaterally to improve functional reach, carry, push, pull pain free.(progressing, not met)  5. Pt will increase AROM/PROM in shoulder external rotation at 45 degrees abduction to 80 degrees bilaterally to improve functional reach, carry, push, pull pain free.(progressing, not met)     Long Term Goals: 8 weeks   1.  Pt will increase AROM/PROM in shoulder flexion to 180 degrees bilaterally to improve functional reach, carry, push, pull pain free. (progressing, not met)  2. Pt will increase strength to 4+/5 in gross UE musculature to improve tolerance to all functional activities pain free. (progressing, not met)  3. Pt will demonstrate full/maximized tissue mobility to increase ROM and promote healthy tissue to be pain free at discharge. (progressing, not met)  4. Pt will report decrease in overall worst pain to 2/10 at discharge. (progressing, not met)  5. Pt will increase AROM/PROM in shoulder abduction ROM to 150 degrees bilaterally to improve functional reach, carry, push, pull pain free. (progressing, not met)  6.  Pt will increase AROM/PROM in shoulder external rotation ROM to 90 degrees bilaterally to improve functional reach, carry, push, pull pain free.(progressing, not met)  7. Patient will report compliance with walking program 5x week for 20 - 30 min each day to improve overall cardiovascular function and decrease cancer related fatigue at discharge.  (progressing, not met)     Plan     Outpatient physical therapy 2x week for 8 weeks to include the following:   Manual Therapy, Neuromuscular Re-ed, Patient Education, Self Care, Therapeutic Activities, and Therapeutic Exercise.    Therapist: Katie Currie, PT  1/15/2025

## 2025-01-17 ENCOUNTER — OFFICE VISIT (OUTPATIENT)
Dept: PLASTIC SURGERY | Facility: CLINIC | Age: 55
End: 2025-01-17
Payer: COMMERCIAL

## 2025-01-17 VITALS
BODY MASS INDEX: 20.99 KG/M2 | SYSTOLIC BLOOD PRESSURE: 111 MMHG | WEIGHT: 126 LBS | HEIGHT: 65 IN | HEART RATE: 61 BPM | DIASTOLIC BLOOD PRESSURE: 78 MMHG

## 2025-01-17 DIAGNOSIS — Z09 SURGERY FOLLOW-UP: Primary | ICD-10-CM

## 2025-01-17 PROCEDURE — 99999 PR PBB SHADOW E&M-EST. PATIENT-LVL III: CPT | Mod: PBBFAC,,, | Performed by: PHYSICIAN ASSISTANT

## 2025-01-17 PROCEDURE — 99024 POSTOP FOLLOW-UP VISIT: CPT | Mod: S$GLB,,, | Performed by: PHYSICIAN ASSISTANT

## 2025-01-17 PROCEDURE — 1159F MED LIST DOCD IN RCRD: CPT | Mod: CPTII,S$GLB,, | Performed by: PHYSICIAN ASSISTANT

## 2025-01-17 PROCEDURE — 3078F DIAST BP <80 MM HG: CPT | Mod: CPTII,S$GLB,, | Performed by: PHYSICIAN ASSISTANT

## 2025-01-17 PROCEDURE — 3074F SYST BP LT 130 MM HG: CPT | Mod: CPTII,S$GLB,, | Performed by: PHYSICIAN ASSISTANT

## 2025-01-21 ENCOUNTER — PATIENT MESSAGE (OUTPATIENT)
Dept: REHABILITATION | Facility: HOSPITAL | Age: 55
End: 2025-01-21
Payer: COMMERCIAL

## 2025-01-21 ENCOUNTER — OFFICE VISIT (OUTPATIENT)
Dept: HEMATOLOGY/ONCOLOGY | Facility: CLINIC | Age: 55
End: 2025-01-21
Payer: COMMERCIAL

## 2025-01-21 DIAGNOSIS — C50.211 MALIGNANT NEOPLASM OF UPPER-INNER QUADRANT OF RIGHT BREAST IN FEMALE, ESTROGEN RECEPTOR POSITIVE: Primary | ICD-10-CM

## 2025-01-21 DIAGNOSIS — Z79.811 AROMATASE INHIBITOR USE: ICD-10-CM

## 2025-01-21 DIAGNOSIS — Z17.0 MALIGNANT NEOPLASM OF UPPER-INNER QUADRANT OF RIGHT BREAST IN FEMALE, ESTROGEN RECEPTOR POSITIVE: Primary | ICD-10-CM

## 2025-01-21 NOTE — PROGRESS NOTES
[facility]   at [facility] Note:    The patient location is: [place]  The chief complaint leading to consultation is: breast cancer     Visit type: audiovisual    Each patient to whom he or she provides medical services by telemedicine is:  (1) informed of the relationship between the physician and patient and the respective role of any other health care provider with respect to management of the patient; and (2) notified that he or she may decline to receive medical services by telemedicine and may withdraw from such care at any time.    Notes:     Chief Complaint:   Encounter Diagnoses   Name Primary?    Malignant neoplasm of upper-inner quadrant of right breast in female, estrogen receptor positive Yes    Aromatase inhibitor use           Cancer Staging   Malignant neoplasm of upper-inner quadrant of right breast in female, estrogen receptor positive  Staging form: Breast, AJCC 8th Edition  - Pathologic stage from 11/18/2024: Stage IA (pT1c, pN0, cM0, G1, ER+, MO+, HER2-, Oncotype DX score: 24) - Signed by Kaylie Dowd MD on 12/19/2024      HPI:  Luoise Cueto is a 54 y.o. female who presents today breast cancer follow up. She started anastrozole. Taking it in the evening and having a lot of night sweats and insomnia. She is taking magnesium    Oncology history  Patient noted a mass in the right breast October 2024.   Diagnostic mammogram on 10/17/24 showed extremely dense breast tissue without a discrete mass  Ultrasound 10/17/24 demonstrated an irregular hypoechoic mass measuring 10 x 8 x 7 mm in the 1 o'clock position 3 cm from the nipple    Biopsy 10/29/24: IDC, grade 1, ER 90%/ MO 20%/ HER2 2+ (ARCHIE negative); Ki67 3%    Breast MRI (11/1/24) showed a 1 x 1 x 1.6 cm irregularly shaped mass with irregular margins and heterogeneous internal enhancement seen in the upper inner quadrant of the right breast at 1 o'clock in the posterior  depth, 4 cm from the nipple, 1.7 cm from the skin, and 0.2 cm from the chest wall.      Bilateral mastectomy 24: IDC, grade 1, 1.7cm; 0/2 LN+   Oncotype RS 24: RR 10%    Started anastrozole 2024        GYN History:  Age of menarche was 14.   Premenopausal. LMP 2024. Patient admits to hormonal therapy (testosterone pellets, estrogen pellets, and progesterone oral) with Dr Mix, last 2024  Patient is   Age of first live birth was 28. Patient did breast feed.      Patient Active Problem List   Diagnosis    Liver lesion    Mixed stress and urge urinary incontinence    Abnormal pelvic ultrasound    Status post hysteroscopy with hysteroscopic myomectomy & IUD Removal and Insertion    Chronic right shoulder pain    Right shoulder pain    Diplopia    Esotropia of both eyes    Myopia    Anisometropia    Malignant neoplasm of upper-inner quadrant of right breast in female, estrogen receptor positive    Physical deconditioning    Stress and adjustment reaction    Decreased range of motion of shoulder    Weakness of both upper extremities    At risk for lymphedema    Poor posture    Other low back pain    Alteration in body image       Current Outpatient Medications   Medication Instructions    acetaminophen (TYLENOL) 500 mg, Oral, Every 6 hours    anastrozole (ARIMIDEX) 1 mg, Oral, Daily    ascorbic acid (vitamin C) (VITAMIN C) 1,000 mg, Nightly    b complex vitamins capsule 1 capsule, Nightly    collagen, bovine, 100 % Powd Apply topically.    ibuprofen (ADVIL,MOTRIN) 600 mg, Oral, 3 times daily    INV TESTOSTERONE/ANASTROZOL OR PLACEBO PELLETS 1 Pellet    Lactobacillus rhamnosus GG (CULTURELLE) 10 billion cell capsule 1 capsule, Nightly    magnesium 30 mg Tab Nightly    omega-3 fatty acids/fish oil (FISH OIL-OMEGA-3 FATTY ACIDS) 300-1,000 mg capsule 1 capsule, Daily    ondansetron (ZOFRAN) 8 mg, Oral, Every 6 hours PRN    oxyCODONE (ROXICODONE) 5 mg, Oral, Every 6 hours PRN    pregabalin  (LYRICA) 75 mg, Oral, 2 times daily    traMADoL (ULTRAM) 50 mg, Oral, Every 6 hours PRN    valACYclovir (VALTREX) 500 mg, Oral, Every 12 hours    vitamin D (VITAMIN D3) 1,000 Units, Nightly       Review of Systems:   Review of Systems   Constitutional: Negative.    HENT: Negative.     Respiratory: Negative.     Cardiovascular: Negative.    Gastrointestinal: Negative.    Musculoskeletal: Negative.    Neurological: Negative.    All other systems reviewed and are negative.      PHYSICAL EXAM:  There were no vitals taken for this visit.    Telemedicine     Pertinent Labs & Imaging:  Pathology Results  (Last 30 days)      None            No results found for this or any previous visit (from the past 24 hours).    Assessment & Plan:    1. 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 Aromatase inhibitor use      Patient with Stage I ER+ breast cancer with low risk oncotype for patients >49yo (RS 24)  Labs confirmed postmenopausal status  Started on anastrozole with night sweats and insomnia. Recommend changing time of day of medication administration as well as increase magnesium and change time of day of this as well (evening dosing)  Plan to follow up 3 mos in person, but will check in with her about 2 weeks to determine if temporal jeong helped.     Per NCCN Guidelines, breast cancer patients should be followed every 3-12 months for the first five years and then annually thereafter with annual mammography if mastectomy or breast reconstruction was not undertaken. At this time, there is no indication for routine laboratory or imaging in the surveillance for metastatic disease, unless clinical signs or symptoms arise.    Healthy diet, low alcohol intake, and an active lifestyle, with a goal of an ideal BMI (20-25) is also encouraged to reduce the risk of breast cancer occurrences and recurrences, as well as improve side effects to anti-estrogen  medications      Route Chart for Scheduling    Med Onc Chart Routing      Follow up with physician 6 months.   Follow up with LILIAN 3 months.   Infusion scheduling note    Injection scheduling note    Labs None   Scheduling:  Preferred lab:  Lab interval:     Imaging    Pharmacy appointment    Other referrals                       MDM includes  :    - Acute or chronic illness or injury that poses a threat to life or bodily function  - Independent review and explanation of 3+ results from unique tests  - Extensive discussion of treatment and management  - Prescription drug management  - Drug therapy requiring intensive monitoring for toxicity        Kaylie Dowd MD   01/22/2025

## 2025-01-21 NOTE — PROGRESS NOTES
The patient location is: [place]  The chief complaint leading to consultation is: new breast cancer     Visit type: audiovisual    Face to Face time with patient: 34 minutes   42 minutes of total time spent on the encounter, which includes face to face time and non-face to face time preparing to see the patient (eg, review of tests), Obtaining and/or reviewing separately obtained history, Documenting clinical information in the electronic or other health record, Independently interpreting results (not separately reported) and communicating results to the patient/family/caregiver, or Care coordination (not separately reported).     Each patient to whom he or she provides medical services by telemedicine is:  (1) informed of the relationship between the physician and patient and the respective role of any other health care provider with respect to management of the patient; and (2) notified that he or she may decline to receive medical services by telemedicine and may withdraw from such care at any time.    Oncology Nutrition Assessment for Medical Nutrition Therapy  Initial Visit    Louise TAMEZ [place]   1970    Referring Provider:  Tammie Rodrigues FNP-C      Reason for Visit: Nutrition counseling and education     PMHx:   Past Medical History:   Diagnosis Date    Abdominal bloating 06/29/2019    BRCA1 negative     BRCA2 negative     GERD (gastroesophageal reflux disease)     resolved with lap tono    Liver lesion 01/03/2019    Malignant neoplasm of upper-inner quadrant of right female breast        Nutrition Assessment    Patient is a 54 y.o.female with recently diagnosed breast cancer s/p Bilateral mastectomy 11/18/24. Currently on anastrozole. Referred to nutrition through integrative oncology.   She reports that she eats really well and eats most of her meals at home.   Prior to diagnosis she would exercise in the morning then eat when she got home. Still healing from surgery, so she has not been exercising.  "Trying to eat more protein, but finds that her appetite over time has not been as strong including with anastrozole and surgery. Also reports some back pain and all over stiffness since starting endocrine therapy.   Breakfast- Greek yogurt with honey, granola, and fruit   Lunch- leftovers thrown over some kind of salad greens with oil and vinegar added (including protein)  Dinner- protein, vegetables, and some breads/grains (sour dough, pasta)   Limits alcohol-  had cancer twice and they have cut out most alcohol since then   Drinks- water, coffee, tea     Weight:57.2 kg (126 lb)  Height:5' 5" (1.651 m)  BMI:Body mass index is 20.97 kg/m².   IBW: Ideal body weight: 57 kg (125 lb 10.6 oz)  Adjusted ideal body weight: 57.1 kg (125 lb 12.8 oz)    Allergies: Percocet [oxycodone-acetaminophen] and Vicodin [hydrocodone-acetaminophen]    Current Medications:    Current Outpatient Medications:     acetaminophen (TYLENOL) 500 MG tablet, Take 1 tablet (500 mg total) by mouth every 6 (six) hours., Disp: 30 tablet, Rfl: 1    anastrozole (ARIMIDEX) 1 mg Tab, Take 1 tablet (1 mg total) by mouth once daily., Disp: 30 tablet, Rfl: 11    ascorbic acid, vitamin C, (VITAMIN C) 1000 MG tablet, Take 1,000 mg by mouth every evening., Disp: , Rfl:     b complex vitamins capsule, Take 1 capsule by mouth every evening., Disp: , Rfl:     collagen, bovine, 100 % Powd, Apply topically., Disp: , Rfl:     ibuprofen (ADVIL,MOTRIN) 600 MG tablet, Take 1 tablet (600 mg total) by mouth 3 (three) times daily., Disp: 30 tablet, Rfl: 1    INV TESTOSTERONE/ANASTROZOL OR PLACEBO PELLETS, Inject 1 Pellet into the skin. FOR INVESTIGATIONAL USE ONLY, Disp: , Rfl:     Lactobacillus rhamnosus GG (CULTURELLE) 10 billion cell capsule, Take 1 capsule by mouth every evening., Disp: , Rfl:     magnesium 30 mg Tab, Take by mouth every evening., Disp: , Rfl:     omega-3 fatty acids/fish oil (FISH OIL-OMEGA-3 FATTY ACIDS) 300-1,000 mg capsule, Take 1 capsule " by mouth once daily., Disp: , Rfl:     ondansetron (ZOFRAN) 4 MG tablet, Take 2 tablets (8 mg total) by mouth every 6 (six) hours as needed for Nausea., Disp: 30 tablet, Rfl: 1    oxyCODONE (ROXICODONE) 5 MG immediate release tablet, Take 1 tablet (5 mg total) by mouth every 6 (six) hours as needed for Pain., Disp: 10 tablet, Rfl: 0    pregabalin (LYRICA) 75 MG capsule, Take 1 capsule (75 mg total) by mouth 2 (two) times daily., Disp: 60 capsule, Rfl: 0    traMADoL (ULTRAM) 50 mg tablet, Take 1 tablet (50 mg total) by mouth every 6 (six) hours as needed for Pain., Disp: 20 tablet, Rfl: 0    valACYclovir (VALTREX) 1000 MG tablet, Take 0.5 tablets (500 mg total) by mouth every 12 (twelve) hours., Disp: 14 tablet, Rfl: 5    vitamin D (VITAMIN D3) 1000 units Tab, Take 1,000 Units by mouth every evening., Disp: , Rfl:     Vitamins/Supplements: Vitamin D, Magnesium, B complex, collagen peptides, omega 3 fish oil, turmeric     Labs: Reviewed     Cholesterol   Date Value Ref Range Status   02/19/2024 181 120 - 199 mg/dL Final     Comment:     The National Cholesterol Education Program (NCEP) has set the  following guidelines (reference ranges) for Cholesterol:  Optimal.....................<200 mg/dL  Borderline High.............200-239 mg/dL  High........................> or = 240 mg/dL     01/24/2023 227 (H) 120 - 199 mg/dL Final     Comment:     The National Cholesterol Education Program (NCEP) has set the  following guidelines (reference ranges) for Cholesterol:  Optimal.....................<200 mg/dL  Borderline High.............200-239 mg/dL  High........................> or = 240 mg/dL       Hemoglobin A1C   Date Value Ref Range Status   02/19/2024 5.2 4.0 - 5.6 % Final     Comment:     ADA Screening Guidelines:  5.7-6.4%  Consistent with prediabetes  >or=6.5%  Consistent with diabetes    High levels of fetal hemoglobin interfere with the HbA1C  assay. Heterozygous hemoglobin variants (HbS, HgC, etc)do  not  significantly interfere with this assay.   However, presence of multiple variants may affect accuracy.     01/24/2023 5.1 4.0 - 5.6 % Final     Comment:     ADA Screening Guidelines:  5.7-6.4%  Consistent with prediabetes  >or=6.5%  Consistent with diabetes    High levels of fetal hemoglobin interfere with the HbA1C  assay. Heterozygous hemoglobin variants (HbS, HgC, etc)do  not significantly interfere with this assay.   However, presence of multiple variants may affect accuracy.       Vit D, 25-Hydroxy   Date Value Ref Range Status   02/19/2024 107 (H) 30 - 96 ng/mL Final     Comment:     Vitamin D deficiency.........<10 ng/mL                              Vitamin D insufficiency......10-29 ng/mL       Vitamin D sufficiency........> or equal to 30 ng/mL  Vitamin D toxicity............>100 ng/mL     12/12/2018 36 30 - 96 ng/mL Final     Comment:     Vitamin D deficiency.........<10 ng/mL                              Vitamin D insufficiency......10-29 ng/mL       Vitamin D sufficiency........> or equal to 30 ng/mL  Vitamin D toxicity............>100 ng/mL       Nutrition Diagnosis    Problem: Nutrition knowledge deficit   Etiology (related to): lack of prior need for nutrition education  Signs/Symptoms (as evidenced by): new cancer diagnosis and self reported diet questions/concerns     Nutrition Intervention    Nutrition Prescription   1425 Kcals (25kcal/kg)  68 g protein (1.2g/kg)   1425 mL fluid (25mL/kg)    Recommendations:  Supplements:   -take collagen and turmeric separate from anastrozole (AM vs PM)  -verify collagen safety with Dr. Dowd  -Magnesium glycinate 400mg with dinner for hot flashes  -American Ginseng 1000mg daily for fatigue   -avoid chronic use of probiotics     Diet  -continue healthy, pant focused diet   -limit red meat to 1-2 days per week  -increase fatty fish to at least twice per week   -Continue to avoid processed meat   -Continue to limit alcohol     Materials Provided/Reviewed    Supplements and brands     Nutrition Monitoring and Evaluation    Monitor: diet education needs       Goals: continue to ficus on diet quality, gut health     Follow up Patient will follow up via portal as needed with any questions/concerns     Communication to referring provider/care team: note available in chart     Counseling time: 30 Minutes    Marcela Hendrix, MPH, RD, , LDN, FAND  Board Certified Specialist in Oncology Nutrition   [phone]

## 2025-01-22 ENCOUNTER — CLINICAL SUPPORT (OUTPATIENT)
Dept: HEMATOLOGY/ONCOLOGY | Facility: CLINIC | Age: 55
End: 2025-01-22
Payer: COMMERCIAL

## 2025-01-22 VITALS — HEIGHT: 65 IN | WEIGHT: 126 LBS | BODY MASS INDEX: 20.99 KG/M2

## 2025-01-22 DIAGNOSIS — Z17.0 MALIGNANT NEOPLASM OF UPPER-INNER QUADRANT OF RIGHT BREAST IN FEMALE, ESTROGEN RECEPTOR POSITIVE: ICD-10-CM

## 2025-01-22 DIAGNOSIS — C50.211 MALIGNANT NEOPLASM OF UPPER-INNER QUADRANT OF RIGHT BREAST IN FEMALE, ESTROGEN RECEPTOR POSITIVE: ICD-10-CM

## 2025-01-22 DIAGNOSIS — Z71.3 NUTRITIONAL COUNSELING: Primary | ICD-10-CM

## 2025-01-22 PROCEDURE — 97802 MEDICAL NUTRITION INDIV IN: CPT | Mod: 95,,, | Performed by: DIETITIAN, REGISTERED

## 2025-01-23 NOTE — PROGRESS NOTES
Louise Cueto [facility] for a follow up visit status post bilateral mastectomy with tissue expander placement on 11/18/24. She has 300cc Artoura tissue expanders filled with 240ccs saline. She is happy with the size. The expanders are uncomfortable. Looking forward to second stage.     PHYSICAL EXAMINATION  Bilateral breast incisions well healed with no issues.   Bilateral tissue expanders in place  Nipples are well perfused         ASSESSMENT/PLAN  Louise Cueto is s/p bilateral NSM with expanders  - Happy with size, good result, expansion complete  - Second stage is scheduled - we will see her back for a preop visit  - Continue scar care and moisturizing  - No activity restrictions     All questions were answered. The patient was advised to contact the clinic with any questions or concerns prior to their next visit.         Rosie Bradshaw PA-C  Plastic and Reconstructive Surgery

## 2025-01-24 NOTE — PROGRESS NOTES
Acupuncture Evaluation Note     Name: Louise Cueto  Clinic Number: [id number]    Traditional Chinese Medicine (TCM) Diagnosis: Qi Stagnation and Blood Stasis  Medical Diagnosis:   Encounter Diagnosis   Name Primary?    Other low back pain Yes          Evaluation Date: 1/14/2025    Visit #/Visits authorized: self pay - visit 6    Precautions: Standard    Subjective     Chief Concern: hot flashes and night sweats    Medical necessity is demonstrated by the following IMPAIRMENTS: Medical Necessity: Decreased mobility limits day to day activities, social, and emergent situations and Decreased quality of life              Aggravating Factors:  lying down   Relieving Factors:  sitting up    Symptom Description:     Quality:  Aching and Shooting  Severity:  4  Frequency:  continuously    Previous Treatments Tried:   advil & tylenol daily and gabapentin       Treatment     Treatment Principles:  move st qi and blood    Acupuncture points used:  4 GUIDO, Du20, Gb34, Ki3, Ki6, Li11, REN12, TREJO MEN, Sp10, Sp6, Sp9, St25, St36, and YIN CRAMER    Bilateral points:  Unilateral points:  Auricular Treatment:  trejo men    Needles In: 22  Needles Out: 22  [place] W/ STIM placed: 4:15 PM  Needles W/ STIM removed: 4:45 PM      Other Traditional Chinese Medicine Modalities -  heat lamp    Assessment     After treatment, patient is feeling not much of a change. Continue with care and eval    Patient prognosis is Good.     Patient will continue to benefit from acupuncture treatment to address the deficits listed in the problem list box on initial evaluation, provide patient family education and to maximize pt's level of independence in the home and community environment.     Patient's spiritual, cultural and educational needs considered and pt agreeable to plan of care and goals.     Anticipated barriers to treatment: none    Plan     Recommend 1 /week for 4 sessions then re-assess.      Education:  Patient is aware of cumulative benefit  of acupuncture

## 2025-01-28 ENCOUNTER — CLINICAL SUPPORT (OUTPATIENT)
Dept: REHABILITATION | Facility: HOSPITAL | Age: 55
End: 2025-01-28

## 2025-01-28 DIAGNOSIS — R23.2 HOT FLASHES RELATED TO AROMATASE INHIBITOR THERAPY: Primary | ICD-10-CM

## 2025-01-28 DIAGNOSIS — T45.1X5A HOT FLASHES RELATED TO AROMATASE INHIBITOR THERAPY: Primary | ICD-10-CM

## 2025-01-28 PROCEDURE — 97813 ACUP 1/> W/ESTIM 1ST 15 MIN: CPT | Performed by: ACUPUNCTURIST

## 2025-01-28 PROCEDURE — 97814 ACUP 1/> W/ESTIM EA ADDL 15: CPT | Performed by: ACUPUNCTURIST

## 2025-01-28 NOTE — PROGRESS NOTES
Acupuncture Evaluation Note     Name: Louise Cueto  Clinic Number: [id number]    Traditional Chinese Medicine (TCM) Diagnosis: Qi Stagnation and Blood Stasis  Medical Diagnosis:   Encounter Diagnosis   Name Primary?    Hot flashes related to aromatase inhibitor therapy Yes          Evaluation Date: 1/28/2025    Visit #/Visits authorized: self pay - visit 7    Precautions: Standard    Subjective     Chief Concern: hot flashes and night sweats    Medical necessity is demonstrated by the following IMPAIRMENTS: Medical Necessity: Decreased mobility limits day to day activities, social, and emergent situations and Decreased quality of life              Aggravating Factors:  lying down   Relieving Factors:  sitting up    Symptom Description:     Quality:  Aching and Shooting  Severity:  4  Frequency:  continuously    Previous Treatments Tried:   advil & tylenol daily and gabapentin       Treatment     Treatment Principles:  move st qi and blood    Acupuncture points used:  4 GUIDO, Du20, Gb34, Ki3, Ki6, Li11, REN12, TREJO MEN, Sp10, Sp6, Sp9, St25, St36, and YIN CRAMER    Bilateral points:  Unilateral points:  Auricular Treatment:  trejo men    Needles In: 22  Needles Out: 22  [place] W/ STIM placed: 4:15 PM  Needles W/ STIM removed: 4:45 PM      Other Traditional Chinese Medicine Modalities -  heat lamp    Assessment     After treatment, patient is feeling not much of a change. Continue with care and eval    Patient prognosis is Good.     Patient will continue to benefit from acupuncture treatment to address the deficits listed in the problem list box on initial evaluation, provide patient family education and to maximize pt's level of independence in the home and community environment.     Patient's spiritual, cultural and educational needs considered and pt agreeable to plan of care and goals.     Anticipated barriers to treatment: none    Plan     Recommend 1 /week for 3 sessions then re-assess.      Education:  Patient  is aware of cumulative benefit of acupuncture

## 2025-02-06 ENCOUNTER — PATIENT MESSAGE (OUTPATIENT)
Dept: PLASTIC SURGERY | Facility: CLINIC | Age: 55
End: 2025-02-06
Payer: COMMERCIAL

## 2025-02-06 ENCOUNTER — PATIENT MESSAGE (OUTPATIENT)
Dept: HEMATOLOGY/ONCOLOGY | Facility: CLINIC | Age: 55
End: 2025-02-06
Payer: COMMERCIAL

## 2025-02-07 DIAGNOSIS — Z79.811 AROMATASE INHIBITOR USE: Primary | ICD-10-CM

## 2025-02-07 DIAGNOSIS — R23.2 HOT FLASHES: ICD-10-CM

## 2025-02-07 RX ORDER — OXYBUTYNIN CHLORIDE 5 MG/1
5 TABLET, EXTENDED RELEASE ORAL DAILY
Qty: 30 TABLET | Refills: 11 | Status: SHIPPED | OUTPATIENT
Start: 2025-02-07 | End: 2026-02-07

## 2025-02-11 ENCOUNTER — CLINICAL SUPPORT (OUTPATIENT)
Dept: REHABILITATION | Facility: HOSPITAL | Age: 55
End: 2025-02-11

## 2025-02-11 DIAGNOSIS — T45.1X5A HOT FLASHES RELATED TO AROMATASE INHIBITOR THERAPY: Primary | ICD-10-CM

## 2025-02-11 DIAGNOSIS — R23.2 HOT FLASHES RELATED TO AROMATASE INHIBITOR THERAPY: Primary | ICD-10-CM

## 2025-02-11 PROCEDURE — 97813 ACUP 1/> W/ESTIM 1ST 15 MIN: CPT | Performed by: ACUPUNCTURIST

## 2025-02-11 PROCEDURE — 97814 ACUP 1/> W/ESTIM EA ADDL 15: CPT | Performed by: ACUPUNCTURIST

## 2025-02-11 NOTE — PROGRESS NOTES
Acupuncture Evaluation Note     Name: Louise Cueto  Clinic Number: [id number]    Traditional Chinese Medicine (TCM) Diagnosis: Qi Stagnation and Blood Stasis  Medical Diagnosis:   Encounter Diagnosis   Name Primary?    Hot flashes related to aromatase inhibitor therapy Yes            Evaluation Date: 2/11/2025    Visit #/Visits authorized: self pay - visit 8    Precautions: Standard    Subjective     Chief Concern: hot flashes and night sweats    Medical necessity is demonstrated by the following IMPAIRMENTS: Medical Necessity: Decreased mobility limits day to day activities, social, and emergent situations and Decreased quality of life              Aggravating Factors:  lying down   Relieving Factors:  sitting up    Symptom Description:     Quality:  Aching and Shooting  Severity:  4  Frequency:  continuously    Previous Treatments Tried:   advil & tylenol daily and gabapentin       Treatment     Treatment Principles:  move st qi and blood    Acupuncture points used:  4 GUIDO, Du20, Gb34, Ki3, Ki6, Li11, REN12, TREJO MEN, Sp10, Sp6, Sp9, St25, St36, and YIN CRAMER    Bilateral points:  Unilateral points:  Auricular Treatment:  trejo men    Needles In: 22  Needles Out: 22  [place] W/ STIM placed: 3:30 PM  Needles W/ STIM removed: 4:00 PM      Other Traditional Chinese Medicine Modalities -  heat lamp    Assessment     After treatment, patient is feeling not much of a change. Continue with care as needed    Patient prognosis is Good.     Patient will continue to benefit from acupuncture treatment to address the deficits listed in the problem list box on initial evaluation, provide patient family education and to maximize pt's level of independence in the home and community environment.     Patient's spiritual, cultural and educational needs considered and pt agreeable to plan of care and goals.     Anticipated barriers to treatment: none    Plan     Recommend as needed    Education:  Patient is aware of cumulative  benefit of acupuncture

## 2025-02-12 ENCOUNTER — TELEPHONE (OUTPATIENT)
Dept: HEMATOLOGY/ONCOLOGY | Facility: CLINIC | Age: 55
End: 2025-02-12
Payer: COMMERCIAL

## 2025-02-12 NOTE — NURSING
LOCO Urbina phoned patient to coordinate a virtual appt with Dr. Chen on Friday 2/14/25 at 11:30am to discuss menopausal symptoms on AI. Virtual access confirmed. Patient confirmed her availability. LOCO Morales.

## 2025-02-13 NOTE — NURSING
RN Carlitos conducted chart review for clinic preparations including intake, barriers to care and opportunities for greater evaluation/recommendations by provider, Andrew Ayoub RN.

## 2025-02-14 ENCOUNTER — TELEPHONE (OUTPATIENT)
Dept: OBSTETRICS AND GYNECOLOGY | Facility: CLINIC | Age: 55
End: 2025-02-14
Payer: COMMERCIAL

## 2025-02-14 ENCOUNTER — OFFICE VISIT (OUTPATIENT)
Dept: HEMATOLOGY/ONCOLOGY | Facility: CLINIC | Age: 55
End: 2025-02-14
Payer: COMMERCIAL

## 2025-02-14 ENCOUNTER — LAB VISIT (OUTPATIENT)
Dept: LAB | Facility: OTHER | Age: 55
End: 2025-02-14
Attending: OBSTETRICS & GYNECOLOGY
Payer: COMMERCIAL

## 2025-02-14 DIAGNOSIS — N95.1 MENOPAUSAL SYMPTOM: ICD-10-CM

## 2025-02-14 DIAGNOSIS — Z79.811 LONG TERM (CURRENT) USE OF AROMATASE INHIBITORS: ICD-10-CM

## 2025-02-14 DIAGNOSIS — N89.8 VAGINAL DRYNESS: Primary | ICD-10-CM

## 2025-02-14 DIAGNOSIS — Z17.0 MALIGNANT NEOPLASM OF UPPER-INNER QUADRANT OF RIGHT BREAST IN FEMALE, ESTROGEN RECEPTOR POSITIVE: ICD-10-CM

## 2025-02-14 DIAGNOSIS — C50.211 MALIGNANT NEOPLASM OF UPPER-INNER QUADRANT OF RIGHT BREAST IN FEMALE, ESTROGEN RECEPTOR POSITIVE: ICD-10-CM

## 2025-02-14 DIAGNOSIS — M54.59 OTHER LOW BACK PAIN: ICD-10-CM

## 2025-02-14 LAB — ESTRADIOL SERPL-MCNC: 34 PG/ML

## 2025-02-14 PROCEDURE — 84402 ASSAY OF FREE TESTOSTERONE: CPT | Performed by: OBSTETRICS & GYNECOLOGY

## 2025-02-14 PROCEDURE — 82670 ASSAY OF TOTAL ESTRADIOL: CPT | Performed by: OBSTETRICS & GYNECOLOGY

## 2025-02-14 PROCEDURE — 84403 ASSAY OF TOTAL TESTOSTERONE: CPT | Performed by: OBSTETRICS & GYNECOLOGY

## 2025-02-14 RX ORDER — ESTRADIOL 10 UG/1
10 INSERT VAGINAL
Qty: 8 EACH | Refills: 11 | Status: SHIPPED | OUTPATIENT
Start: 2025-02-17

## 2025-02-14 NOTE — PROGRESS NOTES
Televisit Information  The patient location is: [place]  The chief complaint leading to consultation is:  Menopausal Symptoms, side effects of aromatase inhibitor  Visit type: Virtual Visit   Face-to-face time with patient:  Approximately 45 minutes.     Approximately 245 minutes in total were spent on this encounter, which includes face-to-face time and non-face-to-face time preparing to see the patient (e.g., review of records and tests), obtaining and/or reviewing separately obtained history, documenting clinical information in the electronic or other health record, independently interpreting results (not separately reported) and communicating results to the patient/family/caregiver, or care coordination (not separately reported).  Each patient to whom he or she provides medical services by telemedicine is:  (1) informed of the relationship between the physician and patient and the respective role of any other health care provider with respect to management of the patient; and (2) notified that he or she may decline to receive medical services by telemedicine and may withdraw from such care at any time.    [facility] Follow up Visit    Mrs. Louise Cueto is a 53yo post-menopausal female who presents virtually for follow-up to [facility] for side effects related to aromatase-inhibitor therapy. She notes insomnia and night sweats. Recently prescribed Ditropan for symptoms.     Mrs. 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 She is s/p bilateral mastectomy with reconstruction 11/18/24. Oncotype returned at 22. Started Anastrazole December 19th of 2024.   Prior to cancer diagnosis she was on Estrogen and testosterone pellets and Prometrium.     Pertinent history of GERD and liver lesion right hepatic lobe. Family history of colon cancer and breast cancer. Her father had an MI at 50.  She reports she is not sleeping because of night sweats. She is doing  "acupuncture- severity of night sweats is not as bad but "they still happen and still wake me up." Sometimes she is able to go back to sleep but 2-3 tmes per week she stays away for 2 hours at night. She also has difficulty sleeping because of back pain- she cannot sleep on her side because of tissue expanders. She is due for surgery on April 4  She reports vaginal dryness- arousal takes longer and orgasm is harder. She does not have pain with sex but she reports no interest. This bothers her. She also reports brain fog and joint pain.   She is doing acupuncture and PT for ROM. She has done OT yoga and has seen RD  She reports that she cannot lift weigths. She does walk 45 minutes daily.   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, estrogen receptor positive  Staging form: Breast, AJCC 8th Edition  - Pathologic stage from 11/18/2024: Stage IA (pT1c, pN0, cM0, G1, ER+, OH+, HER2-, Oncotype DX score: 24) - Signed by Kaylie Dowd MD on 12/19/2024       Oncology History   Malignant neoplasm of upper-inner quadrant of right breast in female, estrogen receptor positive   11/10/2024 Initial Diagnosis    Malignant neoplasm of upper-inner quadrant of right breast in female, estrogen receptor positive     11/18/2024 Cancer Staged    Staging form: Breast, AJCC 8th Edition  - Pathologic stage from 11/18/2024: Stage IA (pT1c, pN0, cM0, G1, ER+, OH+, HER2-, Oncotype DX score: 24)           Past Medical History:   Diagnosis Date    Abdominal bloating 06/29/2019    BRCA1 negative     BRCA2 negative     GERD (gastroesophageal reflux disease)     resolved with lap tono    Liver lesion 01/03/2019    Malignant neoplasm of upper-inner quadrant of right female breast         Current Outpatient Medications   Medication Instructions    acetaminophen (TYLENOL) 500 mg, Oral, Every " 6 hours    anastrozole (ARIMIDEX) 1 mg, Oral, Daily    ascorbic acid (vitamin C) (VITAMIN C) 1,000 mg, Nightly    b complex vitamins capsule 1 capsule, Nightly    collagen, bovine, 100 % Powd Apply topically.    ibuprofen (ADVIL,MOTRIN) 600 mg, Oral, 3 times daily    INV TESTOSTERONE/ANASTROZOL OR PLACEBO PELLETS 1 Pellet    Lactobacillus rhamnosus GG (CULTURELLE) 10 billion cell capsule 1 capsule, Nightly    magnesium 30 mg Tab Nightly    omega-3 fatty acids/fish oil (FISH OIL-OMEGA-3 FATTY ACIDS) 300-1,000 mg capsule 1 capsule, Daily    ondansetron (ZOFRAN) 8 mg, Oral, Every 6 hours PRN    oxybutynin (DITROPAN-XL) 5 mg, Oral, Daily    oxyCODONE (ROXICODONE) 5 mg, Oral, Every 6 hours PRN    pregabalin (LYRICA) 75 mg, Oral, 2 times daily    traMADoL (ULTRAM) 50 mg, Oral, Every 6 hours PRN    valACYclovir (VALTREX) 500 mg, Oral, Every 12 hours    vitamin D (VITAMIN D3) 1,000 Units, Nightly        Past Surgical History:   Procedure Laterality Date    AXILLARY NODE DISSECTION Right 2024    Procedure: LYMPHADENECTOMY, AXILLARY / POSSIBLE RIGHT;  Surgeon: Kassi Mahmood MD;  Location: [facility];  Service: General;  Laterality: Right;    BILATERAL MASTECTOMY Bilateral 2024    Procedure: MASTECTOMY, BILATERAL;  Surgeon: Kassi Mahmood MD;  Location: [facility];  Service: General;  Laterality: Bilateral;  HIGH / TECH AND FROZEN    BREAST CYST EXCISION Left     18 y/o f     SECTION      CHOLECYSTECTOMY      45 year old    COLONOSCOPY N/A 2017    Procedure: COLONOSCOPY;  Surgeon: Andrews Sears MD;  Location: [facility] ([facility]  Service: Endoscopy;  Laterality: N/A;    COLONOSCOPY N/A 2022    Procedure: COLONOSCOPY;  Surgeon: Andrews Sears MD;  [facility]O ([facility]);  Service: Endoscopy;  Laterality: N/A;  Fully Vaccinated.EC    CYSTOSCOPY N/A 2019    Procedure: CYSTOSCOPY;  Surgeon: Debbie Murphy MD;  Location: [facility];  Service: OB/GYN;  Laterality: N/A;    EYE  SURGERY  10/24/2023    strabismus surgery  bilateral    HYSTEROSCOPY N/A 09/17/2019    Procedure: HYSTEROSCOPY-removal of IUD; possible shaving of uterine fibroids if needed to insert new IUD;  Surgeon: Debbie Murphy MD;  Location: [facility];  Service: OB/GYN;  Laterality: N/A;    INJECTION FOR SENTINEL NODE IDENTIFICATION Right 11/18/2024    Procedure: INJECTION, FOR SENTINEL NODE IDENTIFICATION;  Surgeon: Kassi Mahmood MD;  [facility];  Service: General;  Laterality: Right;    INJECTION, AGENT, INTRAVENOUS, FOR ASSESSMENT OF BLOOD FLOW IN FLAP OR GRAFT Bilateral 11/18/2024    Procedure: INJECTION, AGENT, INTRAVENOUS, FOR ASSESSMENT OF BLOOD FLOW IN FLAP OR GRAFT;  Surgeon: Amandeep Noel DO;  Location: [facility];  Service: General;  Laterality: Bilateral;    INSERTION OF BREAST TISSUE EXPANDER Bilateral 11/18/2024    Procedure: INSERTION, TISSUE EXPANDER, BREAST;  Surgeon: Amandeep Noel DO;  Location: [facility];  Service: General;  Laterality: Bilateral;    INTRAUTERINE DEVICE INSERTION N/A 09/17/2019    Procedure: IUD insertion-- mirena-- would prefer kyleena if available;  Surgeon: Debbie Murphy MD;  Location: [facility];  Service: OB/GYN;  Laterality: N/A;    SENTINEL LYMPH NODE BIOPSY Right 11/18/2024    Procedure: BIOPSY, LYMPH NODE, SENTINEL;  Surgeon: Kassi Mahmood MD;  Location: [facility];  Service: General;  Laterality: Right;            Physical Exam   There were no vitals taken for this visit.   Wt Readings from Last 3 Encounters:   01/22/25 57.2 kg (126 lb)   01/17/25 57.2 kg (126 lb)   01/02/25 57.6 kg (126 lb 15.8 oz)     Temp Readings from Last 3 Encounters:   12/19/24 98 °F (36.7 °C) (Oral)   11/18/24 97.9 °F (36.6 °C) (Oral)   11/13/24 98 °F (36.7 °C) (Temporal)     BP Readings from Last 3 Encounters:   01/17/25 111/78   01/02/25 115/60   12/19/24 109/71     Pulse Readings from Last 3 Encounters:   01/17/25 61   01/02/25 75   12/19/24 68      Body mass index is 20.8  Prior  Vital Signs reviewed. Physical Exam deferred for Virtual Visit today.    Constitutional:       General: Patient is not in acute distress.     Appearance: Normal appearance.     Psychiatric:         Mood and Affect: Mood normal.         Behavior: Behavior normal.         Thought Content: Thought content normal.         Judgment: Judgment normal.      Review of Systems:   Cardiac:           No SOB, chest pain with exertion,edema, orthopnea  Distress:          No excessive sadness, no hopelessness, no anhedonia, no excessive worry or nervousness  Cognitive:        No trouble with memory, no difficulty paying attention,+ brain fog, no trouble functioning with work or home life  Fatigue:           Energy level adequate, performing ADL's, no morning fatigue                           Fatigue  1  / 10  ( Scale 0 - 10)   Hormonal:       + hot flashes, +night sweats  Pain:                Has  pain,  location:back                         Neuropathy:    No numbness, no tingling, no paresthesia   Sleep:              No difficulty falling asleep, + waking up in night, no daytime sleepiness, no snoring  Altered function:          No problems with money management,  no problems with daily organization & planning, +brain fog  Weight:           No concerns with weight       Labs:   Lab Results   Component Value Date    WBC 6.42 11/13/2024    HGB 14.6 11/13/2024    HCT 42.0 11/13/2024    MCV 88 11/13/2024     11/13/2024           Hemoglobin A1C   Date Value Ref Range Status   02/19/2024 5.2 4.0 - 5.6 % Final     Comment:     ADA Screening Guidelines:  5.7-6.4%  Consistent with prediabetes  >or=6.5%  Consistent with diabetes    High levels of fetal hemoglobin interfere with the HbA1C  assay. Heterozygous hemoglobin variants (HbS, HgC, etc)do  not significantly interfere with this assay.   However, presence of multiple variants may affect accuracy.     01/24/2023 5.1 4.0 - 5.6 % Final     Comment:     ADA Screening  Guidelines:  5.7-6.4%  Consistent with prediabetes  >or=6.5%  Consistent with diabetes    High levels of fetal hemoglobin interfere with the HbA1C  assay. Heterozygous hemoglobin variants (HbS, HgC, etc)do  not significantly interfere with this assay.   However, presence of multiple variants may affect accuracy.     05/02/2016 5.3 4.5 - 6.2 % Final      T3,T4,T7 and TSH   Vitamin d level  Vitamin b-12       Assessment:     Right Breast Cancer  Long-term Aromatase Inhibitor Use  Menopausal symptoms  Mechanical low back pain  Vaginal dryness  Plan     Continue Vitamin D3  Counseled her on safety and efficacy of testosterone in setting of AI use. Counseled her that it may improve vasomotor symptoms, brain fog, libido and joint pain. Discussed mode of delivery - injection or pellet. Would start with Injection  Will get baseline labs and discuss with Dr. Dowd.   Counseled her on the safety and efficacy of intravaginal estrogen - Imvexxy sent to her pharmacy. Reviewed use of vaginal moisturizer and lubricant.     Total time 45 minutes- face to face, review of medical record and arranging follow up    Lat entry  Discussed with Dr. Dowd and she approves of use of testosterone  Called patient to inform her of that. Will also leave samples of Imvexxy at my office as well as information on Lubricant and moisturizer

## 2025-02-14 NOTE — TELEPHONE ENCOUNTER
----- Message from Nat Chen MD sent at 2/14/2025 12:11 PM CST -----  Can you reach out to her about a 3 month follow up at [facility] please. Does she need a WWE- can you ask her please. She had been seeing GYN on [facility]. Happy to see her   She is coming for Imvexxy samples and GCL info later today  Thank you

## 2025-02-15 LAB — TESTOST SERPL-MCNC: 29 NG/DL (ref 5–73)

## 2025-02-17 ENCOUNTER — RESULTS FOLLOW-UP (OUTPATIENT)
Dept: OBSTETRICS AND GYNECOLOGY | Facility: CLINIC | Age: 55
End: 2025-02-17
Payer: COMMERCIAL

## 2025-02-17 DIAGNOSIS — N95.1 MENOPAUSAL SYMPTOM: Primary | ICD-10-CM

## 2025-02-17 LAB — TESTOST FREE SERPL-MCNC: 0.5 PG/ML

## 2025-02-17 RX ORDER — TESTOSTERONE CYPIONATE 200 MG/ML
50 INJECTION, SOLUTION INTRAMUSCULAR
Status: SHIPPED | OUTPATIENT
Start: 2025-02-18 | End: 2025-06-10

## 2025-02-18 ENCOUNTER — CLINICAL SUPPORT (OUTPATIENT)
Dept: INTERNAL MEDICINE | Facility: CLINIC | Age: 55
End: 2025-02-18
Payer: COMMERCIAL

## 2025-02-18 ENCOUNTER — LAB VISIT (OUTPATIENT)
Dept: INTERNAL MEDICINE | Facility: CLINIC | Age: 55
End: 2025-02-18
Payer: COMMERCIAL

## 2025-02-18 ENCOUNTER — OFFICE VISIT (OUTPATIENT)
Dept: INTERNAL MEDICINE | Facility: CLINIC | Age: 55
End: 2025-02-18
Payer: COMMERCIAL

## 2025-02-18 ENCOUNTER — PATIENT MESSAGE (OUTPATIENT)
Dept: REHABILITATION | Facility: HOSPITAL | Age: 55
End: 2025-02-18
Payer: COMMERCIAL

## 2025-02-18 VITALS
HEART RATE: 76 BPM | BODY MASS INDEX: 20.95 KG/M2 | DIASTOLIC BLOOD PRESSURE: 58 MMHG | HEIGHT: 65 IN | SYSTOLIC BLOOD PRESSURE: 98 MMHG | OXYGEN SATURATION: 99 % | WEIGHT: 125.75 LBS

## 2025-02-18 DIAGNOSIS — C50.211 MALIGNANT NEOPLASM OF UPPER-INNER QUADRANT OF RIGHT BREAST IN FEMALE, ESTROGEN RECEPTOR POSITIVE: ICD-10-CM

## 2025-02-18 DIAGNOSIS — F51.09 SITUATIONAL INSOMNIA: ICD-10-CM

## 2025-02-18 DIAGNOSIS — R52 BODY ACHES: Primary | ICD-10-CM

## 2025-02-18 DIAGNOSIS — J04.0 LARYNGITIS: ICD-10-CM

## 2025-02-18 DIAGNOSIS — Z00.00 ANNUAL PHYSICAL EXAM: Primary | ICD-10-CM

## 2025-02-18 DIAGNOSIS — M54.9 MID BACK PAIN: ICD-10-CM

## 2025-02-18 DIAGNOSIS — Z17.0 MALIGNANT NEOPLASM OF UPPER-INNER QUADRANT OF RIGHT BREAST IN FEMALE, ESTROGEN RECEPTOR POSITIVE: ICD-10-CM

## 2025-02-18 LAB
25(OH)D3+25(OH)D2 SERPL-MCNC: 58 NG/ML (ref 30–96)
ALBUMIN SERPL BCP-MCNC: 3.9 G/DL (ref 3.5–5.2)
ALP SERPL-CCNC: 71 U/L (ref 40–150)
ALT SERPL W/O P-5'-P-CCNC: 13 U/L (ref 10–44)
ANION GAP SERPL CALC-SCNC: 8 MMOL/L (ref 8–16)
AST SERPL-CCNC: 31 U/L (ref 10–40)
BASOPHILS # BLD AUTO: 0.06 K/UL (ref 0–0.2)
BASOPHILS NFR BLD: 0.5 % (ref 0–1.9)
BILIRUB SERPL-MCNC: 0.3 MG/DL (ref 0.1–1)
BUN SERPL-MCNC: 10 MG/DL (ref 6–20)
CALCIUM SERPL-MCNC: 9.7 MG/DL (ref 8.7–10.5)
CHLORIDE SERPL-SCNC: 107 MMOL/L (ref 95–110)
CHOLEST SERPL-MCNC: 191 MG/DL (ref 120–199)
CHOLEST/HDLC SERPL: 3.3 {RATIO} (ref 2–5)
CO2 SERPL-SCNC: 25 MMOL/L (ref 23–29)
CREAT SERPL-MCNC: 0.7 MG/DL (ref 0.5–1.4)
CTP QC/QA: YES
CTP QC/QA: YES
DIFFERENTIAL METHOD BLD: ABNORMAL
EOSINOPHIL # BLD AUTO: 0.4 K/UL (ref 0–0.5)
EOSINOPHIL NFR BLD: 3.7 % (ref 0–8)
ERYTHROCYTE [DISTWIDTH] IN BLOOD BY AUTOMATED COUNT: 12.8 % (ref 11.5–14.5)
EST. GFR  (NO RACE VARIABLE): >60 ML/MIN/1.73 M^2
ESTIMATED AVG GLUCOSE: 103 MG/DL (ref 68–131)
GLUCOSE SERPL-MCNC: 94 MG/DL (ref 70–110)
HBA1C MFR BLD: 5.2 % (ref 4–5.6)
HCT VFR BLD AUTO: 40.1 % (ref 37–48.5)
HDLC SERPL-MCNC: 58 MG/DL (ref 40–75)
HDLC SERPL: 30.4 % (ref 20–50)
HGB BLD-MCNC: 13.6 G/DL (ref 12–16)
IMM GRANULOCYTES # BLD AUTO: 0.04 K/UL (ref 0–0.04)
IMM GRANULOCYTES NFR BLD AUTO: 0.4 % (ref 0–0.5)
LDLC SERPL CALC-MCNC: 124.2 MG/DL (ref 63–159)
LYMPHOCYTES # BLD AUTO: 1.6 K/UL (ref 1–4.8)
LYMPHOCYTES NFR BLD: 14.1 % (ref 18–48)
MCH RBC QN AUTO: 30.8 PG (ref 27–31)
MCHC RBC AUTO-ENTMCNC: 33.9 G/DL (ref 32–36)
MCV RBC AUTO: 91 FL (ref 82–98)
MONOCYTES # BLD AUTO: 0.6 K/UL (ref 0.3–1)
MONOCYTES NFR BLD: 5.6 % (ref 4–15)
NEUTROPHILS # BLD AUTO: 8.4 K/UL (ref 1.8–7.7)
NEUTROPHILS NFR BLD: 75.7 % (ref 38–73)
NONHDLC SERPL-MCNC: 133 MG/DL
NRBC BLD-RTO: 0 /100 WBC
PLATELET # BLD AUTO: 364 K/UL (ref 150–450)
PMV BLD AUTO: 10.1 FL (ref 9.2–12.9)
POC MOLECULAR INFLUENZA A AGN: NEGATIVE
POC MOLECULAR INFLUENZA B AGN: NEGATIVE
POTASSIUM SERPL-SCNC: 4.1 MMOL/L (ref 3.5–5.1)
PROT SERPL-MCNC: 7.4 G/DL (ref 6–8.4)
RBC # BLD AUTO: 4.41 M/UL (ref 4–5.4)
SARS-COV-2 RDRP RESP QL NAA+PROBE: NEGATIVE
SODIUM SERPL-SCNC: 140 MMOL/L (ref 136–145)
TRIGL SERPL-MCNC: 44 MG/DL (ref 30–150)
TSH SERPL DL<=0.005 MIU/L-ACNC: 2.54 UIU/ML (ref 0.4–4)
WBC # BLD AUTO: 11.1 K/UL (ref 3.9–12.7)

## 2025-02-18 PROCEDURE — 80053 COMPREHEN METABOLIC PANEL: CPT | Performed by: INTERNAL MEDICINE

## 2025-02-18 PROCEDURE — 84443 ASSAY THYROID STIM HORMONE: CPT | Performed by: INTERNAL MEDICINE

## 2025-02-18 PROCEDURE — 83036 HEMOGLOBIN GLYCOSYLATED A1C: CPT | Performed by: INTERNAL MEDICINE

## 2025-02-18 PROCEDURE — 80061 LIPID PANEL: CPT | Performed by: INTERNAL MEDICINE

## 2025-02-18 PROCEDURE — 82306 VITAMIN D 25 HYDROXY: CPT | Performed by: INTERNAL MEDICINE

## 2025-02-18 PROCEDURE — 99999 PR PBB SHADOW E&M-EST. PATIENT-LVL IV: CPT | Mod: PBBFAC,,, | Performed by: INTERNAL MEDICINE

## 2025-02-18 PROCEDURE — 85025 COMPLETE CBC W/AUTO DIFF WBC: CPT | Performed by: INTERNAL MEDICINE

## 2025-02-18 RX ORDER — METHYLPREDNISOLONE SOD SUCC 125 MG
62.5 VIAL (EA) INJECTION
Status: COMPLETED | OUTPATIENT
Start: 2025-02-18 | End: 2025-02-18

## 2025-02-18 RX ORDER — ZALEPLON 5 MG/1
5 CAPSULE ORAL NIGHTLY PRN
Qty: 30 CAPSULE | Refills: 2 | Status: SHIPPED | OUTPATIENT
Start: 2025-02-18 | End: 2025-03-20

## 2025-02-18 RX ORDER — CYCLOBENZAPRINE HCL 10 MG
10 TABLET ORAL NIGHTLY PRN
Qty: 30 TABLET | Refills: 2 | Status: SHIPPED | OUTPATIENT
Start: 2025-02-18

## 2025-02-18 RX ADMIN — Medication 62.5 MG: at 10:02

## 2025-02-18 NOTE — TELEPHONE ENCOUNTER
Left message for pt to call back will to scheduled injection. Will also send message through the portal

## 2025-02-18 NOTE — TELEPHONE ENCOUNTER
----- Message from Nat Chen MD sent at 2/17/2025  8:16 PM CST -----  Needs 3 testosterone injections  4 weeks apart with labs 3 weeks after 3rd injection  Please get this injection scheduled this week if possible  Her  is  at [facility] and she is a cancer survivor  Thank you  ----- Message -----  From: [facility]  Sent: 2/14/2025   9:40 PM CST  To: Nat Chen MD

## 2025-02-19 ENCOUNTER — CLINICAL SUPPORT (OUTPATIENT)
Dept: OBSTETRICS AND GYNECOLOGY | Facility: CLINIC | Age: 55
End: 2025-02-19
Payer: COMMERCIAL

## 2025-02-19 DIAGNOSIS — N95.1 MENOPAUSAL SYMPTOM: Primary | ICD-10-CM

## 2025-02-19 PROCEDURE — 96372 THER/PROPH/DIAG INJ SC/IM: CPT | Mod: S$GLB,,, | Performed by: OBSTETRICS & GYNECOLOGY

## 2025-02-19 RX ADMIN — TESTOSTERONE CYPIONATE 50 MG: 200 INJECTION, SOLUTION INTRAMUSCULAR at 11:02

## 2025-02-19 NOTE — PROGRESS NOTES
Here for hormone therapy injection, no complaints at this time, Injection given as ordered, tolerated well, no report of pain prior to or after injection. Return to clinic as scheduled.     Site - [facility]    Testosterone  50  mg # 1      Clinic Supplied Medication

## 2025-02-20 ENCOUNTER — PATIENT MESSAGE (OUTPATIENT)
Dept: HEMATOLOGY/ONCOLOGY | Facility: CLINIC | Age: 55
End: 2025-02-20
Payer: COMMERCIAL

## 2025-02-21 ENCOUNTER — CLINICAL SUPPORT (OUTPATIENT)
Dept: REHABILITATION | Facility: HOSPITAL | Age: 55
End: 2025-02-21

## 2025-02-21 DIAGNOSIS — R23.2 HOT FLASHES RELATED TO AROMATASE INHIBITOR THERAPY: Primary | ICD-10-CM

## 2025-02-21 DIAGNOSIS — T45.1X5A HOT FLASHES RELATED TO AROMATASE INHIBITOR THERAPY: Primary | ICD-10-CM

## 2025-02-21 PROCEDURE — 97814 ACUP 1/> W/ESTIM EA ADDL 15: CPT | Performed by: ACUPUNCTURIST

## 2025-02-21 PROCEDURE — 97813 ACUP 1/> W/ESTIM 1ST 15 MIN: CPT | Performed by: ACUPUNCTURIST

## 2025-02-21 NOTE — PROGRESS NOTES
Acupuncture Evaluation Note     Name: Louise Cueto  Clinic Number: [id number]    Traditional Chinese Medicine (TCM) Diagnosis: Qi Stagnation and Blood Stasis  Medical Diagnosis:   Encounter Diagnosis   Name Primary?    Hot flashes related to aromatase inhibitor therapy Yes            Evaluation Date: 2/21/2025    Visit #/Visits authorized: self pay - visit 9    Precautions: Standard    Subjective     Chief Concern: hot flashes and night sweats, fatigue and cough due to illness    Medical necessity is demonstrated by the following IMPAIRMENTS: Medical Necessity: Decreased mobility limits day to day activities, social, and emergent situations and Decreased quality of life              Aggravating Factors:  lying down   Relieving Factors:  sitting up    Symptom Description:     Quality:  Aching and Shooting  Severity:  4  Frequency:  continuously    Previous Treatments Tried:   advil & tylenol daily and gabapentin       Treatment     Treatment Principles:  move st qi and blood    Acupuncture points used:  4 GUIDO, Du20, Gb34, Ki3, Ki6, Li11, REN12, TREJO MEN, Sp10, Sp6, Sp9, St25, St36, and YIN CRAMER  + LU7, Lu5  Bilateral points:  Unilateral points:  Auricular Treatment:  trejo men    Needles In: 24  Needles Out: 24  [place] W/ STIM placed: 2:15 PM  Needles W/ STIM removed: 2:45 PM      Other Traditional Chinese Medicine Modalities -  heat lamp    Assessment     After treatment, patient is feeling not much of a change. Continue with care as needed    Patient prognosis is Good.     Patient will continue to benefit from acupuncture treatment to address the deficits listed in the problem list box on initial evaluation, provide patient family education and to maximize pt's level of independence in the home and community environment.     Patient's spiritual, cultural and educational needs considered and pt agreeable to plan of care and goals.     Anticipated barriers to treatment: none    Plan     Recommend as  needed    Education:  Patient is aware of cumulative benefit of acupuncture

## 2025-02-23 ENCOUNTER — RESULTS FOLLOW-UP (OUTPATIENT)
Dept: HOME HEALTH SERVICES | Facility: HOSPITAL | Age: 55
End: 2025-02-23

## 2025-02-24 NOTE — PROGRESS NOTES
Subjective:       Patient ID: Louise Cueto is a 54 y.o. female who presents today for:    Chief Complaint:   Chief Complaint   Patient presents with    Annual Exam       History of Present Illness    CHIEF COMPLAINT:  Patient presents today with sore throat, cough, and hoarseness.    HISTORY OF PRESENT ILLNESS:  She developed viral symptoms on Sunday following exposure to sick family members. She reports fatigue, body aches, and chills. She denies fever and is taking Advil for symptom management. COVID and flu tests were negative.    BREAST CANCER TREATMENT:  She has tissue expanders causing significant discomfort, particularly under the underarm area with poking sensations and pain. Her nipples were preserved during the initial surgery, but she reports no sensation in her chest area. She is scheduled for surgery on April 4th to replace the tissue expanders with implants. She currently takes Anastrozole as part of her breast cancer treatment.    SLEEP:  She reports significant sleep disturbance for the past three months, with difficulty sleeping on her back and side.    PHYSICAL ACTIVITY:  She currently only walks for exercise and attends physical therapy sessions. She has stopped lifting weights and notes concerns about visible muscle mass loss. Her weight remains stable at 125 lbs.    HEARING:  She reports age-related hearing loss, most noticeable in noisy environments such as restaurants with significant background noise. Her hearing is generally adequate in other settings. She expresses uncertainty about using hearing aids.    FAMILY HISTORY:  Her sister had breast cancer 10 years ago and tested negative for BRCA gene mutation. Her mother is 80 years old with history of colon cancer. Her father had Parkinson's disease. She reports significant paternal family history of Alzheimer's disease.    MEDICATIONS AND SUPPLEMENTS:  She takes vitamin D, omega-3, vitamin B complex, and magnesium  "supplements.      ROS:  General: -fever, +chills, +fatigue, -weight gain, -weight loss  Eyes: -vision changes, -redness, -discharge  ENT: -ear pain, -nasal congestion, +sore throat, +hearing loss  Cardiovascular: -chest pain, -palpitations, -lower extremity edema  Respiratory: +cough, -shortness of breath  Gastrointestinal: -abdominal pain, -nausea, -vomiting, -diarrhea, -constipation, -blood in stool  Genitourinary: -dysuria, -hematuria, -frequency  Musculoskeletal: -joint pain, -muscle pain  Skin: -rash, -lesion  Neurological: -headache, -dizziness, -numbness, -tingling  Psychiatric: -anxiety, -depression, +sleep difficulty         Medications:  Encounter Medications[1]    Allergies:  Review of patient's allergies indicates:   Allergen Reactions    Percocet [oxycodone-acetaminophen] Nausea And Vomiting     Can take tylenol & tramadol     Vicodin [hydrocodone-acetaminophen] Nausea And Vomiting     Pt not sure if she took percocet or Vicodin that caused severe nausea & vomiting           Objective:      Vital Signs  Pulse: 76  SpO2: 99 %  BP: (!) 98/58  Patient Position: Sitting  Pain Score:   2  Height and Weight  Height: 5' 5" (165.1 cm)  Weight: 57.1 kg (125 lb 12.4 oz)  BSA (Calculated - sq m): 1.62 sq meters  BMI (Calculated): 20.9  Weight in (lb) to have BMI = 25: 149.9]    Physical Exam   Physical Exam    General: No acute distress. Well-developed. Well-nourished.  Eyes: EOMI. Sclerae anicteric.  HENT: Normocephalic. Atraumatic.  Cardiovascular: Regular rate. Regular rhythm. No murmurs. No rubs. No gallops. Normal S1, S2.  Respiratory: Normal respiratory effort. Clear to auscultation bilaterally. No rales. No rhonchi. No wheezing.  Abdomen: Soft. Non-tender. Non-distended. Normoactive bowel sounds.  Musculoskeletal: No  obvious deformity.  Extremities: No lower extremity edema.  Neurological: Alert & oriented x3. No slurred speech. Normal gait.  Psychiatric: Normal mood. Normal affect. Good insight. Good " judgment.  Skin: Warm. Dry. No rash.        Assessment/plan:     Louise Cueto is a 54 y.o.female here for an ANNUAL PHYSICAL EXAM:    Health Maintenance:  Health Maintenance   Topic Date Due    HIV Screening  Never done    Cervical Cancer Screening  03/30/2024    Influenza Vaccine (1) 09/01/2024    COVID-19 Vaccine (4 - 2024-25 season) 09/01/2024    Mammogram  10/29/2025    Colorectal Cancer Screening  07/25/2027    TETANUS VACCINE  08/11/2027    DEXA Scan  11/27/2028    Lipid Panel  02/18/2030    RSV Vaccine (Age 60+ and Pregnant patients) (1 - 1-dose 75+ series) 12/07/2043    Hepatitis C Screening  Completed    Shingles Vaccine  Completed    Pneumococcal Vaccines (Age 50+)  Completed        Annual physical exam  -     Ambulatory referral/consult to [facility]; Future; Expected date: 02/25/2025    Malignant neoplasm of upper-inner quadrant of right breast in female, estrogen receptor positive    Following with breat oncology, surgery , as well as gynecology     Situational insomnia  -     zaleplon (SONATA) 5 MG Cap; Take 1 capsule (5 mg total) by mouth nightly as needed.  Dispense: 30 capsule; Refill: 2    Mid back pain  -     cyclobenzaprine (FLEXERIL) 10 MG tablet; Take 1 tablet (10 mg total) by mouth nightly as needed for Muscle spasms.  Dispense: 30 tablet; Refill: 2    Laryngitis  -     methylPREDNISolone sodium succinate injection 62.5 mg      Assessment & Plan    IMPRESSION:  - Assessed viral upper respiratory infection symptoms, likely a common cold  - Evaluated sleep issues related to anastrozole (aromatase inhibitor) use  - Reviewed current supplement regimen and suggested additions for cognitive support    VIRAL ILLNESS:  - Explained viral nature of current illness and potential contagiousness for 3-5 days.  - Patient to rest and increase fluid intake to manage viral symptoms.  - Patient to skip social events for the next few days due to potential contagiousness.    COLD  SYMPTOMS:  - Discussed rationale against prolonged use of antihistamines for cold symptoms.  - Started Mucinex DM for cough, especially at night.  - Started Flonase nasal spray, twice daily.    COGNITIVE HEALTH:  - Provided information on the role of omega-3s, vitamin B12, and other supplements in cognitive health.  - Recommend adding sublingual or chewable B12 supplement.    BACK PAIN:  - Patient to continue physical therapy as tolerated.  - Started Flexeril (cyclobenzaprine) for back pain and sleep aid.    SLEEP ISSUES:  - Started Sonata for sleep, with instructions to take 1-2 capsules as needed.    MEDICATIONS/SUPPLEMENTS:  - Continued current supplements (vitamin D, omega-3, vitamin B complex, magnesium).  - Administered half-dose steroid injection in office for symptom relief.    LABS:  - Ordered labs.    FOLLOW UP:  - Contact office if prescribed medications are ineffective or if patient wants to try alternatives.  - Follow up on May 8th as scheduled with Dr. Fay for hormone therapy and potential testosterone treatment.  - Check patient portal for any messages regarding testosterone injection appointment.        Future Appointments   Date Time Provider Department Center   2/24/2025 10:00 AM BETHANY CHI GROUP CLASS, [facility] INT ONC [facility] INONCTF [place]   2/25/2025  4:00 PM Jeannie Kwok L.Ac [facility] INTNADYA [facility]   3/5/2025  2:30 PM Annette Lemos LAC [facility] INTNADY[facility]   3/5/2025  3:30 PM Tomi Griffiths, OT [facility] OP RHB [place]   3/12/2025  2:30 PM Tomi Griffiths, OT [facility] [place]   3/18/2025  2:30 PM Tomi Griffiths, OT [facility] [place]   3/20/2025 10:00 AM INJECTION, [facility] WOM W[facility]   3/25/2025  1:30 PM Amandeep Noel, DO [facility] PLASTIC [place]   3/25/2025  2:30 PM Tomi Griffiths, OT [facility] [place]   4/1/2025  2:30 PM Tomi Griffiths, OT [facility] [place]   4/8/2025 10:30 AM Bert  Amandeep CHACKO,  [facility] PLASTIC [place]   4/8/2025  2:30 PM [place], Tamarin, OT [facility] [place]   4/10/2025 10:30 AM Elvia Sahu NP [facility] INA [place]   4/15/2025  2:30 PM [place], Tamarin, OT [facility] R[facility] [place]   4/17/2025 11:00 AM INJECTION, [facility]   4/22/2025  2:30 PM [place], Tamarin, OT [facility] [place]   4/29/2025  2:30 PM [place], Tamarin, OT [facility] [place]   5/6/2025  2:30 PM [place], Tamarin, OT [facility] [place]   5/8/2025  9:30 AM [facility]   5/13/2025  2:30 PM [place], Tamarin, OT [facility] [place]   5/15/2025  3:20 PM INJECTION, [facility]   5/20/2025  2:30 PM [place], Tamarin, OT [facility] [place]   6/19/2025  9:15 AM Kassi Mahmood MD [facility] BRSTSUR [place]     This note was generated with the assistance of ambient listening technology. Verbal consent was obtained by the patient and accompanying visitor(s) for the recording of patient appointment to facilitate this note. I attest to having reviewed and edited the generated note for accuracy, though some syntax or spelling errors may persist. Please contact the author of this note for any clarification.     Cyndie Peralta MD  [facility]       [1]   Outpatient Encounter Medications as of 2/18/2025   Medication Sig Note Dispense Refill    acetaminophen (TYLENOL) 500 MG tablet Take 1 tablet (500 mg total) by mouth every 6 (six) hours.  30 tablet 1    anastrozole (ARIMIDEX) 1 mg Tab Take 1 tablet (1 mg total) by mouth once daily.  30 tablet 11    ascorbic acid, vitamin C, (VITAMIN C) 1000 MG tablet Take 1,000 mg by mouth every evening. 10/23/2023: Hold until after surgery      b complex vitamins capsule Take 1 capsule by mouth every evening. 10/23/2023: Hold until after surgery      collagen, bovine, 100 %  Powd Apply topically.       cyclobenzaprine (FLEXERIL) 10 MG tablet Take 1 tablet (10 mg total) by mouth nightly as needed for Muscle spasms.  30 tablet 2    estradioL (IMVEXXY MAINTENANCE PACK) 10 mcg Inst Place 10 mcg vaginally twice a week.  8 each 11    ibuprofen (ADVIL,MOTRIN) 600 MG tablet Take 1 tablet (600 mg total) by mouth 3 (three) times daily.  30 tablet 1    INV TESTOSTERONE/ANASTROZOL OR PLACEBO PELLETS Inject 1 Pellet into the skin. FOR INVESTIGATIONAL USE ONLY       Lactobacillus rhamnosus GG (CULTURELLE) 10 billion cell capsule Take 1 capsule by mouth every evening. 10/23/2023: Hold until after surgery      magnesium 30 mg Tab Take by mouth every evening. 10/23/2023: Hold until after surgery      omega-3 fatty acids/fish oil (FISH OIL-OMEGA-3 FATTY ACIDS) 300-1,000 mg capsule Take 1 capsule by mouth once daily.       ondansetron (ZOFRAN) 4 MG tablet Take 2 tablets (8 mg total) by mouth every 6 (six) hours as needed for Nausea.  30 tablet 1    oxybutynin (DITROPAN-XL) 5 MG TR24 Take 1 tablet (5 mg total) by mouth once daily.  30 tablet 11    oxyCODONE (ROXICODONE) 5 MG immediate release tablet Take 1 tablet (5 mg total) by mouth every 6 (six) hours as needed for Pain.  10 tablet 0    pregabalin (LYRICA) 75 MG capsule Take 1 capsule (75 mg total) by mouth 2 (two) times daily.  60 capsule 0    traMADoL (ULTRAM) 50 mg tablet Take 1 tablet (50 mg total) by mouth every 6 (six) hours as needed for Pain.  20 tablet 0    valACYclovir (VALTREX) 1000 MG tablet Take 0.5 tablets (500 mg total) by mouth every 12 (twelve) hours. 10/23/2023: Currently not taking 14 tablet 5    vitamin D (VITAMIN D3) 1000 units Tab Take 1,000 Units by mouth every evening. 10/23/2023: Hold until after surgery      zaleplon (SONATA) 5 MG Cap Take 1 capsule (5 mg total) by mouth nightly as needed.  30 capsule 2     Facility-Administered Encounter Medications as of 2/18/2025   Medication Dose Route Frequency Provider Last Rate Last  Admin    [COMPLETED] methylPREDNISolone sodium succinate injection 62.5 mg  62.5 mg Intramuscular 1 time in Clinic/HOD    62.5 mg at 02/18/25 1013    testosterone cypionate injection 50 mg  50 mg Intramuscular Q28 Days    50 mg at 02/19/25 110

## 2025-02-25 ENCOUNTER — CLINICAL SUPPORT (OUTPATIENT)
Dept: REHABILITATION | Facility: HOSPITAL | Age: 55
End: 2025-02-25

## 2025-02-25 DIAGNOSIS — T45.1X5A HOT FLASHES RELATED TO AROMATASE INHIBITOR THERAPY: Primary | ICD-10-CM

## 2025-02-25 DIAGNOSIS — R23.2 HOT FLASHES RELATED TO AROMATASE INHIBITOR THERAPY: Primary | ICD-10-CM

## 2025-02-25 PROCEDURE — 97813 ACUP 1/> W/ESTIM 1ST 15 MIN: CPT | Performed by: ACUPUNCTURIST

## 2025-02-25 PROCEDURE — 97814 ACUP 1/> W/ESTIM EA ADDL 15: CPT | Performed by: ACUPUNCTURIST

## 2025-02-25 NOTE — PROGRESS NOTES
Acupuncture Evaluation Note     Name: Louise Cueto  Clinic Number: [id number]    Traditional Chinese Medicine (TCM) Diagnosis: Qi Stagnation and Blood Stasis  Medical Diagnosis:   Encounter Diagnosis   Name Primary?    Hot flashes related to aromatase inhibitor therapy Yes         Evaluation Date: 2/25/2025    Visit #/Visits authorized: self pay - visit 10    Precautions: Standard    Subjective     Chief Concern: hot flashes and night sweats, fatigue and cough due to illness    Medical necessity is demonstrated by the following IMPAIRMENTS: Medical Necessity: Decreased mobility limits day to day activities, social, and emergent situations and Decreased quality of life              Aggravating Factors:  lying down   Relieving Factors:  sitting up    Symptom Description:     Quality:  Aching and Shooting  Severity:  4  Frequency:  continuously    Previous Treatments Tried:   advil & tylenol daily and gabapentin       Treatment     Treatment Principles:  move st qi and blood    Acupuncture points used:  4 GUIDO, Du20, Gb34, Ki3, Ki6, Li11, REN12, TREJO MEN, Sp10, Sp6, Sp9, St25, St36, and YIN CRAMER  + LU7, Lu5, Li15,16, Jianqian  Bilateral points:  Unilateral points:  Auricular Treatment:  trejo men    Needles In: 28  Needles Out: 28  Needles W/ STIM placed: 4:15 PM  Needles W/ STIM removed: 4:45 PM      Other Traditional Chinese Medicine Modalities -  heat lamp    Assessment     After treatment, patient is feeling not much of a change. Back pain present still but not able to lay face down yet. Shoulder pain on R side, chronic.     Patient prognosis is Good.     Patient will continue to benefit from acupuncture treatment to address the deficits listed in the problem list box on initial evaluation, provide patient family education and to maximize pt's level of independence in the home and community environment.     Patient's spiritual, cultural and educational needs considered and pt agreeable to plan of care and goals.      Anticipated barriers to treatment: none    Plan     Recommend as needed    Education:  Patient is aware of cumulative benefit of acupuncture

## 2025-03-05 ENCOUNTER — CLINICAL SUPPORT (OUTPATIENT)
Dept: REHABILITATION | Facility: HOSPITAL | Age: 55
End: 2025-03-05
Payer: COMMERCIAL

## 2025-03-05 DIAGNOSIS — R53.81 PHYSICAL DECONDITIONING: Primary | ICD-10-CM

## 2025-03-05 DIAGNOSIS — F43.29 STRESS AND ADJUSTMENT REACTION: ICD-10-CM

## 2025-03-05 PROCEDURE — 97112 NEUROMUSCULAR REEDUCATION: CPT

## 2025-03-05 PROCEDURE — 97110 THERAPEUTIC EXERCISES: CPT

## 2025-03-05 PROCEDURE — 97535 SELF CARE MNGMENT TRAINING: CPT

## 2025-03-05 NOTE — PROGRESS NOTES
Outpatient Rehab    Occupational Therapy Progress Note    Patient Name: Louise Cueto  MRN: [id number]  YOB: 1970  Encounter Date: 3/5/2025    Therapy Diagnosis:   Encounter Diagnoses   Name Primary?    Physical deconditioning Yes    Stress and adjustment reaction      Physician: Tammie Rodrigues FNP-C    Physician Orders: Eval and Treat  Medical Diagnosis from Referral: Status post bilateral mastectomy [Z90.13], Other low back pain [M54.59], Neuropathy [G62.9]   Evaluation Date: 12/11/2024  Authorization Period Expiration: 12/31/24  Plan of Care Expiration: 6/5/25/25  Progress Note Due: 3/11/25  Visit # / Visits authorized: 1/ 12     Time In: 1430   Time Out: 1515  Total Time: 45   Total Billable Time: 45      Precautions     Standard and cancer lymphedema    Subjective   I am not feeling great but do some things around the house most days..  Pain reported as 5/10. Pt. is significnatly limited in bilateral shoulder ROM.  She reports difficulty getting to PT to address her shoulder issues.  OT recommended she purchase a pulley for home and PT agrees with this plan.    Pt. Goals:  1. quiet my mind so I can heal more.  2. Manage my posture issues.  3. Improve my sleep.  4. Regain my strength.  5. Increase my endurance.     Objective     Patient Specific Functional Scale:            Activity 12/11/24  3/5/25         1.stress 2     3         2. fatigue 1      3         3.sleep 1      3         4. posture 1      4         5.strength 1     3         6.             SCORE    1.2  3.2            Total Score = Sum of activity scores / number of activities  Minimum Detectable Change (90% CII) for average score = 2 points  Minimum Detectable Change (90% CI) for single activity score = 3 points          Treatment:  Therapeutic Exercise  Therapeutic Exercise Activity 2: SEATED: cat-cow, forward fold, cobra, twist, figure 4  Therapeutic Exercise Activity 5: SUPINE:  modified boat with block 3 x 5 breaths, knee to  chest, knee to chest flow, happy baby flow,    Self Cares and ADLs  Self Care/ADL Activity 1: BREATHING TECHNIQUES: Diaphragmatic breathing, Viloma breath  Self Care/ADL Activity 2: body scan in sit, supine  Self Care/ADL Activity 3: sequential bridge and fish with bolster  Self Care/ADL Activity 4: supine with bolster under knees  Self Care/ADL Activity 7: legs up wall    Balance/Neuromuscular Re-Education  Balance/Neuromuscular Re-Education Activity 1: pt. required verbal and tactile neuromuscular cues for therapeutic exercise and trlaxation techniques    Assessment & Plan   Assessment: In today's session, was taught a yoga home exercise program focusing on strength and whole body ROM. She was also taught breathing techniques and body scan /meditiaion to assist with immune health. She required maximum  neuromuscular cues for all yoga  exercise and relaxation techniques.  Her PSFS score increased from  indicating increased function.       Patient will continue to benefit from skilled outpatient occupational therapy to address the deficits listed in the problem list box on initial evaluation, provide pt/family education and to maximize pt's level of independence in the home and community environment.     Patient's spiritual, cultural, and educational needs considered and patient agreeable to plan of care and goals.     Education  Education was done with Patient. The patient's learning style includes Demonstration, Listening, and Tactile.          Pt. Demonstrated a good understanding of stress and relaxation physiology response and immune health.       Plan:      Goals:   Active       Long term goals       Pt. will be independent for yoga HEP focusing on strength, endurance and whole body  ROM. (Progressing)       Start:  03/05/25    Expected End:  06/05/25            Pt. will be independent for yoga relaxation techniques including breathing exercises, body scan and medititation. (Progressing)       Start:  03/05/25     Expected End:  06/05/25            Pt. will identify a plan for yoga class participation and home practice. (Progressing)       Start:  03/05/25    Expected End:  06/05/25               Short term goals       Pt. will require minimal assist for yoga HEP focusing on strength, enduranceand whole body ROM . (Progressing)       Start:  03/05/25    Expected End:  06/05/25            Pt. will require minimal assistance for relaxation techniques to assist immune health. (Progressing)       Start:  03/05/25    Expected End:  06/05/25            Pt. will identify plan for integration of yoga into daily plan. (Progressing)       Start:  03/05/25    Expected End:  06/05/25                Tomi Griffiths OT

## 2025-03-06 ENCOUNTER — PATIENT MESSAGE (OUTPATIENT)
Dept: REHABILITATION | Facility: HOSPITAL | Age: 55
End: 2025-03-06
Payer: COMMERCIAL

## 2025-03-10 ENCOUNTER — PATIENT MESSAGE (OUTPATIENT)
Dept: REHABILITATION | Facility: HOSPITAL | Age: 55
End: 2025-03-10
Payer: COMMERCIAL

## 2025-03-12 ENCOUNTER — CLINICAL SUPPORT (OUTPATIENT)
Dept: REHABILITATION | Facility: HOSPITAL | Age: 55
End: 2025-03-12
Payer: COMMERCIAL

## 2025-03-12 DIAGNOSIS — R29.3 POOR POSTURE: ICD-10-CM

## 2025-03-12 DIAGNOSIS — F43.29 STRESS AND ADJUSTMENT REACTION: ICD-10-CM

## 2025-03-12 DIAGNOSIS — Z91.89 AT RISK FOR LYMPHEDEMA: ICD-10-CM

## 2025-03-12 DIAGNOSIS — M25.619 DECREASED RANGE OF MOTION OF SHOULDER, UNSPECIFIED LATERALITY: Primary | ICD-10-CM

## 2025-03-12 DIAGNOSIS — R53.81 PHYSICAL DECONDITIONING: Primary | ICD-10-CM

## 2025-03-12 DIAGNOSIS — R29.898 WEAKNESS OF BOTH UPPER EXTREMITIES: ICD-10-CM

## 2025-03-12 PROCEDURE — 97110 THERAPEUTIC EXERCISES: CPT

## 2025-03-12 PROCEDURE — 97112 NEUROMUSCULAR REEDUCATION: CPT

## 2025-03-12 PROCEDURE — 97535 SELF CARE MNGMENT TRAINING: CPT

## 2025-03-12 PROCEDURE — 97164 PT RE-EVAL EST PLAN CARE: CPT

## 2025-03-13 ENCOUNTER — PATIENT MESSAGE (OUTPATIENT)
Dept: REHABILITATION | Facility: HOSPITAL | Age: 55
End: 2025-03-13
Payer: COMMERCIAL

## 2025-03-13 NOTE — PROGRESS NOTES
Outpatient Rehab    Occupational Therapy Visit    Patient Name: Louise Cueto  MRN: [id number]  YOB: 1970  Encounter Date: 3/12/2025    Therapy Diagnosis:   Encounter Diagnoses   Name Primary?    Physical deconditioning Yes    Stress and adjustment reaction      Physician: Nat Chen, *    Physician Orders: Eval and Treat  Medical Diagnosis: Medical Diagnosis from Referral: Status post bilateral mastectomy [Z90.13], Other low back pain [M54.59], Neuropathy [G62.9]   Evaluation Date: 12/11/2024  Authorization Period Expiration: 12/31/24  Plan of Care Expiration: 6/5/25/25  Progress Note Due: 3/11/25  Visit # / Visits authorized: 2/ 12     Time In: 1430     Time In: 1430   Time Out: 1515  Total Time: 45   Total Billable Time: 45        Subjective   I got the overdoor pulley as you recommended. I am doing it every day and it has helped pain..  Pain reported as 2/10.      Objective   Patient Specific Functional Scale:            Activity 12/11/24  3/5/25         1.stress 2     3         2. fatigue 1      3         3.sleep 1      3         4. posture 1      4         5.strength 1     3         6.             SCORE    1.2  3.2            Total Score = Sum of activity scores / number of activities  Minimum Detectable Change (90% CII) for average score = 2 points  Minimum Detectable Change (90% CI) for single activity score = 3 points          Treatment:  Therapeutic Exercise  Therapeutic Exercise Activity 1: STANDING: down dog with chair, chair pose/volcano, shoulder stretches, warrior 2, triangle with chair, tree pose with wall, twist at wall with foot on chair  Therapeutic Exercise Activity 2: SEATED: cat-cow, forward fold, cobra, twist, figure 4  Therapeutic Exercise Activity 3: PRONE: sphinx/sphinx plank,  Therapeutic Exercise Activity 4: QUADRAPED: cat-cow, puppy pose flow, gisselle push ups  Therapeutic Exercise Activity 5: SUPINE:  modified boat with block 3 x 5 breaths, knee to chest, knee to  chest flow, happy baby flow,    Self Cares and ADLs  Self Care/ADL Activity 1: BREATHING TECHNIQUES: Diaphragmatic breathing, Viloma breath  Self Care/ADL Activity 2: body scan in sit, supine  Self Care/ADL Activity 4: supine with bolster under hips and legs on chair    Balance/Neuromuscular Re-Education  Balance/Neuromuscular Re-Education Activity 1: pt. required verbal and tactile neuromuscular cues for therapeutic exercise and trlaxation techniques    Assessment & Plan   Assessment: In today's session, Louise reviewedher  yoga home exercise program focusing on strength and whole body ROM. She was also reviewed breathing techniques and body scan /meditiaion to assist with immune health. In her previous visit we discussed the need to to complete her HEP and obtain a pulley.  She reports she has done this and if feeling better.  She did have increeased shoulder ROMtoday and will see PT for measurements.  She required maximum  neuromuscular cues for all yoga  exercise and relaxation techniques.  Her PSFS score increased from 1.2 to 3.2  indicating increased functionsince her evaluation.  Evaluation/Treatment Tolerance: Patient limited by pain    Patient will continue to benefit from skilled outpatient occupational therapy to address the deficits listed in the problem list box on initial evaluation, provide pt/family education and to maximize pt's level of independence in the home and community environment.     Patient's spiritual, cultural, and educational needs considered and patient agreeable to plan of care and goals.     Education  Education was done with Patient. The patient's learning style includes Demonstration, Listening, and Tactile.          Pt. Demonstrated a good understanding of stress and relaxation physiology response and immune health.       Plan: Continue OT 1x/week for remaining 10 sessions.    Goals:   Active       Long term goals       Pt. will be independent for yoga HEP focusing on strength,  endurance and whole body  ROM. (Progressing)       Start:  03/05/25    Expected End:  06/25/25            Pt. will be independent for yoga relaxation techniques including breathing exercises, body scan and medititation. (Progressing)       Start:  03/05/25    Expected End:  06/25/25            Pt. will identify a plan for yoga class participation and home practice. (Progressing)       Start:  03/05/25    Expected End:  06/25/25               Short term goals       Pt. will require minimal assist for yoga HEP focusing on strength, enduranceand whole body ROM . (Progressing)       Start:  03/05/25    Expected End:  06/25/25            Pt. will require minimal assistance for relaxation techniques to assist immune health. (Progressing)       Start:  03/05/25    Expected End:  06/25/25            Pt. will identify plan for integration of yoga into daily plan. (Progressing)       Start:  03/05/25    Expected End:  06/25/25                Tomi Griffiths OT

## 2025-03-16 NOTE — PROGRESS NOTES
Outpatient Rehab    Physical Therapy Progress Note : Updated Plan of Care    Patient Name: Louise Cueto  MRN: [id number]  YOB: 1970  Encounter Date: 3/12/2025    Therapy Diagnosis:   Encounter Diagnoses   Name Primary?    Decreased range of motion of shoulder, unspecified laterality Yes    Weakness of both upper extremities     At risk for lymphedema     Poor posture      Physician: Tammie Rodrigues FNP-C    Physician Orders: Eval and Treat  Medical Diagnosis: Status post bilateral mastectomy  Other low back pain  Neuropathy    Visit # / Visits Authorized:  2 / 10  Date of Evaluation: 12/11/2024  Insurance Authorization Period: 1/1/2025 to 12/31/2025  Plan of Care Certification:  3/12/2025 to 5/7/2025      PT/PTA:     Number of PTA visits since last PT visit:   Time In: 1530 (late arrival)  Time Out: 1600  Total Time: 30   Total Billable Time: 30    FOTO:  Intake Score: 34%      Precautions     Standard and cancer      Subjective   Her R shoulder is sore and tight compared to L shoulder. She occasional gets a pain that goes down R arm to biceps and she had some shooting pains on her L side yesterday. She has been doing HEP and she did try swinging her tennis racket over ther weekend but that did not feel good.  Pain reported as 1/10. (R shoulder) Fatigue: 2/10    Objective      Shoulder Range of Motion  Right Shoulder   Active (deg) Passive (deg) Pain   Flexion 125       Extension 50       ABduction 130       External Rotation (Shoulder ABducted 0 degrees) 60       External Rotation (Shoulder ABducted 45 degrees) 35       Internal Rotation (Shoulder ABducted 0 degrees)  90       Internal Rotation (Shoulder ABducted 45 degrees)  90         Left Shoulder   Active (deg) Passive (deg) Pain   Flexion 150       Extension 50       ABduction 140       External Rotation (Shoulder ABducted 90 degrees) 90       Internal Rotation (Shoulder ABducted 90 degrees) 90                     Shoulder Strength - Planes  of Motion   Right Strength Right Pain Left Strength Left  Pain   Flexion 4+   4+     ABduction 4+   4+     Internal Rotation 0° 5   5     External Rotation 0° 5   5         Elbow Strength   Right Strength Right Pain Left Strength Left  Pain   Flexion (C6) 5   5     Extension (C7) 5   5            Wrist Strength - Planes of Motion   Right Strength Right Pain Left Strength Left  Pain   Flexion 5   5     Extension 5   5       Right  Strength  Right Hand Dynamometer Position: 2  Elbow Position Forearm Position Trial 1 (lbs) Pain   Flexed Neutral 64  none       Left  Strength  Left Hand Dynamometer Position: 2  Elbow Position Forearm Position Trial 1 (lbs) Pain   Flexed Neutral 60  none             Treatment:  Therapeutic Exercise  TE 1: pulleys, flex/abd, 2 minutes each  TE 2: Doorway stretch, 3 positions, 1 rep x 30 seconds each  TE 3: Butterflies, hands on forehead, 1 set x 10 reps  TE 4: Physioball roll outs, 3 ways, 2 minutes      Time Entry(in minutes):  PT Re-Evaluation Time Entry: 15  Therapeutic Exercise Time Entry: 15    Assessment & Plan   Assessment  Louise presents with a condition of Moderate complexity.   Presentation of Symptoms: Changing  Will Comorbidities Impact Care: No       Functional Limitations: Pain when reaching, Pain with ADLs/IADLs, Participating in sports, Participating in leisure activities, Performing household chores, Range of motion, Carrying objects, Completing self-care activities, Completing work/school activities, Reaching  Impairments: Abnormal or restricted range of motion, Pain with functional activity, Impaired physical strength    Prognosis: Good  Assessment Details: She demonstrates an increase in B shoulder flexion, abduction, and ER compared to her initial evaluation. Her B UE strength has also improved compared to her initial evaluation. She continues to be limited in her usual ADLs by her limited AROM of both shoulders. She would benefit from continued physical  therapy to improve her ROM, strength, and soft tissue mobility in order to return to her PLOF.    Plan  From a physical therapy perspective, the patient would benefit from: Skilled Rehab Services    Planned therapy interventions include: Therapeutic exercise, Therapeutic activities, Neuromuscular re-education, and Manual therapy.            Visit Frequency: 2 times Per Week for 8 Weeks.       This plan was discussed with Patient.   Discussion participants: Agreed Upon Plan of Care             Patient will continue to benefit from skilled outpatient physical therapy to address the deficits listed in the problem list box on initial evaluation, provide pt/family education and to maximize pt's level of independence in the home and community environment.     Patient's spiritual, cultural, and educational needs considered and patient agreeable to plan of care and goals.     Education  Education was done with Patient. The patient's learning style includes Listening and Demonstration. The patient Verbalizes understanding.         Role of physical therapy in multi-disciplinary team. Goals for physical therapy, scheduling. Home exercise program, exercise technique. Energy conservation techniques.        Goals:   Active       Long term goals       Pt will increase AROM/PROM in shoulder flexion to 180 degrees bilaterally to improve functional reach, carry, push, pull pain free.  (Progressing)       Start:  03/12/25    Expected End:  05/07/25            Pt will increase strength to 5/5 in gross UE musculature to improve tolerance to all functional activities pain free.  (Progressing)       Start:  03/12/25    Expected End:  05/07/25            Pt will increase AROM/PROM in shoulder abduction ROM to 180 degrees bilaterally to improve functional reach, carry, push, pull pain free.  (Progressing)       Start:  03/12/25    Expected End:  05/07/25            Pt will increase AROM/PROM in shoulder external rotation ROM to 90 degrees  bilaterally to improve functional reach, carry, push, pull pain free (Progressing)       Start:  03/12/25    Expected End:  05/07/25            Pt will demonstrate full/maximized tissue mobility to increase ROM and promote healthy tissue to be pain free at discharge.  (Progressing)       Start:  03/12/25    Expected End:  05/07/25               Short term goals       Pt will increase AROM/PROM in shoulder abduction ROM to 160 degrees bilaterally to improve functional reach, carry, push, pull pain free (Progressing)       Start:  03/12/25    Expected End:  04/09/25            Pt will increase AROM/PROM in shoulder flexion to 155 degrees bilaterally to improve functional reach, carry, push, pull pain free. (Progressing)       Start:  03/12/25    Expected End:  04/09/25            Pt will increase AROM/PROM in shoulder external rotation at 45 degrees abduction to 80 degrees bilaterally to improve functional reach, carry, push, pull pain free. (Progressing)       Start:  03/12/25    Expected End:  04/09/25            Patient will demonstrate independence with Home Exercise program established.  (Progressing)       Start:  03/12/25    Expected End:  04/09/25            Patient will demonstrate 100% understanding of lymphedema risk reduction practices to include self monitoring for lymphedema.  (Progressing)       Start:  03/12/25    Expected End:  04/09/25                Katie Currie, PT

## 2025-03-18 ENCOUNTER — CLINICAL SUPPORT (OUTPATIENT)
Dept: REHABILITATION | Facility: HOSPITAL | Age: 55
End: 2025-03-18
Payer: COMMERCIAL

## 2025-03-18 DIAGNOSIS — Z91.89 AT RISK FOR LYMPHEDEMA: ICD-10-CM

## 2025-03-18 DIAGNOSIS — R29.898 WEAKNESS OF BOTH UPPER EXTREMITIES: ICD-10-CM

## 2025-03-18 DIAGNOSIS — F43.29 STRESS AND ADJUSTMENT REACTION: ICD-10-CM

## 2025-03-18 DIAGNOSIS — R53.81 PHYSICAL DECONDITIONING: Primary | ICD-10-CM

## 2025-03-18 DIAGNOSIS — M25.619 DECREASED RANGE OF MOTION OF SHOULDER, UNSPECIFIED LATERALITY: Primary | ICD-10-CM

## 2025-03-18 DIAGNOSIS — R29.3 POOR POSTURE: ICD-10-CM

## 2025-03-18 PROCEDURE — 97535 SELF CARE MNGMENT TRAINING: CPT

## 2025-03-18 NOTE — PROGRESS NOTES
Outpatient Rehab    Occupational Therapy Visit    Patient Name: Louise Cueto  MRN: [id number]  YOB: 1970  Encounter Date: 3/18/2025    Therapy Diagnosis:   Encounter Diagnoses   Name Primary?    Physical deconditioning Yes    Stress and adjustment reaction      Physician: Tammie Rodrigues FNP-C    Physician Orders: Eval and Treat  Medical Diagnosis: Medical Diagnosis: Medical Diagnosis from Referral: Status post bilateral mastectomy [Z90.13], Other low back pain [M54.59], Neuropathy [G62.9]   Evaluation Date: 12/11/2024  Authorization Period Expiration: 12/31/25  Plan of Care Expiration: 6/5/25/25  Progress Note Due: 4/5/25  Visit # / Visits authorized: 3/ 12       Time In: 1430   Time Out: 1515  Total Time: 45   Total Billable Time: 45      Subjective   My right  shoulder is hurting and very painful..  Pain reported as 7/10.      Objective   Patient Specific Functional Scale:            Activity 12/11/24  3/5/25         1.stress 2     3         2. fatigue 1      3         3.sleep 1      3         4. posture 1      4         5.strength 1     3         6.             SCORE    1.2  3.2            Total Score = Sum of activity scores / number of activities  Minimum Detectable Change (90% CII) for average score = 2 points  Minimum Detectable Change (90% CI) for single activity score = 3 points                Treatment:  Therapeutic Exercise  TE 1: standing:  warror 2, chair, modified volcano, wide straddle forward fold with chair for catcow, squat whole body strengthening  x 3, twist with chair at wall  TE 2: prone: sphinx x 5  TE 3: side lying joint mobes to right shoulder.  TE 4: supine twist x 2,  Self Care and ADLs  ADL 1: DB, bramari,  ADL 2: body scan in supine and siting  ADL 3: restorative in supine with legs on chair and bolster under hips  Balance/Neuromuscular Re-Education  NMR 1: pt. required verbal and tactile neuromuscular cues for therapeutic exercise and trlaxation techniques    Time  Entry(in minutes):  Neuromuscular Re-Education Time Entry: 15  Self Care/Home Management (ADLs) Time Entry: 15  Therapeutic Exercise Time Entry: 15    Assessment & Plan   Assessment: In today's session, Louise reviewed her  yoga HEP consisting of strengthening, stretching, and balance yoga exercise. She also practiced  breathing and body scan techniques to assist with relaxation/immune health. We discussed the pain and decreased ROM of her right shoulder and recommended she consult her Dr. regarding shoulder diagniosis.  It presents as adhesive capsulitis.   She tolerated the session well and required maximum verbal and neuromuscular cues in all treatment.  Evaluation/Treatment Tolerance: Patient limited by pain    Patient will continue to benefit from skilled outpatient occupational therapy to address the deficits listed in the problem list box on initial evaluation, provide pt/family education and to maximize pt's level of independence in the home and community environment.     Patient's spiritual, cultural, and educational needs considered and patient agreeable to plan of care and goals.     Education  Education was done with Patient. The patient's learning style includes Demonstration, Listening, and Tactile.          Reviewed physiology of the relaxation and stress response and how yoga/meditation affect neuroendocrine system and immune health.       Plan: OT 1x/week for 9 additional visits.    Goals:   Active       Long term goals       Pt. will be independent for yoga HEP focusing on strength, endurance and whole body  ROM. (Progressing)       Start:  03/05/25    Expected End:  06/25/25            Pt. will be independent for yoga relaxation techniques including breathing exercises, body scan and medititation. (Progressing)       Start:  03/05/25    Expected End:  06/25/25            Pt. will identify a plan for yoga class participation and home practice. (Progressing)       Start:  03/05/25    Expected End:   06/25/25               Short term goals       Pt. will require minimal assist for yoga HEP focusing on strength, enduranceand whole body ROM . (Progressing)       Start:  03/05/25    Expected End:  06/25/25            Pt. will require minimal assistance for relaxation techniques to assist immune health. (Progressing)       Start:  03/05/25    Expected End:  06/25/25            Pt. will identify plan for integration of yoga into daily plan. (Progressing)       Start:  03/05/25    Expected End:  06/25/25                Tomi Griffiths OT

## 2025-03-20 ENCOUNTER — CLINICAL SUPPORT (OUTPATIENT)
Dept: OBSTETRICS AND GYNECOLOGY | Facility: CLINIC | Age: 55
End: 2025-03-20
Payer: COMMERCIAL

## 2025-03-20 DIAGNOSIS — N95.1 MENOPAUSAL SYMPTOM: Primary | ICD-10-CM

## 2025-03-20 PROCEDURE — 99999 PR PBB SHADOW E&M-EST. PATIENT-LVL I: CPT | Mod: PBBFAC,,,

## 2025-03-20 RX ADMIN — TESTOSTERONE CYPIONATE 50 MG: 200 INJECTION, SOLUTION INTRAMUSCULAR at 10:03

## 2025-03-20 NOTE — PROGRESS NOTES
Outpatient Rehab    Physical Therapy Visit    Patient Name: Louise Cueto  MRN: [id number]  YOB: 1970  Encounter Date: 3/18/2025    Therapy Diagnosis:   Encounter Diagnoses   Name Primary?    Decreased range of motion of shoulder, unspecified laterality Yes    Weakness of both upper extremities     At risk for lymphedema     Poor posture      Physician: No ref. provider found    Physician Orders: Eval and Treat  Medical Diagnosis:     Visit # / Visits Authorized:    Date of Evaluation: 12/11/2024   Insurance Authorization Period:   Plan of Care Certification:  3/12/2025 to 5/7/2025         Time In: 1524   Time Out: 1540  Total Time: 16   Total Billable Time: 16    FOTO:  Intake Score: 34%      Precautions     Standard and cancer      Subjective   She had some mid back pain over the weekend. She has been swinging her tennis racket and it doesn't feel normal yet. She has not starting to do a tennis serve yet. She has been doing her doorway stretch since the last visit. She has to leave today at 3:40.         Objective    Objective Measures updated at progress report unless specified.          Treatment:  Other Activities  Activity 1: Discussed was to use pillows to support herself in side lying, patient's preferred sleeping posture. Patient was educated on the use of a pillow under her R breast/flank, along with a cervical roll to maintain a neutral cervical spine and a pillow between her knees to help maintain a neutral alignment of her hips. Patient was placed in R side lying with appropriate support.  Activity 2: Patient was screened for use of kinesiotape and was cleared for use.  1. Has the patient ever had a reaction to the adhesive in bandaids? No  2. Has the patient ever uses athletic/kinesiotape in the past? No  3. Is the patient hemodynamically impaired (PE, DVT, CHF, Kidney failure)? No  4. Can the PT/PTA apply the tape to your skin? Yes    Patient was instructed on duration to wear the tape,  "proper drying techniques for the tape, and to remove the tape if he/she had any issues with it.  Patient verbalized understanding of these instructions. 1 "I strip" with 50% tension was applied to the anterior humeral head to the medial border of the scapula, 1 "butterfly strip" with 50% tension on ends was applied to rhomboids.    Time Entry(in minutes):  Self Care/Home Management (ADLs) Time Entry: 1    Assessment & Plan   Assessment: She responded well to kinesiotaping reporting less shoulder pain with flexion. When placed in R side lying with cervical roll and several pillows to support her she reported feeling very comfortable and "I can sleep like this". Due to time constraints she did not perform her usual exercises.  Evaluation/Treatment Tolerance: Patient tolerated treatment well    Patient will continue to benefit from skilled outpatient physical therapy to address the deficits listed in the problem list box on initial evaluation, provide pt/family education and to maximize pt's level of independence in the home and community environment.     Patient's spiritual, cultural, and educational needs considered and patient agreeable to plan of care and goals.     Education  Education was done with Patient. The patient's learning style includes Demonstration and Listening. The patient Verbalizes understanding.         Role of physical therapy in multi-disciplinary team. Goals for physical therapy, scheduling. Home exercise program, exercise technique. Energy conservation techniques.       Plan: Continue with the plan of care and progress as tolerated.    Goals:   Active       Long term goals       Pt will increase AROM/PROM in shoulder flexion to 180 degrees bilaterally to improve functional reach, carry, push, pull pain free.  (Progressing)       Start:  03/12/25    Expected End:  05/07/25            Pt will increase strength to 5/5 in gross UE musculature to improve tolerance to all functional activities pain " free.  (Progressing)       Start:  03/12/25    Expected End:  05/07/25            Pt will increase AROM/PROM in shoulder abduction ROM to 180 degrees bilaterally to improve functional reach, carry, push, pull pain free.  (Progressing)       Start:  03/12/25    Expected End:  05/07/25            Pt will increase AROM/PROM in shoulder external rotation ROM to 90 degrees bilaterally to improve functional reach, carry, push, pull pain free (Progressing)       Start:  03/12/25    Expected End:  05/07/25            Pt will demonstrate full/maximized tissue mobility to increase ROM and promote healthy tissue to be pain free at discharge.  (Progressing)       Start:  03/12/25    Expected End:  05/07/25               Short term goals       Pt will increase AROM/PROM in shoulder abduction ROM to 160 degrees bilaterally to improve functional reach, carry, push, pull pain free (Progressing)       Start:  03/12/25    Expected End:  04/09/25            Pt will increase AROM/PROM in shoulder flexion to 155 degrees bilaterally to improve functional reach, carry, push, pull pain free. (Progressing)       Start:  03/12/25    Expected End:  04/09/25            Pt will increase AROM/PROM in shoulder external rotation at 45 degrees abduction to 80 degrees bilaterally to improve functional reach, carry, push, pull pain free. (Progressing)       Start:  03/12/25    Expected End:  04/09/25            Patient will demonstrate independence with Home Exercise program established.  (Progressing)       Start:  03/12/25    Expected End:  04/09/25            Patient will demonstrate 100% understanding of lymphedema risk reduction practices to include self monitoring for lymphedema.  (Progressing)       Start:  03/12/25    Expected End:  04/09/25                Katie Currie, PT

## 2025-03-21 ENCOUNTER — PATIENT MESSAGE (OUTPATIENT)
Dept: HEMATOLOGY/ONCOLOGY | Facility: CLINIC | Age: 55
End: 2025-03-21
Payer: COMMERCIAL

## 2025-03-24 ENCOUNTER — PATIENT MESSAGE (OUTPATIENT)
Dept: HEMATOLOGY/ONCOLOGY | Facility: CLINIC | Age: 55
End: 2025-03-24

## 2025-03-24 ENCOUNTER — CLINICAL SUPPORT (OUTPATIENT)
Dept: REHABILITATION | Facility: HOSPITAL | Age: 55
End: 2025-03-24

## 2025-03-24 DIAGNOSIS — R23.2 HOT FLASHES RELATED TO AROMATASE INHIBITOR THERAPY: ICD-10-CM

## 2025-03-24 DIAGNOSIS — T45.1X5A HOT FLASHES RELATED TO AROMATASE INHIBITOR THERAPY: ICD-10-CM

## 2025-03-24 DIAGNOSIS — M25.619 DECREASED RANGE OF MOTION OF SHOULDER, UNSPECIFIED LATERALITY: Primary | ICD-10-CM

## 2025-03-24 PROCEDURE — 97813 ACUP 1/> W/ESTIM 1ST 15 MIN: CPT | Performed by: ACUPUNCTURIST

## 2025-03-24 PROCEDURE — 97814 ACUP 1/> W/ESTIM EA ADDL 15: CPT | Performed by: ACUPUNCTURIST

## 2025-03-24 NOTE — PROGRESS NOTES
Acupuncture Evaluation Note     Name: Louise Cueto  Clinic Number: [id number]    Traditional Chinese Medicine (TCM) Diagnosis: Qi Stagnation and Blood Stasis  Medical Diagnosis:   Encounter Diagnoses   Name Primary?    Decreased range of motion of shoulder, unspecified laterality Yes    Hot flashes related to aromatase inhibitor therapy          Evaluation Date: 3/24/2025    Visit #/Visits authorized: self pay - visit 11    Precautions: Standard    Subjective     Chief Concern: hot flashes and night sweats, shoulder pain / frozen shoulder    Medical necessity is demonstrated by the following IMPAIRMENTS: Medical Necessity: Decreased mobility limits day to day activities, social, and emergent situations and Decreased quality of life              Aggravating Factors:  lying down   Relieving Factors:  sitting up    Symptom Description:     Quality:  Aching and Shooting  Severity:  4  Frequency:  continuously    Previous Treatments Tried:   advil & tylenol daily and gabapentin       Treatment     Treatment Principles:  move st qi and blood    Acupuncture points used:  4 GUIDO, Du20, Gb34, Ki3, Ki6, Li11, REN12, TREJO MEN, Sp10, Sp6, Sp9, St25, St36, and YIN CRAMER  + LU7, Lu5, Li15,16, Jianqian  Bilateral points:  Unilateral points:  Auricular Treatment:  trejo men    Needles In: 28  Needles Out: 28  Needles W/ STIM placed: 3:40 PM  Needles W/ STIM removed: 4:10 PM      Other Traditional Chinese Medicine Modalities -  heat lamp    Assessment     After treatment, patient states symptoms are reducing.     Patient prognosis is Good.     Patient will continue to benefit from acupuncture treatment to address the deficits listed in the problem list box on initial evaluation, provide patient family education and to maximize pt's level of independence in the home and community environment.     Patient's spiritual, cultural and educational needs considered and pt agreeable to plan of care and goals.     Anticipated barriers to  treatment: none    Plan     Recommend as needed    Education:  Patient is aware of cumulative benefit of acupuncture

## 2025-03-25 ENCOUNTER — OFFICE VISIT (OUTPATIENT)
Dept: PLASTIC SURGERY | Facility: CLINIC | Age: 55
End: 2025-03-25
Payer: COMMERCIAL

## 2025-03-25 ENCOUNTER — CLINICAL SUPPORT (OUTPATIENT)
Dept: REHABILITATION | Facility: HOSPITAL | Age: 55
End: 2025-03-25
Payer: COMMERCIAL

## 2025-03-25 VITALS
HEIGHT: 65 IN | DIASTOLIC BLOOD PRESSURE: 76 MMHG | HEART RATE: 74 BPM | SYSTOLIC BLOOD PRESSURE: 114 MMHG | WEIGHT: 125.88 LBS | BODY MASS INDEX: 20.97 KG/M2

## 2025-03-25 DIAGNOSIS — R29.3 POOR POSTURE: ICD-10-CM

## 2025-03-25 DIAGNOSIS — Z17.0 MALIGNANT NEOPLASM OF UPPER-INNER QUADRANT OF RIGHT BREAST IN FEMALE, ESTROGEN RECEPTOR POSITIVE: Primary | ICD-10-CM

## 2025-03-25 DIAGNOSIS — C50.211 MALIGNANT NEOPLASM OF UPPER-INNER QUADRANT OF RIGHT BREAST IN FEMALE, ESTROGEN RECEPTOR POSITIVE: Primary | ICD-10-CM

## 2025-03-25 DIAGNOSIS — M25.619 DECREASED RANGE OF MOTION OF SHOULDER, UNSPECIFIED LATERALITY: Primary | ICD-10-CM

## 2025-03-25 DIAGNOSIS — R29.898 WEAKNESS OF BOTH UPPER EXTREMITIES: ICD-10-CM

## 2025-03-25 DIAGNOSIS — Z91.89 AT RISK FOR LYMPHEDEMA: ICD-10-CM

## 2025-03-25 PROCEDURE — 99999 PR PBB SHADOW E&M-EST. PATIENT-LVL IV: CPT | Mod: PBBFAC,,, | Performed by: SURGERY

## 2025-03-25 PROCEDURE — 97110 THERAPEUTIC EXERCISES: CPT

## 2025-03-25 PROCEDURE — 99214 OFFICE O/P EST MOD 30 MIN: CPT | Mod: S$GLB,,, | Performed by: SURGERY

## 2025-03-25 PROCEDURE — 97140 MANUAL THERAPY 1/> REGIONS: CPT

## 2025-03-25 PROCEDURE — 3078F DIAST BP <80 MM HG: CPT | Mod: CPTII,S$GLB,, | Performed by: SURGERY

## 2025-03-25 PROCEDURE — 1159F MED LIST DOCD IN RCRD: CPT | Mod: CPTII,S$GLB,, | Performed by: SURGERY

## 2025-03-25 PROCEDURE — 3074F SYST BP LT 130 MM HG: CPT | Mod: CPTII,S$GLB,, | Performed by: SURGERY

## 2025-03-25 PROCEDURE — 3044F HG A1C LEVEL LT 7.0%: CPT | Mod: CPTII,S$GLB,, | Performed by: SURGERY

## 2025-03-25 PROCEDURE — 3008F BODY MASS INDEX DOCD: CPT | Mod: CPTII,S$GLB,, | Performed by: SURGERY

## 2025-03-27 NOTE — PROGRESS NOTES
Outpatient Rehab    Physical Therapy Visit    Patient Name: Louise Cueto  MRN: [id number]  YOB: 1970  Encounter Date: 3/25/2025    Therapy Diagnosis:   Encounter Diagnoses   Name Primary?    Decreased range of motion of shoulder, unspecified laterality Yes    Weakness of both upper extremities     At risk for lymphedema     Poor posture      Physician: Tammie Rodrigues FNP-C    Physician Orders: Eval and Treat  Medical Diagnosis: Status post bilateral mastectomy  Other low back pain  Neuropathy    Visit # / Visits Authorized:  3 / 10  Insurance Authorization Period: 1/1/2025 to 12/31/2025  Date of Evaluation: 12/11/2024   Plan of Care Certification: 3/12/2025 to 5/7/2025      Time In: 1520   Time Out: 1600  Total Time: 40   Total Billable Time: 40    FOTO:  Intake Score: 34%    Precautions     Standard and cancer      Subjective   She is generally feeling stiff all over. She has been sleeping better since her last visit. She has been doing her exercises with no problem. She feels the tape didn't really help.  Pain reported as 1/10. Fatigue: 2/10    Objective      Shoulder Range of Motion  Right Shoulder   Active (deg) Passive (deg) Pain   Flexion 140       Extension 55       ABduction 130       External Rotation (Shoulder ABducted 0 degrees) 60       External Rotation (Shoulder ABducted 45 degrees) 45         Left Shoulder   Active (deg) Passive (deg) Pain   Flexion 150       Extension 50       ABduction 165       External Rotation (Shoulder ABducted 90 degrees) 80       Internal Rotation (Shoulder ABducted 90 degrees) 90               Treatment:  Therapeutic Exercise  TE 1: pulleys, flex/abd, 2 minutes each, physioball roll outs, 3 ways, 3 minutes  TE 2: posterior capsule stretch, 5 reps x 10 seconds each  TE 3: Supine wand flexion, 1# wand, 1 set x 10 reps, supine flexion 1# hand weight held between both hands, 1 set x 5 reps, single arm supine flexion 1#, 1 set x 5 reps; supine wand ER 1 set x 10  reps  Manual Therapy  MT 1: pec side bending in ER R UE, Grade 1 & 2 mobs R shoulder, Acromial retraction Acromial retraction (dual hand) Layer mobilization Pectoralis stretch Pectoralis stretch with abduction Attachment stretch Axillary stretch Dual distraction axillary stretch Lateral chest stretch      Time Entry(in minutes):  Manual Therapy Time Entry: 15  Therapeutic Exercise Time Entry: 25    Assessment & Plan   Assessment: She was able to perform all of today's activities with no increase in symptoms prior to leaving the clinic. She is slowly progressing towards her goals as she demonstrated an increase in AROM as noted in the objective section. She continues to be limited in her AROM by muscle tightness and complaints of pain. She endorsed adequate stretch with supine wand flexion.  Evaluation/Treatment Tolerance: Patient tolerated treatment well    Patient will continue to benefit from skilled outpatient physical therapy to address the deficits listed in the problem list box on initial evaluation, provide pt/family education and to maximize pt's level of independence in the home and community environment.     Patient's spiritual, cultural, and educational needs considered and patient agreeable to plan of care and goals.           Plan: Continue with the plan of care and progress as tolerated.    Goals:   Active       Long term goals       Pt will increase AROM/PROM in shoulder flexion to 180 degrees bilaterally to improve functional reach, carry, push, pull pain free.  (Progressing)       Start:  03/12/25    Expected End:  05/07/25            Pt will increase strength to 5/5 in gross UE musculature to improve tolerance to all functional activities pain free.  (Progressing)       Start:  03/12/25    Expected End:  05/07/25            Pt will increase AROM/PROM in shoulder abduction ROM to 180 degrees bilaterally to improve functional reach, carry, push, pull pain free.  (Progressing)       Start:  03/12/25     Expected End:  05/07/25            Pt will increase AROM/PROM in shoulder external rotation ROM to 90 degrees bilaterally to improve functional reach, carry, push, pull pain free (Progressing)       Start:  03/12/25    Expected End:  05/07/25            Pt will demonstrate full/maximized tissue mobility to increase ROM and promote healthy tissue to be pain free at discharge.  (Progressing)       Start:  03/12/25    Expected End:  05/07/25               Short term goals       Pt will increase AROM/PROM in shoulder abduction ROM to 160 degrees bilaterally to improve functional reach, carry, push, pull pain free (Progressing)       Start:  03/12/25    Expected End:  04/09/25            Pt will increase AROM/PROM in shoulder flexion to 155 degrees bilaterally to improve functional reach, carry, push, pull pain free. (Progressing)       Start:  03/12/25    Expected End:  04/09/25            Pt will increase AROM/PROM in shoulder external rotation at 45 degrees abduction to 80 degrees bilaterally to improve functional reach, carry, push, pull pain free. (Progressing)       Start:  03/12/25    Expected End:  04/09/25            Patient will demonstrate independence with Home Exercise program established.  (Progressing)       Start:  03/12/25    Expected End:  04/09/25            Patient will demonstrate 100% understanding of lymphedema risk reduction practices to include self monitoring for lymphedema.  (Progressing)       Start:  03/12/25    Expected End:  04/09/25                Katie Currie, PT

## 2025-03-27 NOTE — PROGRESS NOTES
[facility]/ [facility]   at [facility] Note:      Chief Complaint:   Encounter Diagnosis   Name Primary?    Malignant neoplasm of upper-inner quadrant of right breast in female, estrogen receptor positive Yes          Cancer Staging   Malignant neoplasm of upper-inner quadrant of right breast in female, estrogen receptor positive  Staging form: Breast, AJCC 8th Edition  - Pathologic stage from 11/18/2024: Stage IA (pT1c, pN0, cM0, G1, ER+, CA+, HER2-, Oncotype DX score: 24) - Signed by Kaylie Dowd MD on 12/19/2024      HPI:  Louise Cueto is a 54 y.o. female who presents today breast cancer follow up.     She has been on anastrozole. She had her second reconstruction last Friday. She is still healing from that.   She does have a frozen shoulder, she does have a lot of other arthralgias. She does not have hot flashes - particularly at night. These are waking her up at night.   She has not been taking her magnesium.  She is following with Dr. Chen for her hot flashes - seem to be less with testosterone injections.     Per Dr. Dowd's previous note: Oncology history  Patient noted a mass in the right breast October 2024.   Diagnostic mammogram on 10/17/24 showed extremely dense breast tissue without a discrete mass  Ultrasound 10/17/24 demonstrated an irregular hypoechoic mass measuring 10 x 8 x 7 mm in the 1 o'clock position 3 cm from the nipple    Biopsy 10/29/24: IDC, grade 1, ER 90%/ CA 20%/ HER2 2+ (ARCHIE negative); Ki67 3%    Breast MRI (11/1/24) showed a 1 x 1 x 1.6 cm irregularly shaped mass with irregular margins and heterogeneous internal enhancement seen in the upper inner quadrant of the right breast at 1 o'clock in the posterior depth, 4 cm from the nipple, 1.7 cm from the skin, and 0.2 cm from the chest wall.      Bilateral mastectomy 11/18/24: IDC, grade 1, 1.7cm; 0/2 LN+   Oncotype RS 24: RR 10%    Started anastrozole December 2024         GYN History:  Age of menarche was 14.   Premenopausal. LMP 2024. Patient admits to hormonal therapy (testosterone pellets, estrogen pellets, and progesterone oral) with Dr Mix, last 2024  Patient is   Age of first live birth was 28. Patient did breast feed.      Patient Active Problem List   Diagnosis    Liver lesion    Mixed stress and urge urinary incontinence    Abnormal pelvic ultrasound    Status post hysteroscopy with hysteroscopic myomectomy & IUD Removal and Insertion    Chronic right shoulder pain    Right shoulder pain    Diplopia    Esotropia of both eyes    Myopia    Anisometropia    Malignant neoplasm of upper-inner quadrant of right breast in female, estrogen receptor positive    Physical deconditioning    Stress and adjustment reaction    Decreased range of motion of shoulder    Weakness of both upper extremities    At risk for lymphedema    Poor posture    Other low back pain    Alteration in body image       Current Outpatient Medications   Medication Instructions    acetaminophen (TYLENOL) 500 mg, Oral, Every 6 hours    anastrozole (ARIMIDEX) 1 mg, Oral, Daily    ascorbic acid (vitamin C) (VITAMIN C) 1,000 mg, Nightly    b complex vitamins capsule 1 capsule, Nightly    collagen, bovine, 100 % Powd Apply topically.    cyclobenzaprine (FLEXERIL) 10 mg, Oral, Nightly PRN    ibuprofen (ADVIL,MOTRIN) 600 mg, Oral, 3 times daily    IMVEXXY MAINTENANCE PACK 10 mcg, Vaginal, Twice weekly    IMVEXXY MAINTENANCE PACK 10 mcg, Vaginal, Twice weekly    INV TESTOSTERONE/ANASTROZOL OR PLACEBO PELLETS 1 Pellet    Lactobacillus rhamnosus GG (CULTURELLE) 10 billion cell capsule 1 capsule, Nightly    magnesium 30 mg Tab Nightly    omega-3 fatty acids/fish oil (FISH OIL-OMEGA-3 FATTY ACIDS) 300-1,000 mg capsule 1 capsule, Daily    ondansetron (ZOFRAN) 8 mg, Oral, Every 6 hours PRN    oxybutynin (DITROPAN-XL) 5 mg, Oral, Daily    oxyCODONE (ROXICODONE) 5 mg, Oral, Every 6 hours PRN    pregabalin  "(LYRICA) 75 mg, Oral, 2 times daily    traMADoL (ULTRAM) 50 mg, Oral, Every 6 hours PRN    valACYclovir (VALTREX) 500 mg, Oral, Every 12 hours    vitamin D (VITAMIN D3) 1,000 Units, Nightly       Review of Systems:   Review of Systems   Constitutional:  Positive for diaphoresis. Negative for fever, malaise/fatigue and weight loss.   HENT: Negative.  Negative for nosebleeds.    Respiratory: Negative.     Cardiovascular: Negative.    Gastrointestinal: Negative.    Genitourinary:         Hot flashes   Musculoskeletal: Negative.         Arthralgias   Neurological: Negative.  Negative for dizziness, weakness and headaches.   All other systems reviewed and are negative.      PHYSICAL EXAM:  /77 (BP Location: Left arm, Patient Position: Sitting)   Pulse 71   Temp 97.8 °F (36.6 °C) (Oral)   Resp 17   Ht 5' 5" (1.651 m)   Wt 56.7 kg (124 lb 14.3 oz)   LMP 07/14/2024 (Approximate)   SpO2 98%   BMI 20.78 kg/m²     Physical Exam  Vitals and nursing note reviewed.   Constitutional:       General: She is not in acute distress.     Appearance: Normal appearance. She is well-developed.   HENT:      Head: Normocephalic.   Cardiovascular:      Rate and Rhythm: Normal rate and regular rhythm.      Heart sounds: Normal heart sounds.   Pulmonary:      Effort: Pulmonary effort is normal.      Breath sounds: Normal breath sounds.   Abdominal:      General: Bowel sounds are normal. There is no distension.      Palpations: Abdomen is soft.      Tenderness: There is no abdominal tenderness.   Musculoskeletal:      Cervical back: Normal range of motion.   Lymphadenopathy:      Cervical: No cervical adenopathy.      Upper Body:      Right upper body: No supraclavicular adenopathy.      Left upper body: No supraclavicular adenopathy.   Skin:     General: Skin is warm and dry.      Findings: No rash.   Neurological:      Mental Status: She is alert and oriented to person, place, and time.   Psychiatric:         Behavior: Behavior " normal.           Pertinent Labs & Imaging:  Pathology Results  (Last 30 days)      None            No results found for this or any previous visit (from the past 24 hours).    Assessment & Plan:    1. Malignant neoplasm of upper-inner quadrant of right breast in female, estrogen receptor positive      Patient with Stage I ER+ breast cancer with low risk oncotype for patients >49yo (RS 24)  Labs confirmed postmenopausal status  Continue anastrozole. Follows with Dr. Chen for side effects  DXA scan in Nov of this year     Per NCCN Guidelines, breast cancer patients should be followed every 3-12 months for the first five years and then annually thereafter with annual mammography if mastectomy or breast reconstruction was not undertaken. At this time, there is no indication for routine laboratory or imaging in the surveillance for metastatic disease, unless clinical signs or symptoms arise.    Healthy diet, low alcohol intake, and an active lifestyle, with a goal of an ideal BMI (20-25) is also encouraged to reduce the risk of breast cancer occurrences and recurrences, as well as improve side effects to anti-estrogen medications    Return to clinic in 6 months with MD appointment.     Patient is in agreement with the proposed treatment plan. All questions were answered to the patient's satisfaction. Patient knows to call clinic for any new or worsening symptoms and if anything is needed before the next clinic visit.          Elvia Sahu, NARENDRAP-C  Hematology & Medical Oncology   [address] [place] [id number]  ph. [phone]  Fax. [phone]    Collaborating physician, Dr. Dowd.    Approximately 10 minutes were spent face-to-face with the patient.  Approximately 15 minutes in total were spent on this encounter, which includes face-to-face time and non-face-to-face time preparing to see the patient (e.g., review of tests), obtaining and/or reviewing separately obtained history, documenting  clinical information in the electronic or other health record, independently interpreting results (not separately reported) and communicating results to the patient/family/caregiver, or care coordination (not separately reported).      code applied: patient requires or will require a continuous, longitudinal, and active collaborative plan of care related to this patient's health condition, breast cancer --the management of which requires the direction of a practitioner with specialized clinical knowledge, skill, and expertise.      Route Chart for Scheduling    Med Onc Chart Routing      Follow up with physician 6 months. Dr. Dowd   Follow up with [facility]    Infusion scheduling note    Injection scheduling note    Labs    Imaging    Pharmacy appointment    Other referrals                       MDM includes  :    - Acute or chronic illness or injury that poses a threat to life or bodily function  - Independent review and explanation of 3+ results from unique tests  - Extensive discussion of treatment and management  - Prescription drug management  - Drug therapy requiring intensive monitoring for toxicity        Elvia Sahu, ALESHIA   03/27/2025

## 2025-03-31 ENCOUNTER — PATIENT MESSAGE (OUTPATIENT)
Dept: PLASTIC SURGERY | Facility: CLINIC | Age: 55
End: 2025-03-31
Payer: COMMERCIAL

## 2025-03-31 DIAGNOSIS — C50.211 MALIGNANT NEOPLASM OF UPPER-INNER QUADRANT OF RIGHT BREAST IN FEMALE, ESTROGEN RECEPTOR POSITIVE: Primary | ICD-10-CM

## 2025-03-31 DIAGNOSIS — Z17.0 MALIGNANT NEOPLASM OF UPPER-INNER QUADRANT OF RIGHT BREAST IN FEMALE, ESTROGEN RECEPTOR POSITIVE: Primary | ICD-10-CM

## 2025-04-01 ENCOUNTER — ANESTHESIA EVENT (OUTPATIENT)
Dept: SURGERY | Facility: HOSPITAL | Age: 55
End: 2025-04-01
Payer: COMMERCIAL

## 2025-04-01 ENCOUNTER — CLINICAL SUPPORT (OUTPATIENT)
Dept: REHABILITATION | Facility: HOSPITAL | Age: 55
End: 2025-04-01
Payer: COMMERCIAL

## 2025-04-01 ENCOUNTER — CLINICAL SUPPORT (OUTPATIENT)
Dept: REHABILITATION | Facility: HOSPITAL | Age: 55
End: 2025-04-01

## 2025-04-01 DIAGNOSIS — R53.81 PHYSICAL DECONDITIONING: Primary | ICD-10-CM

## 2025-04-01 DIAGNOSIS — F43.29 STRESS AND ADJUSTMENT REACTION: ICD-10-CM

## 2025-04-01 DIAGNOSIS — T45.1X5A HOT FLASHES RELATED TO AROMATASE INHIBITOR THERAPY: Primary | ICD-10-CM

## 2025-04-01 DIAGNOSIS — R23.2 HOT FLASHES RELATED TO AROMATASE INHIBITOR THERAPY: Primary | ICD-10-CM

## 2025-04-01 PROCEDURE — 97813 ACUP 1/> W/ESTIM 1ST 15 MIN: CPT | Performed by: ACUPUNCTURIST

## 2025-04-01 PROCEDURE — 97110 THERAPEUTIC EXERCISES: CPT

## 2025-04-01 PROCEDURE — 97112 NEUROMUSCULAR REEDUCATION: CPT

## 2025-04-01 PROCEDURE — 97535 SELF CARE MNGMENT TRAINING: CPT

## 2025-04-01 PROCEDURE — 97814 ACUP 1/> W/ESTIM EA ADDL 15: CPT | Performed by: ACUPUNCTURIST

## 2025-04-01 NOTE — PROGRESS NOTES
Outpatient Rehab    Occupational Therapy Progress Note    Patient Name: Louise Cueto  MRN: [id number]  YOB: 1970  Encounter Date: 4/1/2025    Therapy Diagnosis:   Encounter Diagnoses   Name Primary?    Physical deconditioning Yes    Stress and adjustment reaction      Physician: Tammie Rodrigues FNP-C    Physician Orders: Eval and Treat  Medical Diagnosis: Status post bilateral mastectomy  Other low back pain  Neuropathy    Visit # / Visits Authorized: 4 / 20  Insurance Authorization Period: 1/1/2025 to 12/31/2025  Date of Evaluation: Evaluation Date: 12/11/2024  Authorization Period Expiration: 12/31/25  Plan of Care Expiration: 6/5/25/25  Progress Note Due: 5/1/25  Plan of Care Certification: 6/5/25      Time In: 1430   Time Out: 1530  Total Time: 60   Total Billable Time: 60      Subjective I have more energy but I am feeling irritable about all the things that hurt in my body.            Objective     Patient Specific Functional Scale:            Activity 12/11/24  3/5/25  4/1/25       1.stress 2     3    4       2. fatigue 1      3  4       3.sleep 1      3  3       4. posture 1      4  4       5.strength 1     3  4       6.             SCORE    1.2  3.2    3.8          Total Score = Sum of activity scores / number of activities  Minimum Detectable Change (90% CII) for average score = 2 points  Minimum Detectable Change (90% CI) for single activity score = 3 points                      Treatment:  Therapeutic Exercise  TE 1: STANDING: , chair pose/volcano, shoulder stretches, warrior 2, triangle with chair, tree, wide straddle forward fold with twist  TE 2: SEATED: cat-cow, forward fold, cobra, twist, figure 4  TE 3: PRONE: sphinx/sphinx plank, jose, plank x 2  TE 4: QUADRAPED: cat-cow, puppy flow, twist, side lying scaption in AAROM and AROM x 5 bilaterally,  TE 5: SUPINE:  modified boat with block 3 x 5 breaths, knee to chest, knee to chest flow, happy baby flow,  Self Care and ADLs  ADL 1:  BREATHING TECHNIQUES: Diaphragmatic breathing, Viloma breath  ADL 2: body scan in sit, supine  ADL 3: restorative in supine with legs on chair and bolster under hips  ADL 4: Restorative YOGA: supine with L.E.'s on chair  ADL 5: RESTORATIVE YOGA: bridge and bound angle supported wtih bolsters  ADL 6: RETORATIVE YOGA: prone twist  ADL 7: legs up wall  Balance/Neuromuscular Re-Education  NMR 1: pt. required verbal and tactile neuromuscular cues for therapeutic exercise and trlaxation techniques    Time Entry(in minutes):  Neuromuscular Re-Education Time Entry: 15  Self Care/Home Management (ADLs) Time Entry: 15  Therapeutic Exercise Time Entry: 30    Assessment & Plan   Assessment:   In today's session, Louise reviewed her yoga HEP consisting of stretching, and balance yoga exercise. She also reviewed breathing and body scan techniques to assist with relaxation/immune health. HervPSFS improved from 3.3 to 3.8 indicating increased function.  She is scheduled for a breast revisison surgery and implant placement next week.  She tolerated the session well and required maximum verbal and neuromuscular cues in all treatment.  Patient will continue to benefit from skilled outpatient occupational therapy to address the deficits listed in the problem list box on initial evaluation, provide pt/family education and to maximize pt's level of independence in the home and community environment.    Patient's spiritual, cultural, and educational needs considered and patient agreeable to plan of care and goals.       Education  Education was done with Patient. The patient's learning style includes Demonstration, Listening, and Tactile.          Reviewed physiology of the relaxation and stress response and how yoga/meditation affect neuroendocrine system and immune health.            Goals:   Active       Long term goals       Pt. will be independent for yoga HEP focusing on strength, endurance and whole body  ROM. (Progressing)        Start:  03/05/25    Expected End:  06/25/25            Pt. will be independent for yoga relaxation techniques including breathing exercises, body scan and medititation. (Progressing)       Start:  03/05/25    Expected End:  06/25/25            Pt. will identify a plan for yoga class participation and home practice. (Progressing)       Start:  03/05/25    Expected End:  06/25/25               Short term goals       Pt. will require minimal assist for yoga HEP focusing on strength, enduranceand whole body ROM . (Met)       Start:  03/05/25    Expected End:  06/25/25    Resolved:  04/01/25         Pt. will require minimal assistance for relaxation techniques to assist immune health. (Progressing)       Start:  03/05/25    Expected End:  06/25/25            Pt. will identify plan for integration of yoga into daily plan. (Progressing)       Start:  03/05/25    Expected End:  06/25/25                Tomi Griffiths OT

## 2025-04-01 NOTE — PROGRESS NOTES
Acupuncture Evaluation Note     Name: Louise Cueto  Clinic Number: [id number]    Traditional Chinese Medicine (TCM) Diagnosis: Qi Stagnation and Blood Stasis  Medical Diagnosis:   Encounter Diagnosis   Name Primary?    Hot flashes related to aromatase inhibitor therapy Yes         Evaluation Date: 4/1/2025    Visit #/Visits authorized: self pay - visit 12    Precautions: Standard    Subjective     Chief Concern: hot flashes and night sweats, shoulder pain / frozen shoulder    Medical necessity is demonstrated by the following IMPAIRMENTS: Medical Necessity: Decreased mobility limits day to day activities, social, and emergent situations and Decreased quality of life              Aggravating Factors:  lying down   Relieving Factors:  sitting up    Symptom Description:     Quality:  Aching and Shooting  Severity:  4  Frequency:  continuously    Previous Treatments Tried:   advil & tylenol daily and gabapentin       Treatment     Treatment Principles:  move st qi and blood    Acupuncture points used:  4 GUIDO, Du20, Gb34, Ki3, Ki6, Li11, REN12, TREJO MEN, Sp10, Sp6, Sp9, St25, St36, and YIN CRAMER  + LU7, Lu5, Li15,16, Jianqian  Bilateral points:  Unilateral points:  Auricular Treatment:  trejo men    Needles In: 28  Needles Out: 28  Needles W/ STIM placed: 3:40 PM  Needles W/ STIM removed: 4:10 PM      Other Traditional Chinese Medicine Modalities -  heat lamp    Assessment     After treatment, patient states symptoms are reducing. Had some relief in shoulder after last visit but did not last. Surgery scheduled for Friday.    Patient prognosis is Good.     Patient will continue to benefit from acupuncture treatment to address the deficits listed in the problem list box on initial evaluation, provide patient family education and to maximize pt's level of independence in the home and community environment.     Patient's spiritual, cultural and educational needs considered and pt agreeable to plan of care and goals.      Anticipated barriers to treatment: none    Plan     Recommend as needed    Education:  Patient is aware of cumulative benefit of acupuncture

## 2025-04-02 NOTE — PROGRESS NOTES
Plastic Surgery History & Physical    SUBJECTIVE:   Chief complaint: Preoperative Visit for second stage implant based reconstruction    History of Present Illness:  54 y.o. female presenting to plastic surgery clinic for a preoperative visit. She has a history of right breast cancer s/p BL NSM with TE placement. She currently has 300cc expanders in place filled with 240 cc of saline.  She is happy with her size. Her current complaints about her reconstruction include upper pole contour. The patient was last seen on .  She is scheduled for second stage on 25. Since the last clinic visit there have been no significant changes in the patient's history.    Past Medical History:   Diagnosis Date    Abdominal bloating 2019    BRCA1 negative     BRCA2 negative     GERD (gastroesophageal reflux disease)     resolved with lap tono    Liver lesion 2019    Malignant neoplasm of upper-inner quadrant of right female breast        Past Surgical History:   Procedure Laterality Date    AXILLARY NODE DISSECTION Right 2024    Procedure: LYMPHADENECTOMY, AXILLARY / POSSIBLE RIGHT;  Surgeon: Kassi Mahmood MD;  Location: [facility];  Service: General;  Laterality: Right;    BILATERAL MASTECTOMY Bilateral 2024    Procedure: MASTECTOMY, BILATERAL;  Surgeon: Kassi Mahmood MD;  Location: [facility];  Service: General;  Laterality: Bilateral;  HIGH / TECH AND FROZEN    BREAST CYST EXCISION Left     20 y/o f     SECTION      CHOLECYSTECTOMY      45 year old    COLONOSCOPY N/A 2017    Procedure: COLONOSCOPY;  Surgeon: Andrews Sears MD;  Location: [facility] ([address] [place] [address]);  Service: Endoscopy;  Laterality: N/A;    COLONOSCOPY N/A 2022    Procedure: COLONOSCOPY;  Surgeon: Andrews Sears MD;  [facility]  Service: Endoscopy;  Laterality: N/A;  Fully Vaccinated.EC    CYSTOSCOPY N/A 2019    Procedure: CYSTOSCOPY;  Surgeon: Debbie Murphy MD;  Location: [facility];   Service: OB/GYN;  Laterality: N/A;    EYE SURGERY  10/24/2023    strabismus surgery  bilateral    HYSTEROSCOPY N/A 09/17/2019    Procedure: HYSTEROSCOPY-removal of IUD; possible shaving of uterine fibroids if needed to insert new IUD;  Surgeon: Debbie Murphy MD;  Location: [facility];  Service: OB/GYN;  Laterality: N/A;    INJECTION FOR SENTINEL NODE IDENTIFICATION Right 11/18/2024    Procedure: INJECTION, FOR SENTINEL NODE IDENTIFICATION;  Surgeon: Kassi Mahmood MD;  Location: [facility];  Service: General;  Laterality: Right;    INJECTION, AGENT, INTRAVENOUS, FOR ASSESSMENT OF BLOOD FLOW IN FLAP OR GRAFT Bilateral 11/18/2024    Procedure: INJECTION, AGENT, INTRAVENOUS, FOR ASSESSMENT OF BLOOD FLOW IN FLAP OR GRAFT;  Surgeon: Amandeep Noel DO;  Location: [facility];  Service: General;  Laterality: Bilateral;    INSERTION OF BREAST TISSUE EXPANDER Bilateral 11/18/2024    Procedure: INSERTION, TISSUE EXPANDER, BREAST;  Surgeon: Amandeep Noel DO;  Location: [facility];  Service: General;  Laterality: Bilateral;    INTRAUTERINE DEVICE INSERTION N/A 09/17/2019    Procedure: IUD insertion-- mirena-- would prefer kyleena if available;  Surgeon: Debbie Murphy MD;  Location: [facility];  Service: OB/GYN;  Laterality: N/A;    SENTINEL LYMPH NODE BIOPSY Right 11/18/2024    Procedure: BIOPSY, LYMPH NODE, SENTINEL;  Surgeon: Kassi Mahmood MD;  Location: [facility];  Service: General;  Laterality: Right;       Family History   Problem Relation Name Age of Onset    Cancer Mother 80 in 2025 73        colon ca    Hypertension Mother 80 in 2025     Arrhythmia Mother 80 in 2025     Heart disease Father 72         MI around 50.    Hyperlipidemia Father 72     Parkinsonism Father 72     Cancer Sister  49        breast    Breast cancer Sister  48        breast    No Known Problems Brother pt is 3rd     No Known Problems Son      Arrhythmia Son          SVT    Cirrhosis Maternal Grandfather      Alcohol abuse Maternal  Grandfather      Alzheimer's disease Paternal Grandmother      Parkinsonism Paternal Grandfather      Ovarian cancer Cousin pat first cousin     Cancer Cousin pat first cousin         ovarian and one with lung    Colon cancer Neg Hx         Social History     Socioeconomic History    Marital status:      Spouse name: Dennis    Number of children: 3   Tobacco Use    Smoking status: Never    Smokeless tobacco: Never   Substance and Sexual Activity    Alcohol use: Yes     Alcohol/week: 1.0 standard drink of alcohol     Types: 1 Glasses of wine per week     Comment: amt varies     Drug use: No    Sexual activity: Yes     Partners: Male   Social History Narrative    From  [place]     Moved to [place] [id number]    Exercising - runs,  wts, yoga, walks.        Kids 20, 20, 18.          Retired  and .      - .         Social Drivers of Health     Financial Resource Strain: Low Risk  (2/14/2025)    Overall Financial Resource Strain (CARDIA)     Difficulty of Paying Living Expenses: Not hard at all   Food Insecurity: No Food Insecurity (2/14/2025)    Hunger Vital Sign     Worried About Running Out of Food in the Last Year: Never true     Ran Out of Food in the Last Year: Never true   Transportation Needs: No Transportation Needs (2/14/2025)    PRAPARE - Transportation     Lack of Transportation (Medical): No     Lack of Transportation (Non-Medical): No   Physical Activity: Sufficiently Active (2/14/2025)    Exercise Vital Sign     Days of Exercise per Week: 7 days     Minutes of Exercise per Session: 150+ min   Stress: Stress Concern Present (2/14/2025)    [employer] [place] [employer] - Occupational Stress Questionnaire     Feeling of Stress : To some extent   Housing Stability: Low Risk  (2/14/2025)    Housing Stability Vital Sign     Unable to Pay for Housing in the Last Year: No     Number of Times Moved in the Last Year: 0     Homeless in the Last Year: No  "      Current Medications[1]    Review of patient's allergies indicates:   Allergen Reactions    Percocet [oxycodone-acetaminophen] Nausea And Vomiting     Can take tylenol & tramadol     Vicodin [hydrocodone-acetaminophen] Nausea And Vomiting     Pt not sure if she took percocet or Vicodin that caused severe nausea & vomiting           OBJECTIVE:     /76   Pulse 74   Ht 5' 5" (1.651 m)   Wt 57.1 kg (125 lb 14.1 oz)   BMI 20.95 kg/m²       Physical Exam:  Gen: NAD, appears stated age  Neuro: normal without focal findings, mental status and speech normal  HEENT: NCAT, neck supple, PEERL  CV: RRR  Pulm: Breathing non-labored, chest wall movement equal bilaterally   Breast: Exam deferred, reviewed medical photography  Abdomen: soft, nontender, no guarding  Extremity:normal strength, no cyanosis or edema  Skin: Skin color, texture, turgor normal. No rashes or lesions  Psych: oriented to time, place and person, mood and affect are within normal limits          ASSESSMENT/PLAN:     Louise Cueto was seen preoperatively today for second stage implant based reconstruction.  The operative plan is for BL TE removal, implant placement and fat grafting.  Lipo donor site will be abdomen than thighs    We discussed details of implant exchange and additional  revisions including possible tailoring of the skin envelope, repositioning/exchange of the implant, capsulotomy or capsulorraphy, liposuction and fat grafting. Risks of implant based reconstruction including but not limited to hematoma, seroma, infection, extrusion, capsular contracture, risks of rupture, need for replacement later in life, poor cosmetic outcome, need for reoperation, changes in sensation, fat necrosis and BII/VALERY-ALCL. Risks of liposuction include hematoma, seroma, contour deformity, dimpling, displeasing cosmesis.    Surgical consent as well as patient implant decision checklists were reviewed and signed. Advised patient to wear post operative " garment as instructed.     Reviewed multimodal pain control regimen used in the post operative period. Prescriptions will be sent to the outpatient pharmacy the day of surgery.  The patient was given a packet including general instructions for post-operative care, instructions for the day of surgery, drain care and process to complete FMLA/Disability paperwork.  All questions were answered. The patient was given a business card with contact info and advised to contact the clinic with any questions or concerns before or after surgery.      Amandeep Noel  Plastic and Reconstructive Surgery    I spent a total of 30 minutes on the day of the visit.  This includes face to face time and non-face to face time preparing to see the patient (eg, review of tests), obtaining and/or reviewing separately obtained history, documenting clinical information in the electronic or other health record, independently interpreting results and communicating results to the patient/family/caregiver, or care coordinator.             [1]   Current Outpatient Medications   Medication Sig Dispense Refill    acetaminophen (TYLENOL) 500 MG tablet Take 1 tablet (500 mg total) by mouth every 6 (six) hours. 30 tablet 1    anastrozole (ARIMIDEX) 1 mg Tab Take 1 tablet (1 mg total) by mouth once daily. 30 tablet 11    ascorbic acid, vitamin C, (VITAMIN C) 1000 MG tablet Take 1,000 mg by mouth every evening.      b complex vitamins capsule Take 1 capsule by mouth every evening.      collagen, bovine, 100 % Powd Apply topically.      cyclobenzaprine (FLEXERIL) 10 MG tablet Take 1 tablet (10 mg total) by mouth nightly as needed for Muscle spasms. 30 tablet 2    estradioL (IMVEXXY MAINTENANCE PACK) 10 mcg Inst Place 10 mcg vaginally twice a week. 8 each 11    estradioL (IMVEXXY MAINTENANCE PACK) 10 mcg Inst Place 10 mcg vaginally twice a week. 8 each 11    ibuprofen (ADVIL,MOTRIN) 600 MG tablet Take 1 tablet (600 mg total) by mouth 3 (three) times  daily. 30 tablet 1    INV TESTOSTERONE/ANASTROZOL OR PLACEBO PELLETS Inject 1 Pellet into the skin. FOR INVESTIGATIONAL USE ONLY      Lactobacillus rhamnosus GG (CULTURELLE) 10 billion cell capsule Take 1 capsule by mouth every evening.      magnesium 30 mg Tab Take by mouth every evening.      omega-3 fatty acids/fish oil (FISH OIL-OMEGA-3 FATTY ACIDS) 300-1,000 mg capsule Take 1 capsule by mouth once daily.      ondansetron (ZOFRAN) 4 MG tablet Take 2 tablets (8 mg total) by mouth every 6 (six) hours as needed for Nausea. 30 tablet 1    oxybutynin (DITROPAN-XL) 5 MG TR24 Take 1 tablet (5 mg total) by mouth once daily. 30 tablet 11    oxyCODONE (ROXICODONE) 5 MG immediate release tablet Take 1 tablet (5 mg total) by mouth every 6 (six) hours as needed for Pain. 10 tablet 0    traMADoL (ULTRAM) 50 mg tablet Take 1 tablet (50 mg total) by mouth every 6 (six) hours as needed for Pain. 20 tablet 0    valACYclovir (VALTREX) 1000 MG tablet Take 0.5 tablets (500 mg total) by mouth every 12 (twelve) hours. 14 tablet 5    vitamin D (VITAMIN D3) 1000 units Tab Take 1,000 Units by mouth every evening.      pregabalin (LYRICA) 75 MG capsule Take 1 capsule (75 mg total) by mouth 2 (two) times daily. 60 capsule 0     Current Facility-Administered Medications   Medication Dose Route Frequency Provider Last Rate Last Admin    testosterone cypionate injection 50 mg  50 mg Intramuscular Q28 Days    50 mg at 03/20/25 1812

## 2025-04-02 NOTE — H&P (VIEW-ONLY)
Plastic Surgery History & Physical    SUBJECTIVE:   Chief complaint: Preoperative Visit for second stage implant based reconstruction    History of Present Illness:  54 y.o. female presenting to plastic surgery clinic for a preoperative visit. She has a history of right breast cancer s/p BL NSM with TE placement. She currently has 300cc expanders in place filled with 240 cc of saline.  She is happy with her size. Her current complaints about her reconstruction include upper pole contour. The patient was last seen on .  She is scheduled for second stage on 25. Since the last clinic visit there have been no significant changes in the patient's history.    Past Medical History:   Diagnosis Date    Abdominal bloating 2019    BRCA1 negative     BRCA2 negative     GERD (gastroesophageal reflux disease)     resolved with lap tono    Liver lesion 2019    Malignant neoplasm of upper-inner quadrant of right female breast        Past Surgical History:   Procedure Laterality Date    AXILLARY NODE DISSECTION Right 2024    Procedure: LYMPHADENECTOMY, AXILLARY / POSSIBLE RIGHT;  Surgeon: Kassi Mahmood MD;  Location: [facility];  Service: General;  Laterality: Right;    BILATERAL MASTECTOMY Bilateral 2024    Procedure: MASTECTOMY, BILATERAL;  Surgeon: Kassi Mahmood MD;  Location: [facility];  Service: General;  Laterality: Bilateral;  HIGH / TECH AND FROZEN    BREAST CYST EXCISION Left     18 y/o f     SECTION      CHOLECYSTECTOMY      45 year old    COLONOSCOPY N/A 2017    Procedure: COLONOSCOPY;  Surgeon: Andrews Sears MD;  Location: [facility] ([address] [place] [address]);  Service: Endoscopy;  Laterality: N/A;    COLONOSCOPY N/A 2022    Procedure: COLONOSCOPY;  Surgeon: Andrews Sears MD;  [facility]  Service: Endoscopy;  Laterality: N/A;  Fully Vaccinated.EC    CYSTOSCOPY N/A 2019    Procedure: CYSTOSCOPY;  Surgeon: Debbie Murphy MD;  Location: [facility];   Service: OB/GYN;  Laterality: N/A;    EYE SURGERY  10/24/2023    strabismus surgery  bilateral    HYSTEROSCOPY N/A 09/17/2019    Procedure: HYSTEROSCOPY-removal of IUD; possible shaving of uterine fibroids if needed to insert new IUD;  Surgeon: Debbie Murphy MD;  Location: [facility];  Service: OB/GYN;  Laterality: N/A;    INJECTION FOR SENTINEL NODE IDENTIFICATION Right 11/18/2024    Procedure: INJECTION, FOR SENTINEL NODE IDENTIFICATION;  Surgeon: Kassi Mahmood MD;  Location: [facility];  Service: General;  Laterality: Right;    INJECTION, AGENT, INTRAVENOUS, FOR ASSESSMENT OF BLOOD FLOW IN FLAP OR GRAFT Bilateral 11/18/2024    Procedure: INJECTION, AGENT, INTRAVENOUS, FOR ASSESSMENT OF BLOOD FLOW IN FLAP OR GRAFT;  Surgeon: Amandeep Noel DO;  Location: [facility];  Service: General;  Laterality: Bilateral;    INSERTION OF BREAST TISSUE EXPANDER Bilateral 11/18/2024    Procedure: INSERTION, TISSUE EXPANDER, BREAST;  Surgeon: Amandeep Noel DO;  Location: [facility];  Service: General;  Laterality: Bilateral;    INTRAUTERINE DEVICE INSERTION N/A 09/17/2019    Procedure: IUD insertion-- mirena-- would prefer kyleena if available;  Surgeon: Debbie Murphy MD;  Location: [facility];  Service: OB/GYN;  Laterality: N/A;    SENTINEL LYMPH NODE BIOPSY Right 11/18/2024    Procedure: BIOPSY, LYMPH NODE, SENTINEL;  Surgeon: Kassi Mahmood MD;  Location: [facility];  Service: General;  Laterality: Right;       Family History   Problem Relation Name Age of Onset    Cancer Mother 80 in 2025 73        colon ca    Hypertension Mother 80 in 2025     Arrhythmia Mother 80 in 2025     Heart disease Father 72         MI around 50.    Hyperlipidemia Father 72     Parkinsonism Father 72     Cancer Sister  49        breast    Breast cancer Sister  48        breast    No Known Problems Brother pt is 3rd     No Known Problems Son      Arrhythmia Son          SVT    Cirrhosis Maternal Grandfather      Alcohol abuse Maternal  Grandfather      Alzheimer's disease Paternal Grandmother      Parkinsonism Paternal Grandfather      Ovarian cancer Cousin pat first cousin     Cancer Cousin pat first cousin         ovarian and one with lung    Colon cancer Neg Hx         Social History     Socioeconomic History    Marital status:      Spouse name: Dennis    Number of children: 3   Tobacco Use    Smoking status: Never    Smokeless tobacco: Never   Substance and Sexual Activity    Alcohol use: Yes     Alcohol/week: 1.0 standard drink of alcohol     Types: 1 Glasses of wine per week     Comment: amt varies     Drug use: No    Sexual activity: Yes     Partners: Male   Social History Narrative    From  [place]     Moved to [place] [id number]    Exercising - runs,  wts, yoga, walks.        Kids 22, 20, 18.          Retired  and .      - .         Social Drivers of Health     Financial Resource Strain: Low Risk  (2/14/2025)    Overall Financial Resource Strain (CARDIA)     Difficulty of Paying Living Expenses: Not hard at all   Food Insecurity: No Food Insecurity (2/14/2025)    Hunger Vital Sign     Worried About Running Out of Food in the Last Year: Never true     Ran Out of Food in the Last Year: Never true   Transportation Needs: No Transportation Needs (2/14/2025)    PRAPARE - Transportation     Lack of Transportation (Medical): No     Lack of Transportation (Non-Medical): No   Physical Activity: Sufficiently Active (2/14/2025)    Exercise Vital Sign     Days of Exercise per Week: 7 days     Minutes of Exercise per Session: 150+ min   Stress: Stress Concern Present (2/14/2025)    [employer] [place] [employer] - Occupational Stress Questionnaire     Feeling of Stress : To some extent   Housing Stability: Low Risk  (2/14/2025)    Housing Stability Vital Sign     Unable to Pay for Housing in the Last Year: No     Number of Times Moved in the Last Year: 0     Homeless in the Last Year: No  "      Current Medications[1]    Review of patient's allergies indicates:   Allergen Reactions    Percocet [oxycodone-acetaminophen] Nausea And Vomiting     Can take tylenol & tramadol     Vicodin [hydrocodone-acetaminophen] Nausea And Vomiting     Pt not sure if she took percocet or Vicodin that caused severe nausea & vomiting           OBJECTIVE:     /76   Pulse 74   Ht 5' 5" (1.651 m)   Wt 57.1 kg (125 lb 14.1 oz)   BMI 20.95 kg/m²       Physical Exam:  Gen: NAD, appears stated age  Neuro: normal without focal findings, mental status and speech normal  HEENT: NCAT, neck supple, PEERL  CV: RRR  Pulm: Breathing non-labored, chest wall movement equal bilaterally   Breast: Exam deferred, reviewed medical photography  Abdomen: soft, nontender, no guarding  Extremity:normal strength, no cyanosis or edema  Skin: Skin color, texture, turgor normal. No rashes or lesions  Psych: oriented to time, place and person, mood and affect are within normal limits          ASSESSMENT/PLAN:     Louise Cueto was seen preoperatively today for second stage implant based reconstruction.  The operative plan is for BL TE removal, implant placement and fat grafting.  Lipo donor site will be abdomen than thighs    We discussed details of implant exchange and additional  revisions including possible tailoring of the skin envelope, repositioning/exchange of the implant, capsulotomy or capsulorraphy, liposuction and fat grafting. Risks of implant based reconstruction including but not limited to hematoma, seroma, infection, extrusion, capsular contracture, risks of rupture, need for replacement later in life, poor cosmetic outcome, need for reoperation, changes in sensation, fat necrosis and BII/VALERY-ALCL. Risks of liposuction include hematoma, seroma, contour deformity, dimpling, displeasing cosmesis.    Surgical consent as well as patient implant decision checklists were reviewed and signed. Advised patient to wear post operative " garment as instructed.     Reviewed multimodal pain control regimen used in the post operative period. Prescriptions will be sent to the outpatient pharmacy the day of surgery.  The patient was given a packet including general instructions for post-operative care, instructions for the day of surgery, drain care and process to complete FMLA/Disability paperwork.  All questions were answered. The patient was given a business card with contact info and advised to contact the clinic with any questions or concerns before or after surgery.      Amandeep Noel  Plastic and Reconstructive Surgery    I spent a total of 30 minutes on the day of the visit.  This includes face to face time and non-face to face time preparing to see the patient (eg, review of tests), obtaining and/or reviewing separately obtained history, documenting clinical information in the electronic or other health record, independently interpreting results and communicating results to the patient/family/caregiver, or care coordinator.             [1]   Current Outpatient Medications   Medication Sig Dispense Refill    acetaminophen (TYLENOL) 500 MG tablet Take 1 tablet (500 mg total) by mouth every 6 (six) hours. 30 tablet 1    anastrozole (ARIMIDEX) 1 mg Tab Take 1 tablet (1 mg total) by mouth once daily. 30 tablet 11    ascorbic acid, vitamin C, (VITAMIN C) 1000 MG tablet Take 1,000 mg by mouth every evening.      b complex vitamins capsule Take 1 capsule by mouth every evening.      collagen, bovine, 100 % Powd Apply topically.      cyclobenzaprine (FLEXERIL) 10 MG tablet Take 1 tablet (10 mg total) by mouth nightly as needed for Muscle spasms. 30 tablet 2    estradioL (IMVEXXY MAINTENANCE PACK) 10 mcg Inst Place 10 mcg vaginally twice a week. 8 each 11    estradioL (IMVEXXY MAINTENANCE PACK) 10 mcg Inst Place 10 mcg vaginally twice a week. 8 each 11    ibuprofen (ADVIL,MOTRIN) 600 MG tablet Take 1 tablet (600 mg total) by mouth 3 (three) times  daily. 30 tablet 1    INV TESTOSTERONE/ANASTROZOL OR PLACEBO PELLETS Inject 1 Pellet into the skin. FOR INVESTIGATIONAL USE ONLY      Lactobacillus rhamnosus GG (CULTURELLE) 10 billion cell capsule Take 1 capsule by mouth every evening.      magnesium 30 mg Tab Take by mouth every evening.      omega-3 fatty acids/fish oil (FISH OIL-OMEGA-3 FATTY ACIDS) 300-1,000 mg capsule Take 1 capsule by mouth once daily.      ondansetron (ZOFRAN) 4 MG tablet Take 2 tablets (8 mg total) by mouth every 6 (six) hours as needed for Nausea. 30 tablet 1    oxybutynin (DITROPAN-XL) 5 MG TR24 Take 1 tablet (5 mg total) by mouth once daily. 30 tablet 11    oxyCODONE (ROXICODONE) 5 MG immediate release tablet Take 1 tablet (5 mg total) by mouth every 6 (six) hours as needed for Pain. 10 tablet 0    traMADoL (ULTRAM) 50 mg tablet Take 1 tablet (50 mg total) by mouth every 6 (six) hours as needed for Pain. 20 tablet 0    valACYclovir (VALTREX) 1000 MG tablet Take 0.5 tablets (500 mg total) by mouth every 12 (twelve) hours. 14 tablet 5    vitamin D (VITAMIN D3) 1000 units Tab Take 1,000 Units by mouth every evening.      pregabalin (LYRICA) 75 MG capsule Take 1 capsule (75 mg total) by mouth 2 (two) times daily. 60 capsule 0     Current Facility-Administered Medications   Medication Dose Route Frequency Provider Last Rate Last Admin    testosterone cypionate injection 50 mg  50 mg Intramuscular Q28 Days    50 mg at 03/20/25 3150

## 2025-04-03 NOTE — PRE-PROCEDURE INSTRUCTIONS
The following was discussed with pt via phone and sent to pt portal. Pt verbalized understanding. Pt to be accompanied by her .    Dear Louise ,    You are scheduled for a procedure with Dr. Noel on 4/4/2025. Your scheduled arrival time is 5:30am.  This arrival time is roughly 1.5-2 hours before your anticipated procedure time to allow sufficient time for pre-op.  Please wear comfortable clothes.  This procedure will take place at the [facility] at the corner of [address] [facility] and [facility] [facility].  [facility] [place] [facility] to [facility].  The address is:    [address]  [place] [address]    After entering the building, you will proceed to the second floor where you can check in with registration. You should take any medications that you routinely take for blood pressure (other than those listed below), heart medications, thyroid, cholesterol, etc.     If you wear contact lenses, please wear glasses to your procedure.    Your fasting instructions are as follow:  Nothing to eat after midnight the evening before your surgery. STOP drinking clear liquids 2 hours prior to your arrival time. Anesthesia encourages this to ensure that you are adequately hydrated. Clear liquids include water, clear juices, Gatorade, black coffee (no milk, no cream), or soda. It does not include anything with pulp such as broth, ice cream, or jello.     You MUST have a responsible adult to bring you home.      The evening before and morning of your procedure, please hold the following medications:  -Aspirin and Aspirin-containing products (Goody's powder, Excedrin)  -NSAIDs (Advil, Ibuprofen, Aleve, Diclofenac)  -Vitamins/Supplements  -Herbal remedies/Teas  -Stimulants (Adderall, Vyvanse, Adipex)  -Diabetic medication (Please bring with you day of procedure)  -Prescription creams/gels/lotions    -May take Tylenol      The evening before and morning of your procedure, take a shower using  antibacterial soap (ex: Hibiclens or Dial antibacterial soap). DO NOT apply deodorant, lotion, cologne, or anything else to the skin. Wear loose, comfortable fitting clothing. Do not wear jewelry or bring any valuables with you. If you wear dentures or contacts, please bring your case with you or leave them at home. Use and bring any inhalers that you may have.    If you have any procedure-specific questions, please call your surgeon's office. Any other questions, don't hesitate to call at [phone].    ON THE MORNING OF SURGERY, if you experience any issues, please call our Pre-op Desk a call at [phone].    Thanks,  Pre-Admit Testing Team  Anesthesia Dept [facility]

## 2025-04-04 ENCOUNTER — ANESTHESIA (OUTPATIENT)
Dept: SURGERY | Facility: HOSPITAL | Age: 55
End: 2025-04-04
Payer: COMMERCIAL

## 2025-04-04 ENCOUNTER — HOSPITAL ENCOUNTER (OUTPATIENT)
Facility: HOSPITAL | Age: 55
Discharge: HOME | End: 2025-04-04
Attending: SURGERY | Admitting: SURGERY
Payer: COMMERCIAL

## 2025-04-04 VITALS
RESPIRATION RATE: 16 BRPM | BODY MASS INDEX: 20.8 KG/M2 | DIASTOLIC BLOOD PRESSURE: 60 MMHG | TEMPERATURE: 98 F | WEIGHT: 125 LBS | HEART RATE: 60 BPM | OXYGEN SATURATION: 95 % | SYSTOLIC BLOOD PRESSURE: 97 MMHG

## 2025-04-04 DIAGNOSIS — Z85.3 PERSONAL HISTORY OF BREAST CANCER: ICD-10-CM

## 2025-04-04 DIAGNOSIS — Z17.0 MALIGNANT NEOPLASM OF UPPER-INNER QUADRANT OF RIGHT BREAST IN FEMALE, ESTROGEN RECEPTOR POSITIVE: Primary | ICD-10-CM

## 2025-04-04 DIAGNOSIS — C50.211 MALIGNANT NEOPLASM OF UPPER-INNER QUADRANT OF RIGHT BREAST IN FEMALE, ESTROGEN RECEPTOR POSITIVE: Primary | ICD-10-CM

## 2025-04-04 LAB
B-HCG UR QL: NEGATIVE
CTP QC/QA: YES

## 2025-04-04 PROCEDURE — 63600175 PHARM REV CODE 636 W HCPCS: Performed by: REGISTERED NURSE

## 2025-04-04 PROCEDURE — 37000008 HC ANESTHESIA 1ST 15 MINUTES: Performed by: SURGERY

## 2025-04-04 PROCEDURE — 63600175 PHARM REV CODE 636 W HCPCS: Performed by: ANESTHESIOLOGY

## 2025-04-04 PROCEDURE — 81025 URINE PREGNANCY TEST: CPT | Performed by: PHYSICIAN ASSISTANT

## 2025-04-04 PROCEDURE — 94761 N-INVAS EAR/PLS OXIMETRY MLT: CPT

## 2025-04-04 PROCEDURE — 71000015 HC POSTOP RECOV 1ST HR: Performed by: SURGERY

## 2025-04-04 PROCEDURE — 88300 SURGICAL PATH GROSS: CPT | Mod: TC,91 | Performed by: SURGERY

## 2025-04-04 PROCEDURE — 71000016 HC POSTOP RECOV ADDL HR: Performed by: SURGERY

## 2025-04-04 PROCEDURE — 25000003 PHARM REV CODE 250: Performed by: REGISTERED NURSE

## 2025-04-04 PROCEDURE — 15771 GRFG AUTOL FAT LIPO 50 CC/<: CPT | Mod: 51,,, | Performed by: SURGERY

## 2025-04-04 PROCEDURE — 36000706: Performed by: SURGERY

## 2025-04-04 PROCEDURE — 27201423 OPTIME MED/SURG SUP & DEVICES STERILE SUPPLY: Performed by: SURGERY

## 2025-04-04 PROCEDURE — 63600175 PHARM REV CODE 636 W HCPCS: Performed by: PHYSICIAN ASSISTANT

## 2025-04-04 PROCEDURE — 25000003 PHARM REV CODE 250: Performed by: PHYSICIAN ASSISTANT

## 2025-04-04 PROCEDURE — 99900035 HC TECH TIME PER 15 MIN (STAT)

## 2025-04-04 PROCEDURE — 11970 RPLCMT TISS XPNDR PERM IMPLT: CPT | Mod: 50,,, | Performed by: SURGERY

## 2025-04-04 PROCEDURE — 88300 SURGICAL PATH GROSS: CPT | Mod: 26,,, | Performed by: PATHOLOGY

## 2025-04-04 PROCEDURE — 71000033 HC RECOVERY, INTIAL HOUR: Performed by: SURGERY

## 2025-04-04 PROCEDURE — C1789 PROSTHESIS, BREAST, IMP: HCPCS | Performed by: SURGERY

## 2025-04-04 PROCEDURE — 36000707: Performed by: SURGERY

## 2025-04-04 PROCEDURE — 25000003 PHARM REV CODE 250: Performed by: ANESTHESIOLOGY

## 2025-04-04 PROCEDURE — 15772 GRFG AUTOL FAT LIPO EA ADDL: CPT | Mod: ,,, | Performed by: SURGERY

## 2025-04-04 PROCEDURE — 63600175 PHARM REV CODE 636 W HCPCS: Performed by: SURGERY

## 2025-04-04 PROCEDURE — 37000009 HC ANESTHESIA EA ADD 15 MINS: Performed by: SURGERY

## 2025-04-04 PROCEDURE — 25000003 PHARM REV CODE 250: Performed by: SURGERY

## 2025-04-04 RX ORDER — PROPOFOL 10 MG/ML
VIAL (ML) INTRAVENOUS
Status: DISCONTINUED | OUTPATIENT
Start: 2025-04-04 | End: 2025-04-04

## 2025-04-04 RX ORDER — LIDOCAINE HYDROCHLORIDE 10 MG/ML
INJECTION, SOLUTION EPIDURAL; INFILTRATION; INTRACAUDAL; PERINEURAL
Status: DISCONTINUED | OUTPATIENT
Start: 2025-04-04 | End: 2025-04-04 | Stop reason: HOSPADM

## 2025-04-04 RX ORDER — OXYCODONE HYDROCHLORIDE 5 MG/1
5 TABLET ORAL EVERY 4 HOURS PRN
Qty: 10 TABLET | Refills: 0 | Status: SHIPPED | OUTPATIENT
Start: 2025-04-04

## 2025-04-04 RX ORDER — LIDOCAINE HYDROCHLORIDE 20 MG/ML
INJECTION INTRAVENOUS
Status: DISCONTINUED | OUTPATIENT
Start: 2025-04-04 | End: 2025-04-04

## 2025-04-04 RX ORDER — ONDANSETRON 4 MG/1
4 TABLET, FILM COATED ORAL EVERY 8 HOURS PRN
Qty: 20 TABLET | Refills: 0 | Status: SHIPPED | OUTPATIENT
Start: 2025-04-04 | End: 2025-04-11

## 2025-04-04 RX ORDER — ACETAMINOPHEN 10 MG/ML
INJECTION, SOLUTION INTRAVENOUS
Status: DISCONTINUED | OUTPATIENT
Start: 2025-04-04 | End: 2025-04-04

## 2025-04-04 RX ORDER — DEXMEDETOMIDINE HYDROCHLORIDE 100 UG/ML
INJECTION, SOLUTION INTRAVENOUS
Status: DISCONTINUED | OUTPATIENT
Start: 2025-04-04 | End: 2025-04-04

## 2025-04-04 RX ORDER — MIDAZOLAM HYDROCHLORIDE 1 MG/ML
INJECTION INTRAMUSCULAR; INTRAVENOUS
Status: DISCONTINUED | OUTPATIENT
Start: 2025-04-04 | End: 2025-04-04

## 2025-04-04 RX ORDER — MUPIROCIN 20 MG/G
OINTMENT TOPICAL
Status: DISCONTINUED | OUTPATIENT
Start: 2025-04-04 | End: 2025-04-04 | Stop reason: HOSPADM

## 2025-04-04 RX ORDER — CEFAZOLIN 2 G/1
2 INJECTION, POWDER, FOR SOLUTION INTRAMUSCULAR; INTRAVENOUS
Status: COMPLETED | OUTPATIENT
Start: 2025-04-04 | End: 2025-04-04

## 2025-04-04 RX ORDER — SODIUM CHLORIDE 9 MG/ML
INJECTION, SOLUTION INTRAVENOUS CONTINUOUS
Status: DISCONTINUED | OUTPATIENT
Start: 2025-04-04 | End: 2025-04-04 | Stop reason: HOSPADM

## 2025-04-04 RX ORDER — DIPHENHYDRAMINE HYDROCHLORIDE 50 MG/ML
INJECTION, SOLUTION INTRAMUSCULAR; INTRAVENOUS
Status: DISCONTINUED | OUTPATIENT
Start: 2025-04-04 | End: 2025-04-04

## 2025-04-04 RX ORDER — SODIUM CHLORIDE 0.9 % (FLUSH) 0.9 %
10 SYRINGE (ML) INJECTION
Status: DISCONTINUED | OUTPATIENT
Start: 2025-04-04 | End: 2025-04-04 | Stop reason: HOSPADM

## 2025-04-04 RX ORDER — GABAPENTIN 300 MG/1
300 CAPSULE ORAL EVERY 8 HOURS
Qty: 21 CAPSULE | Refills: 0 | Status: SHIPPED | OUTPATIENT
Start: 2025-04-04 | End: 2025-04-11

## 2025-04-04 RX ORDER — GLUCAGON 1 MG
1 KIT INJECTION
Status: DISCONTINUED | OUTPATIENT
Start: 2025-04-04 | End: 2025-04-04 | Stop reason: HOSPADM

## 2025-04-04 RX ORDER — IBUPROFEN 600 MG/1
600 TABLET ORAL EVERY 6 HOURS
Qty: 28 TABLET | Refills: 0 | Status: SHIPPED | OUTPATIENT
Start: 2025-04-04 | End: 2025-04-11

## 2025-04-04 RX ORDER — EPINEPHRINE 1 MG/ML
INJECTION INTRAMUSCULAR; INTRAVENOUS; SUBCUTANEOUS
Status: DISCONTINUED | OUTPATIENT
Start: 2025-04-04 | End: 2025-04-04 | Stop reason: HOSPADM

## 2025-04-04 RX ORDER — ROCURONIUM BROMIDE 10 MG/ML
INJECTION, SOLUTION INTRAVENOUS
Status: DISCONTINUED | OUTPATIENT
Start: 2025-04-04 | End: 2025-04-04

## 2025-04-04 RX ORDER — ONDANSETRON HYDROCHLORIDE 2 MG/ML
4 INJECTION, SOLUTION INTRAVENOUS ONCE AS NEEDED
Status: DISCONTINUED | OUTPATIENT
Start: 2025-04-04 | End: 2025-04-04 | Stop reason: HOSPADM

## 2025-04-04 RX ORDER — METHOCARBAMOL 500 MG/1
500 TABLET, FILM COATED ORAL EVERY 8 HOURS
Qty: 20 TABLET | Refills: 0 | Status: SHIPPED | OUTPATIENT
Start: 2025-04-04 | End: 2025-04-11

## 2025-04-04 RX ORDER — ONDANSETRON HYDROCHLORIDE 2 MG/ML
INJECTION, SOLUTION INTRAVENOUS
Status: DISCONTINUED | OUTPATIENT
Start: 2025-04-04 | End: 2025-04-04

## 2025-04-04 RX ORDER — DEXAMETHASONE SODIUM PHOSPHATE 4 MG/ML
INJECTION, SOLUTION INTRA-ARTICULAR; INTRALESIONAL; INTRAMUSCULAR; INTRAVENOUS; SOFT TISSUE
Status: DISCONTINUED | OUTPATIENT
Start: 2025-04-04 | End: 2025-04-04

## 2025-04-04 RX ORDER — LIDOCAINE HYDROCHLORIDE 10 MG/ML
1 INJECTION, SOLUTION EPIDURAL; INFILTRATION; INTRACAUDAL; PERINEURAL ONCE
Status: DISCONTINUED | OUTPATIENT
Start: 2025-04-04 | End: 2025-04-04 | Stop reason: HOSPADM

## 2025-04-04 RX ORDER — ACETAMINOPHEN 500 MG
1000 TABLET ORAL EVERY 6 HOURS
Qty: 56 TABLET | Refills: 0 | Status: SHIPPED | OUTPATIENT
Start: 2025-04-04 | End: 2025-04-11

## 2025-04-04 RX ORDER — SCOPOLAMINE 1 MG/3D
1 PATCH, EXTENDED RELEASE TRANSDERMAL
Status: DISCONTINUED | OUTPATIENT
Start: 2025-04-04 | End: 2025-04-04 | Stop reason: HOSPADM

## 2025-04-04 RX ORDER — HYDROMORPHONE HYDROCHLORIDE 1 MG/ML
0.5 INJECTION, SOLUTION INTRAMUSCULAR; INTRAVENOUS; SUBCUTANEOUS EVERY 5 MIN PRN
Status: DISCONTINUED | OUTPATIENT
Start: 2025-04-04 | End: 2025-04-04 | Stop reason: HOSPADM

## 2025-04-04 RX ORDER — FENTANYL CITRATE 50 UG/ML
INJECTION, SOLUTION INTRAMUSCULAR; INTRAVENOUS
Status: DISCONTINUED | OUTPATIENT
Start: 2025-04-04 | End: 2025-04-04

## 2025-04-04 RX ORDER — BUPIVACAINE HYDROCHLORIDE 2.5 MG/ML
INJECTION, SOLUTION EPIDURAL; INFILTRATION; INTRACAUDAL; PERINEURAL
Status: COMPLETED | OUTPATIENT
Start: 2025-04-04 | End: 2025-04-04

## 2025-04-04 RX ORDER — HALOPERIDOL LACTATE 5 MG/ML
INJECTION, SOLUTION INTRAMUSCULAR
Status: DISCONTINUED | OUTPATIENT
Start: 2025-04-04 | End: 2025-04-04

## 2025-04-04 RX ADMIN — ROCURONIUM BROMIDE 10 MG: 10 INJECTION, SOLUTION INTRAVENOUS at 08:04

## 2025-04-04 RX ADMIN — DIPHENHYDRAMINE HYDROCHLORIDE 12.5 MG: 50 INJECTION, SOLUTION INTRAMUSCULAR; INTRAVENOUS at 08:04

## 2025-04-04 RX ADMIN — FENTANYL CITRATE 25 MCG: 50 INJECTION, SOLUTION INTRAMUSCULAR; INTRAVENOUS at 07:04

## 2025-04-04 RX ADMIN — CEFAZOLIN 2 G: 2 INJECTION, POWDER, FOR SOLUTION INTRAMUSCULAR; INTRAVENOUS at 07:04

## 2025-04-04 RX ADMIN — PROPOFOL 150 MCG/KG/MIN: 10 INJECTION, EMULSION INTRAVENOUS at 07:04

## 2025-04-04 RX ADMIN — DEXAMETHASONE SODIUM PHOSPHATE 8 MG: 4 INJECTION INTRA-ARTICULAR; INTRALESIONAL; INTRAMUSCULAR; INTRAVENOUS; SOFT TISSUE at 07:04

## 2025-04-04 RX ADMIN — SUGAMMADEX 200 MG: 100 INJECTION, SOLUTION INTRAVENOUS at 08:04

## 2025-04-04 RX ADMIN — HYDROMORPHONE HYDROCHLORIDE 0.5 MG: 1 INJECTION, SOLUTION INTRAMUSCULAR; INTRAVENOUS; SUBCUTANEOUS at 10:04

## 2025-04-04 RX ADMIN — DEXMEDETOMIDINE HYDROCHLORIDE 12 MCG: 100 INJECTION, SOLUTION INTRAVENOUS at 07:04

## 2025-04-04 RX ADMIN — HALOPERIDOL LACTATE 1 MG: 5 INJECTION, SOLUTION INTRAMUSCULAR at 07:04

## 2025-04-04 RX ADMIN — PROPOFOL 140 MG: 10 INJECTION, EMULSION INTRAVENOUS at 07:04

## 2025-04-04 RX ADMIN — FENTANYL CITRATE 50 MCG: 50 INJECTION, SOLUTION INTRAMUSCULAR; INTRAVENOUS at 07:04

## 2025-04-04 RX ADMIN — SCOPOLAMINE 1 PATCH: 1.5 PATCH, EXTENDED RELEASE TRANSDERMAL at 06:04

## 2025-04-04 RX ADMIN — SODIUM CHLORIDE: 0.9 INJECTION, SOLUTION INTRAVENOUS at 07:04

## 2025-04-04 RX ADMIN — SODIUM CHLORIDE: 9 INJECTION, SOLUTION INTRAVENOUS at 06:04

## 2025-04-04 RX ADMIN — DEXMEDETOMIDINE HYDROCHLORIDE 12 MCG: 100 INJECTION, SOLUTION INTRAVENOUS at 08:04

## 2025-04-04 RX ADMIN — ROCURONIUM BROMIDE 50 MG: 10 INJECTION, SOLUTION INTRAVENOUS at 07:04

## 2025-04-04 RX ADMIN — PROPOFOL 40 MG: 10 INJECTION, EMULSION INTRAVENOUS at 07:04

## 2025-04-04 RX ADMIN — DIPHENHYDRAMINE HYDROCHLORIDE 25 MG: 50 INJECTION, SOLUTION INTRAMUSCULAR; INTRAVENOUS at 07:04

## 2025-04-04 RX ADMIN — MIDAZOLAM HYDROCHLORIDE 2 MG: 1 INJECTION, SOLUTION INTRAMUSCULAR; INTRAVENOUS at 06:04

## 2025-04-04 RX ADMIN — ROCURONIUM BROMIDE 10 MG: 10 INJECTION, SOLUTION INTRAVENOUS at 07:04

## 2025-04-04 RX ADMIN — LIDOCAINE HYDROCHLORIDE 80 MG: 20 INJECTION INTRAVENOUS at 07:04

## 2025-04-04 RX ADMIN — MUPIROCIN: 20 OINTMENT TOPICAL at 06:04

## 2025-04-04 RX ADMIN — BUPIVACAINE HYDROCHLORIDE 30 ML: 2.5 INJECTION, SOLUTION EPIDURAL; INFILTRATION; INTRACAUDAL; PERINEURAL at 07:04

## 2025-04-04 RX ADMIN — ACETAMINOPHEN 1000 MG: 10 INJECTION INTRAVENOUS at 07:04

## 2025-04-04 RX ADMIN — ONDANSETRON 4 MG: 2 INJECTION INTRAMUSCULAR; INTRAVENOUS at 08:04

## 2025-04-04 NOTE — ANESTHESIA PROCEDURE NOTES
Intubation    Date/Time: 4/4/2025 7:14 AM    Performed by: Shavon Bernstein CRNA  Authorized by: Live Gomez MD    Intubation:     Induction:  Intravenous    Intubated:  Postinduction    Mask Ventilation:  Easy mask    Attempts:  1    Attempted By:  CRNA    Method of Intubation:  Video laryngoscopy    Blade:  Villaseñor 3    Laryngeal View Grade: Grade I - full view of cords      Difficult Airway Encountered?: No      Complications:  None    Airway Device:  Oral endotracheal tube    Airway Device Size:  7.0    Style/Cuff Inflation:  Cuffed (inflated to minimal occlusive pressure)    Tube secured:  21    Secured at:  The lips    Placement Verified By:  Capnometry (auscultation)    Complicating Factors:  None    Findings Post-Intubation:  BS equal bilateral and atraumatic/condition of teeth unchanged  Notes:      Head and neck neutral

## 2025-04-04 NOTE — DISCHARGE INSTRUCTIONS
Plastic Surgery Discharge Instructions  Deniz Noel DO    Wound Care  1. Shower daily beginning 2 days after surgery  2. Okay to let warm soapy water run over the wound at that time  3. Do not submerge wounds in the bath  4. Leave any skin glue or mesh tape in place    Drain Care  If you have drains, strip tubing and record output when drain bulb becomes half full, or at least twice daily  Record output for each drain and bring to our follow up appointment    Diet  Regular Diet    Activity  Light activity only - no lifting greater than 10 lbs  Avoid strenuous activity that would cause you to get hot or sweaty    Medications  You have been given a prescription for narcotic pain medication, anti-inflammatory pain, muscle relaxer, and nerve pain  Unless otherwise contraindicated by your doctor, take 2 extra strength Tylenol and 600mg of ibuprofen every 8 hours  Please take medications as prescribed     What to call for:  1. Sustained fever > 101.0  2. Changes in color, sensation, temperature or pain at surgical site  3. Redness and/or drainage from the surgical site  4. Any reaction to prescribed medications  5. Continuous bloody drainage from surgical drains  6. Wound vac malfunction  7. Questions related to the procedure    Follow-up  1. Please call [phone] to schedule or confirm your follow up visit at the [facility], [address]  2. Call with any questions or concerns.  2. After hours call [phone] - ask to speak with the Plastic Surgery fellow on call

## 2025-04-04 NOTE — PLAN OF CARE
Pt arrived to recovery dosc via stretcher per OR team. Bedside report received. Pt attached to bedside monitor. VSS. Pt sedated___ post procedure. Pt on _7___L of oxygen via simple face mask, oral airway in place removed by myself with ease___; oxygen sats _100___%. Pt IV access infusing NS. Will continue to monitor.

## 2025-04-04 NOTE — BRIEF OP NOTE
Certification of Assistant at Surgery       Surgery Date: 4/4/2025     Participating Surgeons:  Surgeons and Role:     * Amandeep Noel, DO - Primary    Procedures:  Procedure(s) (LRB):  REPLACEMENT, IMPLANT, BREAST (Bilateral)  LIPOSUCTION, WITH FAT GRAFTING TO TRUNK, BREASTS, SCALP, ARMS, AND/OR LEGS, 50CC OR LESS INJECTABLE (Bilateral)    Assistant Surgeon's Certification of Necessity:  I understand that section 1842 (b) (6) (d) of the Social Security Act generally prohibits Medicare Part B reasonable charge payment for the services of assistants at surgery in teaching hospitals when qualified residents are available to furnish such services. I certify that the services for which payment is claimed were medically necessary, and that no qualified resident was available to perform the services. I further understand that these services are subject to post-payment review by the Medicare carrier.      Rosie Bradshaw PA-C    04/04/2025  9:07 AM    [facility] ([facility])  Brief Operative Note     SUMMARY     Surgery Date: 4/4/2025     Surgeons and Role:     * Amandeep Noel, DO - Primary    Assistant: ROBBIN Ortiz    Pre-op Diagnosis:  Malignant neoplasm of upper-inner quadrant of right breast in female, estrogen receptor positive [C50.211, Z17.0]    Post-op Diagnosis:  Post-Op Diagnosis Codes:     * Malignant neoplasm of upper-inner quadrant of right breast in female, estrogen receptor positive [C50.211, Z17.0]    Procedure(s) (LRB):  REPLACEMENT, IMPLANT, BREAST (Bilateral)  LIPOSUCTION, WITH FAT GRAFTING TO TRUNK, BREASTS, SCALP, ARMS, AND/OR LEGS, 50CC OR LESS INJECTABLE (Bilateral)    Anesthesia: General    Description of the findings of the procedure: bilateral tissue expander removal and permanent implant placement, right capsulotomy, liposuction of abdomen and thighs with fat grafting to bilateral breasts    Findings/Key Components: See operative  report    Estimated Blood Loss: 50 mL         Specimens:   Specimen (24h ago, onward)       Start     Ordered    04/04/25 0837  Specimen to Pathology General Surgery  RELEASE UPON ORDERING        References:    Click here for ordering Quick Tip   Question:  Release to patient  Answer:  Immediate    04/04/25 0837                    Implants:   Implant Name Type Inv. Item Serial No.  Lot No. LRB No. Used Action   [place] MemoryGel BOOST Breast Implant Smooth Moderate Plus Profile Silicone Gel-Filled 290   [id number] Left 1 Implanted   [place] MemoryGel BOOST Breast Implant Smooth Moderate Plus Profile SIlicone Gel-Filled 290   [id number]   Right 1 Implanted       Complications: None    Discharge Note    SUMMARY     Admit Date: 4/4/2025    Discharge Date and Time: 04/04/2025 9:07 AM    Hospital Course: Patient was placed in observation status for the above procedure.  See above.  Postoperatively was discharged home from the PACU once criteria was met.    Final Diagnosis: Post-Op Diagnosis Codes:     * Malignant neoplasm of upper-inner quadrant of right breast in female, estrogen receptor positive [C50.211, Z17.0]    Disposition: Home or Self Care    Follow Up/Patient Instructions:     Medications:  Reconciled Home Medications:      Medication List        START taking these medications      gabapentin 300 MG capsule  Commonly known as: NEURONTIN  Take 1 capsule (300 mg total) by mouth every 8 (eight) hours. for 7 days     methocarbamoL 500 MG Tab  Commonly known as: Robaxin  Take 1 tablet (500 mg total) by mouth every 8 (eight) hours. for 7 days            CHANGE how you take these medications      * acetaminophen 500 MG tablet  Commonly known as: TYLENOL  Take 1 tablet (500 mg total) by mouth every 6 (six) hours.  What changed: Another medication with the same name was added. Make sure you understand how and when to take each.     * acetaminophen 500 MG tablet  Commonly known as: TYLENOL  Take  2 tablets (1,000 mg total) by mouth every 6 (six) hours. for 7 days  What changed: You were already taking a medication with the same name, and this prescription was added. Make sure you understand how and when to take each.     * ibuprofen 600 MG tablet  Commonly known as: ADVIL,MOTRIN  Take 1 tablet (600 mg total) by mouth 3 (three) times daily.  What changed: Another medication with the same name was added. Make sure you understand how and when to take each.     * ibuprofen 600 MG tablet  Commonly known as: ADVIL,MOTRIN  Take 1 tablet (600 mg total) by mouth every 6 (six) hours. for 7 days  What changed: You were already taking a medication with the same name, and this prescription was added. Make sure you understand how and when to take each.     * ondansetron 4 MG tablet  Commonly known as: ZOFRAN  Take 2 tablets (8 mg total) by mouth every 6 (six) hours as needed for Nausea.  What changed: Another medication with the same name was added. Make sure you understand how and when to take each.     * ondansetron 4 MG tablet  Commonly known as: ZOFRAN  Take 1 tablet (4 mg total) by mouth every 8 (eight) hours as needed for Nausea.  What changed: You were already taking a medication with the same name, and this prescription was added. Make sure you understand how and when to take each.     * oxyCODONE 5 MG immediate release tablet  Commonly known as: ROXICODONE  Take 1 tablet (5 mg total) by mouth every 6 (six) hours as needed for Pain.  What changed: Another medication with the same name was added. Make sure you understand how and when to take each.     * oxyCODONE 5 MG immediate release tablet  Commonly known as: ROXICODONE  Take 1 tablet (5 mg total) by mouth every 4 (four) hours as needed for Pain.  What changed: You were already taking a medication with the same name, and this prescription was added. Make sure you understand how and when to take each.           * This list has 8 medication(s) that are the same as  other medications prescribed for you. Read the directions carefully, and ask your doctor or other care provider to review them with you.                CONTINUE taking these medications      anastrozole 1 mg Tab  Commonly known as: ARIMIDEX  Take 1 tablet (1 mg total) by mouth once daily.     ascorbic acid (vitamin C) 1000 MG tablet  Commonly known as: VITAMIN C  Take 1,000 mg by mouth every evening.     b complex vitamins capsule  Take 1 capsule by mouth every evening.     collagen (bovine) 100 % Powd  Apply topically.     cyclobenzaprine 10 MG tablet  Commonly known as: FLEXERIL  Take 1 tablet (10 mg total) by mouth nightly as needed for Muscle spasms.     fish oil-omega-3 fatty acids 300-1,000 mg capsule  Take 1 capsule by mouth once daily.     * IMVEXXY MAINTENANCE PACK 10 mcg Inst  Generic drug: estradioL  Place 10 mcg vaginally twice a week.     * IMVEXXY MAINTENANCE PACK 10 mcg Inst  Generic drug: estradioL  Place 10 mcg vaginally twice a week.     INV TESTOSTERONE/ANASTROZOL OR PLACEBO PELLETS  Inject 1 Pellet into the skin. FOR INVESTIGATIONAL USE ONLY     Lactobacillus rhamnosus GG 10 billion cell capsule  Commonly known as: CULTURELLE  Take 1 capsule by mouth every evening.     magnesium 30 mg Tab  Take by mouth every evening.     oxybutynin 5 MG Tr24  Commonly known as: DITROPAN-XL  Take 1 tablet (5 mg total) by mouth once daily.     pregabalin 75 MG capsule  Commonly known as: LYRICA  Take 1 capsule (75 mg total) by mouth 2 (two) times daily.     traMADoL 50 mg tablet  Commonly known as: ULTRAM  Take 1 tablet (50 mg total) by mouth every 6 (six) hours as needed for Pain.     valACYclovir 1000 MG tablet  Commonly known as: VALTREX  Take 0.5 tablets (500 mg total) by mouth every 12 (twelve) hours.     vitamin D 1000 units Tab  Commonly known as: VITAMIN D3  Take 1,000 Units by mouth every evening.           * This list has 2 medication(s) that are the same as other medications prescribed for you. Read the  directions carefully, and ask your doctor or other care provider to review them with you.                Discharge Procedure Orders   Diet general     Wear Surgical Bra and/or Girdle at all times (may remove for short times to shower)   Order Comments: Wear Surgical Bra and/or Girdle at all times (may remove for short times to shower)     Discharge instructions (Specify)

## 2025-04-04 NOTE — PLAN OF CARE
Pt in preop bay 42, VSS and IV inserted. Pt denies any open wounds on body or the use of any weight loss injections. Pt needs anesthesia consents and an H&P, otherwise ready to roll.  Procedural consents verified with pt.

## 2025-04-04 NOTE — OP NOTE
[facility] ([facility])  Plastic Surgery  Operative Note    SUMMARY     Date of Procedure: 4/4/2025     Location:  [facility]    Procedure(s): Procedure(s) (LRB):  REPLACEMENT, IMPLANT, BREAST (Bilateral)  LIPOSUCTION, WITH FAT GRAFTING TO TRUNK, BREASTS, SCALP, ARMS, AND/OR LEGS, 50CC OR LESS INJECTABLE (Bilateral)     Bilateral tissue expander removal and implant placement  Right capsulotomy   Fat grafting to the right reconstructed breast, 70cc  Fat grafting to the left reconstructed breast, 60 cc    Surgeons and Role:     * Amandeep Noel, DO - Primary    Assistant: ROBBIN Ortiz    Pre-Operative Diagnosis: Malignant neoplasm of upper-inner quadrant of right breast in female, estrogen receptor positive [C50.211, Z17.0]    Post-Operative Diagnosis: same    Anesthesia: General    Indications for Procedure: 55 yo woman with history of right breat cancer.  Underwent BL NSM with Adm and TE placement 11/18/24  Now presents for implant exchange    Operative Findings (including complications, if any):   1 L tumescent solution used   700 cc total lipoaspirate (200cc collected in the revolve)  70 cc of fat was injected into the left breast   60 cc of fat was injected into the right breast  Bilateral placement of 290 moderate plus boost smooth round silicone implants        Description of Procedures:  The patient is seen in the preoperative holding area.  All questions were answered.  Surgical consent was signed at her preoperative visit.  Areas of lipodystrophy of the abdomen and medial thighs were marked for liposuction.  Areas of volume deficiency on the upper pole were also marked on each breast  The patient is taken the operating room general was induced.  Both breasts and the abdomen were prepped and draped in the sterile fashion.       I began by instilling tumescent solution.  Three small incisions were made in the lower midline and along the semilunaris, and medial  thigh below the groin crease.  Through that a total of 1000mL of tumescent solution was instilled to the chest wall lateral to the breasts, the bilateral flanks and the upper and lower abdomen.  While that has allowed to sit I turned my attention of the breasts.       Roughly 5-6 cm incisions were made centrally in the previous IMF incisions.  The TE were ruptured and were removed along with the antibiotic disks  I considered several implant sizers,. Ultimately selected 290 MPB.  Superior and medial capsulotomy was done to loosen the capsule on the somewhat tighter right breast    Next liposuction was performed.  Using a 3 mm Naima cannula with the power assisted liposuction liposuction was performed to the bilateral flanks in the upper and lower abdomen and medial thigh.  The fat was collected into the Revolve canister.  In it all 700 cc of lipoaspirate was removed    We washed the fat with the additional 1 L of warmed lactated Ringer's solution. The remaining fat was then drawn into 10 cc syringes.     Fat grafting was done to each breast.  On the right side a 2.1mm spoon tip cannula was used beginning in the upper pole.  The mastectomy skin flap was then globally fat grafted with a total of 70 cc of fat   The same the steps were repeated on the left side.  This done done primary to the upper pole and central .   A total of 60 cc was used on that side     Both pockets were then irrigated with a dilute Betadine followed by Vashe solution.  The skin was cleaned with Betadine.  I changed gloves.  A Brown funnel was used.  Using a no-touch technique the 290 moderate plus boost implant was placed into each pocket.  The capsule was closed on each side with a running 3-0 PDS suture.       .  The deep dermis was closed with a interrupted 3-0 Monocryl suture.  Finally the subcuticular layer was closed with a running 3-0 Stratafix.       The incision was dressed with Dermabond.  The liposuction sites were closed with 5  0 nylon sutures.  The patient was padded and a postoperative bra and a girdle.  She tolerated the procedure well taken to recovery in stable condition    Significant Surgical Tasks Conducted by the Assistant(s), if Applicable: The presence of Rosie Bradshaw PA-C as first assistant was required due to the complexity of the procedure relative to any available residents. I certify that no resident was available who was qualified to serve as first assistant. Duties performed by the assistant included assisting the primary surgeon    Estimated Blood Loss (EBL): 20mL           Implants:   Implant Name Type Inv. Item Serial No.  Lot No. LRB No. Used Action   [place] MemoryGel BOOST Breast Implant Smooth Moderate Plus Profile Silicone Gel-Filled 290cc   [id number]  [id number] Left 1 Implanted   [place] Memory[employer]el BOOST Breast Implant Smooth Moderate Plus Profile SIlicone Gel-Filled 290   [id number]   Right 1 Implanted       Specimens:   Specimen (24h ago, onward)       Start     Ordered    04/04/25 0837  Specimen to Pathology General Surgery  RELEASE UPON ORDERING        References:    Click here for ordering Quick Tip   Question:  Release to patient  Answer:  Immediate    04/04/25 0837                            Condition: Good    Disposition: PACU - hemodynamically stable., DC home    Attestation: I was present and scrubbed for the entire procedure.

## 2025-04-04 NOTE — TRANSFER OF CARE
Anesthesia Transfer of Care Note    Patient: Louise Cueto    Procedure(s) Performed: Procedure(s) (LRB):  REPLACEMENT, IMPLANT, BREAST (Bilateral)  LIPOSUCTION, WITH FAT GRAFTING TO TRUNK, BREASTS, SCALP, ARMS, AND/OR LEGS, 50CC OR LESS INJECTABLE (Bilateral)    Patient location: PACU    Anesthesia Type: general    Transport from OR: Transported from OR on 6-10 L/min O2 by face mask with adequate spontaneous ventilation    Post pain: adequate analgesia    Post assessment: no apparent anesthetic complications and tolerated procedure well    Post vital signs: stable    Level of consciousness: sedated    Nausea/Vomiting: no nausea/vomiting    Complications: none    Transfer of care protocol was followedComments: Nurse at bedside, VSS, spont reg resp noted  Oral airway remains in place      Last vitals: Visit Vitals  BP (!) 92/56   Pulse 61   Temp 36.7 °C (98.1 °F) (Temporal)   Resp 17   Wt 56.7 kg (125 lb)   LMP 07/14/2024 (Approximate)   SpO2 100%   Breastfeeding No   BMI 20.80 kg/m²

## 2025-04-04 NOTE — ANESTHESIA PREPROCEDURE EVALUATION
04/04/2025  Louise Cueto is a 54 y.o., female.      Pre-op Assessment     I have reviewed the Nursing Notes.    I have reviewed the Medications.     Review of Systems  Anesthesia Hx:  No problems with previous Anesthesia             Denies Family Hx of Anesthesia complications.     Social:  Non-Smoker, No Alcohol Use       Hematology/Oncology:  Hematology Normal   Oncology Normal                                   EENT/Dental:  EENT/Dental Normal           Cardiovascular:  Cardiovascular Normal Exercise tolerance: good                                             Pulmonary:  Pulmonary Normal                       Renal/:  Renal/ Normal                 Hepatic/GI:     GERD Liver Disease,        Gerd       Liver Disease        Musculoskeletal:  Musculoskeletal Normal                Neurological:  Neurology Normal                                      Endocrine:  Endocrine Normal            Psych:  Psychiatric History                  Physical Exam  General: Well nourished    Airway:  Mallampati: II / II  Mouth Opening: Normal  TM Distance: Normal  Tongue: Normal  Neck ROM: Normal ROM    Dental:  Intact        Anesthesia Plan  Type of Anesthesia, risks & benefits discussed:    Anesthesia Type: Gen ETT, Regional  Intra-op Monitoring Plan: Standard ASA Monitors  Post Op Pain Control Plan: multimodal analgesia  Induction:  IV  Airway Plan: Direct, Post-Induction  Informed Consent: Informed consent signed with the Patient and all parties understand the risks and agree with anesthesia plan.  All questions answered.   ASA Score: 2    Ready For Surgery From Anesthesia Perspective.     .

## 2025-04-04 NOTE — PLAN OF CARE
Discharge instructions given to patient and spouse__ with verbalization of understanding all instructions. Prescription medications delivered to bedside.  VSS, denies n/v and tolerating PO, rates pain level tolerable, IV removed, and family available for patient discharge home.

## 2025-04-04 NOTE — ANESTHESIA POSTPROCEDURE EVALUATION
Anesthesia Post Evaluation    Patient: Louise Cueto    Procedure(s) Performed: Procedure(s) (LRB):  REPLACEMENT, IMPLANT, BREAST (Bilateral)  LIPOSUCTION, WITH FAT GRAFTING TO TRUNK, BREASTS, SCALP, ARMS, AND/OR LEGS, 50CC OR LESS INJECTABLE (Bilateral)    Final Anesthesia Type: general      Patient location during evaluation: PACU  Patient participation: Yes- Able to Participate  Level of consciousness: awake and alert  Post-procedure vital signs: reviewed and stable  Pain management: adequate  Airway patency: patent    PONV status at discharge: No PONV  Anesthetic complications: no      Cardiovascular status: blood pressure returned to baseline and hemodynamically stable  Respiratory status: unassisted  Hydration status: euvolemic  Follow-up not needed.              Vitals Value Taken Time   BP 97/60 04/04/25 11:02   Temp 36.4 °C (97.5 °F) 04/04/25 09:12   Pulse 68 04/04/25 11:04   Resp 22 04/04/25 11:03   SpO2 98 % 04/04/25 11:04   Vitals shown include unfiled device data.      Event Time   Out of Recovery 09:45:00         Pain/Ozzy Score: Pain Rating Prior to Med Admin: 6 (4/4/2025 10:35 AM)  Ozzy Score: 9 (4/4/2025  9:45 AM)

## 2025-04-04 NOTE — ANESTHESIA PROCEDURE NOTES
EVANGELIST    Patient location during procedure: OR   Block not for primary anesthetic.  Reason for block: at surgeon's request and post-op pain management   Post-op Pain Location: chest wall pain   Start time: 4/4/2025 7:04 AM  Timeout: 4/4/2025 7:03 AM   End time: 4/4/2025 7:09 AM    Staffing  Authorizing Provider: Live Gomez MD  Performing Provider: Live Gomez MD    Staffing  Performed by: Live Gomez MD  Authorized by: Live Gomez MD    Preanesthetic Checklist  Completed: patient identified, IV checked, site marked, risks and benefits discussed, surgical consent, monitors and equipment checked, pre-op evaluation and timeout performed  Peripheral Block  Patient position: sitting  Prep: ChloraPrep  Patient monitoring: heart rate, cardiac monitor, continuous pulse ox, continuous capnometry and frequent blood pressure checks  Block type: erector spinae plane  Laterality: bilateral  Injection technique: single shot  Interspace: T5-6    Needle  Needle type: Quincke   Needle gauge: 20 G  Needle length: 4 in  Needle localization: anatomical landmarks and ultrasound guidance  Needle insertion depth: 3 cm   -ultrasound image captured on disc.  Assessment  Injection assessment: negative aspiration, negative parasthesia and local visualized surrounding nerve  Paresthesia pain: none  Heart rate change: no  Slow fractionated injection: yes  Pain Tolerance: comfortable throughout block and no complaints  Medications:    Medications: bupivacaine (pf) (MARCAINE) injection 0.25% - Perineural   30 mL - 4/4/2025 7:09:00 AM    Additional Notes  Patient tolerated well.  See [facility] RN record for vitals. 30cc per side

## 2025-04-07 ENCOUNTER — TELEPHONE (OUTPATIENT)
Dept: PLASTIC SURGERY | Facility: CLINIC | Age: 55
End: 2025-04-07
Payer: COMMERCIAL

## 2025-04-07 LAB
ESTROGEN SERPL-MCNC: NORMAL PG/ML
INSULIN SERPL-ACNC: NORMAL U[IU]/ML
LAB AP CLINICAL INFORMATION: NORMAL
LAB AP GROSS DESCRIPTION: NORMAL
LAB AP PERFORMING LOCATION(S): NORMAL
LAB AP REPORT FOOTNOTES: NORMAL
T3RU NFR SERPL: NORMAL %

## 2025-04-08 ENCOUNTER — OFFICE VISIT (OUTPATIENT)
Dept: PLASTIC SURGERY | Facility: CLINIC | Age: 55
End: 2025-04-08
Payer: COMMERCIAL

## 2025-04-08 VITALS
WEIGHT: 125 LBS | HEART RATE: 64 BPM | SYSTOLIC BLOOD PRESSURE: 119 MMHG | BODY MASS INDEX: 20.83 KG/M2 | DIASTOLIC BLOOD PRESSURE: 84 MMHG | HEIGHT: 65 IN

## 2025-04-08 DIAGNOSIS — Z09 SURGERY FOLLOW-UP: Primary | ICD-10-CM

## 2025-04-08 PROCEDURE — 3074F SYST BP LT 130 MM HG: CPT | Mod: CPTII,S$GLB,, | Performed by: SURGERY

## 2025-04-08 PROCEDURE — 99024 POSTOP FOLLOW-UP VISIT: CPT | Mod: S$GLB,,, | Performed by: SURGERY

## 2025-04-08 PROCEDURE — 3079F DIAST BP 80-89 MM HG: CPT | Mod: CPTII,S$GLB,, | Performed by: SURGERY

## 2025-04-08 PROCEDURE — 99999 PR PBB SHADOW E&M-EST. PATIENT-LVL IV: CPT | Mod: PBBFAC,,, | Performed by: SURGERY

## 2025-04-08 PROCEDURE — 3044F HG A1C LEVEL LT 7.0%: CPT | Mod: CPTII,S$GLB,, | Performed by: SURGERY

## 2025-04-10 ENCOUNTER — OFFICE VISIT (OUTPATIENT)
Dept: HEMATOLOGY/ONCOLOGY | Facility: CLINIC | Age: 55
End: 2025-04-10
Payer: COMMERCIAL

## 2025-04-10 VITALS
SYSTOLIC BLOOD PRESSURE: 113 MMHG | WEIGHT: 124.88 LBS | DIASTOLIC BLOOD PRESSURE: 77 MMHG | BODY MASS INDEX: 20.8 KG/M2 | HEIGHT: 65 IN | OXYGEN SATURATION: 98 % | TEMPERATURE: 98 F | HEART RATE: 71 BPM | RESPIRATION RATE: 17 BRPM

## 2025-04-10 DIAGNOSIS — C50.211 MALIGNANT NEOPLASM OF UPPER-INNER QUADRANT OF RIGHT BREAST IN FEMALE, ESTROGEN RECEPTOR POSITIVE: Primary | ICD-10-CM

## 2025-04-10 DIAGNOSIS — Z17.0 MALIGNANT NEOPLASM OF UPPER-INNER QUADRANT OF RIGHT BREAST IN FEMALE, ESTROGEN RECEPTOR POSITIVE: Primary | ICD-10-CM

## 2025-04-10 PROCEDURE — 99999 PR PBB SHADOW E&M-EST. PATIENT-LVL V: CPT | Mod: PBBFAC,,, | Performed by: NURSE PRACTITIONER

## 2025-04-10 PROCEDURE — G2211 COMPLEX E/M VISIT ADD ON: HCPCS | Mod: S$GLB,,, | Performed by: NURSE PRACTITIONER

## 2025-04-10 PROCEDURE — 3044F HG A1C LEVEL LT 7.0%: CPT | Mod: CPTII,S$GLB,, | Performed by: NURSE PRACTITIONER

## 2025-04-10 PROCEDURE — 1159F MED LIST DOCD IN RCRD: CPT | Mod: CPTII,S$GLB,, | Performed by: NURSE PRACTITIONER

## 2025-04-10 PROCEDURE — 3074F SYST BP LT 130 MM HG: CPT | Mod: CPTII,S$GLB,, | Performed by: NURSE PRACTITIONER

## 2025-04-10 PROCEDURE — 3078F DIAST BP <80 MM HG: CPT | Mod: CPTII,S$GLB,, | Performed by: NURSE PRACTITIONER

## 2025-04-10 PROCEDURE — 99214 OFFICE O/P EST MOD 30 MIN: CPT | Mod: S$GLB,,, | Performed by: NURSE PRACTITIONER

## 2025-04-10 PROCEDURE — 3008F BODY MASS INDEX DOCD: CPT | Mod: CPTII,S$GLB,, | Performed by: NURSE PRACTITIONER

## 2025-04-10 PROCEDURE — 1160F RVW MEDS BY RX/DR IN RCRD: CPT | Mod: CPTII,S$GLB,, | Performed by: NURSE PRACTITIONER

## 2025-04-10 NOTE — PROGRESS NOTES
Louise Cueto presents to [facility] for a follow up visit status post bilateral second stage implant exchange with liposuction of abdomen and thighs and fat grafting to both breasts on 4/4/25. She is pleased with the size of her implants and improved comfort compared to expanders. She is appropriately sore from lipo.    PHYSICAL EXAMINATION  Bilateral breast fat grafted with improvement in contour. There is bruising, as expected.   Lipo sites of  abdomen and thighs  is mildly bruised. Lipo site sutures with nylon sutures in place.  Drains are not present       ASSESSMENT/PLAN  Louise Cueto is s/p implant exchange with lipo and fat grafting  - Lipo site sutures removed  - Continue compression for 4 weeks day and night and an additional 4 weeks during the day for best contour  - Follow up 2 weeks      All questions were answered. The patient was advised to contact the clinic with any questions or concerns prior to their next visit.       Rosie Bradshaw PA-C  Plastic and Reconstructive Surgery

## 2025-04-14 ENCOUNTER — OFFICE VISIT (OUTPATIENT)
Dept: ORTHOPEDICS | Facility: CLINIC | Age: 55
End: 2025-04-14
Payer: COMMERCIAL

## 2025-04-14 ENCOUNTER — RESULTS FOLLOW-UP (OUTPATIENT)
Dept: INTERNAL MEDICINE | Facility: CLINIC | Age: 55
End: 2025-04-14

## 2025-04-14 ENCOUNTER — HOSPITAL ENCOUNTER (OUTPATIENT)
Dept: RADIOLOGY | Facility: HOSPITAL | Age: 55
Discharge: HOME | End: 2025-04-14
Attending: PHYSICIAN ASSISTANT
Payer: COMMERCIAL

## 2025-04-14 DIAGNOSIS — G89.29 CHRONIC RIGHT SHOULDER PAIN: ICD-10-CM

## 2025-04-14 DIAGNOSIS — M25.511 CHRONIC RIGHT SHOULDER PAIN: ICD-10-CM

## 2025-04-14 DIAGNOSIS — M25.611 DECREASED RANGE OF MOTION OF RIGHT SHOULDER: ICD-10-CM

## 2025-04-14 DIAGNOSIS — M77.8 SUBSCAPULARIS TENDINITIS OF RIGHT SHOULDER: Primary | ICD-10-CM

## 2025-04-14 DIAGNOSIS — M75.31 CALCIFIC TENDONITIS OF RIGHT SHOULDER REGION: ICD-10-CM

## 2025-04-14 PROCEDURE — 73030 X-RAY EXAM OF SHOULDER: CPT | Mod: 26,RT,, | Performed by: RADIOLOGY

## 2025-04-14 PROCEDURE — 99999 PR PBB SHADOW E&M-EST. PATIENT-LVL III: CPT | Mod: PBBFAC,,, | Performed by: PHYSICIAN ASSISTANT

## 2025-04-14 PROCEDURE — 1159F MED LIST DOCD IN RCRD: CPT | Mod: CPTII,S$GLB,, | Performed by: PHYSICIAN ASSISTANT

## 2025-04-14 PROCEDURE — 3044F HG A1C LEVEL LT 7.0%: CPT | Mod: CPTII,S$GLB,, | Performed by: PHYSICIAN ASSISTANT

## 2025-04-14 PROCEDURE — 99203 OFFICE O/P NEW LOW 30 MIN: CPT | Mod: S$GLB,,, | Performed by: PHYSICIAN ASSISTANT

## 2025-04-14 PROCEDURE — 73030 X-RAY EXAM OF SHOULDER: CPT | Mod: TC,RT

## 2025-04-14 RX ORDER — MELOXICAM 15 MG/1
15 TABLET ORAL DAILY
Qty: 30 TABLET | Refills: 0 | Status: SHIPPED | OUTPATIENT
Start: 2025-04-14

## 2025-04-14 NOTE — PROGRESS NOTES
History of Present Illness    HPI:  Patient presents with right shoulder pain, following a double mastectomy and recent breast reconstruction surgery.     She underwent a double mastectomy in November 2024 and had breast reconstruction surgery on April 4, 2025 (about 2 weeks ago). She has been in a hunched position over her breasts for months due to pain and healing, which she believes may have contributed to her current shoulder issues.    Pain is localized to the anterior aspect of her right shoulder. She denies pain in the back of the shoulder. She reports limitations in her range of motion, particularly when raising her arm straight up or to the side, with pain and tightness during these movements. She has been attending PT for both her breast reconstruction and shoulder issues, focusing on opening up her chest. PT is currently on hold due to her recent surgery; but next appt tomorrow. She is currently taking an estrogen blocker (an aromatase inhibitor) for her breast cancer treatment. No active chemo.    She denies numbness or tingling in her hands, open wounds, bleeding, or drainage from her recent surgical sites. She denies having any current chemotherapy treatment.          Medications: I have reviewed medication list in the chart at the time of this encounter.     Review of patient's allergies indicates:   Allergen Reactions    Percocet [oxycodone-acetaminophen] Nausea And Vomiting     Can take tylenol & tramadol     Vicodin [hydrocodone-acetaminophen] Nausea And Vomiting     Pt not sure if she took percocet or Vicodin that caused severe nausea & vomiting      Past Medical History:   Diagnosis Date    Abdominal bloating 06/29/2019    BRCA1 negative     BRCA2 negative     GERD (gastroesophageal reflux disease)     resolved with lap tono    Liver lesion 01/03/2019    Malignant neoplasm of upper-inner quadrant of right female breast      Past Surgical History:   Procedure Laterality Date    AXILLARY NODE  DISSECTION Right 2024    Procedure: LYMPHADENECTOMY, AXILLARY / POSSIBLE RIGHT;  Surgeon: Kassi Mahmood MD;  Location: [facility];  Service: General;  Laterality: Right;    BILATERAL MASTECTOMY Bilateral 2024    Procedure: MASTECTOMY, BILATERAL;  Surgeon: Kassi Mahmood MD;  Location: [facility];  Service: General;  Laterality: Bilateral;  HIGH / TECH AND FROZEN    BREAST CYST EXCISION Left     20 y/o f     SECTION      CHOLECYSTECTOMY      45 year old    COLONOSCOPY N/A 2017    Procedure: COLONOSCOPY;  Surgeon: Andrews Sears MD;  Location: [facility] (4TH FLR);  Service: Endoscopy;  Laterality: N/A;    COLONOSCOPY N/A 2022    Procedure: COLONOSCOPY;  Surgeon: Andrews Sears MD;  Location: [facility] ([facility] FLR);  Service: Endoscopy;  Laterality: N/A;  Fully Vaccinated.EC    CYSTOSCOPY N/A 2019    Procedure: CYSTOSCOPY;  Surgeon: Debbie Murphy MD;  Location: [facility];  Service: OB/GYN;  Laterality: N/A;    EYE SURGERY  10/24/2023    strabismus surgery  bilateral    HYSTEROSCOPY N/A 2019    Procedure: HYSTEROSCOPY-removal of IUD; possible shaving of uterine fibroids if needed to insert new IUD;  Surgeon: Debbie Murphy MD;  Location: [facility];  Service: OB/GYN;  Laterality: N/A;    INJECTION FOR SENTINEL NODE IDENTIFICATION Right 2024    Procedure: INJECTION, FOR SENTINEL NODE IDENTIFICATION;  Surgeon: Kassi Mahmood MD;  Location: [facility];  Service: General;  Laterality: Right;    INJECTION, AGENT, INTRAVENOUS, FOR ASSESSMENT OF BLOOD FLOW IN FLAP OR GRAFT Bilateral 2024    Procedure: INJECTION, AGENT, INTRAVENOUS, FOR ASSESSMENT OF BLOOD FLOW IN FLAP OR GRAFT;  Surgeon: Amandeep Noel DO;  Location: [facility];  Service: General;  Laterality: Bilateral;    INSERTION OF BREAST TISSUE EXPANDER Bilateral 2024    Procedure: INSERTION, TISSUE EXPANDER, BREAST;  Surgeon: Amandeep Noel DO;  Location: [facility];  Service: General;   Laterality: Bilateral;    INTRAUTERINE DEVICE INSERTION N/A 09/17/2019    Procedure: IUD insertion-- mirena-- would prefer kyleena if available;  Surgeon: Debbie Murphy MD;  Location: [facility];  Service: OB/GYN;  Laterality: N/A;    LIPOSUCTION, WITH FAT GRAFTING TO TRUNK, BREASTS, SCALP, ARMS, AND/OR LEGS, 50CC OR LESS INJECTABLE Bilateral 4/4/2025    Procedure: LIPOSUCTION, WITH FAT GRAFTING TO TRUNK, BREASTS, SCALP, ARMS, AND/OR LEGS, 50CC OR LESS INJECTABLE;  Surgeon: Amandeep Noel DO;  Location: [facility] OR;  Service: Plastics;  Laterality: Bilateral;  Bilateral  lipo w fat grafting to breasts    REPLACEMENT OF IMPLANT OF BREAST Bilateral 4/4/2025    Procedure: REPLACEMENT, IMPLANT, BREAST;  Surgeon: Amandeep Noel DO;  Location: [facility] OR;  Service: Plastics;  Laterality: Bilateral;  bilat Implant exchange    SENTINEL LYMPH NODE BIOPSY Right 11/18/2024    Procedure: BIOPSY, LYMPH NODE, SENTINEL;  Surgeon: Kassi Mahmood MD;  Location: [facility];  Service: General;  Laterality: Right;       Review of Systems   Musculoskeletal:  Positive for joint pain and myalgias. Negative for falls.   Neurological:  Positive for weakness.       Physical Exam  Cardiovascular:      Pulses:           Radial pulses are 2+ on the right side.   Musculoskeletal:      Right shoulder: Tenderness present. No swelling, deformity, effusion or laceration. Decreased range of motion. Decreased strength.      Right elbow: Normal.      Left elbow: Normal.      Right wrist: Normal.      Left wrist: Normal.      Right hand: Normal.      Left hand: Normal.          Right Shoulder Exam     Muscle Strength   Abduction: 4/5   Internal rotation: 4/5   External rotation: 5/5   Supraspinatus: 5/5   Subscapularis: 4/5   Biceps: 5/5     Other   Erythema: absent  Scars: absent  Sensation: normal             Right shoulder:  -30/-60° extension.   90/180° flexion.   80/180° abduction.   95/140° adduction.     Positive Painful arc test from  °.   Positive Neer's impingement sign.   Negative Hawkin's test.   Negative Drop arm test.  Negative Mireille's supraspinatus test (empty can) test.   Positive Belly press (internal rotation) test.   Unable to do Lift off test.   Negative Abduction/external rotation test.  Positive Speed's test.    Imaging:  I have independently interpreted and reviewed right shoulder x-ray obtained today and last x-ray in 2022 with patient.    1. Subscapularis tendinitis of right shoulder    2. Calcific tendonitis of right shoulder region    3. Decreased range of motion of right shoulder    4. Chronic right shoulder pain       Assessment & Plan    IMPRESSION:  - Diagnosed calcific tendonitis of the subscapularis tendon based on XR comparison showing progression from 2022 to 2025.  - Ruled out rotator cuff tear based on patient history and exam.  - Opted for conservative management with NSAIDs and physical therapy.    PATIENT EDUCATION:  - Explained anatomy of rotator cuff muscles and shoulder capsule.  - Described calcific tendonitis, its development, and inflammatory nature.  - Discussed the difference between tendon and ligament.  - Clarified the nature of NSAIDs and their anti-inflammatory effects.    ACTION ITEMS/LIFESTYLE:  - Perform pendulum swings twice daily, starting with small circles and gradually increasing size.  - Use shoulder within pain-free range, avoiding extreme overhead movements.  - Apply ice or heat to shoulder for 20 minutes every 4 hours as needed.  - Modify activities: avoid tennis, continue yoga and Pilates with modifications, okay for stationary bike and walking.    MEDICATIONS:  - Started Meloxicam (Mobic) 15 mg daily for 7 days, take with food, discontinue use of other NSAIDs (ibuprofen, Advil, Motrin, Aleve, naproxen) while taking Meloxicam.  - Continued Tylenol as needed.    REFERRALS:  - Patient is getting therapy with OT from oncology standpoint. I have placed new PT referral for treatment of  today's condition. .     FOLLOW UP:  - Follow up in 4 weeks.  - Contact the office if symptoms worsen or if oral steroids are preferred before next appointment.           Orders Placed This Encounter    X-Ray Shoulder Trauma 3 view Right    Ambulatory Referral/Consult to Physical Therapy    meloxicam (MOBIC) 15 MG tablet        Future Appointments   Date Time Provider Department Center   4/15/2025  2:30 PM Tomi Griffiths, OT [facility]   4/17/2025 11:00 AM INJECTION, [facility]   4/22/2025 11:15 AM Amandeep Noel, DO [facility] [place]   4/22/2025  2:30 PM Tomi Griffiths, OT [facility]   4/29/2025  2:30 PM Tomi Griffiths, OT [facility]   5/6/2025  2:30 PM Tomi Griffiths, OT [facility]   5/8/2025  9:30 AM Nat Chen MD [facility]   5/12/2025  1:30 PM Malaika Woodward PA-C [facility]   5/13/2025  2:30 PM Tomi Griffiths, OT [facility]   5/15/2025  3:20 PM INJECTION, [facility] [employer] [facility]   5/20/2025  2:30 PM Tomi Griffiths, OT [facility]   6/9/2025  1:40 PM INJECTION, [facility]   6/19/2025  9:15 AM Kassi Mahmood MD [facility]ward PA-C  Orthopedic Surgery  [facility]

## 2025-04-15 ENCOUNTER — PATIENT MESSAGE (OUTPATIENT)
Dept: REHABILITATION | Facility: HOSPITAL | Age: 55
End: 2025-04-15

## 2025-04-15 ENCOUNTER — CLINICAL SUPPORT (OUTPATIENT)
Dept: REHABILITATION | Facility: HOSPITAL | Age: 55
End: 2025-04-15
Payer: COMMERCIAL

## 2025-04-15 DIAGNOSIS — F43.29 STRESS AND ADJUSTMENT REACTION: ICD-10-CM

## 2025-04-15 DIAGNOSIS — R53.81 PHYSICAL DECONDITIONING: Primary | ICD-10-CM

## 2025-04-15 PROCEDURE — 97112 NEUROMUSCULAR REEDUCATION: CPT

## 2025-04-15 PROCEDURE — 97535 SELF CARE MNGMENT TRAINING: CPT

## 2025-04-15 PROCEDURE — 97110 THERAPEUTIC EXERCISES: CPT

## 2025-04-16 ENCOUNTER — DOCUMENTATION ONLY (OUTPATIENT)
Dept: REHABILITATION | Facility: HOSPITAL | Age: 55
End: 2025-04-16
Payer: COMMERCIAL

## 2025-04-16 DIAGNOSIS — Z91.89 AT RISK FOR LYMPHEDEMA: ICD-10-CM

## 2025-04-16 DIAGNOSIS — M25.619 DECREASED RANGE OF MOTION OF SHOULDER, UNSPECIFIED LATERALITY: Primary | ICD-10-CM

## 2025-04-16 DIAGNOSIS — R29.898 WEAKNESS OF BOTH UPPER EXTREMITIES: ICD-10-CM

## 2025-04-16 DIAGNOSIS — R29.3 POOR POSTURE: ICD-10-CM

## 2025-04-16 DIAGNOSIS — M25.519 SHOULDER PAIN, UNSPECIFIED CHRONICITY, UNSPECIFIED LATERALITY: Primary | ICD-10-CM

## 2025-04-16 NOTE — PROGRESS NOTES
PHYSICAL THERAPY  Discharge  Date of Discharge 4/16/2025    Name: Louise TAMEZ Rom  [facility] #: [id number]    Pt was seen in Physical Therapy for initial evaluation on:12/11/2024  Pt's last date of treatment in Physical Therapy was:3/25/2025  Number of treatment sessions completed: 5  Reason for discharge:    patient prefers to be seen at a clinic closer to her home  Status at Discharge:  Goals not met

## 2025-04-16 NOTE — PROGRESS NOTES
Outpatient Rehab    Occupational Therapy Visit    Patient Name: Louise Cueto  MRN: [id number]  YOB: 1970  Encounter Date: 4/15/2025    Therapy Diagnosis:   Encounter Diagnoses   Name Primary?    Physical deconditioning Yes    Stress and adjustment reaction      Physician: Tammie Rodrigues FNP-C    Physician Orders: Eval and Treat  Medical Diagnosis: Status post bilateral mastectomy  Other low back pain  Neuropathy    Visit # / Visits Authorized: 5 / 20  Insurance Authorization Period: 1/1/2025 to 12/31/2025  Date of Evaluation: 12/11/25  Plan of Care Certification: 3/5/25 to 6/5/25     Time In: 1430   Time Out: 1515  Total Time: 45   Total Billable Time: 45        Subjective   I had my surgery for the implants and I am bruised and sore.  I also saw the orthopedist for my shoulder and she diagnosed me with calcified bursitis..  Pain reported as 4/10. post op surgical pain.    Objective   Patient Specific Functional Scale:            Activity 12/11/24  3/5/25  4/1/25       1.stress 2     3    4       2. fatigue 1      3  4       3.sleep 1      3  3       4. posture 1      4  4       5.strength 1     3  4       6.             SCORE    1.2  3.2    3.8          Total Score = Sum of activity scores / number of activities  Minimum Detectable Change (90% CII) for average score = 2 points  Minimum Detectable Change (90% CI) for single activity score = 3 points                      Treatment:  Therapeutic Exercise  TE 1: STANDING:down dog with chair, chair pose, volcano shoulder stretches,wide straddle forward fold, triangle, side angle, tree,  TE 2: SEATED: cat-cow, forward fold, cobra, twist, figure 4 hip stretch  TE 3: QUADRAPED: modified: cat-cow, puppy flow, camel,  TE 4: knee to chest flow, modified twist,  Self Care and ADLs  ADL 1: BREATHING TECHNIQUES: Diaphragmatic breathing, Viloma breath, bramari breath  ADL 2: body scan in sit, supine  ADL 3: BREATHING TECHNIQUES: diaphragmatic breathing, viloma  breath, bramari breath  ADL 5: RESTORATIVE YOGA: bridge and bound angle supported bert terry  Balance/Neuromuscular Re-Education  NMR 1: Pt. required tactile and verbal neuromuscular cues for yoga exercise and relaxation techniques    Time Entry(in minutes):  Neuromuscular Re-Education Time Entry: 15  Self Care/Home Management (ADLs) Time Entry: 15  Therapeutic Exercise Time Entry: 15    Assessment & Plan   Assessment: In today's session, we focused on relaxation due to recent breast surgery.  She has had bilateral implants placment.  We modified gentle stretching yoga exercise and she tolerated this well.  She practiced  breathing and body scan techniques and was taught bramari breath to  assist with relaxation/immune health. She tolerated the session well and required moderate verbal and neuromuscular cues in all treatment.  Evaluation/Treatment Tolerance: Patient limited by pain    Patient will continue to benefit from skilled outpatient occupational therapy to address the deficits listed in the problem list box on initial evaluation, provide pt/family education and to maximize pt's level of independence in the home and community environment.     Patient's spiritual, cultural, and educational needs considered and patient agreeable to plan of care and goals.     Education  Education was done with Patient. The patient's learning style includes Demonstration, Listening, and Tactile.          Reviewed physiology of the relaxation and stress response and how yoga/meditation affect neuroendocrine system and immune health.       Plan: OT 1x/week x 7 weeks for  therapeutic yoga to include yoga exercise to incerase strength, flexability, balance and relaxation  training including breathing techniques, body scan, metta meditation.    Goals:   Active       Long term goals       Pt. will be independent for yoga HEP focusing on strength, endurance and whole body  ROM. (Progressing)       Start:  03/05/25    Expected End:   06/25/25            Pt. will be independent for yoga relaxation techniques including breathing exercises, body scan and medititation. (Progressing)       Start:  03/05/25    Expected End:  06/25/25            Pt. will identify a plan for yoga class participation and home practice. (Progressing)       Start:  03/05/25    Expected End:  06/25/25               Short term goals       Pt. will require minimal assist for yoga HEP focusing on strength, enduranceand whole body ROM . (Met)       Start:  03/05/25    Expected End:  06/25/25    Resolved:  04/01/25         Pt. will require minimal assistance for relaxation techniques to assist immune health. (Progressing)       Start:  03/05/25    Expected End:  06/25/25            Pt. will identify plan for integration of yoga into daily plan. (Progressing)       Start:  03/05/25    Expected End:  06/25/25                Tomi Griffiths OT

## 2025-04-17 ENCOUNTER — CLINICAL SUPPORT (OUTPATIENT)
Dept: OBSTETRICS AND GYNECOLOGY | Facility: CLINIC | Age: 55
End: 2025-04-17
Payer: COMMERCIAL

## 2025-04-17 DIAGNOSIS — N95.1 MENOPAUSAL SYMPTOM: Primary | ICD-10-CM

## 2025-04-17 PROCEDURE — 99999 PR PBB SHADOW E&M-EST. PATIENT-LVL I: CPT | Mod: PBBFAC,,,

## 2025-04-17 RX ADMIN — TESTOSTERONE CYPIONATE 50 MG: 200 INJECTION, SOLUTION INTRAMUSCULAR at 11:04

## 2025-04-21 ENCOUNTER — TELEPHONE (OUTPATIENT)
Dept: PLASTIC SURGERY | Facility: CLINIC | Age: 55
End: 2025-04-21
Payer: COMMERCIAL

## 2025-04-21 ENCOUNTER — PATIENT MESSAGE (OUTPATIENT)
Dept: REHABILITATION | Facility: HOSPITAL | Age: 55
End: 2025-04-21
Payer: COMMERCIAL

## 2025-04-22 ENCOUNTER — OFFICE VISIT (OUTPATIENT)
Dept: PLASTIC SURGERY | Facility: CLINIC | Age: 55
End: 2025-04-22
Payer: COMMERCIAL

## 2025-04-22 ENCOUNTER — CLINICAL SUPPORT (OUTPATIENT)
Dept: REHABILITATION | Facility: HOSPITAL | Age: 55
End: 2025-04-22
Payer: COMMERCIAL

## 2025-04-22 DIAGNOSIS — R53.81 PHYSICAL DECONDITIONING: Primary | ICD-10-CM

## 2025-04-22 DIAGNOSIS — C50.911 INVASIVE DUCTAL CARCINOMA OF BREAST, FEMALE, RIGHT: Primary | ICD-10-CM

## 2025-04-22 DIAGNOSIS — Z17.0 MALIGNANT NEOPLASM OF UPPER-INNER QUADRANT OF RIGHT BREAST IN FEMALE, ESTROGEN RECEPTOR POSITIVE: ICD-10-CM

## 2025-04-22 DIAGNOSIS — F43.29 STRESS AND ADJUSTMENT REACTION: ICD-10-CM

## 2025-04-22 DIAGNOSIS — C50.211 MALIGNANT NEOPLASM OF UPPER-INNER QUADRANT OF RIGHT BREAST IN FEMALE, ESTROGEN RECEPTOR POSITIVE: ICD-10-CM

## 2025-04-22 PROCEDURE — 97535 SELF CARE MNGMENT TRAINING: CPT

## 2025-04-22 PROCEDURE — 99024 POSTOP FOLLOW-UP VISIT: CPT | Mod: S$GLB,,, | Performed by: SURGERY

## 2025-04-22 PROCEDURE — 3044F HG A1C LEVEL LT 7.0%: CPT | Mod: CPTII,S$GLB,, | Performed by: SURGERY

## 2025-04-22 PROCEDURE — 99999 PR PBB SHADOW E&M-EST. PATIENT-LVL III: CPT | Mod: PBBFAC,,, | Performed by: SURGERY

## 2025-04-22 PROCEDURE — 97110 THERAPEUTIC EXERCISES: CPT

## 2025-04-22 PROCEDURE — 97140 MANUAL THERAPY 1/> REGIONS: CPT

## 2025-04-22 PROCEDURE — 97165 OT EVAL LOW COMPLEX 30 MIN: CPT

## 2025-04-22 NOTE — PROGRESS NOTES
Outpatient Rehab    Occupational Therapy Visit    Patient Name: Louise Cueto  MRN: [id number]  YOB: 1970  Encounter Date: 4/22/2025    Therapy Diagnosis:   Encounter Diagnoses   Name Primary?    Physical deconditioning Yes    Stress and adjustment reaction      Physician: Tammie Rodrigues FNP-C    Physician Orders: Eval and Treat  Medical Diagnosis: Status post bilateral mastectomy  Other low back pain  Neuropathy    Visit # / Visits Authorized: 6 / 20  Insurance Authorization Period: 1/1/2025 to 12/31/2025  Date of Evaluation: 12/11/24  Plan of Care Certification: 3/5/25 to 6/5/25     Time In: 1430   Time Out: 1530  Total Time: 60   Total Billable Time: 60        Subjective   I am scheduled for PT on Friday, 4/25/25..  Pain reported as 4/10.      Objective   Patient Specific Functional Scale:            Activity 12/11/24  3/5/25  4/1/25       1.stress 2     3    4       2. fatigue 1      3  4       3.sleep 1      3  3       4. posture 1      4  4       5.strength 1     3  4       6.             SCORE    1.2  3.2    3.8          Total Score = Sum of activity scores / number of activities  Minimum Detectable Change (90% CII) for average score = 2 points  Minimum Detectable Change (90% CI) for single activity score = 3 points                Treatment:  Therapeutic Exercise  TE 1: STANDING:down dog with chair, chair pose, volcano shoulder stretches,wide straddle forward fold,  side angle, eagle squat x 2,  TE 3: gisselle push-ups x 10, cat-cow, jose, puppy flow  TE 4: SUPINE: knee to chest flow, happy baby flow, bridge, modified boat with block 3x5 breaths,  twist x 3,  Self Care and ADLs  ADL 3: BREATHING TECHNIQUES: diaphragmatic breathing, viloma breath,  ADL 4: Restorative YOGA: supine with L.E.'s on chairand bolster under hips  Balance/Neuromuscular Re-Education  NMR 1: Pt. required tactile and verbal neuromuscular cues for yoga exercise and relaxation techniques  NMR 2: joint glide and PROM assist  for bilateral shoulder ROM.    Time Entry(in minutes):  Manual Therapy Time Entry: 15  Neuromuscular Re-Education Time Entry: 15  Self Care/Home Management (ADLs) Time Entry: 15  Therapeutic Exercise Time Entry: 15    Assessment & Plan   Assessment: In today's session, we reviewed  her HEP focusing on strengthening stretching and relaxation. She reported that her shoulder felt very stiff so she recieved gentle joint glides and scapular mobility as tolerated.   She practiced  breathing and body scan techniques and was taught bramari breath to  assist with relaxation/immune health. She tolerated the session well and required moderate verbal and neuromuscular cues in all treatment.Pt. is schdeuled to begin PT for her right shoulder on 4/25/25  Evaluation/Treatment Tolerance: Patient limited by pain    Patient will continue to benefit from skilled outpatient occupational therapy to address the deficits listed in the problem list box on initial evaluation, provide pt/family education and to maximize pt's level of independence in the home and community environment.     Patient's spiritual, cultural, and educational needs considered and patient agreeable to plan of care and goals.           Plan: OT 1x/week x 6 weeks for  therapeutic yoga to include yoga exercise to incerase strength, flexability, balance and relaxation  training including breathing techniques, body scan, metta meditation.    Goals:   Active       Long term goals       Pt. will be independent for yoga HEP focusing on strength, endurance and whole body  ROM. (Progressing)       Start:  03/05/25    Expected End:  06/25/25            Pt. will be independent for yoga relaxation techniques including breathing exercises, body scan and medititation. (Progressing)       Start:  03/05/25    Expected End:  06/25/25            Pt. will identify a plan for yoga class participation and home practice. (Progressing)       Start:  03/05/25    Expected End:  06/25/25                Short term goals       Pt. will require minimal assist for yoga HEP focusing on strength, enduranceand whole body ROM . (Met)       Start:  03/05/25    Expected End:  06/25/25    Resolved:  04/01/25         Pt. will require minimal assistance for relaxation techniques to assist immune health. (Progressing)       Start:  03/05/25    Expected End:  06/25/25            Pt. will identify plan for integration of yoga into daily plan. (Progressing)       Start:  03/05/25    Expected End:  06/25/25                Tomi Griffiths OT

## 2025-04-24 ENCOUNTER — PATIENT MESSAGE (OUTPATIENT)
Dept: INTERNAL MEDICINE | Facility: CLINIC | Age: 55
End: 2025-04-24
Payer: COMMERCIAL

## 2025-04-24 ENCOUNTER — PATIENT MESSAGE (OUTPATIENT)
Dept: PLASTIC SURGERY | Facility: CLINIC | Age: 55
End: 2025-04-24
Payer: COMMERCIAL

## 2025-04-24 ENCOUNTER — DOCUMENTATION ONLY (OUTPATIENT)
Dept: REHABILITATION | Facility: OTHER | Age: 55
End: 2025-04-24
Payer: COMMERCIAL

## 2025-04-24 NOTE — PROGRESS NOTES
Patient was scheduled for a physical therapy appointment at [facility] location on 4/23/25. Patient failed to appear for the appointment without prior notification today.     Jassi Capellan PT, DPT, OCS

## 2025-04-25 ENCOUNTER — CLINICAL SUPPORT (OUTPATIENT)
Dept: REHABILITATION | Facility: HOSPITAL | Age: 55
End: 2025-04-25

## 2025-04-25 ENCOUNTER — PATIENT MESSAGE (OUTPATIENT)
Dept: HEMATOLOGY/ONCOLOGY | Facility: CLINIC | Age: 55
End: 2025-04-25
Payer: COMMERCIAL

## 2025-04-25 ENCOUNTER — CLINICAL SUPPORT (OUTPATIENT)
Dept: REHABILITATION | Facility: OTHER | Age: 55
End: 2025-04-25
Payer: COMMERCIAL

## 2025-04-25 DIAGNOSIS — M25.611 DECREASED ROM OF RIGHT SHOULDER: Primary | ICD-10-CM

## 2025-04-25 DIAGNOSIS — M25.619 DECREASED RANGE OF MOTION OF SHOULDER, UNSPECIFIED LATERALITY: Primary | ICD-10-CM

## 2025-04-25 DIAGNOSIS — M25.519 SHOULDER PAIN, UNSPECIFIED CHRONICITY, UNSPECIFIED LATERALITY: ICD-10-CM

## 2025-04-25 PROCEDURE — 97814 ACUP 1/> W/ESTIM EA ADDL 15: CPT | Performed by: ACUPUNCTURIST

## 2025-04-25 PROCEDURE — 97813 ACUP 1/> W/ESTIM 1ST 15 MIN: CPT | Performed by: ACUPUNCTURIST

## 2025-04-25 PROCEDURE — 97161 PT EVAL LOW COMPLEX 20 MIN: CPT | Mod: PN

## 2025-04-25 NOTE — PROGRESS NOTES
Outpatient Rehab    Physical Therapy Evaluation    Patient Name: Louise Cueto  MRN: [id number]  YOB: 1970  Encounter Date: 2025    Therapy Diagnosis:   Encounter Diagnoses   Name Primary?    Shoulder pain, unspecified chronicity, unspecified laterality     Decreased ROM of right shoulder Yes     Physician: Cyndie Jack MD    Physician Orders: Eval and Treat  Medical Diagnosis: Shoulder pain, unspecified chronicity, unspecified laterality    Visit # / Visits Authorized:    Insurance Authorization Period: 2025 to 2025  Date of Evaluation: 2025  Plan of Care Certification: 2025 to 25     Time In: 1600   Time Out: 1645  Total Time: 45   Total Billable Time: 45    Intake Outcome Measure for FOTO Survey    Therapist reviewed FOTO scores for Louise Cueto on 2025.   FOTO report - see Media section or FOTO account episode details.     Intake Score:  %         Subjective   History of Present Illness  Louise is a 54 y.o. female who reports to physical therapy with a chief concern of R shoulder pain, stiffness.                 Dominant Hand: Right  History of Present Condition/Illness: Right shoulder pain since 1st mastectomy 2nd to postural deficits. Most recent surgical procedure was 2 weeks ago.    Pain     Patient reports a current pain level of 5/10. Pain at best is reported as 4/10. Pain at worst is reported as 6/10.   Location: R shoulder  Clinical Progression (since onset): Worsening  Pain Qualities: Aching  Pain-Relieving Factors: Activity modification  Pain-Aggravating Factors: Lifting, Reaching           Past Medical History/Physical Systems Review:   Louise Cueto  has a past medical history of Abdominal bloating, BRCA1 negative, BRCA2 negative, GERD (gastroesophageal reflux disease), Liver lesion, and Malignant neoplasm of upper-inner quadrant of right female breast.    Louise Cueto  has a past surgical history that includes  section;  Colonoscopy (N/A, 01/26/2017); Breast cyst excision (Left); Hysteroscopy (N/A, 09/17/2019); Intrauterine device insertion (N/A, 09/17/2019); Cystoscopy (N/A, 09/17/2019); Colonoscopy (N/A, 07/25/2022); Cholecystectomy; Eye surgery (10/24/2023); Bilateral mastectomy (Bilateral, 11/18/2024); [place] lymph node biopsy (Right, 11/18/2024); Injection for sentinel node identification (Right, 11/18/2024); Axillary node dissection (Right, 11/18/2024); Insertion of breast tissue expander (Bilateral, 11/18/2024); injection, agent, intravenous, for assessment of blood flow in flap or graft (Bilateral, 11/18/2024); Replacement of implant of breast (Bilateral, 4/4/2025); and liposuction, with fat grafting to trunk, breasts, scalp, arms, and/or legs, 50cc or less injectable (Bilateral, 4/4/2025).    Louise has a current medication list which includes the following prescription(s): acetaminophen, anastrozole, ascorbic acid (vitamin c), b complex vitamins, collagen (bovine), cyclobenzaprine, imvexxy maintenance pack, imvexxy maintenance pack, gabapentin, ibuprofen, INV TESTOSTERONE/ANASTROZOL OR PLACEBO PELLETS, lactobacillus rhamnosus gg, magnesium, meloxicam, fish oil-omega-3 fatty acids, ondansetron, oxybutynin, oxycodone, oxycodone, pregabalin, tramadol, valacyclovir, and vitamin d, and the following Facility-Administered Medications: testosterone cypionate.    Review of patient's allergies indicates:   Allergen Reactions    Percocet [oxycodone-acetaminophen] Nausea And Vomiting     Can take tylenol & tramadol     Vicodin [hydrocodone-acetaminophen] Nausea And Vomiting     Pt not sure if she took percocet or Vicodin that caused severe nausea & vomiting        Objective      Shoulder Range of Motion  Right Shoulder   Active (deg) Passive (deg) Pain   Flexion 90       Extension 50       Scaption         ABduction 65       ADduction         Horizontal ABduction         Horizontal ADduction         External Rotation (Shoulder  ABducted 0 degrees) 45       External Rotation (Shoulder ABducted 45 degrees) 35       External Rotation (Shoulder ABducted 90 degrees)         Internal Rotation (Shoulder ABducted 0 degrees)         Internal Rotation (Shoulder ABducted 45 degrees) 35       Internal Rotation (Shoulder ABducted 90 degrees)           Left Shoulder   Active (deg) Passive (deg) Pain   Flexion 145       Extension 50       Scaption         ABduction 120       ADduction         Horizontal ABduction         Horizontal ADduction         External Rotation (Shoulder ABducted 0 degrees) 55       External Rotation (Shoulder ABducted 45 degrees)         External Rotation (Shoulder ABducted 90 degrees) 45       Internal Rotation (Shoulder ABducted 0 degrees)         Internal Rotation (Shoulder ABducted 45 degrees) 45       Internal Rotation (Shoulder ABducted 90 degrees)                       Shoulder Special Tests  Shoulder Labral Tests  Positive: Right Biceps Load I  Negative: Left Biceps Load I  Positive: Right Shelby's and Right Resisted Supine External Rotation  Negative: Left Shelby's and Left Resisted Supine External Rotation  Rotator Cuff Tests  Positive: Right Drop Arm and Right Empty Can  Negative: Left Drop Arm and Left Empty Can  Positive: Right Full Can  Negative: Left Full Can  Impingement Tests  Positive: Right Cross Body ADduction and Right Parrish-Lasha       Patient demonstrates positive capsular pattern on R           Treatment:       Time Entry(in minutes):  PT Evaluation (Moderate) Time Entry: 25    Assessment & Plan   Assessment  Louise presents with a condition of Moderate complexity.   Presentation of Symptoms: Stable  Will Comorbidities Impact Care: Yes  Past surgical history    Functional Limitations: Activity tolerance, Pain when reaching, Pain with ADLs/IADLs, Participating in leisure activities, Participating in sports  Impairments: Impaired physical strength, Pain with functional activity, Lack of appropriate home  exercise program    Patient Goal for Therapy (PT): Decrease R shoulder pain and improve function  Prognosis: Good  Assessment Details: Patient is a 54 year old female presenting to physical therapy with complaint of right shoulder pain, limited range of motion, and overall functional ability of right UE. Patient presents with capsular pattern of right shoulder range of motion limitations, right shoulder pain and weakness, and limited right upper extremity function 2nd to deficits. Louise will benefit from skilled physical therapy to address current deficits and return to prior level of function.     Plan  From a physical therapy perspective, the patient would benefit from: Skilled Rehab Services    Planned therapy interventions include: Therapeutic exercise, Therapeutic activities, Neuromuscular re-education, and Manual therapy.            Visit Frequency: 2 times Per Week for 6 Weeks.       This plan was discussed with Patient.   Discussion participants: Agreed Upon Plan of Care  Plan details: Right shoulder mobility, postural opening, postural strengthening, T/S extension mobility          Patient's spiritual, cultural, and educational needs considered and patient agreeable to plan of care and goals.           Goals:   Active       Functional outcome       Patient will show a significant change in FOTO patient-reported outcome tool to demonstrate subjective improvement       Start:  04/29/25            Patient will demonstrate independence in home program for support of progression       Start:  04/29/25               Leisure activities       Patient will participate in recreational activity including running, tennis without pain or limitation.       Start:  04/29/25               Pain       Patient will report pain of 3/10 demonstrating a reduction of overall pain       Start:  04/29/25               Range of Motion       Patient will improve right shoulder range of motion by 20% for improved right upper extremity  function.       Start:  04/29/25               Strength       Patient will demonstrate improved right shoulder strength to 4/5 throughout for improved right upper extremity function.       Start:  04/29/25                Jassi Capellan, PT   Admission

## 2025-04-25 NOTE — PROGRESS NOTES
Acupuncture Evaluation Note     Name: Louise Cueto  Clinic Number: [id number]    Traditional Chinese Medicine (TCM) Diagnosis: Qi Stagnation and Blood Stasis  Medical Diagnosis:   Encounter Diagnosis   Name Primary?    Decreased range of motion of shoulder, unspecified laterality Yes           Evaluation Date: 4/25/2025    Visit #/Visits authorized: self pay - visit 13    Precautions: Standard    Subjective     Chief Concern: hot flashes and night sweats, shoulder pain / frozen shoulder    Medical necessity is demonstrated by the following IMPAIRMENTS: Medical Necessity: Decreased mobility limits day to day activities, social, and emergent situations and Decreased quality of life              Aggravating Factors:  lying down   Relieving Factors:  sitting up    Symptom Description:     Quality:  Aching and Shooting  Severity:  4  Frequency:  continuously    Previous Treatments Tried:   advil & tylenol daily and gabapentin       Treatment     Treatment Principles:  move st qi and blood    Acupuncture points used:  4 GUIDO, Du20, Gb34, Ki3, Ki6, Li11, REN12, TREJO MEN, Sp10, Sp6, Sp9, St25, St36, and YIN CRAMER  + LU7, Lu5, Li15,16, Jianqian  Bilateral points:  Unilateral points:  Auricular Treatment:  trejo men    Needles In: 28  Needles Out: 28  [place] W/ STIM placed: 10:15 AM  Needles W/ STIM removed: 10:45 AM      Other Traditional Chinese Medicine Modalities -  heat lamp    Assessment     After treatment, patient states symptoms are reducing with hot flashes. Post-op constipation, but states recovery is going better than last surgery. Shoulder pain still present    Patient prognosis is Good.     Patient will continue to benefit from acupuncture treatment to address the deficits listed in the problem list box on initial evaluation, provide patient family education and to maximize pt's level of independence in the home and community environment.     Patient's spiritual, cultural and educational needs considered and pt  agreeable to plan of care and goals.     Anticipated barriers to treatment: none    Plan     Recommend as needed    Education:  Patient is aware of cumulative benefit of acupuncture

## 2025-04-29 ENCOUNTER — CLINICAL SUPPORT (OUTPATIENT)
Dept: REHABILITATION | Facility: HOSPITAL | Age: 55
End: 2025-04-29

## 2025-04-29 ENCOUNTER — CLINICAL SUPPORT (OUTPATIENT)
Dept: REHABILITATION | Facility: HOSPITAL | Age: 55
End: 2025-04-29
Payer: COMMERCIAL

## 2025-04-29 DIAGNOSIS — T45.1X5A HOT FLASHES RELATED TO AROMATASE INHIBITOR THERAPY: Primary | ICD-10-CM

## 2025-04-29 DIAGNOSIS — R23.2 HOT FLASHES RELATED TO AROMATASE INHIBITOR THERAPY: Primary | ICD-10-CM

## 2025-04-29 DIAGNOSIS — R53.81 PHYSICAL DECONDITIONING: Primary | ICD-10-CM

## 2025-04-29 DIAGNOSIS — M25.519 SHOULDER PAIN, UNSPECIFIED CHRONICITY, UNSPECIFIED LATERALITY: ICD-10-CM

## 2025-04-29 DIAGNOSIS — F43.29 STRESS AND ADJUSTMENT REACTION: ICD-10-CM

## 2025-04-29 PROBLEM — M25.611 DECREASED ROM OF RIGHT SHOULDER: Status: ACTIVE | Noted: 2025-04-29

## 2025-04-29 PROCEDURE — 97535 SELF CARE MNGMENT TRAINING: CPT

## 2025-04-29 PROCEDURE — 97813 ACUP 1/> W/ESTIM 1ST 15 MIN: CPT | Performed by: ACUPUNCTURIST

## 2025-04-29 PROCEDURE — 97112 NEUROMUSCULAR REEDUCATION: CPT

## 2025-04-29 PROCEDURE — 97814 ACUP 1/> W/ESTIM EA ADDL 15: CPT | Performed by: ACUPUNCTURIST

## 2025-04-29 PROCEDURE — 97110 THERAPEUTIC EXERCISES: CPT

## 2025-04-29 NOTE — PROGRESS NOTES
Outpatient Rehab    Occupational Therapy Progress Note    Patient Name: Louise Cueto  MRN: [id number]  YOB: 1970  Encounter Date: 4/29/2025    Therapy Diagnosis:   Encounter Diagnoses   Name Primary?    Physical deconditioning Yes    Stress and adjustment reaction      Physician: Tammie Rodrigues FNP-C    Physician Orders: Eval and Treat  Medical Diagnosis: Status post bilateral mastectomy  Other low back pain  Neuropathy    Visit # / Visits Authorized: 7 / 20  Insurance Authorization Period: 1/1/2025 to 12/31/2025  Date of Evaluation: 12/11/24  Plan of Care Certification: 3/5/25 to 6/5/25     Time In: 1430   Time Out: 1530  Total Time: 60   Total Billable Time:          Subjective   I am very sore from therapy and trying new ex. at home..  Pain reported as 5/10.      Objective   Patient Specific Functional Scale:            Activity 12/11/24  3/5/25  4/1/25  4/29/25     1.stress 2     3    4      6     2. fatigue 1      3  4       5     3.sleep 1      3  3      5     4. posture 1      4  4     6     5.strength 1     3  4     5     6.             SCORE    1.2  3.2    3.8     5.4        Total Score = Sum of activity scores / number of activities  Minimum Detectable Change (90% CII) for average score = 2 points  Minimum Detectable Change (90% CI) for single activity score = 3 points          Treatment:  Therapeutic Exercise  TE 2: SEATED: cat-cow, forward fold, cobra, twist, figure 4 hip stretch  TE 3: QUADRAPED: cat-cow, jose,  TE 4: SUPINE: knee to chest flow, happy baby flow, supported bridge with block, twist x 3, knee to chest flow, feet lifted/knee circles, figure 4, neck relaese with yoga block, neck ROM in supine,  Self Care and ADLs  ADL 1: BREATHING TECHNIQUES: Diaphragmatic breathing, Vil[place] breath,bramari breath,  ADL 2: MEDITATION TECHNIQUES:  body scan in supine,  sit,  and stand; mettaloving kindness, bramha mudra neck ROM and meditation  ADL 8: sequential fish to bridge with  bolster    Time Entry(in minutes):  Neuromuscular Re-Education Time Entry: 1  Self Care/Home Management (ADLs) Time Entry: 30  Therapeutic Exercise Time Entry: 1    Assessment & Plan   Assessment: In today's session, we focused on relaxation due to significant pain of right shoulder and hips. She practiced breathing and body scan techniques to assist with relaxation/immune health.She was also taught techniques using yoga block and bolster to assist with relaxation at home and when traveling.   She tolerated the session well and required moderate verbal and neuromuscular cues in all treatment.  Evaluation/Treatment Tolerance: Patient limited by pain.  Her PSFS score has increased from 3.8 to 5.4 indicating increased overall function.    Patient will continue to benefit from skilled outpatient occupational therapy to address the deficits listed in the problem list box on initial evaluation, provide pt/family education and to maximize pt's level of independence in the home and community environment.     Patient's spiritual, cultural, and educational needs considered and patient agreeable to plan of care and goals.     Education  Education was done with Patient. The patient's learning style includes Demonstration, Listening, and Tactile.          Reviewed physiology of the relaxation and stress response and how yoga/meditation affect neuroendocrine system and immune health.       Plan: OT 1x/week x 5 weeks for  therapeutic yoga to include yoga exercise to incerase strength, flexability, balance and relaxation  training including breathing techniques, body scan, metta meditation.    Goals:   Active       Long term goals       Pt. will be independent for yoga HEP focusing on strength, endurance and whole body  ROM. (Progressing)       Start:  03/05/25    Expected End:  06/25/25            Pt. will be independent for yoga relaxation techniques including breathing exercises, body scan and medititation. (Progressing)        Start:  03/05/25    Expected End:  06/25/25            Pt. will identify a plan for yoga class participation and home practice. (Progressing)       Start:  03/05/25    Expected End:  06/25/25               Short term goals       Pt. will require minimal assist for yoga HEP focusing on strength, enduranceand whole body ROM . (Met)       Start:  03/05/25    Expected End:  06/25/25    Resolved:  04/01/25         Pt. will require minimal assistance for relaxation techniques to assist immune health. (Met)       Start:  03/05/25    Expected End:  06/25/25    Resolved:  04/29/25         Pt. will identify plan for integration of yoga into daily plan. (Progressing)       Start:  03/05/25    Expected End:  06/25/25                Tomi Griffiths OT

## 2025-04-29 NOTE — PROGRESS NOTES
Acupuncture Evaluation Note     Name: Louise Cueto  Clinic Number: [id number]    Traditional Chinese Medicine (TCM) Diagnosis: Qi Stagnation and Blood Stasis  Medical Diagnosis:   Encounter Diagnoses   Name Primary?    Hot flashes related to aromatase inhibitor therapy Yes    Shoulder pain, unspecified chronicity, unspecified laterality            Evaluation Date: 4/29/2025    Visit #/Visits authorized: self pay - visit 14    Precautions: Standard    Subjective     Chief Concern: hot flashes and night sweats, shoulder pain / frozen shoulder    Medical necessity is demonstrated by the following IMPAIRMENTS: Medical Necessity: Decreased mobility limits day to day activities, social, and emergent situations and Decreased quality of life              Aggravating Factors:  lying down   Relieving Factors:  sitting up    Symptom Description:     Quality:  Aching and Shooting  Severity:  4  Frequency:  continuously    Previous Treatments Tried:   advil & tylenol daily and gabapentin       Treatment     Treatment Principles:  move st qi and blood    Acupuncture points used:  4 GUIDO, Du20, Gb34, Ki3, Ki6, Li11, REN12, TREJO MEN, Sp10, Sp6, Sp9, St25, St36, and YIN CRAMER  + LU7, Lu5, Li15,16, Jianqian  Bilateral points:  Unilateral points:  Auricular Treatment:  trejo men    Needles In: 28  Needles Out: 28  Needles W/ STIM placed: 3:45 PM  Needles W/ STIM removed: 4:15 PM      Other Traditional Chinese Medicine Modalities -  heat lamp    Assessment     After treatment, patient states symptoms are reducing with hot flashes. Movement with bowels. Shoulder pain still present and tender upon palpitation along the deltoid branch and longhead brachii.    Patient prognosis is Good.     Patient will continue to benefit from acupuncture treatment to address the deficits listed in the problem list box on initial evaluation, provide patient family education and to maximize pt's level of independence in the home and community environment.      Patient's spiritual, cultural and educational needs considered and pt agreeable to plan of care and goals.     Anticipated barriers to treatment: none    Plan     Recommend as needed    Education:  Patient is aware of cumulative benefit of acupuncture

## 2025-05-01 ENCOUNTER — CLINICAL SUPPORT (OUTPATIENT)
Dept: REHABILITATION | Facility: OTHER | Age: 55
End: 2025-05-01
Payer: COMMERCIAL

## 2025-05-01 DIAGNOSIS — M25.611 DECREASED ROM OF RIGHT SHOULDER: Primary | ICD-10-CM

## 2025-05-01 PROCEDURE — 97530 THERAPEUTIC ACTIVITIES: CPT | Mod: PN

## 2025-05-01 PROCEDURE — 97112 NEUROMUSCULAR REEDUCATION: CPT | Mod: PN

## 2025-05-01 NOTE — PROGRESS NOTES
Outpatient Rehab    Physical Therapy Visit    Patient Name: Louise Cueto  MRN: [id number]  YOB: 1970  Encounter Date: 5/1/2025    Therapy Diagnosis:   Encounter Diagnosis   Name Primary?    Decreased ROM of right shoulder Yes     Physician: Cyndie Jack MD    Physician Orders: Eval and Treat  Medical Diagnosis: Shoulder pain, unspecified chronicity, unspecified laterality    Visit # / Visits Authorized:  1 / 10  Insurance Authorization Period: 4/16/2025 to 12/31/2025  Date of Evaluation: 4/25/2025  Plan of Care Certification: 4/25/2025 to 7/25/25     PT/PTA:     Number of PTA visits since last PT visit:   Time In: 1330   Time Out: 1430  Total Time: 60   Total Billable Time: 60    FOTO:  Intake Score:  %  Survey Score 1:  %  Survey Score 2:  %         Subjective   Pt reported feeling well today. States that she feels about the same as the initial visit. Reports she is not able to sleep on her R side anymore and is restless at night..         Objective            Treatment:  Balance/Neuromuscular Re-Education  NMR 1: shoulder flexion AAROM supine vs wand x2min  NMR 2: supine wand ER x30  NMR 3: supine shoulder flexion wand x30  NMR 4: sidelying internal rotation sleeper stretch x30 (pain lying on R side)  NMR 5: SL ER vs 1# DB x20  NMR 6: bicep curl vs 2# x30  NMR 7: wall slides towel x20  NMR 8: pulleys x3min  NMR 9: upper trap stretch 10x10''  Therapeutic Activity  TA 1: rows vs RTB x30  TA 2: extension vs RTB x30  TA 3: ER walkou vs RTB x30  TA 4: IR walkout vs RTB x30    Time Entry(in minutes):  Neuromuscular Re-Education Time Entry: 45  Therapeutic Activity Time Entry: 15    Assessment & Plan   Assessment: Pt tolerated treatment session today. Began on the handbike and took breaks throughout due to fatigue. Tolerated AAROM exercises lacking ER more than IR. Had to take breaks throughout due to fatigue. Included wall slides, IR/ER band walkouts, and pulleys in order to guide Louise into  greater ROM with each repetition. Pain overhead >90 and tightness felt with overhead reaching in the chest. Pt also had upper trap tightness which she was then educated on proper stretching exercises to target it. Plan is to continue to follow PT POC and progress as tolerated.       Patient will continue to benefit from skilled outpatient physical therapy to address the deficits listed in the problem list box on initial evaluation, provide pt/family education and to maximize pt's level of independence in the home and community environment.     Patient's spiritual, cultural, and educational needs considered and patient agreeable to plan of care and goals.           Plan:      Goals:   Active       Functional outcome       Patient will show a significant change in FOTO patient-reported outcome tool to demonstrate subjective improvement       Start:  04/29/25            Patient will demonstrate independence in home program for support of progression       Start:  04/29/25               Leisure activities       Patient will participate in recreational activity including running, tennis without pain or limitation.       Start:  04/29/25               Pain       Patient will report pain of 3/10 demonstrating a reduction of overall pain       Start:  04/29/25               Range of Motion       Patient will improve right shoulder range of motion by 20% for improved right upper extremity function.       Start:  04/29/25               Strength       Patient will demonstrate improved right shoulder strength to 4/5 throughout for improved right upper extremity function.       Start:  04/29/25              Pt was co-treated by ANUSHA Khan under the direct supervision of a Licensed Physical Therapist    Christie Bailey PT

## 2025-05-06 ENCOUNTER — CLINICAL SUPPORT (OUTPATIENT)
Dept: REHABILITATION | Facility: HOSPITAL | Age: 55
End: 2025-05-06

## 2025-05-06 ENCOUNTER — CLINICAL SUPPORT (OUTPATIENT)
Dept: REHABILITATION | Facility: HOSPITAL | Age: 55
End: 2025-05-06
Payer: COMMERCIAL

## 2025-05-06 DIAGNOSIS — R23.2 HOT FLASHES RELATED TO AROMATASE INHIBITOR THERAPY: ICD-10-CM

## 2025-05-06 DIAGNOSIS — F43.29 STRESS AND ADJUSTMENT REACTION: ICD-10-CM

## 2025-05-06 DIAGNOSIS — R53.81 PHYSICAL DECONDITIONING: Primary | ICD-10-CM

## 2025-05-06 DIAGNOSIS — T45.1X5A HOT FLASHES RELATED TO AROMATASE INHIBITOR THERAPY: ICD-10-CM

## 2025-05-06 DIAGNOSIS — M25.519 SHOULDER PAIN, UNSPECIFIED CHRONICITY, UNSPECIFIED LATERALITY: Primary | ICD-10-CM

## 2025-05-06 PROCEDURE — 97110 THERAPEUTIC EXERCISES: CPT

## 2025-05-06 PROCEDURE — 97814 ACUP 1/> W/ESTIM EA ADDL 15: CPT | Performed by: ACUPUNCTURIST

## 2025-05-06 PROCEDURE — 97112 NEUROMUSCULAR REEDUCATION: CPT

## 2025-05-06 PROCEDURE — 97813 ACUP 1/> W/ESTIM 1ST 15 MIN: CPT | Performed by: ACUPUNCTURIST

## 2025-05-06 PROCEDURE — 97535 SELF CARE MNGMENT TRAINING: CPT

## 2025-05-06 NOTE — PROGRESS NOTES
Acupuncture Evaluation Note     Name: Louise Cueto  Clinic Number: [id number]    Traditional Chinese Medicine (TCM) Diagnosis: Qi Stagnation and Blood Stasis  Medical Diagnosis:   Encounter Diagnoses   Name Primary?    Shoulder pain, unspecified chronicity, unspecified laterality Yes    Hot flashes related to aromatase inhibitor therapy            Evaluation Date: 5/6/2025    Visit #/Visits authorized: self pay - visit 15    Precautions: Standard    Subjective     Chief Concern: hot flashes and night sweats, shoulder pain / frozen shoulder    Medical necessity is demonstrated by the following IMPAIRMENTS: Medical Necessity: Decreased mobility limits day to day activities, social, and emergent situations and Decreased quality of life              Aggravating Factors:  lying down   Relieving Factors:  sitting up    Symptom Description:     Quality:  Aching and Shooting  Severity:  4  Frequency:  continuously    Previous Treatments Tried:  advil & tylenol daily and gabapentin       Treatment     Treatment Principles:  move st qi and blood    Acupuncture points used:  4 GUIDO, Du20, Gb34, Ki3, Ki6, Li11, REN12, TREJO MEN, Sp10, Sp6, Sp9, St25, St36, and YIN CRAMER  + LU7, Lu5, Li15,16, Jianqian  Bilateral points:  Unilateral points:  Auricular Treatment:  trejo men    Needles In: 28  Needles Out: 28  Needles W/ STIM placed: 3:45 PM  Needles W/ STIM removed: 4:15 PM      Other Traditional Chinese Medicine Modalities - heat lamp    Assessment     After treatment, patient states symptoms are reducing with hot flashes. Shoulder pain is present still.    Patient prognosis is Good.     Patient will continue to benefit from acupuncture treatment to address the deficits listed in the problem list box on initial evaluation, provide patient family education and to maximize pt's level of independence in the home and community environment.     Patient's spiritual, cultural and educational needs considered and pt agreeable to plan of  care and goals.     Anticipated barriers to treatment: none    Plan     Recommend as needed    Education:  Patient is aware of cumulative benefit of acupuncture

## 2025-05-06 NOTE — PROGRESS NOTES
Outpatient Rehab    Occupational Therapy Visit    Patient Name: Louise Cueto  MRN: [id number]  YOB: 1970  Encounter Date: 5/6/2025    Therapy Diagnosis:   Encounter Diagnoses   Name Primary?    Physical deconditioning Yes    Stress and adjustment reaction      Physician: Tammie Rodrigues FNP-C    Physician Orders: Eval and Treat  Medical Diagnosis: Status post bilateral mastectomy  Other low back pain  Neuropathy    Visit # / Visits Authorized: 8 / 20  Insurance Authorization Period: 1/1/2025 to 12/31/2025  Date of Evaluation: 12/11/24  Plan of Care Certification: 3/5/25 to 6/5/25     Time In: 1430   Time Out: 1530  Total Time (in minutes): 60   Total Billable Time (in minutes): 60      Subjective   I am ok..  Pain reported as 3/10. post op surgical pain.    Objective     Patient Specific Functional Scale:            Activity 12/11/24  3/5/25  4/1/25  4/29/25     1.stress 2     3    4      6     2. fatigue 1      3  4       5     3.sleep 1      3  3      5     4. posture 1      4  4     6     5.strength 1     3  4     5     6.             SCORE    1.2  3.2    3.8     5.4        Total Score = Sum of activity scores / number of activities  Minimum Detectable Change (90% CII) for average score = 2 points  Minimum Detectable Change (90% CI) for single activity score = 3 points           Treatment:  Therapeutic Exercise  TE 1: STANDING:down dog with chair, wide straddle forward fold,  TE 2: SEATED: cat-cow, forward fold, cobra, twist, figure 4 hip stretch  TE 3: QUADRAPED: cat-cow, jose,  TE 4: SUPINE: knee to chest flow, happy baby flow, feet lifted/knee circles, figure 4, neck relaese with yoga block, neck ROM in supine, hamstring stretch wit yoga strap at  scapulae and neck,  Self Care and ADLs  ADL 1: BREATHING TECHNIQUES: Diaphragmatic breathing, Viloma breath,bramari breath, ujaii breath,  ADL 2: MEDITATION TECHNIQUES:  body scan in supine,  sit,  and stand; mettaloving kindness  ADL 3: BREATHING  TECHNIQUES: diaphragmatic breathing, viloma breath,  ADL 5: RESTORATIVE YOGA: supine with LE's supported wtih bolsters  Balance/Neuromuscular Re-Education  NMR 1: Pt. required tactile and verbal neuromuscular cues for yoga exercise and relaxation techniques    Time Entry(in minutes):  Neuromuscular Re-Education Time Entry: 15  Self Care/Home Management (ADLs) Time Entry: 15  Therapeutic Exercise Time Entry: 30    Assessment & Plan   Assessment: In today's session, we focused on relaxation due to continued  pain of right shoulder and hips. She practiced breathing and body scan techniques to assist with relaxation/immune health.She was also taught techniques using yoga block and bolster to assist with relaxation at home.   She was taught how to stretch both hamstrings and thoracic ext with yoga strap and well as neck and hamstring with yoga strap.   She tolerated the session well and required moderate verbal and neuromuscular cues in all treatment.       Patient will continue to benefit from skilled outpatient occupational therapy to address the deficits listed in the problem list box on initial evaluation, provide pt/family education and to maximize pt's level of independence in the home and community environment.     Patient's spiritual, cultural, and educational needs considered and patient agreeable to plan of care and goals.     Education  Education was done with Patient. The patient's learning style includes Demonstration, Listening, and Tactile.          Reviewed physiology of the relaxation and stress response and how yoga/meditation affect neuroendocrine system and immune health.       Plan: OT 1x/week x 4  weeks for  therapeutic yoga to include yoga exercise to incerase strength, flexability, balance and relaxation  training including breathing techniques, body scan, metta meditation.    Goals:   Active       Long term goals       Pt. will be independent for yoga HEP focusing on strength, endurance and whole  body  ROM. (Progressing)       Start:  03/05/25    Expected End:  06/25/25            Pt. will be independent for yoga relaxation techniques including breathing exercises, body scan and medititation. (Progressing)       Start:  03/05/25    Expected End:  06/25/25            Pt. will identify a plan for yoga class participation and home practice. (Progressing)       Start:  03/05/25    Expected End:  06/25/25               Short term goals       Pt. will require minimal assist for yoga HEP focusing on strength, enduranceand whole body ROM . (Met)       Start:  03/05/25    Expected End:  06/25/25    Resolved:  04/01/25         Pt. will require minimal assistance for relaxation techniques to assist immune health. (Met)       Start:  03/05/25    Expected End:  06/25/25    Resolved:  04/29/25         Pt. will identify plan for integration of yoga into daily plan. (Progressing)       Start:  03/05/25    Expected End:  06/25/25                Tomi Griffiths OT

## 2025-05-08 ENCOUNTER — PATIENT MESSAGE (OUTPATIENT)
Dept: REHABILITATION | Facility: HOSPITAL | Age: 55
End: 2025-05-08
Payer: COMMERCIAL

## 2025-05-08 ENCOUNTER — OFFICE VISIT (OUTPATIENT)
Dept: OBSTETRICS AND GYNECOLOGY | Facility: CLINIC | Age: 55
End: 2025-05-08
Attending: OBSTETRICS & GYNECOLOGY
Payer: COMMERCIAL

## 2025-05-08 ENCOUNTER — LAB VISIT (OUTPATIENT)
Dept: LAB | Facility: OTHER | Age: 55
End: 2025-05-08
Attending: OBSTETRICS & GYNECOLOGY
Payer: COMMERCIAL

## 2025-05-08 VITALS
BODY MASS INDEX: 21.16 KG/M2 | DIASTOLIC BLOOD PRESSURE: 74 MMHG | HEIGHT: 65 IN | WEIGHT: 127 LBS | HEART RATE: 76 BPM | SYSTOLIC BLOOD PRESSURE: 109 MMHG

## 2025-05-08 DIAGNOSIS — Z12.4 SCREENING FOR MALIGNANT NEOPLASM OF THE CERVIX: Primary | ICD-10-CM

## 2025-05-08 DIAGNOSIS — Z17.0 MALIGNANT NEOPLASM OF UPPER-INNER QUADRANT OF RIGHT BREAST IN FEMALE, ESTROGEN RECEPTOR POSITIVE: ICD-10-CM

## 2025-05-08 DIAGNOSIS — Z01.419 ENCOUNTER FOR GYNECOLOGICAL EXAMINATION WITHOUT ABNORMAL FINDING: ICD-10-CM

## 2025-05-08 DIAGNOSIS — Z00.00 ANNUAL PHYSICAL EXAM: ICD-10-CM

## 2025-05-08 DIAGNOSIS — N95.1 MENOPAUSAL SYMPTOM: ICD-10-CM

## 2025-05-08 DIAGNOSIS — C50.211 MALIGNANT NEOPLASM OF UPPER-INNER QUADRANT OF RIGHT BREAST IN FEMALE, ESTROGEN RECEPTOR POSITIVE: ICD-10-CM

## 2025-05-08 LAB
ESTRADIOL SERPL HS-MCNC: 22 PG/ML
TESTOST SERPL-MCNC: 125 NG/DL (ref 5–73)

## 2025-05-08 PROCEDURE — 99999 PR PBB SHADOW E&M-EST. PATIENT-LVL V: CPT | Mod: PBBFAC,,, | Performed by: OBSTETRICS & GYNECOLOGY

## 2025-05-08 PROCEDURE — 84403 ASSAY OF TOTAL TESTOSTERONE: CPT

## 2025-05-08 PROCEDURE — 36415 COLL VENOUS BLD VENIPUNCTURE: CPT

## 2025-05-08 PROCEDURE — 82670 ASSAY OF TOTAL ESTRADIOL: CPT

## 2025-05-08 PROCEDURE — 84402 ASSAY OF FREE TESTOSTERONE: CPT

## 2025-05-08 NOTE — PROGRESS NOTES
SUBJECTIVE:   54 y.o. female   for annual routine Pap and checkup. No LMP recorded. Patient is perimenopausal..  She reports that she has stopped the aromatase inhibitor.  She has not yet spoken with Dr. Dowd about it.  She felt that the benefit did not outweigh the symptoms that she is experiencing.  She reports that she has a frozen right  shoulder which worsened after her surgery for breast cancer.  She has been doing physical therapy but.  Has not had improvement.  She reports having mood swings and feeling teary.  She has stiffness and random knee pain as well as back pain.  She reports that the night sweats are better and not as intense since starting testosterone.  She reports that she can now sleep on her left side since expanders are out    She is actively doing acupuncture.  She is on testosterone injections.  She has been on pellets in the past and responded well to these.  She is using vaginal estrogen twice weekly  Past Medical History:   Diagnosis Date    Abdominal bloating 2019    BRCA1 negative     BRCA2 negative     GERD (gastroesophageal reflux disease)     resolved with lap tono    Liver lesion 2019    Malignant neoplasm of upper-inner quadrant of right female breast      Past Surgical History:   Procedure Laterality Date    AXILLARY NODE DISSECTION Right 2024    Procedure: LYMPHADENECTOMY, AXILLARY / POSSIBLE RIGHT;  Surgeon: Kassi Mahmood MD;  Location: [facility];  Service: General;  Laterality: Right;    BILATERAL MASTECTOMY Bilateral 2024    Procedure: MASTECTOMY, BILATERAL;  Surgeon: Kassi Mahmood MD;  Location: [facility];  Service: General;  Laterality: Bilateral;  HIGH / TECH AND FROZEN    BREAST CYST EXCISION Left     18 y/o f     SECTION      CHOLECYSTECTOMY      45 year old    COLONOSCOPY N/A 2017    Procedure: COLONOSCOPY;  Surgeon: Andrews Sears MD;  Location: [address]);  Service: Endoscopy;  Laterality: N/A;     COLONOSCOPY N/A 07/25/2022    Procedure: COLONOSCOPY;  Surgeon: Andrews Sears MD;  Location: [facility] ENDO ([address] [place] [address]);  Service: Endoscopy;  Laterality: N/A;  Fully Vaccinated.EC    CYSTOSCOPY N/A 09/17/2019    Procedure: CYSTOSCOPY;  Surgeon: Debbie Murphy MD;  Location: [facility];  Service: OB/GYN;  Laterality: N/A;    EYE SURGERY  10/24/2023    strabismus surgery  bilateral    HYSTEROSCOPY N/A 09/17/2019    Procedure: HYSTEROSCOPY-removal of IUD; possible shaving of uterine fibroids if needed to insert new IUD;  Surgeon: Debbie Murphy MD;  Location: [facility];  Service: OB/GYN;  Laterality: N/A;    INJECTION FOR SENTINEL NODE IDENTIFICATION Right 11/18/2024    Procedure: INJECTION, FOR SENTINEL NODE IDENTIFICATION;  Surgeon: Kassi Mahmood MD;  Location: [facility];  Service: General;  Laterality: Right;    INJECTION, AGENT, INTRAVENOUS, FOR ASSESSMENT OF BLOOD FLOW IN FLAP OR GRAFT Bilateral 11/18/2024    Procedure: INJECTION, AGENT, INTRAVENOUS, FOR ASSESSMENT OF BLOOD FLOW IN FLAP OR GRAFT;  Surgeon: Amandeep Noel DO;  Location: [facility];  Service: General;  Laterality: Bilateral;    INSERTION OF BREAST TISSUE EXPANDER Bilateral 11/18/2024    Procedure: INSERTION, TISSUE EXPANDER, BREAST;  Surgeon: Amandeep Noel DO;  Location: [facility];  Service: General;  Laterality: Bilateral;    INTRAUTERINE DEVICE INSERTION N/A 09/17/2019    Procedure: IUD insertion-- mirena-- would prefer kyleena if available;  Surgeon: Debbie Murphy MD;  Location: [facility];  Service: OB/GYN;  Laterality: N/A;    LIPOSUCTION, WITH FAT GRAFTING TO TRUNK, BREASTS, SCALP, ARMS, AND/OR LEGS, 50CC OR LESS INJECTABLE Bilateral 4/4/2025    Procedure: LIPOSUCTION, WITH FAT GRAFTING TO TRUNK, BREASTS, SCALP, ARMS, AND/OR LEGS, 50CC OR LESS INJECTABLE;  Surgeon: Amandeep Noel DO;  [facility];  Service: Plastics;  Laterality: Bilateral;  Bilateral  lipo w fat grafting to breasts    REPLACEMENT OF IMPLANT OF BREAST  Bilateral 2025    Procedure: REPLACEMENT, IMPLANT, BREAST;  Surgeon: Amandeep Noel DO;  Location: [facility];  Service: Plastics;  Laterality: Bilateral;  bilat Implant exchange    SENTINEL LYMPH NODE BIOPSY Right 2024    Procedure: BIOPSY, LYMPH NODE, SENTINEL;  Surgeon: Kassi Mahmood MD;  Location: [facility] OR;  Service: General;  Laterality: Right;     Social History[1]  Family History   Problem Relation Name Age of Onset    Parkinsonism Paternal Grandfather      Alzheimer's disease Paternal Grandmother      Cirrhosis Maternal Grandfather      Alcohol abuse Maternal Grandfather      Heart disease Father 72         MI around 50.    Hyperlipidemia Father 72     Parkinsonism Father 72     Cancer Mother 80 in  73        colon ca    Hypertension Mother 80 in      Arrhythmia Mother 80 in      No Known Problems Brother pt is 3rd     Cancer Sister  49        breast    Breast cancer Sister  48        breast    No Known Problems Son      Arrhythmia Son          SVT    Ovarian cancer Cousin pat first cousin     Cancer Cousin pat first cousin         ovarian and one with lung    Colon cancer Neg Hx      Uterine cancer Neg Hx      Cervical cancer Neg Hx       OB History    Para Term  AB Living   3 3 3      SAB IAB Ectopic Multiple Live Births             # Outcome Date GA Lbr Crow/2nd Weight Sex Type Anes PTL Lv   3 Term      CS-LTranv      2 Term      Vag-Spont      1 Term      Vag-Spont              Current Medications[2]  Allergies: Percocet [oxycodone-acetaminophen] and Vicodin [hydrocodone-acetaminophen]     The 10-year ASCVD risk score (Treasure HERNANDEZ, et al., 2019) is: 1.5%    Values used to calculate the score:      Age: 54 years      Sex: Female      Is Non- : No      Diabetic: No      Tobacco smoker: No      Systolic Blood Pressure: 109 mmHg      Is BP treated: No      HDL Cholesterol: 58 mg/dL      Total Cholesterol: 191 mg/dL      ROS:  Constitutional: no  weight loss, weight gain, fever, fatigue  Eyes:  No vision changes, glasses/contacts  ENT/Mouth: No ulcers, sinus problems, ears ringing, headache  Cardiovascular: No inability to lie flat, chest pain, exercise intolerance, swelling, heart palpitations  Respiratory: No wheezing, coughing blood, shortness of breath, or cough  Gastrointestinal: No diarrhea, bloody stool, nausea/vomiting, constipation, gas, hemorrhoids  Genitourinary: No blood in urine, painful urination, urgency of urination, frequency of urination, incomplete emptying, incontinence, abnormal bleeding, painful periods, heavy periods, vaginal discharge, vaginal odor, painful intercourse, sexual problems, bleeding after intercourse.  Musculoskeletal: No muscle weakness, +joint pain, +frozen right shoulder  Skin/Breast: No painful breasts, nipple discharge, masses, rash, ulcers  Neurological: No passing out, seizures, numbness, headache  Endocrine: No diabetes, hypothyroid, hyperthyroid,+ hot flashes, hair loss, abnormal hair growth, acne  Psychiatric: No depression, crying  Hematologic: No bruises, bleeding, swollen lymph nodes, anemia.      Physical Exam:   Constitutional: She is oriented to person, place, and time. She appears well-developed and well-nourished.      Neck: No tracheal deviation present. No thyromegaly present.    Cardiovascular:       Exam reveals no edema.        Pulmonary/Chest: Effort normal. She exhibits no mass, no tenderness, no deformity and no retraction. Right breast exhibits no inverted nipple, no mass, no nipple discharge, no skin change, no tenderness, presence, no bleeding and no swelling. Left breast exhibits no inverted nipple, no mass, no nipple discharge, no skin change, no tenderness, presence, no bleeding and no swelling. Breasts are symmetrical.        Abdominal: Soft. She exhibits no distension and no mass. There is no abdominal tenderness. There is no rebound and no guarding. No hernia. Hernia confirmed negative  in the left inguinal area.     Genitourinary:    Vagina and uterus normal.   Rectum:      No external hemorrhoid.   There is no rash, tenderness or lesion on the right labia. There is no rash, tenderness or lesion on the left labia. Cervix is normal. No no adexnal prolapse. Right adnexum displays no mass, no tenderness and no fullness. Left adnexum displays no mass, no tenderness and no fullness. No vaginal discharge, tenderness, bleeding, rectocele, cystocele or prolapse of vaginal walls in the vagina. Cervix exhibits no motion tenderness, no discharge and no friability. Uterus is not deviated.           Musculoskeletal: Normal range of motion and moves all extremeties. No edema.       Neurological: She is alert and oriented to person, place, and time.    Skin: No rash noted. No erythema. No pallor.    Psychiatric: She has a normal mood and affect. Her behavior is normal. Judgment and thought content normal.         ASSESSMENT:   well woman  1. Screening for malignant neoplasm of the cervix  Liquid-Based Pap Smear, Screening      2. Encounter for gynecological examination without abnormal finding        3. Malignant neoplasm of upper-inner quadrant of right breast in female, estrogen receptor positive  Ambulatory referral/consult to [facility]      4. Annual physical exam  Ambulatory referral/consult to [facility]          PLAN:   pap smear/HPV  She will let Dr. Dowd know that she has stopped the aromatase inhibitor  Counseled her that she is still a candidate for testosterone but would recommend using testosterone pellets along with aromatase inhibitor pellets.  Counseled her that the aromatase inhibitor pellet is not treatment and she will not have the same side effects that she experienced with oral aromatase inhibitor.  The aromatase inhibitor pellet will prevent conversion of testosterone to estrogen.  Stressed to her the importance of frequent labs while on this  treatment plan.  She voiced understanding as interested in having a testosterone and aromatase inhibitor pellet placed.  She leaves for the summer on June 17th to go to [place]  We will message Jeannie and Annette regarding changing her acupuncture location from [place] to [facility]       [1]   Social History  Socioeconomic History    Marital status:      Spouse name: Dennis    Number of children: 3   Tobacco Use    Smoking status: Never    Smokeless tobacco: Never   Substance and Sexual Activity    Alcohol use: Yes     Alcohol/week: 1.0 standard drink of alcohol     Types: 1 Glasses of wine per week     Comment: amt varies     Drug use: No    Sexual activity: Yes     Partners: Male   Social History Narrative    From  [place]     [place]oved [place] [facility] 1998    Exercising - runs,  wts, yoga, walks.        Kids 22, 20, 18.          Retired  and .      - .         Social Drivers of Health     Financial Resource Strain: Low Risk  (2/14/2025)    Overall Financial Resource Strain (CARDIA)     Difficulty of Paying Living Expenses: Not hard at all   Food Insecurity: No Food Insecurity (2/14/2025)    Hunger Vital Sign     Worried About Running Out of Food in the Last Year: Never true     Ran Out of Food in the Last Year: Never true   Transportation Needs: No Transportation Needs (2/14/2025)    PRAPARE - Transportation     Lack of Transportation (Medical): No     Lack of Transportation (Non-Medical): No   Physical Activity: Sufficiently Active (2/14/2025)    Exercise Vital Sign     Days of Exercise per Week: 7 days     Minutes of Exercise per Session: 150+ min   Stress: Stress Concern Present (2/14/2025)    [employer] - Occupational Stress Questionnaire     Feeling of Stress : To some extent   Housing Stability: Low Risk  (2/14/2025)    Housing Stability Vital Sign     Unable to Pay for Housing in the Last Year: No     Number of Times Moved in the Last  Year: 0     Homeless in the Last Year: No   [2]   Current Outpatient Medications   Medication Sig Dispense Refill    acetaminophen (TYLENOL) 500 MG tablet Take 1 tablet (500 mg total) by mouth every 6 (six) hours. 30 tablet 1    anastrozole (ARIMIDEX) 1 mg Tab Take 1 tablet (1 mg total) by mouth once daily. (Patient not taking: Reported on 5/8/2025.) 30 tablet 11    ascorbic acid, vitamin C, (VITAMIN C) 1000 MG tablet Take 1,000 mg by mouth every evening.      b complex vitamins capsule Take 1 capsule by mouth every evening.      collagen, bovine, 100 % Powd Apply topically.      cyclobenzaprine (FLEXERIL) 10 MG tablet Take 1 tablet (10 mg total) by mouth nightly as needed for Muscle spasms. 30 tablet 2    estradioL (IMVEXXY MAINTENANCE PACK) 10 mcg Inst Place 10 mcg vaginally twice a week. 8 each 11    estradioL (IMVEXXY MAINTENANCE PACK) 10 mcg Inst Place 10 mcg vaginally twice a week. 8 each 11    gabapentin (NEURONTIN) 300 MG capsule Take 1 capsule (300 mg total) by mouth every 8 (eight) hours. for 7 days 21 capsule 0    ibuprofen (ADVIL,MOTRIN) 600 MG tablet Take 1 tablet (600 mg total) by mouth 3 (three) times daily. 30 tablet 1    INV TESTOSTERONE/ANASTROZOL OR PLACEBO PELLETS Inject 1 Pellet into the skin. FOR INVESTIGATIONAL USE ONLY      Lactobacillus rhamnosus GG (CULTURELLE) 10 billion cell capsule Take 1 capsule by mouth every evening.      magnesium 30 mg Tab Take by mouth every evening.      meloxicam (MOBIC) 15 MG tablet Take 1 tablet (15 mg total) by mouth once daily. 30 tablet 0    omega-3 fatty acids/fish oil (FISH OIL-OMEGA-3 FATTY ACIDS) 300-1,000 mg capsule Take 1 capsule by mouth once daily.      ondansetron (ZOFRAN) 4 MG tablet Take 2 tablets (8 mg total) by mouth every 6 (six) hours as needed for Nausea. 30 tablet 1    oxybutynin (DITROPAN-XL) 5 MG TR24 Take 1 tablet (5 mg total) by mouth once daily. 30 tablet 11    oxyCODONE (ROXICODONE) 5 MG immediate release tablet Take 1 tablet (5 mg  total) by mouth every 6 (six) hours as needed for Pain. 10 tablet 0    oxyCODONE (ROXICODONE) 5 MG immediate release tablet Take 1 tablet (5 mg total) by mouth every 4 (four) hours as needed for Pain. 10 tablet 0    pregabalin (LYRICA) 75 MG capsule Take 1 capsule (75 mg total) by mouth 2 (two) times daily. 60 capsule 0    traMADoL (ULTRAM) 50 mg tablet Take 1 tablet (50 mg total) by mouth every 6 (six) hours as needed for Pain. 20 tablet 0    valACYclovir (VALTREX) 1000 MG tablet Take 0.5 tablets (500 mg total) by mouth every 12 (twelve) hours. (Patient not taking: Reported on 4/22/2025) 14 tablet 5    vitamin D (VITAMIN D3) 1000 units Tab Take 1,000 Units by mouth every evening.       Current Facility-Administered Medications   Medication Dose Route Frequency Provider Last Rate Last Admin    testosterone cypionate injection 50 mg  50 mg Intramuscular Q28 Days    50 mg at 04/17/25 6121

## 2025-05-10 ENCOUNTER — RESULTS FOLLOW-UP (OUTPATIENT)
Dept: OBSTETRICS AND GYNECOLOGY | Facility: CLINIC | Age: 55
End: 2025-05-10

## 2025-05-12 LAB — W FREE TESTOSTERONE: 1.6 PG/ML

## 2025-05-13 ENCOUNTER — CLINICAL SUPPORT (OUTPATIENT)
Dept: REHABILITATION | Facility: HOSPITAL | Age: 55
End: 2025-05-13
Payer: COMMERCIAL

## 2025-05-13 ENCOUNTER — CLINICAL SUPPORT (OUTPATIENT)
Dept: REHABILITATION | Facility: HOSPITAL | Age: 55
End: 2025-05-13

## 2025-05-13 DIAGNOSIS — T45.1X5A HOT FLASHES RELATED TO AROMATASE INHIBITOR THERAPY: ICD-10-CM

## 2025-05-13 DIAGNOSIS — R53.81 PHYSICAL DECONDITIONING: Primary | ICD-10-CM

## 2025-05-13 DIAGNOSIS — M25.519 SHOULDER PAIN, UNSPECIFIED CHRONICITY, UNSPECIFIED LATERALITY: Primary | ICD-10-CM

## 2025-05-13 DIAGNOSIS — R23.2 HOT FLASHES RELATED TO AROMATASE INHIBITOR THERAPY: ICD-10-CM

## 2025-05-13 DIAGNOSIS — F43.29 STRESS AND ADJUSTMENT REACTION: ICD-10-CM

## 2025-05-13 PROCEDURE — 97814 ACUP 1/> W/ESTIM EA ADDL 15: CPT | Performed by: ACUPUNCTURIST

## 2025-05-13 PROCEDURE — 97813 ACUP 1/> W/ESTIM 1ST 15 MIN: CPT | Performed by: ACUPUNCTURIST

## 2025-05-13 PROCEDURE — 97535 SELF CARE MNGMENT TRAINING: CPT

## 2025-05-13 PROCEDURE — 97112 NEUROMUSCULAR REEDUCATION: CPT

## 2025-05-13 PROCEDURE — 97110 THERAPEUTIC EXERCISES: CPT

## 2025-05-13 NOTE — PROGRESS NOTES
Outpatient Rehab    Occupational Therapy Visit    Patient Name: Louise Cueto  MRN: [id number]  YOB: 1970  Encounter Date: 5/13/2025    Therapy Diagnosis:   Encounter Diagnoses   Name Primary?    Physical deconditioning Yes    Stress and adjustment reaction      Physician: Tammie Rodrigues FNP-C    Physician Orders: Eval and Treat  Medical Diagnosis: Status post bilateral mastectomy  Other low back pain  Neuropathy    Visit # / Visits Authorized: 9/12  Insurance Authorization Period: 1/1/2025 to 12/31/2025  Date of Evaluation: 12/11/2024  Plan of Care Certification: 4/30/2025 to 7/30/2025      Time In: 1530   Time Out: 1615  Total Time (in minutes): 45   Total Billable Time (in minutes): 45        Precautions:     Standard and cancer      Subjective   I had a treatment this morning at [facility] for my shoulder..  Pain reported as 4/10. post op surgical pain.    Objective     Patient Specific Functional Scale:            Activity 12/11/24  3/5/25  4/1/25  4/29/25     1.stress 2     3    4      6     2. fatigue 1      3  4       5     3.sleep 1      3  3      5     4. posture 1      4  4     6     5.strength 1     3  4     5     6.             SCORE    1.2  3.2    3.8     5.4        Total Score = Sum of activity scores / number of activities  Minimum Detectable Change (90% CII) for average score = 2 points  Minimum Detectable Change (90% CI) for single activity score = 3 points                  Treatment:  Therapeutic Exercise  TE 1: STANDING:, wide straddle forward fold, warrior 2, tree, chair pose x 2,  TE 3: QUADRAPED: cat-cow, jose,  TE 4: SUPINE: knee to chest flow, happy baby flow, neck relaese with yoga block, neck ROM in supine, bramha mudra meditation  for neck ROM and relaxation, twist x 3    Time Entry(in minutes):  Neuromuscular Re-Education Time Entry: 15  Self Care/Home Management (ADLs) Time Entry: 15  Therapeutic Exercise Time Entry: 15    Assessment & Plan   Assessment: In today's  session, we reviewed Louise's HEP to increase strength, balance andshoulder ROM. . She practiced breathing and body scan techniques to assist with relaxation/immune health.She was also taught techniques using yoga block and bolster to assist with relaxation at home.     She tolerated the session well and required moderate verbal and neuromuscular cues in all treatment.  Evaluation/Treatment Tolerance: Patient limited by pain    Patient will continue to benefit from skilled outpatient occupational therapy to address the deficits listed in the problem list box on initial evaluation, provide pt/family education and to maximize pt's level of independence in the home and community environment.     Patient's spiritual, cultural, and educational needs considered and patient agreeable to plan of care and goals.     Education  Education was done with Patient. The patient's learning style includes Demonstration, Listening, and Tactile.          Reviewed physiology of the relaxation and stress response and how yoga/meditation affect neuroendocrine system and immune health.       Plan: OT 1x/week x  weeks for  therapeutic yoga to include yoga exercise to incerase strength, flexability, balance and relaxation  training including breathing techniques, body scan, metta meditation.    Goals:   Active       Long term goals       Pt. will be independent for yoga HEP focusing on strength, endurance and whole body  ROM. (Progressing)       Start:  03/05/25    Expected End:  06/25/25            Pt. will be independent for yoga relaxation techniques including breathing exercises, body scan and medititation. (Progressing)       Start:  03/05/25    Expected End:  06/25/25            Pt. will identify a plan for yoga class participation and home practice. (Progressing)       Start:  03/05/25    Expected End:  06/25/25               Short term goals       Pt. will require minimal assist for yoga HEP focusing on strength, enduranceand whole  body ROM . (Met)       Start:  03/05/25    Expected End:  06/25/25    Resolved:  04/01/25         Pt. will require minimal assistance for relaxation techniques to assist immune health. (Met)       Start:  03/05/25    Expected End:  06/25/25    Resolved:  04/29/25         Pt. will identify plan for integration of yoga into daily plan. (Progressing)       Start:  03/05/25    Expected End:  06/25/25                Tomi Griffiths OT

## 2025-05-13 NOTE — PROGRESS NOTES
Acupuncture Evaluation Note     Name: Louise Cueto  Clinic Number: [id number]    Traditional Chinese Medicine (TCM) Diagnosis: Qi Stagnation and Blood Stasis  Medical Diagnosis:   Encounter Diagnoses   Name Primary?    Shoulder pain, unspecified chronicity, unspecified laterality Yes    Hot flashes related to aromatase inhibitor therapy            Evaluation Date: 5/13/2025    Visit #/Visits authorized: self pay - visit 16    Precautions: Standard    Subjective     Chief Concern: hot flashes and night sweats, shoulder pain / frozen shoulder    Medical necessity is demonstrated by the following IMPAIRMENTS: Medical Necessity: Decreased mobility limits day to day activities, social, and emergent situations and Decreased quality of life              Aggravating Factors:  lying down   Relieving Factors:  sitting up    Symptom Description:     Quality:  Aching and Shooting  Severity:  4  Frequency:  continuously    Previous Treatments Tried:  advil & tylenol daily and gabapentin       Treatment     Treatment Principles:  move st qi and blood    Acupuncture points used:  4 GUIDO, Du20, Gb34, Ki3, Ki6, Li11, REN12, TREJO MEN, Sp10, Sp6, Sp9, St25, St36, and YIN CRAMER  + LU7, Lu5, Li15,16, Jianqian  Bilateral points:  Unilateral points:  Auricular Treatment:  trejo men    Needles In: 28  Needles Out: 28  Needles W/ STIM placed: 3:45 PM  Needles W/ STIM removed: 4:15 PM      Other Traditional Chinese Medicine Modalities - heat lamp    Assessment     After treatment, patient states symptoms are reducing with hot flashes. Shoulder pain is present still.    Patient prognosis is Good.     Patient will continue to benefit from acupuncture treatment to address the deficits listed in the problem list box on initial evaluation, provide patient family education and to maximize pt's level of independence in the home and community environment.     Patient's spiritual, cultural and educational needs considered and pt agreeable to plan of  care and goals.     Anticipated barriers to treatment: none    Plan     Recommend as needed    Education:  Patient is aware of cumulative benefit of acupuncture

## 2025-05-15 ENCOUNTER — CLINICAL SUPPORT (OUTPATIENT)
Dept: OBSTETRICS AND GYNECOLOGY | Facility: CLINIC | Age: 55
End: 2025-05-15
Payer: COMMERCIAL

## 2025-05-15 ENCOUNTER — RESULTS FOLLOW-UP (OUTPATIENT)
Dept: OBSTETRICS AND GYNECOLOGY | Facility: CLINIC | Age: 55
End: 2025-05-15

## 2025-05-15 DIAGNOSIS — N95.1 MENOPAUSAL SYMPTOM: Primary | ICD-10-CM

## 2025-05-15 PROCEDURE — 99999 PR PBB SHADOW E&M-EST. PATIENT-LVL I: CPT | Mod: PBBFAC,,,

## 2025-05-15 RX ADMIN — TESTOSTERONE CYPIONATE 50 MG: 200 INJECTION, SOLUTION INTRAMUSCULAR at 03:05

## 2025-05-16 ENCOUNTER — CLINICAL SUPPORT (OUTPATIENT)
Dept: REHABILITATION | Facility: OTHER | Age: 55
End: 2025-05-16
Payer: COMMERCIAL

## 2025-05-16 DIAGNOSIS — M25.611 DECREASED ROM OF RIGHT SHOULDER: Primary | ICD-10-CM

## 2025-05-16 PROCEDURE — 97112 NEUROMUSCULAR REEDUCATION: CPT | Mod: PN

## 2025-05-16 PROCEDURE — 97530 THERAPEUTIC ACTIVITIES: CPT | Mod: PN

## 2025-05-16 NOTE — PROGRESS NOTES
Outpatient Rehab    Physical Therapy Visit    Patient Name: Louise Cueto  MRN: [id number]  YOB: 1970  Encounter Date: 5/16/2025    Therapy Diagnosis:   Encounter Diagnosis   Name Primary?    Decreased ROM of right shoulder Yes     Physician: Cyndie Jack MD    Physician Orders: Eval and Treat  Medical Diagnosis: Shoulder pain, unspecified chronicity, unspecified laterality    Visit # / Visits Authorized:  2 / 10  Insurance Authorization Period: 4/25/2025 to 12/31/2025  Date of Evaluation: 4/25/2025  Plan of Care Certification:  4/25/25- 7/25/25     PT/PTA:   0  Number of PTA visits since last PT visit: 0  Time In: 0930   Time Out: 1030  Total Time (in minutes): 60   Total Billable Time (in minutes): 60      Precautions:   Standard      Subjective   Pt reports that she is doing well today. 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  Pain reported as 3/10.      Objective            Treatment:  Balance/Neuromuscular Re-Education  NMR 1: shoulder flexion AAROM supine vs wand x2min  NMR 2: supine wand ER x30  NMR 3: supine shoulder flexion wand x30  NMR 4: sidelying internal rotation sleeper stretch x30 (pain lying on R side)  NMR 5: SL ER vs 1# DB x20  NMR 6: bicep curl vs 2# x30  NMR 7: wall slides towel x20  NMR 9: upper trap stretch 10x10''  Therapeutic Activity  TA 1: rows vs RTB x30  TA 2: extension vs RTB x30  TA 5: Ball circles on wall 20/20    Time Entry(in minutes):       Assessment & Plan   Assessment: Pt tolerated treatment session well today with mild increase in shoulder pain. Provided pt with updated HEP as she is traveling to [facility] for 10 days. Continue PT POC.       Patient will continue to benefit from skilled outpatient physical therapy to address the deficits listed in the problem list box on initial evaluation, provide pt/family education and to maximize pt's level of independence in the home and community environment.     Patient's  spiritual, cultural, and educational needs considered and patient agreeable to plan of care and goals.           Plan:      Goals:   Active       Functional outcome       Patient will show a significant change in FOTO patient-reported outcome tool to demonstrate subjective improvement       Start:  04/29/25            Patient will demonstrate independence in home program for support of progression       Start:  04/29/25               Leisure activities       Patient will participate in recreational activity including running, tennis without pain or limitation.       Start:  04/29/25               Pain       Patient will report pain of 3/10 demonstrating a reduction of overall pain       Start:  04/29/25               Range of Motion       Patient will improve right shoulder range of motion by 20% for improved right upper extremity function.       Start:  04/29/25               Strength       Patient will demonstrate improved right shoulder strength to 4/5 throughout for improved right upper extremity function.       Start:  04/29/25                Christie Bailey PT

## 2025-05-25 NOTE — PROGRESS NOTES
Ms Cueto is 2.5 weeks s/p TE removal, implant placement and fat grafting  Doing well.  Much happier with implants, does notice some upper pole asymmetry  Happy with implant size    Does have more upper pole step off on right than left  Implants soft and in very good position relative to chest wall and nipple    Explained some edema and fat grafting is still going down.  Will be a couple more months to know her final appearance  It may be that she would benefit from some additional fat grafting  Very happy with size and position  Has a very busy summer schedule. Will see her back in the fall    Deniz Noel, DO  Plastic and Reconstructive Surgery

## 2025-06-04 ENCOUNTER — CLINICAL SUPPORT (OUTPATIENT)
Dept: REHABILITATION | Facility: OTHER | Age: 55
End: 2025-06-04
Payer: COMMERCIAL

## 2025-06-04 DIAGNOSIS — M25.611 DECREASED ROM OF RIGHT SHOULDER: Primary | ICD-10-CM

## 2025-06-04 PROCEDURE — 97112 NEUROMUSCULAR REEDUCATION: CPT | Mod: PN

## 2025-06-04 PROCEDURE — 97530 THERAPEUTIC ACTIVITIES: CPT | Mod: PN

## 2025-06-05 ENCOUNTER — TELEPHONE (OUTPATIENT)
Dept: OBSTETRICS AND GYNECOLOGY | Facility: CLINIC | Age: 55
End: 2025-06-05
Payer: COMMERCIAL

## 2025-06-10 ENCOUNTER — CLINICAL SUPPORT (OUTPATIENT)
Dept: REHABILITATION | Facility: OTHER | Age: 55
End: 2025-06-10
Payer: COMMERCIAL

## 2025-06-10 DIAGNOSIS — M25.611 DECREASED ROM OF RIGHT SHOULDER: Primary | ICD-10-CM

## 2025-06-10 PROCEDURE — 97530 THERAPEUTIC ACTIVITIES: CPT | Mod: PN

## 2025-06-10 PROCEDURE — 97112 NEUROMUSCULAR REEDUCATION: CPT | Mod: PN

## 2025-06-10 NOTE — PROGRESS NOTES
"  Outpatient Rehab    Physical Therapy Discharge    Patient Name: Louise Cueto  MRN: [id number]  YOB: 1970  Encounter Date: 6/10/2025    Therapy Diagnosis:   Encounter Diagnosis   Name Primary?    Decreased ROM of right shoulder Yes     Physician: Cyndie Jack MD    Physician Orders: Eval and Treat  Medical Diagnosis: Shoulder pain, unspecified chronicity, unspecified laterality  Surgical Diagnosis: Not applicable for this Episode   Surgical Date: Not applicable for this Episode    Visit # / Visits Authorized:  3 / 10  Insurance Authorization Period: 4/25/2025 to 12/31/2025  Date of Evaluation: 4/25/2025  Plan of Care Certification:  4/25/25- 7/25/25     PT/PTA:   0  Number of PTA visits since last PT visit: 0  Time In: 1100   Time Out: 1200  Total Time (in minutes): 60   Total Billable Time (in minutes): 60         Subjective    Pt reports that she is doing well today. Pt states that she ran yesterday and lifted light weights and her shoulder is fatigued.          Objective            Treatment:  Balance/Neuromuscular Re-Education  NMR 1: shoulder flexion AAROM supine vs wand x2min  NMR 2: supine wand ER x30  NMR 3: supine shoulder flexion wand x30  NMR 5: SL ER vs 1# DB x20  NMR 7: wall slides towel x20  Therapeutic Activity  TA 1: rows vs RTB x30  TA 5: Ball circles on wall 20/20  TA 6: Thoracic side bend in quadruped 20x5"hold  TA 7: cat/camel 10x10" hold  TA 8: thread the needle 10x5" hold R/L  TA 9: UBE 3 min/3 min  TA 10: Pt educated on return to running program and HEP    Time Entry(in minutes):  Neuromuscular Re-Education Time Entry: 30  Therapeutic Activity Time Entry: 30    Assessment & Plan   Assessment:     Discharge patient today. Patient has partially/fully met their short and long term goals. PT educated pt on progression of home exercise program and pt demonstrated understanding. All questions/concerns were answered/addressed by PT.      The patient's spiritual, cultural, and " educational needs were considered, and the patient is agreeable to the plan of care and goals.           Plan:      Goals:   Active       Functional outcome       Patient will show a significant change in FOTO patient-reported outcome tool to demonstrate subjective improvement       Start:  04/29/25            Patient will demonstrate independence in home program for support of progression       Start:  04/29/25               Leisure activities       Patient will participate in recreational activity including running, tennis without pain or limitation.       Start:  04/29/25               Pain       Patient will report pain of 3/10 demonstrating a reduction of overall pain       Start:  04/29/25               Range of Motion       Patient will improve right shoulder range of motion by 20% for improved right upper extremity function.       Start:  04/29/25               Strength       Patient will demonstrate improved right shoulder strength to 4/5 throughout for improved right upper extremity function.       Start:  04/29/25                Christie Bailey PT

## 2025-06-11 ENCOUNTER — OFFICE VISIT (OUTPATIENT)
Dept: SURGERY | Facility: CLINIC | Age: 55
End: 2025-06-11
Payer: COMMERCIAL

## 2025-06-11 ENCOUNTER — OFFICE VISIT (OUTPATIENT)
Dept: OBSTETRICS AND GYNECOLOGY | Facility: CLINIC | Age: 55
End: 2025-06-11
Attending: OBSTETRICS & GYNECOLOGY
Payer: COMMERCIAL

## 2025-06-11 ENCOUNTER — TELEPHONE (OUTPATIENT)
Dept: HEMATOLOGY/ONCOLOGY | Facility: CLINIC | Age: 55
End: 2025-06-11
Payer: COMMERCIAL

## 2025-06-11 ENCOUNTER — TELEPHONE (OUTPATIENT)
Dept: SPORTS MEDICINE | Facility: CLINIC | Age: 55
End: 2025-06-11
Payer: COMMERCIAL

## 2025-06-11 VITALS
SYSTOLIC BLOOD PRESSURE: 98 MMHG | HEIGHT: 65 IN | BODY MASS INDEX: 21.16 KG/M2 | DIASTOLIC BLOOD PRESSURE: 66 MMHG | WEIGHT: 127 LBS | HEART RATE: 77 BPM

## 2025-06-11 VITALS
BODY MASS INDEX: 21.99 KG/M2 | WEIGHT: 132 LBS | SYSTOLIC BLOOD PRESSURE: 112 MMHG | HEART RATE: 60 BPM | HEIGHT: 65 IN | DIASTOLIC BLOOD PRESSURE: 75 MMHG

## 2025-06-11 DIAGNOSIS — Z78.0 MENOPAUSE: Primary | ICD-10-CM

## 2025-06-11 DIAGNOSIS — C50.211 MALIGNANT NEOPLASM OF UPPER-INNER QUADRANT OF RIGHT BREAST IN FEMALE, ESTROGEN RECEPTOR POSITIVE: ICD-10-CM

## 2025-06-11 DIAGNOSIS — Z17.0 MALIGNANT NEOPLASM OF UPPER-INNER QUADRANT OF RIGHT BREAST IN FEMALE, ESTROGEN RECEPTOR POSITIVE: Primary | ICD-10-CM

## 2025-06-11 DIAGNOSIS — Z17.0 MALIGNANT NEOPLASM OF UPPER-INNER QUADRANT OF RIGHT BREAST IN FEMALE, ESTROGEN RECEPTOR POSITIVE: ICD-10-CM

## 2025-06-11 DIAGNOSIS — M25.511 CHRONIC RIGHT SHOULDER PAIN: ICD-10-CM

## 2025-06-11 DIAGNOSIS — G89.29 CHRONIC RIGHT SHOULDER PAIN: ICD-10-CM

## 2025-06-11 DIAGNOSIS — C50.211 MALIGNANT NEOPLASM OF UPPER-INNER QUADRANT OF RIGHT BREAST IN FEMALE, ESTROGEN RECEPTOR POSITIVE: Primary | ICD-10-CM

## 2025-06-11 PROCEDURE — 3074F SYST BP LT 130 MM HG: CPT | Mod: CPTII,S$GLB,, | Performed by: SURGERY

## 2025-06-11 PROCEDURE — 99999 PR PBB SHADOW E&M-EST. PATIENT-LVL IV: CPT | Mod: PBBFAC,,, | Performed by: SURGERY

## 2025-06-11 PROCEDURE — 99999 PR PBB SHADOW E&M-EST. PATIENT-LVL IV: CPT | Mod: PBBFAC,,, | Performed by: OBSTETRICS & GYNECOLOGY

## 2025-06-11 PROCEDURE — 1159F MED LIST DOCD IN RCRD: CPT | Mod: CPTII,S$GLB,, | Performed by: SURGERY

## 2025-06-11 PROCEDURE — 3078F DIAST BP <80 MM HG: CPT | Mod: CPTII,S$GLB,, | Performed by: SURGERY

## 2025-06-11 PROCEDURE — 3008F BODY MASS INDEX DOCD: CPT | Mod: CPTII,S$GLB,, | Performed by: SURGERY

## 2025-06-11 PROCEDURE — 3044F HG A1C LEVEL LT 7.0%: CPT | Mod: CPTII,S$GLB,, | Performed by: SURGERY

## 2025-06-11 PROCEDURE — 99213 OFFICE O/P EST LOW 20 MIN: CPT | Mod: S$GLB,,, | Performed by: SURGERY

## 2025-06-11 NOTE — PROGRESS NOTES
[facility]  Department of Surgery    REFERRING PROVIDER: No referring provider defined for this encounter. Cyndie Jack MD  CHIEF COMPLAINT:   Chief Complaint   Patient presents with    6 Month F/U       DIAGNOSIS:   This is a 54 y.o. 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     TREATMENT:   1. bilateral mastectomy with Right sentinel node biopsy on 4/4/2025. MAURICE Mahmood M.D. Surgical Oncology. Implant reconstruction, Dr. Noel. Final pathology showed Final pathology showed 1.7 cm IDC and 1 mm  DCIS. Closest margin of invasive carcinoma is 1 mm from posterior margin. Closest DCIS margin is 6 mm from posterior. (0/2) LN.   2. Oncotype 24  3. Adjuvant endocrine therapy, anastrozole started on December 2024. JASEN Dowd M.D. Medical Oncology     HISTORY OF PRESENT ILLNESS:   Louise Cueto is a 54 y.o. female comes in for oncological follow up.  She denies change in her breast self-exam specifically denying new masses, skin or nipple changes, or nipple discharge. Past medical and surgical history is updated with new changes. There have been no changes to family history. The patient denies constitutional symptoms of night sweats, chills, weight loss, new headaches, visual changes, new back or bony pain, chest pain, or shortness of breath.    Stopped taking anastrozole due to extreme side effects despite measures to counteract including yoga, acupuncture and diet/exercise.  Had revision  - good cosmesis.  Frozen shoulder - released from PT yesterday    Review of Systems: See HPI/Interval History for other systems reviewed.     IMAGING:   N/a     MEDICATIONS/ALLERGIES:     Current Outpatient Medications   Medication Sig    acetaminophen (TYLENOL) 500 MG tablet Take 1 tablet (500 mg total) by mouth every 6 (six) hours.    anastrozole (ARIMIDEX) 1 mg Tab Take 1 tablet (1 mg total) by mouth once daily.    ascorbic acid, vitamin C, (VITAMIN C) 1000 MG tablet  Take 1,000 mg by mouth every evening.    b complex vitamins capsule Take 1 capsule by mouth every evening.    collagen, bovine, 100 % Powd Apply topically.    cyclobenzaprine (FLEXERIL) 10 MG tablet Take 1 tablet (10 mg total) by mouth nightly as needed for Muscle spasms.    estradioL (IMVEXXY MAINTENANCE PACK) 10 mcg Inst Place 10 mcg vaginally twice a week.    estradioL (IMVEXXY MAINTENANCE PACK) 10 mcg Inst Place 10 mcg vaginally twice a week.    ibuprofen (ADVIL,MOTRIN) 600 MG tablet Take 1 tablet (600 mg total) by mouth 3 (three) times daily.    INV TESTOSTERONE/ANASTROZOL OR PLACEBO PELLETS Inject 1 Pellet into the skin. FOR INVESTIGATIONAL USE ONLY    Lactobacillus rhamnosus GG (CULTURELLE) 10 billion cell capsule Take 1 capsule by mouth every evening.    magnesium 30 mg Tab Take by mouth every evening.    meloxicam (MOBIC) 15 MG tablet Take 1 tablet (15 mg total) by mouth once daily.    omega-3 fatty acids/fish oil (FISH OIL-OMEGA-3 FATTY ACIDS) 300-1,000 mg capsule Take 1 capsule by mouth once daily.    ondansetron (ZOFRAN) 4 MG tablet Take 2 tablets (8 mg total) by mouth every 6 (six) hours as needed for Nausea.    oxybutynin (DITROPAN-XL) 5 MG TR24 Take 1 tablet (5 mg total) by mouth once daily.    oxyCODONE (ROXICODONE) 5 MG immediate release tablet Take 1 tablet (5 mg total) by mouth every 6 (six) hours as needed for Pain.    oxyCODONE (ROXICODONE) 5 MG immediate release tablet Take 1 tablet (5 mg total) by mouth every 4 (four) hours as needed for Pain.    traMADoL (ULTRAM) 50 mg tablet Take 1 tablet (50 mg total) by mouth every 6 (six) hours as needed for Pain.    valACYclovir (VALTREX) 1000 MG tablet Take 0.5 tablets (500 mg total) by mouth every 12 (twelve) hours.    vitamin D (VITAMIN D3) 1000 units Tab Take 1,000 Units by mouth every evening.    gabapentin (NEURONTIN) 300 MG capsule Take 1 capsule (300 mg total) by mouth every 8 (eight) hours. for 7 days    pregabalin (LYRICA) 75 MG capsule Take 1  "capsule (75 mg total) by mouth 2 (two) times daily.     No current facility-administered medications for this visit.      Review of patient's allergies indicates:   Allergen Reactions    Percocet [oxycodone-acetaminophen] Nausea And Vomiting     Can take tylenol & tramadol     Vicodin [hydrocodone-acetaminophen] Nausea And Vomiting     Pt not sure if she took percocet or Vicodin that caused severe nausea & vomiting       PHYSICAL EXAM:   BP 98/66   Pulse 77   Ht 5' 5" (1.651 m)   Wt 57.6 kg (127 lb)   BMI 21.13 kg/m²     Physical Exam   Constitutional: She is oriented to person, place, and time.   HENT:   Head: Normocephalic and atraumatic.   Mouth/Throat: Mucous membranes are moist.   Eyes: Pupils are equal, round, and reactive to light.   Cardiovascular:  Normal rate and regular rhythm.            Pulmonary/Chest: Effort normal. Right breast exhibits no inverted nipple, no mass, no nipple discharge, no skin change and no tenderness. Left breast exhibits no inverted nipple, no mass, no nipple discharge, no skin change and no tenderness.       Abdominal: Normal appearance.   Neurological: She is alert and oriented to person, place, and time.   Skin: Skin is warm and dry. Capillary refill takes less than 2 seconds.     Psychiatric: Her behavior is normal. Mood normal.       ASSESSMENT:   This is a 54 y.o. female without evidence of recurrence by exam, history or imaging.       PLAN:   1. Continue to follow up with Dr. Dowd in Medical Oncology - discuss alternatives  2. Continue monthly self breast exams and call the clinic with any changes or problems.  3. Return to clinic in 1 year .  4. Referral to Orthopedics for frozen shoulder - Dr. Alcala    The patient is in agreement with the plan. Questions were encouraged and answered to patient's satisfaction. Louise will call our office with any questions or concerns.     25 minutes were spent on this encounter, 15 of which was face to face counseling and 10 minutes " were spent on chart review and coordination of care.

## 2025-06-11 NOTE — TELEPHONE ENCOUNTER
Spoke c pt. Scheduled appt for R shoulder per Dr. Mahmood referral. Confirmed appt date, time, location. Pt will call c additional questions/concerns in interim. Pt expressed understanding & was thankful.

## 2025-06-11 NOTE — PROGRESS NOTES
Louise Cueto  54 y.o. female is here for her 1 .pellet insertion. She did not havea hysterectomy.  She has signed her informed consent outlining the procedure and the implant of the pellets . Pt is also aware of the risks and benefits of estradiol and testosterone and that this mode of delivery is not FDA approved even though the actual drug is FDA approved.      After proper informed consent was obtained, pt laid prone on the examining table. With alcohol prep in place, 10 cc of 1% lidocaine with out epinepherine was placed in a tracking formation for the trochar. This was allowed to set for 15 min. The right buttock was prepped and draped in the usual fashion for a sterile procedure using betadine.  With an 11 blade a one cm incision was made.  The sterile trochar was used to insert 100 mg of Testosterone in pellet form  Pressure was applied to the incision site for hemostasis.  A steri strip was placed and a pressure dressing placed to be removed no earlier than 4 hours.      Patient is leaving for [place] until September.

## 2025-06-11 NOTE — PROGRESS NOTES
CC: Right shoulder pain    54 y.o. Female presents as a new patient to me.  She is right-hand dominant.  Chief complaint of right shoulder pain and stiffness now for several months.  Since November of last year.  She has history of prior double mastectomy for breast cancer with subsequent reconstruction.  No radiation therapy.  Shoulder pain is fairly diffuse.  Present with overhead activity and range of motion.  Bothers her when she lies on her right side at night.  Better with rest.  No physical therapy to this point.  No neck or radicular symptoms.  She does have a history of previous right shoulder pain with MRI performed in 2022.  She was found to have subscapularis calcific tendinitis in the absence of any rotator cuff tear.  The shoulder was doing well up until her breast surgery.  No diabetes or thyroid disorder.    Referred by colleague Kassi Mahmood MD.    Patient has been seen previously my colleagues Liz Nunez MD and Thomas Alonso DO in 2022 for right shoulder calcific tendinitis.    PMHx notable for GERD, breast cancer with double mastectomy in 11/2024 and breast reconstruction surgery on 04/2025; discontinued anastrozole due to side effects; no irradiation.   Negative for tobacco.   Negative for diabetes. Last A1C: 5.2, 2/18/25    PAST MEDICAL HISTORY:   Past Medical History:   Diagnosis Date    Abdominal bloating 06/29/2019    BRCA1 negative     BRCA2 negative     GERD (gastroesophageal reflux disease)     resolved with lap tono    Liver lesion 01/03/2019    Malignant neoplasm of upper-inner quadrant of right female breast      PAST SURGICAL HISTORY:  Past Surgical History:   Procedure Laterality Date    AXILLARY NODE DISSECTION Right 11/18/2024    Procedure: LYMPHADENECTOMY, AXILLARY / POSSIBLE RIGHT;  Surgeon: Kassi Mahmood MD;  Location: [facility];  Service: General;  Laterality: Right;    BILATERAL MASTECTOMY Bilateral 11/18/2024    Procedure: MASTECTOMY, BILATERAL;  Surgeon: Kassi Mahmood  MD [place];  Location: [facility];  Service: General;  Laterality: Bilateral;  HIGH / TECH AND FROZEN    BREAST CYST EXCISION Left     18 y/o f     SECTION      CHOLECYSTECTOMY      45 year old    COLONOSCOPY N/A 2017    Procedure: COLONOSCOPY;  Surgeon: Andrews Sears MD;  Location: [facility] ENDO (4TH FLR);  Service: Endoscopy;  Laterality: N/A;    COLONOSCOPY N/A 2022    Procedure: COLONOSCOPY;  Surgeon: Andrews Sears MD;  Location: [facility] ENDO (4TH FLR);  Service: Endoscopy;  Laterality: N/A;  Fully Vaccinated.EC    CYSTOSCOPY N/A 2019    Procedure: CYSTOSCOPY;  Surgeon: Debbie Murphy MD;  Location: [facility] OR;  Service: OB/GYN;  Laterality: N/A;    EYE SURGERY  10/24/2023    strabismus surgery  bilateral    HYSTEROSCOPY N/A 2019    Procedure: HYSTEROSCOPY-removal of IUD; possible shaving of uterine fibroids if needed to insert new IUD;  Surgeon: Debbie Murphy MD;  Location: [facility];  Service: OB/GYN;  Laterality: N/A;    INJECTION FOR SENTINEL NODE IDENTIFICATION Right 2024    Procedure: INJECTION, FOR SENTINEL NODE IDENTIFICATION;  Surgeon: Kassi Mahmood MD;  Location: [facility];  Service: General;  Laterality: Right;    INJECTION, AGENT, INTRAVENOUS, FOR ASSESSMENT OF BLOOD FLOW IN FLAP OR GRAFT Bilateral 2024    Procedure: INJECTION, AGENT, INTRAVENOUS, FOR ASSESSMENT OF BLOOD FLOW IN FLAP OR GRAFT;  Surgeon: Amandeep Noel DO;  Location: [facility];  Service: General;  Laterality: Bilateral;    INSERTION OF BREAST TISSUE EXPANDER Bilateral 2024    Procedure: INSERTION, TISSUE EXPANDER, BREAST;  Surgeon: Amandeep Noel DO;  Location: [facility] OR;  Service: General;  Laterality: Bilateral;    INTRAUTERINE DEVICE INSERTION N/A 2019    Procedure: IUD insertion-- mirena-- would prefer kyleena if available;  Surgeon: Debbie Murphy MD;  Location: [facility];  Service: OB/GYN;  Laterality: N/A;    LIPOSUCTION, WITH FAT GRAFTING TO TRUNK,  BREASTS, SCALP, ARMS, AND/OR LEGS, 50CC OR LESS INJECTABLE Bilateral 4/4/2025    Procedure: LIPOSUCTION, WITH FAT GRAFTING TO TRUNK, BREASTS, SCALP, ARMS, AND/OR LEGS, 50CC OR LESS INJECTABLE;  Surgeon: Amandeep Noel DO;  Location: [facility] OR;  Service: Plastics;  Laterality: Bilateral;  Bilateral  lipo w fat grafting to breasts    REPLACEMENT OF IMPLANT OF BREAST Bilateral 4/4/2025    Procedure: REPLACEMENT, IMPLANT, BREAST;  Surgeon: Amandeep Noel DO;  Location: [facility] OR;  Service: Plastics;  Laterality: Bilateral;  bilat Implant exchange    SENTINEL LYMPH NODE BIOPSY Right 11/18/2024    Procedure: BIOPSY, LYMPH NODE, SENTINEL;  Surgeon: Kassi Mahmood MD;  Location: [facility] OR;  Service: General;  Laterality: Right;     FAMILY HISTORY:  Family History   Problem Relation Name Age of Onset    Parkinsonism Paternal Grandfather      Alzheimer's disease Paternal Grandmother      Cirrhosis Maternal Grandfather      Alcohol abuse Maternal Grandfather      Heart disease Father 72         MI around 50.    Hyperlipidemia Father 72     Parkinsonism Father 72     Cancer Mother 80 in 2025 73        colon ca    Hypertension Mother 80 in 2025     Arrhythmia Mother 80 in 2025     No Known Problems Brother pt is 3rd     Cancer Sister  49        breast    Breast cancer Sister  48        breast    No Known Problems Son      Arrhythmia Son          SVT    Ovarian cancer Cousin pat first cousin     Cancer Cousin pat first cousin         ovarian and one with lung    Colon cancer Neg Hx      Uterine cancer Neg Hx      Cervical cancer Neg Hx       MEDICATIONS:  Current Medications[1]    ALLERGIES:  Review of patient's allergies indicates:   Allergen Reactions    Percocet [oxycodone-acetaminophen] Nausea And Vomiting     Can take tylenol & tramadol     Vicodin [hydrocodone-acetaminophen] Nausea And Vomiting     Pt not sure if she took percocet or Vicodin that caused severe nausea & vomiting     REVIEW  "OF SYSTEMS:  Constitution: Negative. Negative for chills, fever and night sweats.    Hematologic/Lymphatic: Negative for bleeding problem. Does not bruise/bleed easily.   Skin: Negative for dry skin, itching and rash.   Musculoskeletal: Negative for falls. Positive for right shoulder pain and muscle weakness.     All other review of symptoms were reviewed and found to be noncontributory.     PHYSICAL EXAMINATION:  Vitals:  BP 99/68   Pulse 62   Ht 5' 5" (1.651 m)   Wt 56 kg (123 lb 5.6 oz)   BMI 20.53 kg/m²    General: Well-developed well-nourished 54 y.o. femalein no acute distress   Cardiovascular: Regular rhythm by palpation of distal pulse, normal color and temperature, no concerning varicosities on symptomatic side   Lungs: No labored breathing or wheezing appreciated   Neuro: Alert and oriented ×3   Psychiatric: well oriented to person, place and time, demonstrates normal mood and affect   Skin: No rashes, lesions or ulcers, normal temperature, turgor, and texture on uninvolved extremity    Ortho/SPM Exam  Examination of the right shoulder demonstrates fairly diffuse tenderness to palpation.  Active forward elevation to 100, passive to 115-120 with pain and stiffness.  Pain at mid and terminal range.  Active external rotation with arm at side to 30, passive to 40-50 with pain. 4+ out of 5 resisted scaption with some limitation due to mild pain.  5- out of 5 resisted external rotation with arm at side.  Negative belly press test.  Negative AC joint.  Positive modified speed's test.  Pain to palpation over the proximal biceps groove and over the glenohumeral joint line.  Intact cervical neck range of motion.  Negative Spurling's maneuver.    IMAGING:  Xrays including AP, Outlet and Axillary Lateral of RIGHT shoulder are reviewed by me:   No significant degenerative change    Prior MRI 10/08/22 of right shoulder reviewed by me and discussed with patient. Study shows:   1. Subscapularis calcific " tendinitis.  2. Mild AC joint arthrosis.    ASSESSMENT:      ICD-10-CM ICD-9-CM   1. Adhesive capsulitis of right shoulder  M75.01 726.0   2. Chronic right shoulder pain  M25.511 719.41    G89.29 338.29       PLAN:     Findings most consistent with secondary adhesive capsulitis related to recent breast surgery.  Fairly well-preserved rotator cuff strength on testing.  History of previous MRI showing an intact cuff.  No history of trauma.  No advanced imaging needed.  Treatment options discussed.  Conservative treatment measures recommended.  Prescription given for Medrol Dosepak which she will begin taking now.  Transitioned to Celebrex thereafter.  Glenohumeral steroid injection given today.  Discussed the need for formal PT.  She is relocating for the summer to [place] here in the next week or so.  She will reach out via the portal to let us know where she wants to do formal therapy.  I will see her back when she returns to the local area from [place].  We did discuss that there is arthroscopic intervention for this condition but the hope would be to avoid that if possible    Large Joint Aspiration/Injection: R glenohumeral    Date/Time: 6/12/2025 11:30 AM    Performed by: ASHLEY Alcala MD  Authorized by: ASHLEY lAcala MD    Consent Done?:  Yes (Verbal)  Indications:  Pain  Site marked: the procedure site was marked    Timeout: prior to procedure the correct patient, procedure, and site was verified    Prep: patient was prepped and draped in usual sterile fashion      Local anesthesia used?: Yes    Local anesthetic:  Co-phenylcaine spray (0.2% Naropin)  Anesthetic total (ml):  4      Details:  Needle Size:  22 G  Approach:  Superior  Location:  Shoulder  Site:  R glenohumeral  Medications:  40 mg triamcinolone acetonide 40 mg/mL  Patient tolerance:  Patient tolerated the procedure well with no immediate complications           [1]    Current Outpatient Medications:     acetaminophen (TYLENOL) 500 MG tablet,  Take 1 tablet (500 mg total) by mouth every 6 (six) hours., Disp: 30 tablet, Rfl: 1    ascorbic acid, vitamin C, (VITAMIN C) 1000 MG tablet, Take 1,000 mg by mouth every evening., Disp: , Rfl:     b complex vitamins capsule, Take 1 capsule by mouth every evening., Disp: , Rfl:     collagen, bovine, 100 % Powd, Apply topically., Disp: , Rfl:     cyclobenzaprine (FLEXERIL) 10 MG tablet, Take 1 tablet (10 mg total) by mouth nightly as needed for Muscle spasms., Disp: 30 tablet, Rfl: 2    estradioL (IMVEXXY MAINTENANCE PACK) 10 mcg Inst, Place 10 mcg vaginally twice a week., Disp: 8 each, Rfl: 11    exemestane (AROMASIN) 25 mg tablet, Take 1 tablet (25 mg total) by mouth once daily., Disp: 30 tablet, Rfl: 11    ibuprofen (ADVIL,MOTRIN) 600 MG tablet, Take 1 tablet (600 mg total) by mouth 3 (three) times daily., Disp: 30 tablet, Rfl: 1    INV TESTOSTERONE/ANASTROZOL OR PLACEBO PELLETS, Inject 1 Pellet into the skin. FOR INVESTIGATIONAL USE ONLY, Disp: , Rfl:     Lactobacillus rhamnosus GG (CULTURELLE) 10 billion cell capsule, Take 1 capsule by mouth every evening., Disp: , Rfl:     magnesium 30 mg Tab, Take by mouth every evening., Disp: , Rfl:     meloxicam (MOBIC) 15 MG tablet, Take 1 tablet (15 mg total) by mouth once daily., Disp: 30 tablet, Rfl: 0    omega-3 fatty acids/fish oil (FISH OIL-OMEGA-3 FATTY ACIDS) 300-1,000 mg capsule, Take 1 capsule by mouth once daily., Disp: , Rfl:     ondansetron (ZOFRAN) 4 MG tablet, Take 2 tablets (8 mg total) by mouth every 6 (six) hours as needed for Nausea., Disp: 30 tablet, Rfl: 1    oxybutynin (DITROPAN-XL) 5 MG TR24, Take 1 tablet (5 mg total) by mouth once daily., Disp: 30 tablet, Rfl: 11    oxyCODONE (ROXICODONE) 5 MG immediate release tablet, Take 1 tablet (5 mg total) by mouth every 6 (six) hours as needed for Pain., Disp: 10 tablet, Rfl: 0    oxyCODONE (ROXICODONE) 5 MG immediate release tablet, Take 1 tablet (5 mg total) by mouth every 4 (four) hours as  needed for Pain., Disp: 10 tablet, Rfl: 0    traMADoL (ULTRAM) 50 mg tablet, Take 1 tablet (50 mg total) by mouth every 6 (six) hours as needed for Pain., Disp: 20 tablet, Rfl: 0    vitamin D (VITAMIN D3) 1000 units Tab, Take 1,000 Units by mouth every evening., Disp: , Rfl:     estradioL (IMVEXXY MAINTENANCE PACK) 10 mcg Inst, Place 10 mcg vaginally twice a week. (Patient not taking: Reported on 6/12/2025), Disp: 8 each, Rfl: 11    gabapentin (NEURONTIN) 300 MG capsule, Take 1 capsule (300 mg total) by mouth every 8 (eight) hours. for 7 days, Disp: 21 capsule, Rfl: 0    pregabalin (LYRICA) 75 MG capsule, Take 1 capsule (75 mg total) by mouth 2 (two) times daily., Disp: 60 capsule, Rfl: 0    valACYclovir (VALTREX) 1000 MG tablet, Take 0.5 tablets (500 mg total) by mouth every 12 (twelve) hours. (Patient not taking: Reported on 6/12/2025), Disp: 14 tablet, Rfl: 5

## 2025-06-11 NOTE — PROGRESS NOTES
SUBJECTIVE:   54 y.o. female   presents today for her 1st pellet No LMP recorded. Patient is perimenopausal..  She she had been on pellets in the past prior to her breast cancer diagnosis.  She had plan on stopping the aromatase inhibitor and the plan today was to do testosterone pellet and an aromatase inhibitor pellet 8 mg.  She met with Dr. Mahmood today and she was encouraged to restart a different aromatase inhibitor..    She reports that she felt great on hormones prior to her cancer diagnosis.  She feels that she has lost muscle and she also has questions about her diet and protein intake    Past Medical History:   Diagnosis Date    Abdominal bloating 2019    BRCA1 negative     BRCA2 negative     GERD (gastroesophageal reflux disease)     resolved with lap tono    Liver lesion 2019    Malignant neoplasm of upper-inner quadrant of right female breast      Past Surgical History:   Procedure Laterality Date    AXILLARY NODE DISSECTION Right 2024    Procedure: LYMPHADENECTOMY, AXILLARY / POSSIBLE RIGHT;  Surgeon: Kassi Mahmood MD;  Location: [facility];  Service: General;  Laterality: Right;    BILATERAL MASTECTOMY Bilateral 2024    Procedure: MASTECTOMY, BILATERAL;  Surgeon: Kassi Mahmood MD;  Location: [facility];  Service: General;  Laterality: Bilateral;  HIGH / TECH AND FROZEN    BREAST CYST EXCISION Left     18 y/o f     SECTION      CHOLECYSTECTOMY      45 year old    COLONOSCOPY N/A 2017    Procedure: COLONOSCOPY;  Surgeon: Andrews Sears MD;  Location: [address]);  Service: Endoscopy;  Laterality: N/A;    COLONOSCOPY N/A 2022    Procedure: COLONOSCOPY;  Surgeon: Andrews Sears MD;  Location: [facility] ([address] [place] [address]);  Service: Endoscopy;  Laterality: N/A;  Fully Vaccinated.EC    CYSTOSCOPY N/A 2019    Procedure: CYSTOSCOPY;  Surgeon: Debbie Murphy MD;  Location: [facility];  Service: OB/GYN;  Laterality: N/A;    EYE SURGERY  10/24/2023     strabismus surgery  bilateral    HYSTEROSCOPY N/A 09/17/2019    Procedure: HYSTEROSCOPY-removal of IUD; possible shaving of uterine fibroids if needed to insert new IUD;  Surgeon: Debbie Murphy MD;  Location: [facility] OR;  Service: OB/GYN;  Laterality: N/A;    INJECTION FOR SENTINEL NODE IDENTIFICATION Right 11/18/2024    Procedure: INJECTION, FOR SENTINEL NODE IDENTIFICATION;  Surgeon: Kassi Mahmood MD;  Location: [facility] OR;  Service: General;  Laterality: Right;    INJECTION, AGENT, INTRAVENOUS, FOR ASSESSMENT OF BLOOD FLOW IN FLAP OR GRAFT Bilateral 11/18/2024    Procedure: INJECTION, AGENT, INTRAVENOUS, FOR ASSESSMENT OF BLOOD FLOW IN FLAP OR GRAFT;  Surgeon: Amandeep Noel DO;  Location: [facility];  Service: General;  Laterality: Bilateral;    INSERTION OF BREAST TISSUE EXPANDER Bilateral 11/18/2024    Procedure: INSERTION, TISSUE EXPANDER, BREAST;  Surgeon: Amandeep Noel DO;  Location: [facility] OR;  Service: General;  Laterality: Bilateral;    INTRAUTERINE DEVICE INSERTION N/A 09/17/2019    Procedure: IUD insertion-- mirena-- would prefer kyleena if available;  Surgeon: Debbie Murphy MD;  Location: [facility] OR;  Service: OB/GYN;  Laterality: N/A;    LIPOSUCTION, WITH FAT GRAFTING TO TRUNK, BREASTS, SCALP, ARMS, AND/OR LEGS, 50CC OR LESS INJECTABLE Bilateral 4/4/2025    Procedure: LIPOSUCTION, WITH FAT GRAFTING TO TRUNK, BREASTS, SCALP, ARMS, AND/OR LEGS, 50CC OR LESS INJECTABLE;  Surgeon: Amandeep Noel DO;  Location: [facility] OR;  Service: Plastics;  Laterality: Bilateral;  Bilateral  lipo w fat grafting to breasts    REPLACEMENT OF IMPLANT OF BREAST Bilateral 4/4/2025    Procedure: REPLACEMENT, IMPLANT, BREAST;  Surgeon: Amandeep Noel DO;  Location: [facility] OR;  Service: Plastics;  Laterality: Bilateral;  bilat Implant exchange    SENTINEL LYMPH NODE BIOPSY Right 11/18/2024    Procedure: BIOPSY, LYMPH NODE, SENTINEL;  Surgeon: Kassi Mahmood MD;  Location: [facility];  Service: General;   Laterality: Right;     Social History[1]  Family History   Problem Relation Name Age of Onset    Parkinsonism Paternal Grandfather      Alzheimer's disease Paternal Grandmother      Cirrhosis Maternal Grandfather      Alcohol abuse Maternal Grandfather      Heart disease Father 72         MI around 50.    Hyperlipidemia Father 72     Parkinsonism Father 72     Cancer Mother 80 in  73        colon ca    Hypertension Mother 80 in      Arrhythmia Mother 80 in      No Known Problems Brother pt is 3rd     Cancer Sister  49        breast    Breast cancer Sister  48        breast    No Known Problems Son      Arrhythmia Son          SVT    Ovarian cancer Cousin pat first cousin     Cancer Cousin pat first cousin         ovarian and one with lung    Colon cancer Neg Hx      Uterine cancer Neg Hx      Cervical cancer Neg Hx       OB History    Para Term  AB Living   3 3 3      SAB IAB Ectopic Multiple Live Births             # Outcome Date GA Lbr Crow/2nd Weight Sex Type Anes PTL Lv   3 Term      CS-LTranv      2 Term      Vag-Spont      1 Term      Vag-Spont              Current Medications[2]  Allergies: Percocet [oxycodone-acetaminophen] and Vicodin [hydrocodone-acetaminophen]     The 10-year ASCVD risk score (Treasure HERNANDEZ, et al., 2019) is: 1.5%    Values used to calculate the score:      Age: 54 years      Sex: Female      Is Non- : No      Diabetic: No      Tobacco smoker: No      Systolic Blood Pressure: 112 mmHg      Is BP treated: No      HDL Cholesterol: 58 mg/dL      Total Cholesterol: 191 mg/dL      ROS:  Constitutional: no weight loss, +weight gain, fever, fatigue  Eyes:  No vision changes, glasses/contacts  ENT/Mouth: No ulcers, sinus problems, ears ringing, headache  Cardiovascular: No inability to lie flat, chest pain, exercise intolerance, swelling, heart palpitations  Respiratory: No wheezing, coughing blood, shortness of breath, or cough  Gastrointestinal: No  diarrhea, bloody stool, nausea/vomiting, constipation, gas, hemorrhoids  Genitourinary: No blood in urine, painful urination, urgency of urination, frequency of urination, incomplete emptying, incontinence, abnormal bleeding, painful periods, heavy periods, vaginal discharge, vaginal odor, painful intercourse, sexual problems, bleeding after intercourse.  Musculoskeletal: No muscle weakness  Skin/Breast: No painful breasts, nipple discharge, masses, rash, ulcers  Neurological: No passing out, seizures, numbness, headache  Endocrine: No diabetes, hypothyroid, hyperthyroid, +hot flashes, hair loss, abnormal hair growth, acne  Psychiatric: No depression, crying  Hematologic: No bruises, bleeding, swollen lymph nodes, anemia.      Physical Exam  deferred    ASSESSMENT:   Breast cancer  menopause    PLAN:   Counseled her that I will only place a testosterone pellet 100 mg today.  If she decides not to go on the aromatase inhibitor for months without an aromatase inhibitor pellet in place will be okay.  Going forward however if she is not on aromatase inhibitor orally we will place an aromatase inhibitor pellet.  Counseled her on increase protein intake as well as eating earlier in the day.  Counseled her on use of creatinine and strength training.  Encouraged her to not work out at a fasted state.         [1]   Social History  Socioeconomic History    Marital status:      Spouse name: Dennis    Number of children: 3   Tobacco Use    Smoking status: Never    Smokeless tobacco: Never   Substance and Sexual Activity    Alcohol use: Yes     Alcohol/week: 1.0 standard drink of alcohol     Types: 1 Glasses of wine per week     Comment: amt varies     Drug use: No    Sexual activity: Yes     Partners: Male   Social History Narrative    From  [place]     Moved to [facility] 1998    Exercising - runs,  wts, yoga, walks.        Kids 22, 20, 18.          Retired  and .      - .          Social Drivers of Health     Financial Resource Strain: Low Risk  (2/14/2025)    Overall Financial Resource Strain (CARDIA)     Difficulty of Paying Living Expenses: Not hard at all   Food Insecurity: No Food Insecurity (2/14/2025)    Hunger Vital Sign     Worried About Running Out of Food in the Last Year: Never true     Ran Out of Food in the Last Year: Never true   Transportation Needs: No Transportation Needs (2/14/2025)    PRAPARE - Transportation     Lack of Transportation (Medical): No     Lack of Transportation (Non-Medical): No   Physical Activity: Sufficiently Active (2/14/2025)    Exercise Vital Sign     Days of Exercise per Week: 7 days     Minutes of Exercise per Session: 150+ min   Stress: Stress Concern Present (2/14/2025)    [employer] - Occupational Stress Questionnaire     Feeling of Stress : To some extent   Housing Stability: Low Risk  (2/14/2025)    Housing Stability Vital Sign     Unable to Pay for Housing in the Last Year: No     Number of Times Moved in the Last Year: 0     Homeless in the Last Year: No   [2]   Current Outpatient Medications   Medication Sig Dispense Refill    acetaminophen (TYLENOL) 500 MG tablet Take 1 tablet (500 mg total) by mouth every 6 (six) hours. 30 tablet 1    anastrozole (ARIMIDEX) 1 mg Tab Take 1 tablet (1 mg total) by mouth once daily. 30 tablet 11    ascorbic acid, vitamin C, (VITAMIN C) 1000 MG tablet Take 1,000 mg by mouth every evening.      b complex vitamins capsule Take 1 capsule by mouth every evening.      collagen, bovine, 100 % Powd Apply topically.      cyclobenzaprine (FLEXERIL) 10 MG tablet Take 1 tablet (10 mg total) by mouth nightly as needed for Muscle spasms. 30 tablet 2    estradioL (IMVEXXY MAINTENANCE PACK) 10 mcg Inst Place 10 mcg vaginally twice a week. 8 each 11    estradioL (IMVEXXY MAINTENANCE PACK) 10 mcg Inst Place 10 mcg vaginally twice a week. 8 each 11    gabapentin (NEURONTIN) 300 MG capsule Take 1  capsule (300 mg total) by mouth every 8 (eight) hours. for 7 days 21 capsule 0    ibuprofen (ADVIL,MOTRIN) 600 MG tablet Take 1 tablet (600 mg total) by mouth 3 (three) times daily. 30 tablet 1    INV TESTOSTERONE/ANASTROZOL OR PLACEBO PELLETS Inject 1 Pellet into the skin. FOR INVESTIGATIONAL USE ONLY      Lactobacillus rhamnosus GG (CULTURELLE) 10 billion cell capsule Take 1 capsule by mouth every evening.      magnesium 30 mg Tab Take by mouth every evening.      meloxicam (MOBIC) 15 MG tablet Take 1 tablet (15 mg total) by mouth once daily. 30 tablet 0    omega-3 fatty acids/fish oil (FISH OIL-OMEGA-3 FATTY ACIDS) 300-1,000 mg capsule Take 1 capsule by mouth once daily.      ondansetron (ZOFRAN) 4 MG tablet Take 2 tablets (8 mg total) by mouth every 6 (six) hours as needed for Nausea. 30 tablet 1    oxybutynin (DITROPAN-XL) 5 MG TR24 Take 1 tablet (5 mg total) by mouth once daily. 30 tablet 11    oxyCODONE (ROXICODONE) 5 MG immediate release tablet Take 1 tablet (5 mg total) by mouth every 6 (six) hours as needed for Pain. 10 tablet 0    oxyCODONE (ROXICODONE) 5 MG immediate release tablet Take 1 tablet (5 mg total) by mouth every 4 (four) hours as needed for Pain. 10 tablet 0    pregabalin (LYRICA) 75 MG capsule Take 1 capsule (75 mg total) by mouth 2 (two) times daily. 60 capsule 0    traMADoL (ULTRAM) 50 mg tablet Take 1 tablet (50 mg total) by mouth every 6 (six) hours as needed for Pain. 20 tablet 0    valACYclovir (VALTREX) 1000 MG tablet Take 0.5 tablets (500 mg total) by mouth every 12 (twelve) hours. 14 tablet 5    vitamin D (VITAMIN D3) 1000 units Tab Take 1,000 Units by mouth every evening.       No current facility-administered medications for this visit.

## 2025-06-12 ENCOUNTER — OFFICE VISIT (OUTPATIENT)
Dept: HEMATOLOGY/ONCOLOGY | Facility: CLINIC | Age: 55
End: 2025-06-12
Payer: COMMERCIAL

## 2025-06-12 ENCOUNTER — TELEPHONE (OUTPATIENT)
Dept: SPORTS MEDICINE | Facility: CLINIC | Age: 55
End: 2025-06-12
Payer: COMMERCIAL

## 2025-06-12 ENCOUNTER — HOSPITAL ENCOUNTER (OUTPATIENT)
Dept: RADIOLOGY | Facility: HOSPITAL | Age: 55
Discharge: HOME | End: 2025-06-12
Attending: ORTHOPAEDIC SURGERY
Payer: COMMERCIAL

## 2025-06-12 ENCOUNTER — OFFICE VISIT (OUTPATIENT)
Dept: SPORTS MEDICINE | Facility: CLINIC | Age: 55
End: 2025-06-12
Payer: COMMERCIAL

## 2025-06-12 VITALS
HEART RATE: 62 BPM | SYSTOLIC BLOOD PRESSURE: 99 MMHG | HEIGHT: 65 IN | DIASTOLIC BLOOD PRESSURE: 68 MMHG | WEIGHT: 123.38 LBS | BODY MASS INDEX: 20.55 KG/M2

## 2025-06-12 DIAGNOSIS — Z17.0 MALIGNANT NEOPLASM OF UPPER-INNER QUADRANT OF RIGHT BREAST IN FEMALE, ESTROGEN RECEPTOR POSITIVE: Primary | ICD-10-CM

## 2025-06-12 DIAGNOSIS — N95.1 MENOPAUSAL SYMPTOM: ICD-10-CM

## 2025-06-12 DIAGNOSIS — C50.211 MALIGNANT NEOPLASM OF UPPER-INNER QUADRANT OF RIGHT BREAST IN FEMALE, ESTROGEN RECEPTOR POSITIVE: Primary | ICD-10-CM

## 2025-06-12 DIAGNOSIS — G89.29 CHRONIC RIGHT SHOULDER PAIN: ICD-10-CM

## 2025-06-12 DIAGNOSIS — M75.01 ADHESIVE CAPSULITIS OF RIGHT SHOULDER: Primary | ICD-10-CM

## 2025-06-12 DIAGNOSIS — M25.511 CHRONIC RIGHT SHOULDER PAIN: ICD-10-CM

## 2025-06-12 PROCEDURE — 73020 X-RAY EXAM OF SHOULDER: CPT | Mod: 26,RT,, | Performed by: RADIOLOGY

## 2025-06-12 PROCEDURE — 73020 X-RAY EXAM OF SHOULDER: CPT | Mod: TC,RT

## 2025-06-12 PROCEDURE — 99999 PR PBB SHADOW E&M-EST. PATIENT-LVL V: CPT | Mod: PBBFAC,,, | Performed by: ORTHOPAEDIC SURGERY

## 2025-06-12 RX ORDER — TRIAMCINOLONE ACETONIDE 40 MG/ML
40 INJECTION, SUSPENSION INTRA-ARTICULAR; INTRAMUSCULAR
Status: DISCONTINUED | OUTPATIENT
Start: 2025-06-12 | End: 2025-06-12 | Stop reason: HOSPADM

## 2025-06-12 RX ORDER — EXEMESTANE 25 MG/1
25 TABLET ORAL DAILY
Qty: 30 TABLET | Refills: 11 | Status: SHIPPED | OUTPATIENT
Start: 2025-06-12 | End: 2026-06-12

## 2025-06-12 RX ORDER — METHYLPREDNISOLONE 4 MG/1
TABLET ORAL
Qty: 1 EACH | Refills: 0 | Status: SHIPPED | OUTPATIENT
Start: 2025-06-12

## 2025-06-12 RX ORDER — CELECOXIB 200 MG/1
200 CAPSULE ORAL DAILY
Qty: 60 CAPSULE | Refills: 1 | Status: SHIPPED | OUTPATIENT
Start: 2025-06-12

## 2025-06-12 RX ADMIN — TRIAMCINOLONE ACETONIDE 40 MG: 40 INJECTION, SUSPENSION INTRA-ARTICULAR; INTRAMUSCULAR at 11:06

## 2025-06-12 NOTE — PROGRESS NOTES
The patient location is: [place]  The chief complaint leading to consultation is: breast cancer        Each patient to whom he or she provides medical services by telemedicine is:  (1) informed of the relationship between the physician and patient and the respective role of any other health care provider with respect to management of the patient; and (2) notified that he or she may decline to receive medical services by telemedicine and may withdraw from such care at any time.    Notes:     [facility]/ [facility] and [facility]   at [facility] Note:      Chief Complaint:   Encounter Diagnoses   Name Primary?    Malignant neoplasm of upper-inner quadrant of right breast in female, estrogen receptor positive Yes    Menopausal symptom             Cancer Staging   Malignant neoplasm of upper-inner quadrant of right breast in female, estrogen receptor positive  Staging form: Breast, AJCC 8th Edition  - Pathologic stage from 11/18/2024: Stage IA (pT1c, pN0, cM0, G1, ER+, WY+, HER2-, Oncotype DX score: 24) - Signed by Kaylie Dowd MD on 12/19/2024      HPI:  Louise Cueto is a 54 y.o. female who presents today breast cancer follow up. She stopped her anastrozole but discontinued due to arthralgias, mood lability, and night sweats. Has also noticed some weight gain.   She follows with Dr Chen and started on testosterone injections.     Oncology history  Patient noted a mass in the right breast October 2024.   Diagnostic mammogram on 10/17/24 showed extremely dense breast tissue without a discrete mass  Ultrasound 10/17/24 demonstrated an irregular hypoechoic mass measuring 10 x 8 x 7 mm in the 1 o'clock position 3 cm from the nipple    Biopsy 10/29/24: IDC, grade 1, ER 90%/ WY 20%/ HER2 2+ (ARCHIE negative); Ki67 3%    Breast MRI (11/1/24) showed a 1 x 1 x 1.6 cm irregularly shaped mass with irregular margins and heterogeneous internal enhancement seen in the upper inner  quadrant of the right breast at 1 o'clock in the posterior depth, 4 cm from the nipple, 1.7 cm from the skin, and 0.2 cm from the chest wall.      Bilateral mastectomy 24: IDC, grade 1, 1.7cm; 0/2 LN+   Oncotype RS 24: RR 10%    Started anastrozole 2024, self-discontinued May 2025        GYN History:  Age of menarche was 14.   Premenopausal. LMP 2024. Patient admits to hormonal therapy (testosterone pellets, estrogen pellets, and progesterone oral) with Dr Mix, last 2024  Patient is   Age of first live birth was 28. Patient did breast feed.      Patient Active Problem List   Diagnosis    Liver lesion    Mixed stress and urge urinary incontinence    Abnormal pelvic ultrasound    Status post hysteroscopy with hysteroscopic myomectomy & IUD Removal and Insertion    Chronic right shoulder pain    Right shoulder pain    Diplopia    Esotropia of both eyes    Myopia    Anisometropia    Malignant neoplasm of upper-inner quadrant of right breast in female, estrogen receptor positive    Physical deconditioning    Stress and adjustment reaction    Decreased range of motion of shoulder    Weakness of both upper extremities    At risk for lymphedema    Poor posture    Other low back pain    Alteration in body image    Decreased ROM of right shoulder       Current Outpatient Medications   Medication Instructions    acetaminophen (TYLENOL) 500 mg, Oral, Every 6 hours    ascorbic acid (vitamin C) (VITAMIN C) 1,000 mg, Nightly    b complex vitamins capsule 1 capsule, Nightly    collagen, bovine, 100 % Powd Apply topically.    cyclobenzaprine (FLEXERIL) 10 mg, Oral, Nightly PRN    exemestane (AROMASIN) 25 mg, Oral, Daily    gabapentin (NEURONTIN) 300 mg, Oral, Every 8 hours    ibuprofen (ADVIL,MOTRIN) 600 mg, Oral, 3 times daily    IMVEXXY MAINTENANCE PACK 10 mcg, Vaginal, Twice weekly    IMVEXXY MAINTENANCE PACK 10 mcg, Vaginal, Twice weekly    INV TESTOSTERONE/ANASTROZOL OR PLACEBO PELLETS 1 Pellet     Lactobacillus rhamnosus GG (CULTURELLE) 10 billion cell capsule 1 capsule, Nightly    magnesium 30 mg Tab Nightly    meloxicam (MOBIC) 15 mg, Oral, Daily    omega-3 fatty acids/fish oil (FISH OIL-OMEGA-3 FATTY ACIDS) 300-1,000 mg capsule 1 capsule, Daily    ondansetron (ZOFRAN) 8 mg, Oral, Every 6 hours PRN    oxybutynin (DITROPAN-XL) 5 mg, Oral, Daily    oxyCODONE (ROXICODONE) 5 mg, Oral, Every 6 hours PRN    oxyCODONE (ROXICODONE) 5 mg, Oral, Every 4 hours PRN    pregabalin (LYRICA) 75 mg, Oral, 2 times daily    traMADoL (ULTRAM) 50 mg, Oral, Every 6 hours PRN    valACYclovir (VALTREX) 500 mg, Oral, Every 12 hours    vitamin D (VITAMIN D3) 1,000 Units, Nightly       Review of Systems:   Review of Systems   Constitutional:  Positive for diaphoresis. Negative for fever, malaise/fatigue and weight loss.   HENT: Negative.  Negative for nosebleeds.    Respiratory: Negative.     Cardiovascular: Negative.    Gastrointestinal: Negative.    Genitourinary:         Hot flashes   Musculoskeletal: Negative.         Arthralgias   Neurological: Negative.  Negative for dizziness, weakness and headaches.   All other systems reviewed and are negative.      PHYSICAL EXAM:  There were no vitals taken for this visit.  Virtual visits      Pertinent Labs & Imaging:  Pathology Results  (Last 30 days)      None            No results found for this or any previous visit (from the past 24 hours).    Assessment & Plan:    1. Malignant neoplasm of upper-inner quadrant of right breast in female, estrogen receptor positive  - exemestane (AROMASIN) 25 mg tablet; Take 1 tablet (25 mg total) by mouth once daily.  Dispense: 30 tablet; Refill: 11    2. Menopausal symptom  - exemestane (AROMASIN) 25 mg tablet; Take 1 tablet (25 mg total) by mouth once daily.  Dispense: 30 tablet; Refill: 11        Patient with Stage I ER+ breast cancer with low risk oncotype for patients >51yo (RS 24)  Labs confirmed postmenopausal status  Intolerant to  anastrozole. Will change to exemestane. Follows with Dr. Chen for side effects  DXA scan in Nov 2025    Per NCCN Guidelines, breast cancer patients should be followed every 3-12 months for the first five years and then annually thereafter with annual mammography if mastectomy or breast reconstruction was not undertaken. At this time, there is no indication for routine laboratory or imaging in the surveillance for metastatic disease, unless clinical signs or symptoms arise.    Healthy diet, low alcohol intake, and an active lifestyle, with a goal of an ideal BMI (20-25) is also encouraged to reduce the risk of breast cancer occurrences and recurrences, as well as improve side effects to anti-estrogen medications      Patient is in agreement with the proposed treatment plan. All questions were answered to the patient's satisfaction. Patient knows to call clinic for any new or worsening symptoms and if anything is needed before the next clinic visit.           code applied: patient requires or will require a continuous, longitudinal, and active collaborative plan of care related to this patient's health condition, breast cancer --the management of which requires the direction of a practitioner with specialized clinical knowledge, skill, and expertise.      Route Chart for Scheduling  Med Onc Route Chart for Scheduling         MDM includes  :    - Acute or chronic illness or injury that poses a threat to life or bodily function  - Extensive discussion of treatment and management  - Prescription drug management  - Drug therapy requiring intensive monitoring for toxicity        Kaylie Dowd MD   06/12/2025

## 2025-06-13 ENCOUNTER — PATIENT MESSAGE (OUTPATIENT)
Dept: OBSTETRICS AND GYNECOLOGY | Facility: CLINIC | Age: 55
End: 2025-06-13
Payer: COMMERCIAL

## 2025-06-13 ENCOUNTER — TELEPHONE (OUTPATIENT)
Dept: OBSTETRICS AND GYNECOLOGY | Facility: CLINIC | Age: 55
End: 2025-06-13
Payer: COMMERCIAL

## 2025-06-13 RX ORDER — SULFAMETHOXAZOLE AND TRIMETHOPRIM 800; 160 MG/1; MG/1
1 TABLET ORAL 2 TIMES DAILY
Qty: 10 TABLET | Refills: 0 | Status: SHIPPED | OUTPATIENT
Start: 2025-06-13 | End: 2025-06-18

## 2025-06-13 RX ORDER — MUPIROCIN 20 MG/G
OINTMENT TOPICAL 3 TIMES DAILY
Qty: 30 G | Refills: 0 | Status: SHIPPED | OUTPATIENT
Start: 2025-06-13

## 2025-06-13 NOTE — TELEPHONE ENCOUNTER
Patient reports tenderness around the incision where pellet was placed. Steri strips came off.   She denies fever or purulent discharge.   Will send in Bactrim DS twice daily for 5 days and Bactroband ointment

## 2025-06-16 ENCOUNTER — PATIENT MESSAGE (OUTPATIENT)
Dept: OBSTETRICS AND GYNECOLOGY | Facility: CLINIC | Age: 55
End: 2025-06-16
Payer: COMMERCIAL

## 2025-06-16 ENCOUNTER — PATIENT MESSAGE (OUTPATIENT)
Dept: REHABILITATION | Facility: HOSPITAL | Age: 55
End: 2025-06-16
Payer: COMMERCIAL

## 2025-06-27 ENCOUNTER — PATIENT MESSAGE (OUTPATIENT)
Dept: OBSTETRICS AND GYNECOLOGY | Facility: CLINIC | Age: 55
End: 2025-06-27
Payer: COMMERCIAL

## 2025-06-27 DIAGNOSIS — N89.8 VAGINAL DRYNESS: ICD-10-CM

## 2025-06-28 RX ORDER — ESTRADIOL 10 UG/1
10 INSERT VAGINAL
Qty: 8 EACH | Refills: 11 | Status: SHIPPED | OUTPATIENT
Start: 2025-06-30

## 2025-07-13 ENCOUNTER — PATIENT MESSAGE (OUTPATIENT)
Dept: HEMATOLOGY/ONCOLOGY | Facility: CLINIC | Age: 55
End: 2025-07-13
Payer: COMMERCIAL

## 2025-07-30 ENCOUNTER — PATIENT MESSAGE (OUTPATIENT)
Dept: HEMATOLOGY/ONCOLOGY | Facility: CLINIC | Age: 55
End: 2025-07-30
Payer: COMMERCIAL

## 2025-08-26 ENCOUNTER — PATIENT MESSAGE (OUTPATIENT)
Dept: OBSTETRICS AND GYNECOLOGY | Facility: CLINIC | Age: 55
End: 2025-08-26
Payer: COMMERCIAL

## 2025-08-27 ENCOUNTER — TELEPHONE (OUTPATIENT)
Dept: OBSTETRICS AND GYNECOLOGY | Facility: HOSPITAL | Age: 55
End: 2025-08-27
Payer: COMMERCIAL

## 2025-08-27 DIAGNOSIS — L65.9 HAIR LOSS: Primary | ICD-10-CM

## 2025-08-27 RX ORDER — FEZOLINETANT 45 MG/1
45 TABLET, FILM COATED ORAL DAILY
Qty: 30 TABLET | Refills: 11 | Status: SHIPPED | OUTPATIENT
Start: 2025-08-27

## 2025-09-02 DIAGNOSIS — Z17.0 MALIGNANT NEOPLASM OF UPPER-INNER QUADRANT OF RIGHT BREAST IN FEMALE, ESTROGEN RECEPTOR POSITIVE: Primary | ICD-10-CM

## 2025-09-02 DIAGNOSIS — R61 NIGHT SWEATS: ICD-10-CM

## 2025-09-02 DIAGNOSIS — C50.211 MALIGNANT NEOPLASM OF UPPER-INNER QUADRANT OF RIGHT BREAST IN FEMALE, ESTROGEN RECEPTOR POSITIVE: Primary | ICD-10-CM

## 2025-09-02 DIAGNOSIS — R23.2 HOT FLASHES: ICD-10-CM

## 2025-09-02 RX ORDER — CELECOXIB 200 MG/1
200 CAPSULE ORAL DAILY
Qty: 60 CAPSULE | Refills: 1 | Status: SHIPPED | OUTPATIENT
Start: 2025-09-02

## 2025-09-02 RX ORDER — FEZOLINETANT 45 MG/1
45 TABLET, FILM COATED ORAL DAILY
Qty: 30 TABLET | Refills: 11 | Status: SHIPPED | OUTPATIENT
Start: 2025-09-02

## (undated) DEVICE — DRAPE STERI INSTRUMENT 1018

## (undated) DEVICE — PAD ABDOMINAL STERILE 8X10IN

## (undated) DEVICE — SOL VASHE INSTILLATION WND 16

## (undated) DEVICE — GOWN NONREINF SET-IN SLV XL

## (undated) DEVICE — DEVICE PLASMABLADE X 3.0S LT

## (undated) DEVICE — POSITIONER HEEL FOAM CONVOLTD

## (undated) DEVICE — APPLICATOR CHLORAPREP ORN 26ML

## (undated) DEVICE — Device

## (undated) DEVICE — SUT MONOCRYL 3-0 PS-2 UND

## (undated) DEVICE — SUT STRATAFIX PGAPCL 3 FS-1

## (undated) DEVICE — GLOVE BIOGEL SKINSENSE PI 7.0

## (undated) DEVICE — DRAPE THREE-QTR REINF 53X77IN

## (undated) DEVICE — BANDAGE GAUZE COT STRL 4.5X4.1

## (undated) DEVICE — PENCIL ELECTROSURG HOLST W/BLD

## (undated) DEVICE — SPONGE BULKEE II ABSRB 6X6.75

## (undated) DEVICE — GLOVE BIOGEL SKINSENSE PI 6.0

## (undated) DEVICE — NDL ECLIPSE SAFETY 23G 1.5IN

## (undated) DEVICE — GOWN SURGICAL X-LARGE

## (undated) DEVICE — APPLIER CLIP LIAGCLIP 9.375IN

## (undated) DEVICE — HYSTEROSCOPE TRUCLEAR ELITE

## (undated) DEVICE — GLOVE BIOGEL SKINSENSE PI 6.5

## (undated) DEVICE — HOLDER DRAIN POUCH PINK

## (undated) DEVICE — SUT 2/0 27IN PDS II VIO MO

## (undated) DEVICE — SUT STRATAFIX PGAPCL 3 FS-2

## (undated) DEVICE — ELECTRODE BLADE INSULATED 1 IN

## (undated) DEVICE — GOWN ORBIS LVL 4 XXL 49IN

## (undated) DEVICE — CORD BIPOLAR 12 FOOT

## (undated) DEVICE — SUT MONOCRYL 4-0 PS-2

## (undated) DEVICE — SOL BETADINE 5%

## (undated) DEVICE — DRAIN CHANNEL ROUND 15FR

## (undated) DEVICE — SUT PDS II 3-0 SH 36IN

## (undated) DEVICE — SET INFLOW TUBE HYSTER

## (undated) DEVICE — COVER LIGHT HANDLE 80/CA

## (undated) DEVICE — STAPLER SKIN ROTATING HEAD

## (undated) DEVICE — SUT VICRYL PLUS 2-0 CT1 18

## (undated) DEVICE — SUT 3-0 ETHILON 18 FS-1

## (undated) DEVICE — SUT VICRYL 3-0 27 SH

## (undated) DEVICE — POSITIONER IV ARMBOARD FOAM

## (undated) DEVICE — SOL 9P NACL IRR PIC IL

## (undated) DEVICE — TOWEL OR XRAY BLUE 17X26IN

## (undated) DEVICE — UNDERGLOVES BIOGEL PI SZ 7 LF

## (undated) DEVICE — CONTAINER SPEC OR STRL 4.5OZ

## (undated) DEVICE — SUT PDS II 2-0 CT1

## (undated) DEVICE — SOL IRR NACL .9% 3000ML

## (undated) DEVICE — SUT 6/0 18IN COATED VICRYL

## (undated) DEVICE — SUT MCRYL PLUS 3-0 PS2 27IN

## (undated) DEVICE — SOL NORMAL USPCA 0.9%

## (undated) DEVICE — MANIFOLD 4 PORT

## (undated) DEVICE — TRAY MUSCLE LID EYE

## (undated) DEVICE — ELECTRODE BLD EXT INSUL 1

## (undated) DEVICE — ELECTRODE REM PLYHSV RETURN 9

## (undated) DEVICE — APPLIER LIGACLIP SM 9.38IN

## (undated) DEVICE — ADHESIVE DERMABOND ADVANCED

## (undated) DEVICE — EVACUATOR WOUND BULB 100CC

## (undated) DEVICE — UNDERGLOVE BIOGEL PI SZ 6.5 LF

## (undated) DEVICE — REMOVER STAPLE SKIN STERILE

## (undated) DEVICE — SEE MEDLINE ITEM 146313

## (undated) DEVICE — TRAY CATH 1-LYR URIMTR 16FR

## (undated) DEVICE — SUT 2/0 30IN SILK BLK BRAI

## (undated) DEVICE — SUT MONOCYRL 4-0 PS2 UND

## (undated) DEVICE — DRESSING TRANS 2X2 TEGADERM

## (undated) DEVICE — GLOVE BIOGEL SKINSENSE PI 7.5

## (undated) DEVICE — TOWEL OR DISP STRL BLUE 4/PK

## (undated) DEVICE — DEVICE TRUECLEAR INCISOR 2.9

## (undated) DEVICE — SYR ONLY LUER LOCK 20CC

## (undated) DEVICE — MARKER FN REG DUAL UTIL RULER

## (undated) DEVICE — SUT 6/0 18IN PLAIN GUT D/A

## (undated) DEVICE — TRAY CYSTO BASIN

## (undated) DEVICE — SUT ETHILON 5-0 PS-2 18IN

## (undated) DEVICE — SYR 10CC LUER LOCK

## (undated) DEVICE — SPONGE LAP 18X18 PREWASHED

## (undated) DEVICE — FUNNEL KELLER STERILE

## (undated) DEVICE — SYS CLSR DERMABOND PRINEO 42CM

## (undated) DEVICE — DEVICE TRUCLEAR ULTRA MINI

## (undated) DEVICE — SUT VICRYL PLUS 4-0 PS2 27

## (undated) DEVICE — TIP YANKAUERS BULB NO VENT

## (undated) DEVICE — FORCEP CURVED DISP

## (undated) DEVICE — DRESSING CHG FOAM 4MM 1IN

## (undated) DEVICE — SYS CLSR DERMABOND PRINEO 22CM

## (undated) DEVICE — WAVEGUIDE BRITEFIELD DISP

## (undated) DEVICE — SOL IRRI STRL WATER 1000ML

## (undated) DEVICE — DRAPE STRABISMUS STRL 40X48IN

## (undated) DEVICE — GLOVE SENSICARE PI ALOE 7

## (undated) DEVICE — SET BASIN 48X48IN 6000ML RING